# Patient Record
Sex: FEMALE | Race: OTHER | HISPANIC OR LATINO | URBAN - METROPOLITAN AREA
[De-identification: names, ages, dates, MRNs, and addresses within clinical notes are randomized per-mention and may not be internally consistent; named-entity substitution may affect disease eponyms.]

---

## 2021-05-27 ENCOUNTER — INPATIENT (INPATIENT)
Facility: HOSPITAL | Age: 46
LOS: 2 days | Discharge: HOME | DRG: 744 | End: 2021-05-30
Attending: OBSTETRICS & GYNECOLOGY | Admitting: OBSTETRICS & GYNECOLOGY
Payer: MEDICAID

## 2021-05-27 VITALS
TEMPERATURE: 100 F | WEIGHT: 194.01 LBS | HEART RATE: 136 BPM | HEIGHT: 64 IN | RESPIRATION RATE: 16 BRPM | SYSTOLIC BLOOD PRESSURE: 140 MMHG | OXYGEN SATURATION: 97 % | DIASTOLIC BLOOD PRESSURE: 85 MMHG

## 2021-05-27 LAB
ALBUMIN SERPL ELPH-MCNC: 3.8 G/DL — SIGNIFICANT CHANGE UP (ref 3.3–5)
ALP SERPL-CCNC: 122 U/L — HIGH (ref 40–120)
ALT FLD-CCNC: 13 U/L — SIGNIFICANT CHANGE UP (ref 10–45)
ANION GAP SERPL CALC-SCNC: 15 MMOL/L — SIGNIFICANT CHANGE UP (ref 5–17)
AST SERPL-CCNC: 11 U/L — SIGNIFICANT CHANGE UP (ref 10–40)
BASE EXCESS BLDV CALC-SCNC: 3.9 MMOL/L — HIGH (ref -2–2)
BASOPHILS # BLD AUTO: 0.03 K/UL — SIGNIFICANT CHANGE UP (ref 0–0.2)
BASOPHILS NFR BLD AUTO: 0.2 % — SIGNIFICANT CHANGE UP (ref 0–2)
BILIRUB SERPL-MCNC: 0.4 MG/DL — SIGNIFICANT CHANGE UP (ref 0.2–1.2)
BUN SERPL-MCNC: 10 MG/DL — SIGNIFICANT CHANGE UP (ref 7–23)
CA-I SERPL-SCNC: 1.17 MMOL/L — SIGNIFICANT CHANGE UP (ref 1.12–1.3)
CALCIUM SERPL-MCNC: 9.8 MG/DL — SIGNIFICANT CHANGE UP (ref 8.4–10.5)
CHLORIDE BLDV-SCNC: 101 MMOL/L — SIGNIFICANT CHANGE UP (ref 96–108)
CHLORIDE SERPL-SCNC: 98 MMOL/L — SIGNIFICANT CHANGE UP (ref 96–108)
CO2 BLDV-SCNC: 30 MMOL/L — SIGNIFICANT CHANGE UP (ref 22–30)
CO2 SERPL-SCNC: 24 MMOL/L — SIGNIFICANT CHANGE UP (ref 22–31)
CREAT SERPL-MCNC: 0.88 MG/DL — SIGNIFICANT CHANGE UP (ref 0.5–1.3)
EOSINOPHIL # BLD AUTO: 0.02 K/UL — SIGNIFICANT CHANGE UP (ref 0–0.5)
EOSINOPHIL NFR BLD AUTO: 0.1 % — SIGNIFICANT CHANGE UP (ref 0–6)
GAS PNL BLDV: 137 MMOL/L — SIGNIFICANT CHANGE UP (ref 135–145)
GAS PNL BLDV: SIGNIFICANT CHANGE UP
GLUCOSE BLDV-MCNC: 111 MG/DL — HIGH (ref 70–99)
GLUCOSE SERPL-MCNC: 112 MG/DL — HIGH (ref 70–99)
HCG SERPL-ACNC: <2 MIU/ML — SIGNIFICANT CHANGE UP
HCO3 BLDV-SCNC: 29 MMOL/L — SIGNIFICANT CHANGE UP (ref 21–29)
HCT VFR BLD CALC: 37.9 % — SIGNIFICANT CHANGE UP (ref 34.5–45)
HCT VFR BLDA CALC: 42 % — SIGNIFICANT CHANGE UP (ref 39–50)
HGB BLD CALC-MCNC: 13.6 G/DL — SIGNIFICANT CHANGE UP (ref 11.5–15.5)
HGB BLD-MCNC: 12.5 G/DL — SIGNIFICANT CHANGE UP (ref 11.5–15.5)
IMM GRANULOCYTES NFR BLD AUTO: 0.4 % — SIGNIFICANT CHANGE UP (ref 0–1.5)
LACTATE BLDV-MCNC: 1.7 MMOL/L — SIGNIFICANT CHANGE UP (ref 0.7–2)
LYMPHOCYTES # BLD AUTO: 1.47 K/UL — SIGNIFICANT CHANGE UP (ref 1–3.3)
LYMPHOCYTES # BLD AUTO: 9.7 % — LOW (ref 13–44)
MCHC RBC-ENTMCNC: 28.5 PG — SIGNIFICANT CHANGE UP (ref 27–34)
MCHC RBC-ENTMCNC: 33 GM/DL — SIGNIFICANT CHANGE UP (ref 32–36)
MCV RBC AUTO: 86.3 FL — SIGNIFICANT CHANGE UP (ref 80–100)
MONOCYTES # BLD AUTO: 0.84 K/UL — SIGNIFICANT CHANGE UP (ref 0–0.9)
MONOCYTES NFR BLD AUTO: 5.5 % — SIGNIFICANT CHANGE UP (ref 2–14)
NEUTROPHILS # BLD AUTO: 12.76 K/UL — HIGH (ref 1.8–7.4)
NEUTROPHILS NFR BLD AUTO: 84.1 % — HIGH (ref 43–77)
NRBC # BLD: 0 /100 WBCS — SIGNIFICANT CHANGE UP (ref 0–0)
PCO2 BLDV: 47 MMHG — SIGNIFICANT CHANGE UP (ref 35–50)
PH BLDV: 7.41 — SIGNIFICANT CHANGE UP (ref 7.35–7.45)
PLATELET # BLD AUTO: 315 K/UL — SIGNIFICANT CHANGE UP (ref 150–400)
PO2 BLDV: <20 MMHG — LOW (ref 25–45)
POTASSIUM BLDV-SCNC: 3.4 MMOL/L — LOW (ref 3.5–5.3)
POTASSIUM SERPL-MCNC: 3.8 MMOL/L — SIGNIFICANT CHANGE UP (ref 3.5–5.3)
POTASSIUM SERPL-SCNC: 3.8 MMOL/L — SIGNIFICANT CHANGE UP (ref 3.5–5.3)
PROT SERPL-MCNC: 7.8 G/DL — SIGNIFICANT CHANGE UP (ref 6–8.3)
RBC # BLD: 4.39 M/UL — SIGNIFICANT CHANGE UP (ref 3.8–5.2)
RBC # FLD: 13.4 % — SIGNIFICANT CHANGE UP (ref 10.3–14.5)
SAO2 % BLDV: 30 % — LOW (ref 67–88)
SODIUM SERPL-SCNC: 137 MMOL/L — SIGNIFICANT CHANGE UP (ref 135–145)
WBC # BLD: 15.18 K/UL — HIGH (ref 3.8–10.5)
WBC # FLD AUTO: 15.18 K/UL — HIGH (ref 3.8–10.5)

## 2021-05-27 PROCEDURE — 93010 ELECTROCARDIOGRAM REPORT: CPT

## 2021-05-27 PROCEDURE — 99285 EMERGENCY DEPT VISIT HI MDM: CPT

## 2021-05-27 RX ORDER — ACETAMINOPHEN 500 MG
975 TABLET ORAL ONCE
Refills: 0 | Status: COMPLETED | OUTPATIENT
Start: 2021-05-27 | End: 2021-05-27

## 2021-05-27 RX ORDER — SODIUM CHLORIDE 9 MG/ML
1000 INJECTION, SOLUTION INTRAVENOUS ONCE
Refills: 0 | Status: COMPLETED | OUTPATIENT
Start: 2021-05-27 | End: 2021-05-27

## 2021-05-27 RX ORDER — SODIUM CHLORIDE 9 MG/ML
1000 INJECTION, SOLUTION INTRAVENOUS
Refills: 0 | Status: DISCONTINUED | OUTPATIENT
Start: 2021-05-27 | End: 2021-05-29

## 2021-05-27 RX ORDER — VANCOMYCIN HCL 1 G
1750 VIAL (EA) INTRAVENOUS ONCE
Refills: 0 | Status: COMPLETED | OUTPATIENT
Start: 2021-05-27 | End: 2021-05-27

## 2021-05-27 RX ORDER — KETOROLAC TROMETHAMINE 30 MG/ML
15 SYRINGE (ML) INJECTION ONCE
Refills: 0 | Status: DISCONTINUED | OUTPATIENT
Start: 2021-05-27 | End: 2021-05-27

## 2021-05-27 RX ADMIN — Medication 975 MILLIGRAM(S): at 21:37

## 2021-05-27 RX ADMIN — SODIUM CHLORIDE 4000 MILLILITER(S): 9 INJECTION, SOLUTION INTRAVENOUS at 22:42

## 2021-05-27 RX ADMIN — Medication 100 MILLIGRAM(S): at 22:43

## 2021-05-27 RX ADMIN — Medication 15 MILLIGRAM(S): at 22:42

## 2021-05-27 NOTE — ED PROVIDER NOTE - NS ED ROS FT
Constitutional: no fevers, chills  HEENT: +cough +sore throat  Cardiac: no chest pain, palpitations  Respiratory: no SOB  GI: +abdominal pain lower  : no dysuria, frequency, or hematuria +vaginal discharge  MSK: no joint pain  Skin: no rashes  Neuro: no headache, change in vision, weakness  Psych: negative

## 2021-05-27 NOTE — ED ADULT NURSE NOTE - CHIEF COMPLAINT QUOTE
Tampon stuck in vagina x 5 days. Pain/fever/cramps/vaginal bleeding x 1 day.    *recent travel to [place] (Monday return), +sore throat.

## 2021-05-27 NOTE — ED ADULT TRIAGE NOTE - CHIEF COMPLAINT QUOTE
Tampon stuck in vagina x 5 days. Pain/fever/cramps/vaginal bleeding x 1 day. Tampon stuck in vagina x 5 days. Pain/fever/cramps/vaginal bleeding x 1 day.    *recent travel to [place] (Monday return), +sore throat.

## 2021-05-27 NOTE — ED PROVIDER NOTE - CLINICAL SUMMARY MEDICAL DECISION MAKING FREE TEXT BOX
Pt w/ retained tampon now w/ fever and foul smelling vaginal discharge. Febrile, tachy, normotensive. Concern for Toxic shock. Labs, bcx, clinda+vanc, fluids, UA, UCX, EKG, GYN consult. Reassess.

## 2021-05-27 NOTE — ED PROVIDER NOTE - OBJECTIVE STATEMENT
46yo F hx of HTN, DM comes to ED for fever and retained tampon. Pt placed a tampon on Saturday (6 days ago), has been unable to get it out, went to OP office today and they could not retrieve the entirety of the tampon either. Pt today reports fever, tmax 100.6F, took Tylenol in triage. Having vaginal discharge, foul smelling. Also has had 1 day of sore throat, slight cough, and R neck pain. Denies n/v, cp, sob, upper abdominal pain, urinary burning, or bowel change, and rashes.

## 2021-05-27 NOTE — ED PROVIDER NOTE - ATTENDING CONTRIBUTION TO CARE
------------ATTENDING NOTE------------  pt c/o 24 hrs of fevers, diffuse aches, in setting of one week of gradually worsening pelvic pain and increasing malodorous vaginal discharge, complicated as tampon unable to be removed for past 6 days, empiric antibiotics, awaiting imaging and GYN consult and close reassessments -->  - Remigio Rivers MD   --------------------------------------------- ------------ATTENDING NOTE------------  pt c/o 24 hrs of fevers, diffuse aches, in setting of one week of gradually worsening pelvic pain and increasing malodorous vaginal discharge, complicated as tampon unable to be removed for past 6 days, empiric antibiotics, awaiting imaging and GYN consult and close reassessments --> improving w/ IVF/fever reduction, eval by GYN for admission, tx as PID.  - Remigio Rivers MD   ---------------------------------------------

## 2021-05-27 NOTE — ED PROVIDER NOTE - PHYSICAL EXAMINATION
General: non-toxic, NAD  HEENT: NCAT, PERRL, +bilateral tonsilar exudates +tender anterior cervical lymph node of R neck.  Cardiac: RRR, no murmurs, 2+ peripheral pulses  Chest: CTA  Abdomen: soft, non-distended, +ttp of lower abdomen mainly suprapubically   Pelvic: deferred in setting of GYN for tampon retrieval.   Extremities: no peripheral edema, calf tenderness, or leg size discrepancies  Skin: no rashes  Neuro: AAOx4, 5+motor, sensory grossly intact  Psych: mood and affect appropriate

## 2021-05-27 NOTE — ED PROVIDER NOTE - RAPID ASSESSMENT
45y F presents to the ED c/o tampon stuck in the vagina x 5 days. Pt went to OBGYN to remove the tampon however they were unable to remove it completely and stated that "pieces are stuck in the cervix." Pt endorses f/c, pelvic pain, lower abd pain, and a general sense of feeling unwell today.  Denies n/v/d, cough, cp, prior episodes,  rash or itchiness.     IMelody (Scribhawa), have documented this rapid assessment note under the dictation of  Alfred Chen (PA), which has been reviewed and affirmed to be accurate.  Patient was seen as a QPA patient. The patient will be seen and further worked up in the main emergency department and their care will be completed by the main emergency department team along with a thorough physical exam. Receiving team will follow up on labs, analgesia, any clinical imaging, reassess and disposition as clinically indicated, all decisions regarding the progression of care will be made at their discretion. 45y F presents to the ED c/o tampon stuck in the vagina x 5 days. Pt went to OBGYN to remove the tampon however they were unable to remove it completely and stated that "pieces are stuck in the cervix." Pt endorses f/c, pelvic pain, lower abd pain, and a general sense of feeling unwell today.  Denies n/v/d, cough, cp, prior episodes,  rash or pruritus.    IMelody (Karolina), have documented this rapid assessment note under the dictation of  Alfred Chen (PA), which has been reviewed and affirmed to be accurate.  Patient was seen as a QPA patient. The patient will be seen and further worked up in the main emergency department and their care will be completed by the main emergency department team along with a thorough physical exam. Receiving team will follow up on labs, analgesia, any clinical imaging, reassess and disposition as clinically indicated, all decisions regarding the progression of care will be made at their discretion.    IAlfred PA-C, personally performed the service described in the documentation recorded by the scribhawa in my presence, and it accurately and completely records my words and actions.

## 2021-05-27 NOTE — ED ADULT NURSE NOTE - OBJECTIVE STATEMENT
Pt is a 45y F PMH HTN, HLD, DM p/w  tampon stuck in vagina x 6 days. Pt was seen at OB/GYN and they were unable to remove the entire tampon. Reports pelvic pain, fevers, chills, and "feeling unwell." Pt reports Tmax 100.6, tachycardic in triage to 130s. Pt placed on cardiac monitor, improved in ED to 100s. Endorses foul smelling vaginal discharge and one day of cough, sore throat, and neck pain. Denies SOB, chest pain, numbness, tingling. A&Ox4, LEE, lungs clear, distal pulses intact, abdomen soft, skin intact. Side rails up for safety, call bell and personal items within reach, instructed to call for assistance, verbalizes understanding. Will continue to monitor.

## 2021-05-28 ENCOUNTER — RESULT REVIEW (OUTPATIENT)
Age: 46
End: 2021-05-28

## 2021-05-28 ENCOUNTER — TRANSCRIPTION ENCOUNTER (OUTPATIENT)
Age: 46
End: 2021-05-28

## 2021-05-28 DIAGNOSIS — N73.0 ACUTE PARAMETRITIS AND PELVIC CELLULITIS: ICD-10-CM

## 2021-05-28 LAB
A1C WITH ESTIMATED AVERAGE GLUCOSE RESULT: 5.8 % — HIGH (ref 4–5.6)
APPEARANCE UR: CLEAR — SIGNIFICANT CHANGE UP
APTT BLD: 33.2 SEC — SIGNIFICANT CHANGE UP (ref 27.5–35.5)
BACTERIA # UR AUTO: ABNORMAL
BASOPHILS # BLD AUTO: 0.02 K/UL — SIGNIFICANT CHANGE UP (ref 0–0.2)
BASOPHILS NFR BLD AUTO: 0.1 % — SIGNIFICANT CHANGE UP (ref 0–2)
BILIRUB UR-MCNC: NEGATIVE — SIGNIFICANT CHANGE UP
COLOR SPEC: SIGNIFICANT CHANGE UP
DIFF PNL FLD: ABNORMAL
EOSINOPHIL # BLD AUTO: 0.05 K/UL — SIGNIFICANT CHANGE UP (ref 0–0.5)
EOSINOPHIL NFR BLD AUTO: 0.3 % — SIGNIFICANT CHANGE UP (ref 0–6)
EPI CELLS # UR: 1 /HPF — SIGNIFICANT CHANGE UP
ESTIMATED AVERAGE GLUCOSE: 120 MG/DL — HIGH (ref 68–114)
GLUCOSE BLDC GLUCOMTR-MCNC: 111 MG/DL — HIGH (ref 70–99)
GLUCOSE BLDC GLUCOMTR-MCNC: 206 MG/DL — HIGH (ref 70–99)
GLUCOSE BLDC GLUCOMTR-MCNC: 218 MG/DL — HIGH (ref 70–99)
GLUCOSE UR QL: NEGATIVE — SIGNIFICANT CHANGE UP
HCT VFR BLD CALC: 34.8 % — SIGNIFICANT CHANGE UP (ref 34.5–45)
HGB BLD-MCNC: 11.6 G/DL — SIGNIFICANT CHANGE UP (ref 11.5–15.5)
HYALINE CASTS # UR AUTO: 1 /LPF — SIGNIFICANT CHANGE UP (ref 0–2)
IMM GRANULOCYTES NFR BLD AUTO: 0.5 % — SIGNIFICANT CHANGE UP (ref 0–1.5)
INR BLD: 1.12 RATIO — SIGNIFICANT CHANGE UP (ref 0.88–1.16)
KETONES UR-MCNC: NEGATIVE — SIGNIFICANT CHANGE UP
LEUKOCYTE ESTERASE UR-ACNC: ABNORMAL
LYMPHOCYTES # BLD AUTO: 1.15 K/UL — SIGNIFICANT CHANGE UP (ref 1–3.3)
LYMPHOCYTES # BLD AUTO: 7.6 % — LOW (ref 13–44)
MCHC RBC-ENTMCNC: 28.6 PG — SIGNIFICANT CHANGE UP (ref 27–34)
MCHC RBC-ENTMCNC: 33.3 GM/DL — SIGNIFICANT CHANGE UP (ref 32–36)
MCV RBC AUTO: 85.9 FL — SIGNIFICANT CHANGE UP (ref 80–100)
MONOCYTES # BLD AUTO: 0.8 K/UL — SIGNIFICANT CHANGE UP (ref 0–0.9)
MONOCYTES NFR BLD AUTO: 5.3 % — SIGNIFICANT CHANGE UP (ref 2–14)
NEUTROPHILS # BLD AUTO: 13 K/UL — HIGH (ref 1.8–7.4)
NEUTROPHILS NFR BLD AUTO: 86.2 % — HIGH (ref 43–77)
NITRITE UR-MCNC: NEGATIVE — SIGNIFICANT CHANGE UP
NRBC # BLD: 0 /100 WBCS — SIGNIFICANT CHANGE UP (ref 0–0)
PH UR: 6.5 — SIGNIFICANT CHANGE UP (ref 5–8)
PLATELET # BLD AUTO: 279 K/UL — SIGNIFICANT CHANGE UP (ref 150–400)
PROT UR-MCNC: ABNORMAL
PROTHROM AB SERPL-ACNC: 13.4 SEC — SIGNIFICANT CHANGE UP (ref 10.6–13.6)
RBC # BLD: 4.05 M/UL — SIGNIFICANT CHANGE UP (ref 3.8–5.2)
RBC # FLD: 13.6 % — SIGNIFICANT CHANGE UP (ref 10.3–14.5)
RBC CASTS # UR COMP ASSIST: 9 /HPF — HIGH (ref 0–4)
SARS-COV-2 RNA SPEC QL NAA+PROBE: SIGNIFICANT CHANGE UP
SP GR SPEC: 1.01 — LOW (ref 1.01–1.02)
UROBILINOGEN FLD QL: NEGATIVE — SIGNIFICANT CHANGE UP
WBC # BLD: 15.09 K/UL — HIGH (ref 3.8–10.5)
WBC # FLD AUTO: 15.09 K/UL — HIGH (ref 3.8–10.5)
WBC UR QL: 37 /HPF — HIGH (ref 0–5)

## 2021-05-28 PROCEDURE — 88305 TISSUE EXAM BY PATHOLOGIST: CPT | Mod: 26

## 2021-05-28 PROCEDURE — 88342 IMHCHEM/IMCYTCHM 1ST ANTB: CPT | Mod: 26,59

## 2021-05-28 PROCEDURE — 76856 US EXAM PELVIC COMPLETE: CPT | Mod: 26

## 2021-05-28 PROCEDURE — 88360 TUMOR IMMUNOHISTOCHEM/MANUAL: CPT | Mod: 26

## 2021-05-28 PROCEDURE — 99233 SBSQ HOSP IP/OBS HIGH 50: CPT | Mod: GC,25

## 2021-05-28 PROCEDURE — 57454 BX/CURETT OF CERVIX W/SCOPE: CPT | Mod: GC

## 2021-05-28 RX ORDER — DEXTROSE 50 % IN WATER 50 %
25 SYRINGE (ML) INTRAVENOUS ONCE
Refills: 0 | Status: DISCONTINUED | OUTPATIENT
Start: 2021-05-28 | End: 2021-05-29

## 2021-05-28 RX ORDER — ACETAMINOPHEN 500 MG
650 TABLET ORAL EVERY 6 HOURS
Refills: 0 | Status: DISCONTINUED | OUTPATIENT
Start: 2021-05-28 | End: 2021-05-30

## 2021-05-28 RX ORDER — SODIUM CHLORIDE 9 MG/ML
1000 INJECTION, SOLUTION INTRAVENOUS
Refills: 0 | Status: DISCONTINUED | OUTPATIENT
Start: 2021-05-28 | End: 2021-05-29

## 2021-05-28 RX ORDER — DEXTROSE 50 % IN WATER 50 %
15 SYRINGE (ML) INTRAVENOUS ONCE
Refills: 0 | Status: DISCONTINUED | OUTPATIENT
Start: 2021-05-28 | End: 2021-05-29

## 2021-05-28 RX ORDER — INSULIN LISPRO 100/ML
VIAL (ML) SUBCUTANEOUS AT BEDTIME
Refills: 0 | Status: DISCONTINUED | OUTPATIENT
Start: 2021-05-28 | End: 2021-05-29

## 2021-05-28 RX ORDER — IBUPROFEN 200 MG
600 TABLET ORAL EVERY 6 HOURS
Refills: 0 | Status: DISCONTINUED | OUTPATIENT
Start: 2021-05-28 | End: 2021-05-30

## 2021-05-28 RX ORDER — INSULIN LISPRO 100/ML
VIAL (ML) SUBCUTANEOUS
Refills: 0 | Status: DISCONTINUED | OUTPATIENT
Start: 2021-05-28 | End: 2021-05-29

## 2021-05-28 RX ORDER — BENZOCAINE AND MENTHOL 5; 1 G/100ML; G/100ML
1 LIQUID ORAL EVERY 6 HOURS
Refills: 0 | Status: DISCONTINUED | OUTPATIENT
Start: 2021-05-28 | End: 2021-05-30

## 2021-05-28 RX ORDER — LOSARTAN POTASSIUM 100 MG/1
100 TABLET, FILM COATED ORAL DAILY
Refills: 0 | Status: DISCONTINUED | OUTPATIENT
Start: 2021-05-28 | End: 2021-05-30

## 2021-05-28 RX ORDER — CEFOTETAN DISODIUM 1 G
2 VIAL (EA) INJECTION ONCE
Refills: 0 | Status: COMPLETED | OUTPATIENT
Start: 2021-05-28 | End: 2021-05-28

## 2021-05-28 RX ORDER — AMLODIPINE BESYLATE 2.5 MG/1
10 TABLET ORAL DAILY
Refills: 0 | Status: DISCONTINUED | OUTPATIENT
Start: 2021-05-28 | End: 2021-05-30

## 2021-05-28 RX ORDER — ALPRAZOLAM 0.25 MG
0.5 TABLET ORAL ONCE
Refills: 0 | Status: DISCONTINUED | OUTPATIENT
Start: 2021-05-28 | End: 2021-05-28

## 2021-05-28 RX ORDER — DEXTROSE 50 % IN WATER 50 %
25 SYRINGE (ML) INTRAVENOUS ONCE
Refills: 0 | Status: DISCONTINUED | OUTPATIENT
Start: 2021-05-28 | End: 2021-05-30

## 2021-05-28 RX ORDER — SODIUM CHLORIDE 0.65 %
1 AEROSOL, SPRAY (ML) NASAL THREE TIMES A DAY
Refills: 0 | Status: DISCONTINUED | OUTPATIENT
Start: 2021-05-28 | End: 2021-05-30

## 2021-05-28 RX ORDER — GLUCAGON INJECTION, SOLUTION 0.5 MG/.1ML
1 INJECTION, SOLUTION SUBCUTANEOUS ONCE
Refills: 0 | Status: DISCONTINUED | OUTPATIENT
Start: 2021-05-28 | End: 2021-05-30

## 2021-05-28 RX ORDER — CEFOTETAN DISODIUM 1 G
2 VIAL (EA) INJECTION EVERY 12 HOURS
Refills: 0 | Status: DISCONTINUED | OUTPATIENT
Start: 2021-05-28 | End: 2021-05-29

## 2021-05-28 RX ORDER — CEFOTETAN DISODIUM 1 G
VIAL (EA) INJECTION
Refills: 0 | Status: DISCONTINUED | OUTPATIENT
Start: 2021-05-28 | End: 2021-05-29

## 2021-05-28 RX ORDER — SIMETHICONE 80 MG/1
80 TABLET, CHEWABLE ORAL EVERY 6 HOURS
Refills: 0 | Status: DISCONTINUED | OUTPATIENT
Start: 2021-05-28 | End: 2021-05-30

## 2021-05-28 RX ORDER — SENNA PLUS 8.6 MG/1
1 TABLET ORAL AT BEDTIME
Refills: 0 | Status: DISCONTINUED | OUTPATIENT
Start: 2021-05-28 | End: 2021-05-30

## 2021-05-28 RX ORDER — DEXTROSE 50 % IN WATER 50 %
12.5 SYRINGE (ML) INTRAVENOUS ONCE
Refills: 0 | Status: DISCONTINUED | OUTPATIENT
Start: 2021-05-28 | End: 2021-05-30

## 2021-05-28 RX ORDER — HEPARIN SODIUM 5000 [USP'U]/ML
5000 INJECTION INTRAVENOUS; SUBCUTANEOUS EVERY 12 HOURS
Refills: 0 | Status: DISCONTINUED | OUTPATIENT
Start: 2021-05-28 | End: 2021-05-30

## 2021-05-28 RX ADMIN — Medication 0.5 MILLIGRAM(S): at 14:14

## 2021-05-28 RX ADMIN — Medication 2: at 14:25

## 2021-05-28 RX ADMIN — SODIUM CHLORIDE 1000 MILLILITER(S): 9 INJECTION, SOLUTION INTRAVENOUS at 00:45

## 2021-05-28 RX ADMIN — HEPARIN SODIUM 5000 UNIT(S): 5000 INJECTION INTRAVENOUS; SUBCUTANEOUS at 05:37

## 2021-05-28 RX ADMIN — BENZOCAINE AND MENTHOL 1 LOZENGE: 5; 1 LIQUID ORAL at 17:47

## 2021-05-28 RX ADMIN — Medication 110 MILLIGRAM(S): at 17:30

## 2021-05-28 RX ADMIN — Medication 650 MILLIGRAM(S): at 18:01

## 2021-05-28 RX ADMIN — Medication 110 MILLIGRAM(S): at 06:54

## 2021-05-28 RX ADMIN — Medication 650 MILLIGRAM(S): at 17:31

## 2021-05-28 RX ADMIN — Medication 975 MILLIGRAM(S): at 01:04

## 2021-05-28 RX ADMIN — Medication 15 MILLIGRAM(S): at 01:04

## 2021-05-28 RX ADMIN — HEPARIN SODIUM 5000 UNIT(S): 5000 INJECTION INTRAVENOUS; SUBCUTANEOUS at 17:30

## 2021-05-28 RX ADMIN — AMLODIPINE BESYLATE 10 MILLIGRAM(S): 2.5 TABLET ORAL at 05:37

## 2021-05-28 RX ADMIN — Medication 100 GRAM(S): at 17:30

## 2021-05-28 RX ADMIN — Medication 500 MILLIGRAM(S): at 01:15

## 2021-05-28 RX ADMIN — Medication 100 GRAM(S): at 06:28

## 2021-05-28 RX ADMIN — LOSARTAN POTASSIUM 100 MILLIGRAM(S): 100 TABLET, FILM COATED ORAL at 05:37

## 2021-05-28 NOTE — H&P ADULT - ASSESSMENT
TEN HERNANDEZ is a 46y  who presented to ED with new onset fever, pelvic pain, and 5d of foul smelling vaginal discharge following retention of tampon x 2-5d. She denies any chills, dysuria, n/v, diarrhea, SOB, or CP. In the ED pt febrile to 100.7, tachycardic to 136, WBC elevated to 15, and pt with pain to palpation to bilateral lower quadrants and suprapubic region. She denies any chills, dysuria, n/v, diarrhea, SOB, or CP. Pt to be admitted for treatment of PID       Plan:  #Neuro: ibuprofen + tylenol prn pain  #CV: Hemodynamically stable,tachycardia resolved, BP wnl    #Pulm: ROS and PE unremarkable  #ID: last fever 100.7  @ 2120, Covid neg on (). S/p vancomycin + clindamycin in ED will proceed with IV Cefotetan and doxycycline. WBC 15 on admission, f/u CT pelvis, BCx, UCx, pelvic fluid Cx, urine GC/CT   #GI: Reg diet  #: pelvic rest, no insertion of any objects/antibiotics  #FEN: LR @ 125. replete electrolytes prn   #Heme: HSQ, SCDs while in bed, and early ambulation encouraged for DVT prophylaxis      Pt seen and assessed with Dr. Flood.     Swapna Hart MD  OBGYN PGY2  TEN HERNANDEZ is a 46y  who presented to ED with new onset fever, pelvic pain, and 5d of foul smelling vaginal discharge following retention of tampon x 2-5d. She denies any chills, dysuria, n/v, diarrhea, SOB, or CP. In the ED pt febrile to 100.7, tachycardic to 136, WBC elevated to 15, and pt with pain to palpation to bilateral lower quadrants and suprapubic region. She denies any chills, dysuria, n/v, diarrhea, SOB, or CP. Pt to be admitted for treatment of PID and CT pelvis to be performed to confirm removal of foreign body.       Plan:  #Neuro: ibuprofen + tylenol prn pain  #CV: Hemodynamically stable,tachycardia resolved, BP wnl    #Pulm: ROS and PE unremarkable  #ID: last fever 100.7  @ 2120, Covid neg on (). S/p vancomycin + clindamycin in ED will proceed with IV Cefotetan and doxycycline. WBC 15 on admission, f/u CT pelvis, BCx, UCx, pelvic fluid Cx, urine GC/CT   #GI: Reg diet  #: pelvic rest, no insertion of any objects/antibiotics  #FEN: LR @ 125. replete electrolytes prn   #Heme: HSQ, SCDs while in bed, and early ambulation encouraged for DVT prophylaxis      Pt seen and assessed with Dr. Flood.     Swapna Hart MD  OBGYN PGY2

## 2021-05-28 NOTE — DISCHARGE NOTE PROVIDER - CARE PROVIDER_API CALL
[facility]  [facility],   [address] [place] [address] [id number]   [place] [address]  Phone: [phone]  Fax: (   )    -  Follow Up Time:

## 2021-05-28 NOTE — DISCHARGE NOTE PROVIDER - PROVIDER TOKENS
FREE:[LAST:[[facility]],PHONE:[phone]],FAX:[(   [phone]    -],ADDRESS:[[address] [place] [address] [address] [place] [address]]]

## 2021-05-28 NOTE — DISCHARGE NOTE PROVIDER - NSDCMRMEDTOKEN_GEN_ALL_CORE_FT
amLODIPine:  orally   atorvastatin:  orally   cephalexin 500 mg oral tablet: 1 tab(s) orally 3 times a day for 10 days  lisinopril:  orally    amLODIPine:  orally   atorvastatin:  orally   doxycycline hyclate 100 mg oral capsule: 1 cap(s) orally 2 times a day MDD:2 tab/day  Flagyl 500 mg oral tablet: 1 tab(s) orally 2 times a day MDD:2 tab/day  lisinopril:  orally   metFORMIN 1000 mg oral tablet: 1 tab(s) orally 2 times a day

## 2021-05-28 NOTE — DISCHARGE NOTE PROVIDER - HOSPITAL COURSE
46 y.o. with PMHx of T2DM, HLD, HTN initially presented to the ED with pelvic pain and recent hx of retained tampon, 5 days of fowl smelling discharge, admitted with fever and tachycardia. She was started on treatment for presumed PID (Cefotetan/Doxycyline 5/27-). Labs as noted below. On HD#1, it was noted that cervix appeared irregular, dark, and distorted. Cervical biopsies were performed as well as endometrial biopsy and endocervical curettings. Patient's primary OB GYN was contacted (Dr. Michael James) and her records were obtained. It was confirmed that she was seen 5/26 and where he removed the retained tampon. It was noted that she had copious yellow discharge and she was given metrogel with instruction to follow up. Pelvic ultrasound was completed which showed no collection or fluid in the endomtrail canal. Patient wss dicsharged in stable condition.                11.6   15.09 )-----------( 279      ( 05-28 @ 10:20 )             34.8                12.5   15.18 )-----------( 315      ( 05-27 @ 22:20 )             37.9      46 y.o. with PMHx of T2DM, HLD, HTN initially presented to the ED with pelvic pain and recent hx of retained tampon, 5 days of fowl smelling discharge, admitted with fever and tachycardia. She was started on treatment for presumed PID (Cefotetan/Doxycyline 5/27- 5/30). Labs as noted below. On HD#1, it was noted that cervix appeared irregular, dark, and distorted. Cervical biopsies were performed as well as endometrial biopsy and endocervical curettings. Patient's primary OB GYN was contacted (Dr. Michael James) and her records were obtained. It was confirmed that she was seen 5/26 and where he removed the retained tampon. It was noted that she had copious yellow discharge and she was given metrogel with instruction to follow up. Pelvic ultrasound was completed which showed no collection or fluid in the endomtrail canal.     On HDR#3 CTAP was performed, with read that could not rule out retained tampon. Pt remained afebrile, without leukocytosis.     However, given findings patient was taken to OR on HD#4 for EUA and possible diagnostic hysteroscopy. EUA revealed barrel cervix, with friable abnormal tissue seen. ECC and cervical biopsy were obtained. Pt med post operative milestones - voiding, ambulating, tolerating po, pain control.     Pt was discharged home on HD#4 with plan for close follow up. Pt was discharged home with antibiotics for complete 2 week course of Doxy/Flagyl. Pt to have close follow up in GYN clinic, and pathology to be followed up. Patient was dicsharged in stable condition.

## 2021-05-28 NOTE — CHART NOTE - NSCHARTNOTEFT_GEN_A_CORE
R4 Chart Note:     Spoke with patient's OBGYN Dr. Michael James. Per Dr. James patient was seen in his office on Wed 5/26 for complaint of foul smelling vaginal discharge in setting of a retained tampon. On exam Dr. James noted copious yellow discharge and yellow foreign body, presumably a tampon, protruding from the external cervical os which he removed in pieces. A TVUS was completed at that time and there appeared to be remaining heterogenous material within the cervical canal however it was unable to be removed. The patient was then sent home with prescription for metrogel and the plan was for the patient to return to the office after treatment for further management. At the time of the patient's visit she had no pelvic tenderness on exam.     Per Dr. James the patient has been seen by him previously for an annual exam at which time she had a negative pap/HPV neg (1/2021). Additionally, the patient has a h/o of small ovarian cysts noted on US last year however the patient failed to follow up for repeat imaging.       Will await full records from Dr. James's office which are to be faxed over.     d/w Dr. Nicholas Vasquez PGY4

## 2021-05-28 NOTE — CHART NOTE - NSCHARTNOTEFT_GEN_A_CORE
R/B/A of procedure obtained, all questions answered, consent obtained. Pt placed in lithotomy position, speculum placed, cervix visualized, betadine used to clean the cervix,   Punch biopsies taken of cervix at 12, 3, 6, and 9 o'clock. Each placed in individual specimen cup for pathology.   ECC performed and placed in specimen cup for pathology  Endometrial biopsy pipelle placed through cervix with difficulty, 1 passes made with the pipelle.  Pt tolerated the procedure well, no active bleeding. Monsels placed over cerivix. Pt with no pain post procedure.   Specimen taken to pathology directly.    Performed with Dr. Peterson, SVC Attending and Niru PGY4  Asaf PGY2 R/B/A of procedure obtained, all questions answered, consent obtained. Pt placed in lithotomy position, speculum placed, cervix visualized, betadine used to clean the cervix,   Punch biopsies taken of cervix at 12, 3, 6, and 9 o'clock. Each placed in individual specimen cup for pathology.   ECC performed and placed in specimen cup for pathology  Endometrial biopsy pipelle placed through cervix with difficulty, 1 pass made with the pipelle however unable to clearly pass through internal os   Pt tolerated the procedure well, no active bleeding. Monsels placed over cervix. Pt with no pain post procedure.   Specimen taken to pathology directly.    Performed with Dr. Peterson, C Attending and Dr. Marrufo PGY4  Asaf PGY2      GYN ATTENDING PROCEDURE NOTE  R/B/A for endometrial biopsy, ECC, cervical biopsy discussed with patient at length and consent obtained in the presence of a witness  Procedure as above  Will continue in patient monitor and treament for presumed PID  F/u TVUS and CT A/P\    CWChan

## 2021-05-28 NOTE — PROGRESS NOTE ADULT - ATTENDING COMMENTS
GYN ATTENDING NOTE **incomplete**  Ms. Wilson is a 45yo  LMP 5/15 ([facility] neg) now HD#2 a/w presumed pelvic inflammatory disease in the setting of retained tampon.     Timeline of events:   (5/15) CD#1 of menses  () Tampon #1 placed as pt in [facility] and going to [facility], removed same day  () Tampon #2 placed, pt does not remember removing tampon- had vaginal intercourse with . Notes that  did not report retained tampon.   () Noted foul smelling copious discharge per vagina  () Presented to primary GYN's office where pt reports foreign body removed and started on vaginal antibiotic x5 days  () present to ED with Tmax 100.7F s/p 1 dose of Vancomycin/Clindamycin  () admitted to GYN and started on Cefotetan/Doxycycline for presumed PID     Today, pt is afebrile with overall reassuring VS, interval improvement in tachycardia. Exam       Plan  #PID  - fever curve, last T100.8 ( at 21:20)  - f/u AM CBC, trend leukocytosis  - f/u UCx ()   - f/u TVUS  - obtain CT A/P if persistent leukocytosis or interval worsening of symptoms, unresponsive to current antibiotics  - Continue Cefotetan/Doxycycline, consider broadening with addition of Metronidazole if clinical picture worsens  - obtain records from Primary GYN re: cervical cancer screening       CWChan GYN ATTENDING NOTE   Pt evaluated at 8AM and then again at 2PM  Ms. Wilson is a 45yo  LMP 5/15 ([facility] neg) now HD#2 a/w presumed pelvic inflammatory disease in the setting of retained tampon.     Timeline of events:   (5/15) CD#1 of menses  () Tampon #1 placed as pt in [facility] and going to [facility], removed same day  () Tampon #2 placed, pt does not remember removing tampon- had vaginal intercourse with . Notes that  did not report retained tampon.   () Noted foul smelling copious discharge per vagina  () Presented to primary GYN's office where pt reports foreign body removed and started on vaginal antibiotic x5 days  () present to ED with Tmax 100.7F s/p 1 dose of Vancomycin/Clindamycin  () admitted to GYN and started on Cefotetan/Doxycycline for presumed PID     Today, pt reports minimal vaginal bleeding however she reports a hx of HMB with occasional intermenstrual bleeding. She endorses a hx of small ovarian cysts. Records obtained from pt's primary GYN with (2021) cervical cancer cotesting HAKEEM, HPV negative. Prior to this, pt did not have GYN care after her last pregnancy. Currently, she is afebrile with overall reassuring VS, interval improvement in tachycardia. Pelvic examination performed and notable for normal appearing external female genitalia, vaginal mucosa, anterior lip and posterior lip of cervix visualized entirely, no active bleeding however small amount of blood tinged fluid in the vault. At the level of the external os, there is a 1.5x1.5cm white, mildly friable appearing lesion that is flush to external os. On bimanual, the cervix is firm to palpation, unable to delineate size of uterus due to pt's body habitus. Rectovaginal exam without nodularity or firmness of parametria bilaterally. Labs notable for stable leukocytosis 15.09. H/H overall reassuring.     Clinical concern of cervical abnormality discussed with patient. Recommend cervical biopsy, ECC. Also recommend EMB at this time for HMB, >36yo, and RF of obesity. Patient verbalized understanding and is in agreement with plan.     Plan  #PID  - fever curve, last T100.8 ( at 21:20)  - f/u AM CBC, trend leukocytosis  - f/u UCx ()   - f/u TVUS  - obtain CT A/P if persistent leukocytosis or interval worsening of symptoms, unresponsive to current antibiotics  - Continue Cefotetan/Doxycycline, consider broadening with addition of Metronidazole if clinical picture worsens  - obtain records from Primary GYN re: cervical cancer screening     #Cervical mass  -f/u cervical biopsy, ECC    #AUB  -f/u TVUS  -f/u EMB    Jordi BLACK Fellow

## 2021-05-28 NOTE — H&P ADULT - ATTENDING COMMENTS
Patient presents with complaints of vaginal discharge, foul smelling; abdominal pain and fevers. Patient with history of removal of retained tampon and given vaginal antibiotic by primary GYN. Reports taking 2 doses of vaginal medication.   Reports having intercourse with penis and toys per vagina with tampon in place.   Tolerating diet, ambulating and voiding spontaneously.   Past medical and surgical history reviewed. Allergies confirmed.   Obstetrical and GYN history reviewed.   Vitals reviewed, notable for T 100.7, mild tachycardia and tachypnea, normotensive   Gen: no acute distress, appears mildly diaphoretic   Abd: bilateral lower quadrant abdominal pain, no rebound or guarding noted   Speculum: cervix visualized, dark appearance at os with eschar appearance, grayish discharge from os, no notable mass seen   Pelvix exam: + bilateral adenexal discomfort, no masses palpated bilaterally   Cervix palpated without mass noted and mild tenderness with manipulation   Ext: no calf tenderness   Labs reviewed WBC 15   A/P: Suspected PID due to prolonged retained tampon   -IV antibiotics   -f/u cultures   -continue to monitor vitals   -CT pelvis to evaluate for retained material   -pain control PRN   -regular diet   -AM labs   -GYN team to be informed     Patient expressed understanding of the plan     DULCE Flood

## 2021-05-28 NOTE — DISCHARGE NOTE PROVIDER - NSDCFUADDINST_GEN_ALL_CORE_FT
Please make an appointment to see your doctor as ordered.     Nothing per vagina - no douching, no tampons, no sex.     Please call your doctor if you develop: fevers, nausea or vomiting, or have pain not relieved by motrin and/or tylenol.

## 2021-05-28 NOTE — DISCHARGE NOTE PROVIDER - NSDCCPCAREPLAN_GEN_ALL_CORE_FT
PRINCIPAL DISCHARGE DIAGNOSIS  Diagnosis: PID (acute pelvic inflammatory disease)  Assessment and Plan of Treatment:

## 2021-05-28 NOTE — PROGRESS NOTE ADULT - ASSESSMENT
46y p/w recent history of retained tampon s/p removal from outside OB a/w pelvic pain, fever and tachycardia. Overnight, patient had episode of chills and was unable to get CT done due to panic attack, otherwise feeling well.    46y p/w recent history of retained tampon s/p removal with outside OB  admitted with pelvic pain, fever and tachycardia for treatment of PID. Overnight, patient had episode of chills and was unable to get CT done due to panic attack. Patient afebrile overnight without acute complaints.     -Plan to complete TVUS and CTAP.  -Will contact patient's private OBGYN Dr. Michael zuniga and obtain medical -records  -Will obtain vaginal cultures  -c/w treatment of PID with IV abx (Cefotetan, Doxy) and trend leukocytosis

## 2021-05-28 NOTE — PROGRESS NOTE ADULT - SUBJECTIVE AND OBJECTIVE BOX
R2 GYN Progress Note   HD#1     Patient seen and examined at bedside.  Reports having some mild chills last night, otherwise feeling well following beginning antibiotics. Reports going to CT scan but had panic attack and was unable to complete. No acute complaints.  Pain well controlled. Patient is ambulating, passing flatus. Patient is voiding spontaneously.   Denies CP, SOB, N/V.     Vital Signs Last 24 Hours  T(C): 36.7 (05-28-21 @ 05:20), Max: 38.2 (05-27-21 @ 21:20)  HR: 102 (05-28-21 @ 05:20) (89 - 136)  BP: 126/85 (05-28-21 @ 05:20) (115/73 - 140/85)  RR: 18 (05-28-21 @ 05:20) (16 - 20)  SpO2: 100% (05-28-21 @ 05:20) (96% - 100%)    I&O's Summary    27 May 2021 07:01  -  28 May 2021 07:00  --------------------------------------------------------  IN: 470 mL / OUT: 0 mL / NET: 470 mL        Physical Exam:  General: NAD  CV: RR  Lungs: CTA b/l  Abdomen: Soft, nontender, normoactive bowel sounds  : no bleeding on pad  Ext: No pain or swelling     Labs:                        12.5   15.18 )-----------( 315      ( 27 May 2021 22:20 )             37.9   baso 0.2    eos 0.1    imm gran 0.4    lymph 9.7    mono 5.5    poly 84.1       MEDICATIONS  (STANDING):  amLODIPine   Tablet 10 milliGRAM(s) Oral daily  cefoTEtan  IVPB      cefoTEtan  IVPB 2 Gram(s) IV Intermittent every 12 hours  dextrose 40% Gel 15 Gram(s) Oral once  dextrose 5%. 1000 milliLiter(s) (50 mL/Hr) IV Continuous <Continuous>  dextrose 5%. 1000 milliLiter(s) (100 mL/Hr) IV Continuous <Continuous>  dextrose 50% Injectable 25 Gram(s) IV Push once  dextrose 50% Injectable 12.5 Gram(s) IV Push once  dextrose 50% Injectable 25 Gram(s) IV Push once  doxycycline IVPB      doxycycline IVPB 100 milliGRAM(s) IV Intermittent every 12 hours  glucagon  Injectable 1 milliGRAM(s) IntraMuscular once  heparin   Injectable 5000 Unit(s) SubCutaneous every 12 hours  insulin lispro (ADMELOG) corrective regimen sliding scale   SubCutaneous three times a day before meals  insulin lispro (ADMELOG) corrective regimen sliding scale   SubCutaneous at bedtime  lactated ringers. 1000 milliLiter(s) (150 mL/Hr) IV Continuous <Continuous>  losartan 100 milliGRAM(s) Oral daily    MEDICATIONS  (PRN):  acetaminophen   Tablet .. 650 milliGRAM(s) Oral every 6 hours PRN Mild Pain (1 - 3)  ibuprofen  Tablet. 600 milliGRAM(s) Oral every 6 hours PRN Moderate Pain (4 - 6)           GYN Progress Note   HD#1     Patient seen and examined at bedside.  Reports having some mild chills last night, otherwise feeling well following beginning antibiotics. Patient reports having had  Reports going to CT scan but had panic attack and was unable to complete. Patient currently without acute complaints.  Pain well controlled. Patient is ambulating, passing flatus. Patient is voiding spontaneously.   Denies CP, SOB, N/V.         Vital Signs Last 24 Hours  T(C): 36.7 (05-28-21 @ 05:20), Max: 38.2 (05-27-21 @ 21:20)  HR: 102 (05-28-21 @ 05:20) (89 - 136)  BP: 126/85 (05-28-21 @ 05:20) (115/73 - 140/85)  RR: 18 (05-28-21 @ 05:20) (16 - 20)  SpO2: 100% (05-28-21 @ 05:20) (96% - 100%)    I&O's Summary    27 May 2021 07:01  -  28 May 2021 07:00  --------------------------------------------------------  IN: 470 mL / OUT: 0 mL / NET: 470 mL        Physical Exam:  General: NAD  CV: RR  Lungs: CTA b/l  Abdomen: Soft, nontender, normoactive bowel sounds  : no bleeding on pad  Ext: No pain or swelling     Labs:                        12.5   15.18 )-----------( 315      ( 27 May 2021 22:20 )             37.9   baso 0.2    eos 0.1    imm gran 0.4    lymph 9.7    mono 5.5    poly 84.1       MEDICATIONS  (STANDING):  amLODIPine   Tablet 10 milliGRAM(s) Oral daily  cefoTEtan  IVPB      cefoTEtan  IVPB 2 Gram(s) IV Intermittent every 12 hours  dextrose 40% Gel 15 Gram(s) Oral once  dextrose 5%. 1000 milliLiter(s) (50 mL/Hr) IV Continuous <Continuous>  dextrose 5%. 1000 milliLiter(s) (100 mL/Hr) IV Continuous <Continuous>  dextrose 50% Injectable 25 Gram(s) IV Push once  dextrose 50% Injectable 12.5 Gram(s) IV Push once  dextrose 50% Injectable 25 Gram(s) IV Push once  doxycycline IVPB      doxycycline IVPB 100 milliGRAM(s) IV Intermittent every 12 hours  glucagon  Injectable 1 milliGRAM(s) IntraMuscular once  heparin   Injectable 5000 Unit(s) SubCutaneous every 12 hours  insulin lispro (ADMELOG) corrective regimen sliding scale   SubCutaneous three times a day before meals  insulin lispro (ADMELOG) corrective regimen sliding scale   SubCutaneous at bedtime  lactated ringers. 1000 milliLiter(s) (150 mL/Hr) IV Continuous <Continuous>  losartan 100 milliGRAM(s) Oral daily    MEDICATIONS  (PRN):  acetaminophen   Tablet .. 650 milliGRAM(s) Oral every 6 hours PRN Mild Pain (1 - 3)  ibuprofen  Tablet. 600 milliGRAM(s) Oral every 6 hours PRN Moderate Pain (4 - 6)

## 2021-05-28 NOTE — PATIENT PROFILE ADULT - ARE SIGNIFICANT INDICATORS COMPLETE.
Assessment/Plan:          Diagnoses and all orders for this visit:    Adult general medical exam  Comments:  recommend flu shot  Orders:  -     Lipid Panel with Direct LDL reflex; Future  -     Comprehensive metabolic panel; Future  -     CBC and differential; Future    Need for hepatitis C screening test  -     Hepatitis C antibody; Future    Bradycardia  Comments:   asymptomatic  Advised to recheck Holter monitor to rule out less than 40 beat heart rate  Patient declined    Elevated PSA  Comments:   referred to Urology  Discussed with patient rule out prostate cancer  Patient declined  Orders:  -     PSA, total and free; Future  -     Urinalysis with reflex to microscopic    Blood pressure elevated without history of HTN  Comments:   recheck blood pressure with next office visit    Visual disturbance of one eye  Comments:  recommend finishing work up ,holter , echo/ Doppler  Patient declined  advised to follow with Ophthalmology    Screening for colon cancer  -     POCT hemoccult screening    Right inguinal hernia  Comments:   recommend referral to surgery  Patient declined  patient to call if he developed pain or hernia get enlarged    Enlarged prostate    Marijuana use  Comments:   consult patient about stopping vaping marijuana            Subjective:     Patient ID: Petey Irwin is a 56 y o  male      Pt is here to follow with his medical problems     Elevated PSA, pt didn't see urology   Denied problems with urination   Denied wt loss   bradycadia ,  pain patient stated he had a Holter monitor 2 years ago  Was benign  Denied dizziness, shortness of breath or fatigue  Patient stated per last year he 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  He was seen by ophthalmologist and neurologist   Patient stated he start very being marijuana and vision went back to normal in few months  Patient denied any recurrence of vision loss  Patient denied weakness, numbness denied the slurred speech    Patient stated her his Yes the visual loss was related to his retina problem not been following with Ophthalmology  New complaint  Bulging at the right inguinal area  Started 2 months ago  Stable  Soft  The he sees with standing, it goes away when he lays down  Moderate size  And persistent pt denied pain  Labs done 4-2018 discussed results with ot        Review of Systems   Constitutional: Negative for chills, fatigue, fever and unexpected weight change  HENT: Negative for sore throat and trouble swallowing  Eyes: Negative for visual disturbance  Respiratory: Negative for cough and shortness of breath  Cardiovascular: Negative for chest pain, palpitations and leg swelling  Gastrointestinal: Negative for abdominal pain, blood in stool, constipation, diarrhea, nausea and vomiting  Endocrine: Negative for polydipsia and polyphagia  Genitourinary: Negative for dysuria, flank pain, frequency, hematuria and urgency  Musculoskeletal: Negative for arthralgias, back pain, gait problem, joint swelling and myalgias  Neurological: Negative for dizziness, numbness and headaches  Hematological: Negative for adenopathy  Psychiatric/Behavioral: The patient is not nervous/anxious  Objective:     Physical Exam   Constitutional: He is oriented to person, place, and time  He appears well-developed and well-nourished  No distress  HENT:   Head: Normocephalic  Mouth/Throat: Oropharynx is clear and moist  No oropharyngeal exudate  Eyes: Pupils are equal, round, and reactive to light  EOM are normal  No scleral icterus  Neck: Normal range of motion  Neck supple  No JVD present  No tracheal deviation present  Cardiovascular: Normal rate, regular rhythm and normal heart sounds  Exam reveals no gallop and no friction rub  No murmur heard  Pulses:       Carotid pulses are 3+ on the right side, and 3+ on the left side    Legs , no edema    Pulmonary/Chest: Effort normal and breath sounds normal    Abdominal: Soft  Bowel sounds are normal  He exhibits no mass  There is no tenderness  A hernia is present    smal size right inguinal hernia , + pulging with standing , rducible,   Musculoskeletal: Normal range of motion  He exhibits no edema or tenderness  Lymphadenopathy:     He has no cervical adenopathy  Neurological: He is alert and oriented to person, place, and time  No cranial nerve deficit  He exhibits normal muscle tone  Coordination normal    Normal gait   Skin: No rash noted  Psychiatric: He has a normal mood and affect   His behavior is normal  Judgment and thought content normal

## 2021-05-28 NOTE — DISCHARGE NOTE PROVIDER - NSDCFUADDAPPT_GEN_ALL_CORE_FT
**Schedule a postoperative appointment with the [facility] in 2 weeks**

## 2021-05-28 NOTE — H&P ADULT - HISTORY OF PRESENT ILLNESS
TEN HERNANDEZ is a 46y  who presented to ED following 5d of foul smelling vaginal discharge, pelvic pain, and fever of 100.6  @ 0900. She recalls inserting a tampon due to menses , forgot about it and the proceed to have sexual intercourse with her  for the next two consecutive days. She became concerned during sexual intercourse on  because fo the foul smell she and her  noticed and she remembered she had never removed her tampon. She was seen in the office by her obgyn (who she cannot recall the name of)  where a TVUS was performed, pt reports he "took out the tampon in pieces", and that she felt lots of "scraping and had heavy bleeding" following the procedure. She was prescribed a topical antibiotic that she cannot recall the name of and has been inserting it vaginally twice a day for the past 2 days. She is unsure what the TVUS performed in the office showed as her doctor had to emergently leave her visit and she was never counseled regarding her results. She woke up the morning of  in a pool of sweat and took her temp which was 100.6. She took and Advil but was concerned about her well being so she came to the ED to be evaluated. She denies any chills, dysuria, n/v, diarrhea, SOB, or CP. In the ED pt febrile to 100.7, tachycardic to 136, and WBC elevated to 15.      OB/GYN HISTORY:     LMP: 5/15/21   ( ,  AB,  )  (+) known ovarian cyst  Pt denies any hx of fibroids, endometriosis, STIs or abnormal pap smears                                            REVIEW OF SYSTEMS:  CONSTITUTIONAL: No weakness, (+) fevers, no chills  RESPIRATORY: No shortness of breath  CARDIOVASCULAR: No chest pain or palpitations  GASTROINTESTINAL: No abdominal or epigastric pain. No nausea, vomiting; No diarrhea  GENITOURINARY: No dysuria, frequency or hematuria  NEUROLOGICAL: No headache, LOC  All other review of systems is negative unless indicated above      Allergies:  No Known Allergies/Intolerances        PAST MEDICAL & SURGICAL HISTORY:  Hypertension    Hypercholesteremia    Pre-diabetes    No significant past surgical history            FAMILY HISTORY:  No pertinent family history in first degree relatives      SOCIAL HISTORY:  Lives with daughter,  lives in [place]  Denies any alcohol/tobacco/illicit drug use     Vital Signs Last 24 Hrs  T(C): 37 (27 May 2021 23:11), Max: 38.2 (27 May 2021 21:20)  T(F): 98.6 (27 May 2021 23:11), Max: 100.7 (27 May 2021 21:20)  HR: 99 (27 May 2021 23:11) (99 - 136)  BP: 128/89 (27 May 2021 23:11) (128/89 - 140/85)  BP(mean): --  RR: 20 (27 May 2021 23:11) (16 - 20)  SpO2: 96% (27 May 2021 23:11) (96% - 97%)    PHYSICAL EXAM:  Constitutional: NAD, awake and alert, well-developed  Respiratory: Breath sounds are clear bilaterally, No wheezing, rales or rhonchi  Cardiovascular: S1 and S2, regular rate and rhythm, no Murmurs, gallops or rubs  Gastrointestinal: soft, obese, nondistended, no guarding, no rebound  Genitourinary: brown/pink singed vaginal discharge appreciated (culture sent), no cervical/vaginal bleeding, cervical os closed with abnormal contour and white/brown/black discoloration ?scar tissue vs monsels, no foreign objects appreciated, no CMT, tender on bimanual exam with palpation of bilateral lower quadrants and suprapubic region    Neurological: A/O x 3, no focal deficits  Skin: No rashes    LABS:                        12.5   15.18 )-----------( 315      ( 27 May 2021 22:20 )             37.9         137  |  98  |  10  ----------------------------<  112<H>  3.8   |  24  |  0.88    Ca    9.8      27 May 2021 22:20    TPro  7.8  /  Alb  3.8  /  TBili  0.4  /  DBili  x   /  AST  11  /  ALT  13  /  AlkPhos  122<H>      I&O's Detail      Urinalysis Basic - ( 27 May 2021 23:49 )    Color: Light Yellow / Appearance: Clear / S.008 / pH: x  Gluc: x / Ketone: Negative  / Bili: Negative / Urobili: Negative   Blood: x / Protein: 30 mg/dL / Nitrite: Negative   Leuk Esterase: Large / RBC: 9 /hpf / WBC 37 /HPF   Sq Epi: x / Non Sq Epi: 1 /hpf / Bacteria: Many        RADIOLOGY & ADDITIONAL STUDIES:  **CT Pelvis pending** TEN HERNANDEZ is a 46y  who presented to ED following 5d of foul smelling vaginal discharge, pelvic pain, and fever of 100.6  @ 0900. She recalls inserting a tampon due to menses , forgot about it and the proceed to have sexual intercourse with her  for the next two consecutive days. She became concerned during sexual intercourse on  because fo the foul smell she and her  noticed and she remembered she had never removed her tampon. She was seen in the office by her obgyn (who she cannot recall the name of)  where a TVUS was performed, pt reports he "took out the tampon in pieces", and that she felt lots of "scraping and had heavy bleeding" following the procedure. She was prescribed a topical antibiotic that she cannot recall the name of and has been inserting it vaginally twice a day for the past 2 days. She is unsure what the TVUS performed in the office showed as her doctor had to emergently leave her visit and she was never counseled regarding her results. She woke up the morning of  in a pool of sweat and took her temp which was 100.6. She took and Advil but was concerned about her well being so she came to the ED to be evaluated. She denies any chills, dysuria, n/v, diarrhea, SOB, or CP. In the ED pt febrile to 100.7, tachycardic to 136, and WBC elevated to 15.      OB/GYN HISTORY:     LMP: 5/15/21   ( ,  AB,  )  (+) known ovarian cyst  Pt denies any hx of fibroids, endometriosis, STIs or abnormal pap smears                                            REVIEW OF SYSTEMS:  CONSTITUTIONAL: No weakness, (+) fevers, no chills  RESPIRATORY: No shortness of breath  CARDIOVASCULAR: No chest pain or palpitations  GASTROINTESTINAL: No abdominal or epigastric pain. No nausea, vomiting; No diarrhea  GENITOURINARY: No dysuria, frequency or hematuria  NEUROLOGICAL: No headache, LOC  All other review of systems is negative unless indicated above      Allergies:  No Known Allergies/Intolerances        PAST MEDICAL & SURGICAL HISTORY:  Hypertension    Hypercholesteremia    Diabetes Mellitus, type 2    No significant past surgical history            FAMILY HISTORY:  No pertinent family history in first degree relatives      SOCIAL HISTORY:  Lives with daughter,  lives in [place]  Denies any alcohol/tobacco/illicit drug use     Vital Signs Last 24 Hrs  T(C): 37 (27 May 2021 23:11), Max: 38.2 (27 May 2021 21:20)  T(F): 98.6 (27 May 2021 23:11), Max: 100.7 (27 May 2021 21:20)  HR: 99 (27 May 2021 23:11) (99 - 136)  BP: 128/89 (27 May 2021 23:11) (128/89 - 140/85)  BP(mean): --  RR: 20 (27 May 2021 23:11) (16 - 20)  SpO2: 96% (27 May 2021 23:11) (96% - 97%)    PHYSICAL EXAM:  Constitutional: NAD, awake and alert, well-developed  Respiratory: Breath sounds are clear bilaterally, No wheezing, rales or rhonchi  Cardiovascular: S1 and S2, regular rate and rhythm, no Murmurs, gallops or rubs  Gastrointestinal: soft, obese, nondistended, no guarding, no rebound  Genitourinary: brown/pink singed vaginal discharge appreciated (culture sent), no cervical/vaginal bleeding, cervical os closed with abnormal contour and white/brown/black discoloration ?scar tissue vs monsels, no foreign objects appreciated, no CMT, tender on bimanual exam with palpation of bilateral lower quadrants and suprapubic region    Neurological: A/O x 3, no focal deficits  Skin: No rashes    LABS:                        12.5   15.18 )-----------( 315      ( 27 May 2021 22:20 )             37.9         137  |  98  |  10  ----------------------------<  112<H>  3.8   |  24  |  0.88    Ca    9.8      27 May 2021 22:20    TPro  7.8  /  Alb  3.8  /  TBili  0.4  /  DBili  x   /  AST  11  /  ALT  13  /  AlkPhos  122<H>      I&O's Detail      Urinalysis Basic - ( 27 May 2021 23:49 )    Color: Light Yellow / Appearance: Clear / S.008 / pH: x  Gluc: x / Ketone: Negative  / Bili: Negative / Urobili: Negative   Blood: x / Protein: 30 mg/dL / Nitrite: Negative   Leuk Esterase: Large / RBC: 9 /hpf / WBC 37 /HPF   Sq Epi: x / Non Sq Epi: 1 /hpf / Bacteria: Many        RADIOLOGY & ADDITIONAL STUDIES:  **CT Pelvis pending**

## 2021-05-29 ENCOUNTER — TRANSCRIPTION ENCOUNTER (OUTPATIENT)
Age: 46
End: 2021-05-29

## 2021-05-29 LAB
COVID-19 SPIKE DOMAIN AB INTERP: NEGATIVE — SIGNIFICANT CHANGE UP
COVID-19 SPIKE DOMAIN ANTIBODY RESULT: 0.4 U/ML — SIGNIFICANT CHANGE UP
HCT VFR BLD CALC: 35.8 % — SIGNIFICANT CHANGE UP (ref 34.5–45)
HGB BLD-MCNC: 11.8 G/DL — SIGNIFICANT CHANGE UP (ref 11.5–15.5)
MCHC RBC-ENTMCNC: 29.4 PG — SIGNIFICANT CHANGE UP (ref 27–34)
MCHC RBC-ENTMCNC: 33 GM/DL — SIGNIFICANT CHANGE UP (ref 32–36)
MCV RBC AUTO: 89.3 FL — SIGNIFICANT CHANGE UP (ref 80–100)
N GONORRHOEA RRNA SPEC QL NAA+PROBE: SIGNIFICANT CHANGE UP
NRBC # BLD: 0 /100 WBCS — SIGNIFICANT CHANGE UP (ref 0–0)
PLATELET # BLD AUTO: 154 K/UL — SIGNIFICANT CHANGE UP (ref 150–400)
RBC # BLD: 4.01 M/UL — SIGNIFICANT CHANGE UP (ref 3.8–5.2)
RBC # FLD: 13.8 % — SIGNIFICANT CHANGE UP (ref 10.3–14.5)
SARS-COV-2 IGG+IGM SERPL QL IA: 0.4 U/ML — SIGNIFICANT CHANGE UP
SARS-COV-2 IGG+IGM SERPL QL IA: NEGATIVE — SIGNIFICANT CHANGE UP
SPECIMEN SOURCE: SIGNIFICANT CHANGE UP
WBC # BLD: 5.89 K/UL — SIGNIFICANT CHANGE UP (ref 3.8–10.5)
WBC # FLD AUTO: 5.89 K/UL — SIGNIFICANT CHANGE UP (ref 3.8–10.5)

## 2021-05-29 PROCEDURE — 74177 CT ABD & PELVIS W/CONTRAST: CPT | Mod: 26

## 2021-05-29 PROCEDURE — 99231 SBSQ HOSP IP/OBS SF/LOW 25: CPT

## 2021-05-29 RX ORDER — CEFOTETAN DISODIUM 1 G
2 VIAL (EA) INJECTION EVERY 12 HOURS
Refills: 0 | Status: DISCONTINUED | OUTPATIENT
Start: 2021-05-29 | End: 2021-05-30

## 2021-05-29 RX ORDER — SODIUM CHLORIDE 9 MG/ML
1000 INJECTION, SOLUTION INTRAVENOUS
Refills: 0 | Status: DISCONTINUED | OUTPATIENT
Start: 2021-05-29 | End: 2021-05-30

## 2021-05-29 RX ORDER — METRONIDAZOLE 500 MG
500 TABLET ORAL EVERY 8 HOURS
Refills: 0 | Status: DISCONTINUED | OUTPATIENT
Start: 2021-05-29 | End: 2021-05-29

## 2021-05-29 RX ORDER — METRONIDAZOLE 500 MG
500 TABLET ORAL EVERY 8 HOURS
Refills: 0 | Status: DISCONTINUED | OUTPATIENT
Start: 2021-05-29 | End: 2021-05-30

## 2021-05-29 RX ORDER — ALPRAZOLAM 0.25 MG
1 TABLET ORAL ONCE
Refills: 0 | Status: DISCONTINUED | OUTPATIENT
Start: 2021-05-29 | End: 2021-05-29

## 2021-05-29 RX ADMIN — BENZOCAINE AND MENTHOL 1 LOZENGE: 5; 1 LIQUID ORAL at 05:08

## 2021-05-29 RX ADMIN — BENZOCAINE AND MENTHOL 1 LOZENGE: 5; 1 LIQUID ORAL at 22:51

## 2021-05-29 RX ADMIN — Medication 100 GRAM(S): at 17:06

## 2021-05-29 RX ADMIN — Medication 110 MILLIGRAM(S): at 05:35

## 2021-05-29 RX ADMIN — HEPARIN SODIUM 5000 UNIT(S): 5000 INJECTION INTRAVENOUS; SUBCUTANEOUS at 17:06

## 2021-05-29 RX ADMIN — Medication 1 MILLIGRAM(S): at 08:19

## 2021-05-29 RX ADMIN — SENNA PLUS 1 TABLET(S): 8.6 TABLET ORAL at 22:41

## 2021-05-29 RX ADMIN — AMLODIPINE BESYLATE 10 MILLIGRAM(S): 2.5 TABLET ORAL at 05:35

## 2021-05-29 RX ADMIN — Medication 100 GRAM(S): at 05:35

## 2021-05-29 RX ADMIN — SODIUM CHLORIDE 125 MILLILITER(S): 9 INJECTION, SOLUTION INTRAVENOUS at 11:33

## 2021-05-29 RX ADMIN — Medication 100 MILLIGRAM(S): at 13:24

## 2021-05-29 RX ADMIN — Medication 110 MILLIGRAM(S): at 17:06

## 2021-05-29 RX ADMIN — Medication 100 MILLIGRAM(S): at 22:43

## 2021-05-29 RX ADMIN — LOSARTAN POTASSIUM 100 MILLIGRAM(S): 100 TABLET, FILM COATED ORAL at 05:35

## 2021-05-29 RX ADMIN — HEPARIN SODIUM 5000 UNIT(S): 5000 INJECTION INTRAVENOUS; SUBCUTANEOUS at 05:35

## 2021-05-29 NOTE — PROGRESS NOTE ADULT - ATTENDING COMMENTS
GYN ATTENDING NOTE   Ms. Wilson is a 45yo  [place] [place]#3 a/w presumed pelvic inflammatory disease in the setting of retained tampon (which was removed outpatient by primary GYN) found to have cervical lesion c/f malignancy on this admission. She is s/p cervical biopsies, ECC, EMB on () for cervical mass and hx of AUB. TVUS notable for thin EE, no acute intrauterine lesions, and simple appearing R ovarian cyst likely physiologic. No e/o of retained tampon, suspect endocervical canal lesion. Today, she reports interval resolution in vaginal bleeding. Afebrile with interval resolution in leukocytosis. Exam entirely nonfocal and benign. Patient recovering well. Will transition to oral abx and obtain CT A/P with contrast to evaluate cervical mass.       Plan  #PID  - fever curve, last T100.8 ( at 21:20)  - f/u UCx (), genital culture (), GC/CT ()  - Transition to Doxycycline 100mg BID and Metronidazole 500mg q8h PO x7 days for outpatient management     #Cervical mass/ AUB  -f/u cervical biopsy, ECC, EMB ()  -f/u CT A/P with contrast, premedicate with Alprazolam for procedure associated anxiety      Anticipate dc home today  C/f cervical malignancy dw patient in depth. All questions answered.    Jordi BLACK Fellow.

## 2021-05-29 NOTE — PROGRESS NOTE ADULT - PROBLEM SELECTOR PLAN 1
Neuro: pain controlled, Motrin/Tylenol PRN  CV: Hemodynamically stable.   Pulm: Saturating well on room air, encourage oob/amb  GI:  Continue regular diet  : UOP, Voiding spontaneously  Heme: c/w HSQ and SCDs for DVT ppx  FEN: SLIV.    ID: Afebrile, on Cefotetan + Doxycycline (5/27-) f/u AM CBC. Trend leukocytosis. f/u Bcx, Ucx (5/27); f/y GC/CT (5/28). F/u EMB/ECC/Cervical Bx (5/28)  Endo: ISS   Dispo: will reattempt CT today; premedicate with Xanax    ANAMranheri PGY2

## 2021-05-29 NOTE — CHART NOTE - NSCHARTNOTEFT_GEN_A_CORE
R2 Note    Spoke with patient at bedside regarding the CT findings of retained tampon in the endocervical canal. Explained that, given her hx of fevers and leukocytosis, we would recommend an EUA and diagnostic hysteroscopy during this admission to remove any retained objects. She was instructed to hold eating in anticipation of possible procedure later this evening. Will start IVF. The patient is amenable to this plan. Patient added on to OR.       D/w Dr. David RFrankel PGY2

## 2021-05-29 NOTE — PROGRESS NOTE ADULT - SUBJECTIVE AND OBJECTIVE BOX
R2  GYN  Progress Note     HD#2    Patient seen and examined at bedside. No acute events overnight. No acute complaints. Minimal amount of pelvic pain, well controlled with medication. No vaginal bleeding. Ambulating and tolerating regular diet. She was unable to go to CT yesterday due to medication wearing off, but is amenable to trying again today. Patient is voiding spontaneously.  Denies CP, SOB, N/V, fevers, and chills.    Vital Signs Last 24 Hours  T(C): 36.7 (05-29-21 @ 05:06), Max: 37.8 (05-28-21 @ 08:41)  HR: 88 (05-29-21 @ 05:06) (86 - 107)  BP: 122/83 (05-29-21 @ 05:06) (111/75 - 134/88)  RR: 18 (05-29-21 @ 05:06) (18 - 18)  SpO2: 99% (05-29-21 @ 05:06) (96% - 99%)    I&O's Summary    28 May 2021 07:01  -  29 May 2021 07:00  --------------------------------------------------------  IN: 4270 mL / OUT: 1200 mL / NET: 3070 mL        Physical Exam:  General: NAD  CV: RR  Lungs: CTA b/l  Abdomen: Soft, nontender, normoactive bowel sounds  : no bleeding on pad  Ext: No pain or swelling     Labs:                        11.8   5.89  )-----------( 154      ( 29 May 2021 06:36 )             35.8   baso x      eos x      imm gran x      lymph x      mono x      poly x                            11.6   15.09 )-----------( 279      ( 28 May 2021 10:20 )             34.8   baso 0.1    eos 0.3    imm gran 0.5    lymph 7.6    mono 5.3    poly 86.2                         12.5   15.18 )-----------( 315      ( 27 May 2021 22:20 )             37.9   baso 0.2    eos 0.1    imm gran 0.4    lymph 9.7    mono 5.5    poly 84.1       MEDICATIONS  (STANDING):  amLODIPine   Tablet 10 milliGRAM(s) Oral daily  benzocaine 15 mG/menthol 3.6 mG (Sugar-Free) Lozenge 1 Lozenge Oral every 6 hours  cefoTEtan  IVPB 2 Gram(s) IV Intermittent every 12 hours  cefoTEtan  IVPB      dextrose 50% Injectable 25 Gram(s) IV Push once  dextrose 50% Injectable 12.5 Gram(s) IV Push once  doxycycline IVPB      doxycycline IVPB 100 milliGRAM(s) IV Intermittent every 12 hours  glucagon  Injectable 1 milliGRAM(s) IntraMuscular once  heparin   Injectable 5000 Unit(s) SubCutaneous every 12 hours  losartan 100 milliGRAM(s) Oral daily  senna 1 Tablet(s) Oral at bedtime    MEDICATIONS  (PRN):  acetaminophen   Tablet .. 650 milliGRAM(s) Oral every 6 hours PRN Mild Pain (1 - 3)  ibuprofen  Tablet. 600 milliGRAM(s) Oral every 6 hours PRN Moderate Pain (4 - 6)  simethicone 80 milliGRAM(s) Chew every 6 hours PRN Gas  sodium chloride 0.65% Nasal 1 Spray(s) Both Nostrils three times a day PRN Nasal Congestion

## 2021-05-29 NOTE — PROGRESS NOTE ADULT - ASSESSMENT
46y p/w recent history of retained tampon s/p removal with outside OB, p/w with pelvic pain, fever and tachycardia and admitted for treatment of PID. She has been afebrile overnight. Has been unable to complete CT exam due to anxiety; will attempt again today.

## 2021-05-30 ENCOUNTER — TRANSCRIPTION ENCOUNTER (OUTPATIENT)
Age: 46
End: 2021-05-30

## 2021-05-30 ENCOUNTER — RESULT REVIEW (OUTPATIENT)
Age: 46
End: 2021-05-30

## 2021-05-30 VITALS — TEMPERATURE: 98 F

## 2021-05-30 LAB
-  AMIKACIN: SIGNIFICANT CHANGE UP
-  AMOXICILLIN/CLAVULANIC ACID: SIGNIFICANT CHANGE UP
-  AMPICILLIN/SULBACTAM: SIGNIFICANT CHANGE UP
-  AMPICILLIN: SIGNIFICANT CHANGE UP
-  AZTREONAM: SIGNIFICANT CHANGE UP
-  CEFAZOLIN: SIGNIFICANT CHANGE UP
-  CEFEPIME: SIGNIFICANT CHANGE UP
-  CEFOXITIN: SIGNIFICANT CHANGE UP
-  CEFTRIAXONE: SIGNIFICANT CHANGE UP
-  CIPROFLOXACIN: SIGNIFICANT CHANGE UP
-  ERTAPENEM: SIGNIFICANT CHANGE UP
-  GENTAMICIN: SIGNIFICANT CHANGE UP
-  IMIPENEM: SIGNIFICANT CHANGE UP
-  LEVOFLOXACIN: SIGNIFICANT CHANGE UP
-  MEROPENEM: SIGNIFICANT CHANGE UP
-  NITROFURANTOIN: SIGNIFICANT CHANGE UP
-  PIPERACILLIN/TAZOBACTAM: SIGNIFICANT CHANGE UP
-  TIGECYCLINE: SIGNIFICANT CHANGE UP
-  TOBRAMYCIN: SIGNIFICANT CHANGE UP
-  TRIMETHOPRIM/SULFAMETHOXAZOLE: SIGNIFICANT CHANGE UP
BASOPHILS # BLD AUTO: 0.02 K/UL — SIGNIFICANT CHANGE UP (ref 0–0.2)
BASOPHILS NFR BLD AUTO: 0.3 % — SIGNIFICANT CHANGE UP (ref 0–2)
BLD GP AB SCN SERPL QL: NEGATIVE — SIGNIFICANT CHANGE UP
CULTURE RESULTS: SIGNIFICANT CHANGE UP
EOSINOPHIL # BLD AUTO: 0.22 K/UL — SIGNIFICANT CHANGE UP (ref 0–0.5)
EOSINOPHIL NFR BLD AUTO: 3.8 % — SIGNIFICANT CHANGE UP (ref 0–6)
HCG SERPL-ACNC: <2 MIU/ML — SIGNIFICANT CHANGE UP
HCT VFR BLD CALC: 34 % — LOW (ref 34.5–45)
HCT VFR BLD CALC: 34.5 % — SIGNIFICANT CHANGE UP (ref 34.5–45)
HGB BLD-MCNC: 11.5 G/DL — SIGNIFICANT CHANGE UP (ref 11.5–15.5)
HGB BLD-MCNC: 11.7 G/DL — SIGNIFICANT CHANGE UP (ref 11.5–15.5)
IMM GRANULOCYTES NFR BLD AUTO: 0.5 % — SIGNIFICANT CHANGE UP (ref 0–1.5)
LYMPHOCYTES # BLD AUTO: 1.39 K/UL — SIGNIFICANT CHANGE UP (ref 1–3.3)
LYMPHOCYTES # BLD AUTO: 23.7 % — SIGNIFICANT CHANGE UP (ref 13–44)
MCHC RBC-ENTMCNC: 29 PG — SIGNIFICANT CHANGE UP (ref 27–34)
MCHC RBC-ENTMCNC: 29.3 PG — SIGNIFICANT CHANGE UP (ref 27–34)
MCHC RBC-ENTMCNC: 33.8 GM/DL — SIGNIFICANT CHANGE UP (ref 32–36)
MCHC RBC-ENTMCNC: 33.9 GM/DL — SIGNIFICANT CHANGE UP (ref 32–36)
MCV RBC AUTO: 85.6 FL — SIGNIFICANT CHANGE UP (ref 80–100)
MCV RBC AUTO: 86.5 FL — SIGNIFICANT CHANGE UP (ref 80–100)
METHOD TYPE: SIGNIFICANT CHANGE UP
MONOCYTES # BLD AUTO: 0.38 K/UL — SIGNIFICANT CHANGE UP (ref 0–0.9)
MONOCYTES NFR BLD AUTO: 6.5 % — SIGNIFICANT CHANGE UP (ref 2–14)
NEUTROPHILS # BLD AUTO: 3.82 K/UL — SIGNIFICANT CHANGE UP (ref 1.8–7.4)
NEUTROPHILS NFR BLD AUTO: 65.2 % — SIGNIFICANT CHANGE UP (ref 43–77)
NRBC # BLD: 0 /100 WBCS — SIGNIFICANT CHANGE UP (ref 0–0)
NRBC # BLD: 0 /100 WBCS — SIGNIFICANT CHANGE UP (ref 0–0)
ORGANISM # SPEC MICROSCOPIC CNT: SIGNIFICANT CHANGE UP
ORGANISM # SPEC MICROSCOPIC CNT: SIGNIFICANT CHANGE UP
PLATELET # BLD AUTO: 312 K/UL — SIGNIFICANT CHANGE UP (ref 150–400)
PLATELET # BLD AUTO: 314 K/UL — SIGNIFICANT CHANGE UP (ref 150–400)
RBC # BLD: 3.93 M/UL — SIGNIFICANT CHANGE UP (ref 3.8–5.2)
RBC # BLD: 4.03 M/UL — SIGNIFICANT CHANGE UP (ref 3.8–5.2)
RBC # FLD: 13.2 % — SIGNIFICANT CHANGE UP (ref 10.3–14.5)
RBC # FLD: 13.2 % — SIGNIFICANT CHANGE UP (ref 10.3–14.5)
RH IG SCN BLD-IMP: NEGATIVE — SIGNIFICANT CHANGE UP
SARS-COV-2 RNA SPEC QL NAA+PROBE: SIGNIFICANT CHANGE UP
SPECIMEN SOURCE: SIGNIFICANT CHANGE UP
WBC # BLD: 5.86 K/UL — SIGNIFICANT CHANGE UP (ref 3.8–10.5)
WBC # BLD: 6.06 K/UL — SIGNIFICANT CHANGE UP (ref 3.8–10.5)
WBC # FLD AUTO: 5.86 K/UL — SIGNIFICANT CHANGE UP (ref 3.8–10.5)
WBC # FLD AUTO: 6.06 K/UL — SIGNIFICANT CHANGE UP (ref 3.8–10.5)

## 2021-05-30 PROCEDURE — 87086 URINE CULTURE/COLONY COUNT: CPT

## 2021-05-30 PROCEDURE — 96374 THER/PROPH/DIAG INJ IV PUSH: CPT

## 2021-05-30 PROCEDURE — 87591 N.GONORRHOEAE DNA AMP PROB: CPT

## 2021-05-30 PROCEDURE — 88305 TISSUE EXAM BY PATHOLOGIST: CPT

## 2021-05-30 PROCEDURE — 87635 SARS-COV-2 COVID-19 AMP PRB: CPT

## 2021-05-30 PROCEDURE — 82435 ASSAY OF BLOOD CHLORIDE: CPT

## 2021-05-30 PROCEDURE — 82330 ASSAY OF CALCIUM: CPT

## 2021-05-30 PROCEDURE — 88341 IMHCHEM/IMCYTCHM EA ADD ANTB: CPT

## 2021-05-30 PROCEDURE — 86901 BLOOD TYPING SEROLOGIC RH(D): CPT

## 2021-05-30 PROCEDURE — 88360 TUMOR IMMUNOHISTOCHEM/MANUAL: CPT

## 2021-05-30 PROCEDURE — 85730 THROMBOPLASTIN TIME PARTIAL: CPT

## 2021-05-30 PROCEDURE — U0003: CPT

## 2021-05-30 PROCEDURE — 84145 PROCALCITONIN (PCT): CPT

## 2021-05-30 PROCEDURE — 86769 SARS-COV-2 COVID-19 ANTIBODY: CPT

## 2021-05-30 PROCEDURE — 84132 ASSAY OF SERUM POTASSIUM: CPT

## 2021-05-30 PROCEDURE — 88305 TISSUE EXAM BY PATHOLOGIST: CPT | Mod: 26,59

## 2021-05-30 PROCEDURE — 84295 ASSAY OF SERUM SODIUM: CPT

## 2021-05-30 PROCEDURE — 82962 GLUCOSE BLOOD TEST: CPT

## 2021-05-30 PROCEDURE — 83036 HEMOGLOBIN GLYCOSYLATED A1C: CPT

## 2021-05-30 PROCEDURE — 85014 HEMATOCRIT: CPT

## 2021-05-30 PROCEDURE — 85610 PROTHROMBIN TIME: CPT

## 2021-05-30 PROCEDURE — 74177 CT ABD & PELVIS W/CONTRAST: CPT

## 2021-05-30 PROCEDURE — 82803 BLOOD GASES ANY COMBINATION: CPT

## 2021-05-30 PROCEDURE — 85025 COMPLETE CBC W/AUTO DIFF WBC: CPT

## 2021-05-30 PROCEDURE — 86850 RBC ANTIBODY SCREEN: CPT

## 2021-05-30 PROCEDURE — 82947 ASSAY GLUCOSE BLOOD QUANT: CPT

## 2021-05-30 PROCEDURE — 85018 HEMOGLOBIN: CPT

## 2021-05-30 PROCEDURE — 83605 ASSAY OF LACTIC ACID: CPT

## 2021-05-30 PROCEDURE — 84702 CHORIONIC GONADOTROPIN TEST: CPT

## 2021-05-30 PROCEDURE — 87040 BLOOD CULTURE FOR BACTERIA: CPT

## 2021-05-30 PROCEDURE — 80053 COMPREHEN METABOLIC PANEL: CPT

## 2021-05-30 PROCEDURE — 96361 HYDRATE IV INFUSION ADD-ON: CPT

## 2021-05-30 PROCEDURE — 81001 URINALYSIS AUTO W/SCOPE: CPT

## 2021-05-30 PROCEDURE — 87186 SC STD MICRODIL/AGAR DIL: CPT

## 2021-05-30 PROCEDURE — 86900 BLOOD TYPING SEROLOGIC ABO: CPT

## 2021-05-30 PROCEDURE — 99285 EMERGENCY DEPT VISIT HI MDM: CPT | Mod: 25

## 2021-05-30 PROCEDURE — 88305 TISSUE EXAM BY PATHOLOGIST: CPT | Mod: 26

## 2021-05-30 PROCEDURE — 93005 ELECTROCARDIOGRAM TRACING: CPT

## 2021-05-30 PROCEDURE — U0005: CPT

## 2021-05-30 PROCEDURE — 76856 US EXAM PELVIC COMPLETE: CPT

## 2021-05-30 RX ORDER — METRONIDAZOLE 500 MG
1 TABLET ORAL
Qty: 20 | Refills: 0
Start: 2021-05-30 | End: 2021-06-08

## 2021-05-30 RX ORDER — ONDANSETRON 8 MG/1
4 TABLET, FILM COATED ORAL ONCE
Refills: 0 | Status: DISCONTINUED | OUTPATIENT
Start: 2021-05-30 | End: 2021-05-30

## 2021-05-30 RX ORDER — SODIUM CHLORIDE 9 MG/ML
1000 INJECTION, SOLUTION INTRAVENOUS
Refills: 0 | Status: DISCONTINUED | OUTPATIENT
Start: 2021-05-30 | End: 2021-05-30

## 2021-05-30 RX ORDER — FENTANYL CITRATE 50 UG/ML
50 INJECTION INTRAVENOUS
Refills: 0 | Status: DISCONTINUED | OUTPATIENT
Start: 2021-05-30 | End: 2021-05-30

## 2021-05-30 RX ORDER — FENTANYL CITRATE 50 UG/ML
25 INJECTION INTRAVENOUS
Refills: 0 | Status: DISCONTINUED | OUTPATIENT
Start: 2021-05-30 | End: 2021-05-30

## 2021-05-30 RX ADMIN — HEPARIN SODIUM 5000 UNIT(S): 5000 INJECTION INTRAVENOUS; SUBCUTANEOUS at 06:47

## 2021-05-30 RX ADMIN — LOSARTAN POTASSIUM 100 MILLIGRAM(S): 100 TABLET, FILM COATED ORAL at 06:49

## 2021-05-30 RX ADMIN — Medication 650 MILLIGRAM(S): at 00:31

## 2021-05-30 RX ADMIN — AMLODIPINE BESYLATE 10 MILLIGRAM(S): 2.5 TABLET ORAL at 06:49

## 2021-05-30 RX ADMIN — Medication 100 GRAM(S): at 07:01

## 2021-05-30 RX ADMIN — Medication 100 MILLIGRAM(S): at 06:47

## 2021-05-30 RX ADMIN — SODIUM CHLORIDE 125 MILLILITER(S): 9 INJECTION, SOLUTION INTRAVENOUS at 06:47

## 2021-05-30 RX ADMIN — Medication 100 MILLIGRAM(S): at 13:38

## 2021-05-30 RX ADMIN — Medication 110 MILLIGRAM(S): at 06:48

## 2021-05-30 RX ADMIN — BENZOCAINE AND MENTHOL 1 LOZENGE: 5; 1 LIQUID ORAL at 06:48

## 2021-05-30 RX ADMIN — SODIUM CHLORIDE 125 MILLILITER(S): 9 INJECTION, SOLUTION INTRAVENOUS at 17:44

## 2021-05-30 NOTE — PRE-ANESTHESIA EVALUATION ADULT - NSANTHPMHFT_GEN_ALL_CORE
46y  who presented to ED following 5d of foul smelling vaginal discharge, pelvic pain, and fever of 100.6  @ 0900. She recalls inserting a tampon due to menses , forgot about it and the proceed to have sexual intercourse with her  for the next two consecutive days. She became concerned during sexual intercourse on  because fo the foul smell she and her  noticed and she remembered she had never removed her tampon. She was seen in the office by her obgyn (who she cannot recall the name of)  where a TVUS was performed, pt reports he "took out the tampon in pieces", and that she felt lots of "scraping and had heavy bleeding" following the procedure. She was prescribed a topical antibiotic that she cannot recall the name of and has been inserting it vaginally twice a day for the past 2 days. She is unsure what the TVUS performed in the office showed as her doctor had to emergently leave her visit and she was never counseled regarding her results. She woke up the morning of  in a pool of sweat and took her temp which was 100.6. She took and Advil but was concerned about her well being so she came to the ED to be evaluated. She denies any chills, dysuria, n/v, diarrhea, SOB, or CP. In the ED pt febrile to 100.7, tachycardic to 136, and WBC elevated to 15.

## 2021-05-30 NOTE — PROGRESS NOTE ADULT - ATTENDING COMMENTS
Covering GYN Service Attending.  Chart reviewed, labs notes reviewed in detail.  Disc reasons for proceeding with examination under anesthesia, Poss D and C hysteroscopy  Presently stable.  Pelvic pain and vaginal dc decreased.  R / B / A of Procedure disc.  All questions answered.  Pt is on OR schedule for today.    PERCY

## 2021-05-30 NOTE — PROGRESS NOTE ADULT - ASSESSMENT
46y p/w recent history of retained tampon s/p removal with outside OB, p/w with pelvic pain, fever and tachycardia and admitted for treatment of PID. She has been afebrile since initial fever on admission. However, CTAP read yesterday revealed evidence of retained tampon in the endocervical canal. In the setting of hx of fevers, leukocytosis, hx and radioographic findigns - pt added on for EUA and diagnostic hysteroscopy to OR today.     Neuro: pain controlled, Motrin/Tylenol PRN  CV: Hemodynamically stable.   Pulm: Saturating well on room air, encourage oob/amb  GI:  NPO for OR  : UOP, Voiding spontaneously  Heme: c/w HSQ and SCDs for DVT ppx  FEN: LR@125 while NPO  ID: Afebrile, on Cefotetan + Doxycycline (5/27-) f/u AM CBC. Trend leukocytosis. f/u Bcx, Ucx (5/27); f/y GC/CT (5/28). F/u EMB/ECC/Cervical Bx (5/28)  Endo: ISS   Dispo: For OR today    Asaf PGY2

## 2021-05-30 NOTE — PROGRESS NOTE ADULT - SUBJECTIVE AND OBJECTIVE BOX
R2 STERLING Progress Note HD#4    Patient seen and examined at bedside.  No acute events overnight. No acute complaints.  Pain well controlled.  Patient is ambulating. NPO at midnight for OR today.    Denies CP, SOB, N/V, fevers, and chills.    Vital Signs Last 24 Hours  T(C): 36.5 (05-30-21 @ 01:02), Max: 36.8 (05-29-21 @ 14:08)  HR: 89 (05-30-21 @ 01:02) (89 - 96)  BP: 127/85 (05-30-21 @ 01:02) (117/81 - 127/85)  RR: 18 (05-30-21 @ 01:02) (18 - 18)  SpO2: 97% (05-30-21 @ 01:02) (97% - 98%)    I&O's Summary    29 May 2021 07:01  -  30 May 2021 07:00  --------------------------------------------------------  IN: 3675 mL / OUT: 0 mL / NET: 3675 mL        Physical Exam:  General: NAD  CV: RR, S1, S2  Lungs: CTA b/l, good air flow b/l   Abdomen: Soft, mildly-tender to palpation diffusely, non distended  : no bleeding/discharge on pad  Ext: No pain or swelling     Labs:                        11.8   5.89  )-----------( 154      ( 29 May 2021 06:36 )             35.8   baso x      eos x      imm gran x      lymph x      mono x      poly x                            11.6   15.09 )-----------( 279      ( 28 May 2021 10:20 )             34.8   baso 0.1    eos 0.3    imm gran 0.5    lymph 7.6    mono 5.3    poly 86.2                         12.5   15.18 )-----------( 315      ( 27 May 2021 22:20 )             37.9   baso 0.2    eos 0.1    imm gran 0.4    lymph 9.7    mono 5.5    poly 84.1       MEDICATIONS  (STANDING):  amLODIPine   Tablet 10 milliGRAM(s) Oral daily  benzocaine 15 mG/menthol 3.6 mG (Sugar-Free) Lozenge 1 Lozenge Oral every 6 hours  cefoTEtan  IVPB 2 Gram(s) IV Intermittent every 12 hours  dextrose 50% Injectable 25 Gram(s) IV Push once  dextrose 50% Injectable 12.5 Gram(s) IV Push once  doxycycline IVPB 100 milliGRAM(s) IV Intermittent every 12 hours  glucagon  Injectable 1 milliGRAM(s) IntraMuscular once  heparin   Injectable 5000 Unit(s) SubCutaneous every 12 hours  lactated ringers. 1000 milliLiter(s) (125 mL/Hr) IV Continuous <Continuous>  losartan 100 milliGRAM(s) Oral daily  metroNIDAZOLE  IVPB 500 milliGRAM(s) IV Intermittent every 8 hours  senna 1 Tablet(s) Oral at bedtime    MEDICATIONS  (PRN):  acetaminophen   Tablet .. 650 milliGRAM(s) Oral every 6 hours PRN Mild Pain (1 - 3)  ibuprofen  Tablet. 600 milliGRAM(s) Oral every 6 hours PRN Moderate Pain (4 - 6)  simethicone 80 milliGRAM(s) Chew every 6 hours PRN Gas  sodium chloride 0.65% Nasal 1 Spray(s) Both Nostrils three times a day PRN Nasal Congestion

## 2021-05-30 NOTE — DISCHARGE NOTE NURSING/CASE MANAGEMENT/SOCIAL WORK - NSDCFUADDAPPT_GEN_ALL_CORE_FT
**Schedule a postoperative appointment with the [facility] in 2 weeks**

## 2021-05-30 NOTE — PRE-ANESTHESIA EVALUATION ADULT - NSANTHAIRWAYFT_ENT_ALL_CORE
good mouth opening, MP 1, neck ROM wnl, thick neck, some missing teeth (lower left), denies loose teeth

## 2021-05-30 NOTE — DISCHARGE NOTE NURSING/CASE MANAGEMENT/SOCIAL WORK - PATIENT PORTAL LINK FT
You can access the FollowMyHealth Patient Portal offered by [facility] by registering at the following website: [email or web address]. By joining [facility]’s FollowMyHealth portal, you will also be able to view your health information using other applications (apps) compatible with our system.

## 2021-05-30 NOTE — BRIEF OPERATIVE NOTE - OPERATION/FINDINGS
Barrel shaped cervix on bimanual exam. Visual appearing dark in color, friable with multiple small pieces of tissues easily removed. ECC obtained. Multiple pieces of tissue collected from cervical bed in piece meal. Rectovaginal revealed distinct mass in posterior fornix, no additional nodularity palpated.

## 2021-05-30 NOTE — BRIEF OPERATIVE NOTE - NSICDXBRIEFPROCEDURE_GEN_ALL_CORE_FT
PROCEDURES:  Exam under anesthesia, vagina 30-May-2021 16:45:17  Kristine Ron  Cervical biopsy 30-May-2021 16:45:27  Kristine Ron

## 2021-05-30 NOTE — CHART NOTE - NSCHARTNOTEFT_GEN_A_CORE
X0HTHGI POST-OP CHECK    S: Patient seen and evaluated at bedside.  Pt awake and alert resting comfortaby in bed, eating soup.   Patient reports pain controlled with analgesia. Pt denies N/V, SOB, CP, palpitations, fever/chills. +OOB. Voiding.     O:   T(C): 36.7 (05-30-21 @ 18:00), Max: 36.8 (05-30-21 @ 14:47)  HR: 93 (05-30-21 @ 17:45) (82 - 93)  BP: 116/81 (05-30-21 @ 17:45) (111/72 - 124/77)  RR: 12 (05-30-21 @ 17:45) (12 - 18)  SpO2: 98% (05-30-21 @ 17:45) (91% - 98%)  Wt(kg): --  I&O's Summary    29 May 2021 07:01  -  30 May 2021 07:00  --------------------------------------------------------  IN: 3675 mL / OUT: 0 mL / NET: 3675 mL    30 May 2021 07:01  -  30 May 2021 18:34  --------------------------------------------------------  IN: 615 mL / OUT: 300 mL / NET: 315 mL        Gen: Resting comfortably in bed, NAD  Abd: soft, appropriately tender, no rebound or guarding  Ext: non-tender b/l, no edema      A/P: 46y Female s/p EUA, cervical biopsy - recovering well in immediate postoperative period. No indication for patient to remain inpatient at this time. Pt has been afebrile>24 hours, leukocytosis resolved, and patient clinically feeling well.   - Will discharge patient home with complete course of PID abx: Doxy/Flagyl   - Patient to follow up in [address] [place] [address] GYN Clinic in 2 weeks for postoperative visit  - Explained and counseled patient and her  at bedside on findings of EUA. Explained importance of pathology follow up and need for close gyn follow up. Pt states she wishes to continue care to [facility] and will come to clinic.   - GYN team to follow up on pathology.     dw Dr. Rio Ron PGY2

## 2021-06-02 LAB
CULTURE RESULTS: SIGNIFICANT CHANGE UP
SPECIMEN SOURCE: SIGNIFICANT CHANGE UP

## 2021-06-03 NOTE — CHART NOTE - NSCHARTNOTEFT_GEN_A_CORE
R1 Chart Note     Patient called to check in. She reports some mild abdominal pain and vaginal spotting but it otherwise feeling well. Discussed with patient that she needs to be followed up in clinic-provided her with the phone number. Patient states she will call to make a follow up appointment. Patient expressed understanding, answered all questions.     Marzena Moraes PGY-1

## 2021-06-04 LAB — SURGICAL PATHOLOGY STUDY: SIGNIFICANT CHANGE UP

## 2021-06-04 NOTE — CHART NOTE - NSCHARTNOTESELECT_GEN_ALL_CORE
GYN Telephone
R2 Gyn Chart Note
R4 Chart Note/Event Note
R2 GYN Note
R2 Gyn Chart Note
R2 Gyn Procedure Note

## 2021-06-04 NOTE — CHART NOTE - NSCHARTNOTEFT_GEN_A_CORE
Surgical pathology reviewed as below -     Final Diagnosis    1. Cervix, biopsy:  - Adenocarcinoma underlying squamous epithelium, in the  background of necrosis and acute inflammation, see comment.    2. Endocervix, scraping:  - Adenocarcinoma in the background of acute inflammation and  necrosis, see comment.      Pt called regarding results. Discussed need for referral to Gyn Onc for further work up and management.   Pt clearly tearful and sad over the phone, but expressed clear understanding and plans to follow up closely. Pt states her  went back to [place], but she has close family and friends nearby for support as well.     Pt information sent to Dr. Kitchen and Dr. Goldberg of Gyn Oncology for referral and establishment of care in Gyn Onc Clinic.   NS GYN team to continue to follow to ensure follow up.     naresh Ron PGY2

## 2021-06-09 LAB — SURGICAL PATHOLOGY STUDY: SIGNIFICANT CHANGE UP

## 2021-06-10 ENCOUNTER — NON-APPOINTMENT (OUTPATIENT)
Age: 46
End: 2021-06-10

## 2021-06-10 PROBLEM — Z86.39 HISTORY OF TYPE 2 DIABETES MELLITUS: Status: RESOLVED | Noted: 2021-06-10 | Resolved: 2021-06-10

## 2021-06-10 PROBLEM — D25.9 UTERINE FIBROID: Status: ACTIVE | Noted: 2021-06-10

## 2021-06-10 PROBLEM — N83.201 BILATERAL OVARIAN CYSTS: Status: ACTIVE | Noted: 2021-06-10

## 2021-06-10 PROBLEM — N73.9 PID (PELVIC INFLAMMATORY DISEASE): Status: ACTIVE | Noted: 2021-06-10

## 2021-06-10 PROBLEM — Z86.39 HISTORY OF HYPERLIPIDEMIA: Status: RESOLVED | Noted: 2021-06-10 | Resolved: 2021-06-10

## 2021-06-11 ENCOUNTER — APPOINTMENT (OUTPATIENT)
Dept: GYNECOLOGIC ONCOLOGY | Facility: CLINIC | Age: 46
End: 2021-06-11
Payer: MEDICAID

## 2021-06-11 ENCOUNTER — NON-APPOINTMENT (OUTPATIENT)
Age: 46
End: 2021-06-11

## 2021-06-11 VITALS
WEIGHT: 182 LBS | HEIGHT: 64 IN | BODY MASS INDEX: 31.07 KG/M2 | HEART RATE: 68 BPM | DIASTOLIC BLOOD PRESSURE: 98 MMHG | SYSTOLIC BLOOD PRESSURE: 138 MMHG

## 2021-06-11 DIAGNOSIS — D25.9 LEIOMYOMA OF UTERUS, UNSPECIFIED: ICD-10-CM

## 2021-06-11 DIAGNOSIS — N83.201 UNSPECIFIED OVARIAN CYST, RIGHT SIDE: ICD-10-CM

## 2021-06-11 DIAGNOSIS — Z86.39 PERSONAL HISTORY OF OTHER ENDOCRINE, NUTRITIONAL AND METABOLIC DISEASE: ICD-10-CM

## 2021-06-11 DIAGNOSIS — N83.202 UNSPECIFIED OVARIAN CYST, RIGHT SIDE: ICD-10-CM

## 2021-06-11 DIAGNOSIS — Z80.3 FAMILY HISTORY OF MALIGNANT NEOPLASM OF BREAST: ICD-10-CM

## 2021-06-11 DIAGNOSIS — Z80.0 FAMILY HISTORY OF MALIGNANT NEOPLASM OF DIGESTIVE ORGANS: ICD-10-CM

## 2021-06-11 DIAGNOSIS — N73.9 FEMALE PELVIC INFLAMMATORY DISEASE, UNSPECIFIED: ICD-10-CM

## 2021-06-11 PROCEDURE — 99205 OFFICE O/P NEW HI 60 MIN: CPT

## 2021-06-12 NOTE — REVIEW OF SYSTEMS
[Negative] : Musculoskeletal [Diabetes] : diabetes mellitus [de-identified] : HTN, HLD [FreeTextEntry4] : Recent PID

## 2021-06-12 NOTE — HISTORY OF PRESENT ILLNESS
[FreeTextEntry1] : Referring GYN - Dr. Rick James\par PCP - Dr. Rueda\par \par Ms. Wilson is a 47 yo  premenopausal female (LMP 5/15/21) who presents for initial consultation at the request of Dr. James for the management of a GYN malignancy (primary vs. secondary). Patient presented to [facility] on 5/28/21 c/o pelvic pain and recent hx of retained tampon with 5 days of malodorous discharge admitted with fever and tachycardia. Presumed PID and started of Cefotetan/Doxy. On exam it was noted the cervix appeared irregular, dark and distorted. It was confirmed that patient was seen by Dr. James 5/26/21 where retained tampon was removed, cervical biopsy obtained, prescribed Metrogel and TVUS done with no collection or fluid. CT A/P performed which could not R/O retained tampon. On HD # 4 she was taken to the OR for EUA, possible D&C hysteroscopy. ECC/cervical biopsy were obtained revealing adenocarcinoma (primary vs. secondary). She was discharged home on HD 4 on PO Doxy/Flagyl, completed yesterday. She was referred here for further management. \par \par Reports occasional pelvic cramping. Tolerating PO without N/V. Denies a change in appetite or weight. Reports normal bowel and urinary function. No other associated signed or symptoms. She is planning to move to [place] next month. \par \par PATHOLOGY:\par 1) EMBx, 5/26/21, Dr. James\par     a) atypical endometrial glands exhibiting complex, cribiform architexture are present as a few scattered glands, further workup is necessary to R/O CAH or adenocarcinoma of the endometrium\par 2) EMBx, 5/28/21\par     a) minute polypoid fragment of endometrial tissue with partial infarction and focal glandular crowding without atypia \par 3) EUA, D&C, ECC, cervical biopsy, 5/30/21, [facility]\par     a) cervical/endocervix - adenocarcinoma in a background of necrosis and acute inflammation (primary vs. secondary)\par \par IMAGING:\par CT A/P on 5/29/21: lobulated bilateral renal cortex with cortical scarring bilaterally. Multiple nonobstructing stones on the left. no hydro.\par The endometrial cavity is slightly distended with fluid. An air containing structure is identified within the upper vaginal cavity (likely retained tampon). Mild fatty stranding in the pelvis. Likely a physiological cyst in the right ovary and a small corpus luteal or hemorrhagic cyst in the left ovary. Small free fluid. Appendix normal. No ascites. No LAD. \par \par TVUS on 5/28/21: uterus 9.0 x 4.9 x 5.2cm. Retained tampon in vagina. No collection or fluid in the endometrial canal. EML 6.3mm. \par RTO- a simple cyst 3.7 x 3.4 x 3.4cm\par LTO - Small follicles are seen. \par No FF. \par \par In office TVUS on 5/26/21: hyperechoic area seen within the cervical cavity. right ovarian cyst seen 2.7 x 3.2 x 2.8cm. \par \par In office TVUS on 11/15/20: EML 10mm. Fluid seen in the endo cavity.. Posterior uterine fibroid 13.7 x 13.0 x 14.7cm. normal ovaries.\par \par CT A/P on 5/18/16: heterogenous 4cm myometrial lesion in the lower uterus - probably a degenerating fibroid. Small complex left adnexal hemorrhagic cyst. \par \par LABS on 11/30/20:\par CA-125 was 11 (ref<36)\par HCG was <5\par \par PMHx: T2DM, HTN, HLD\par PSHx: N/A\par Fam Hx: maternal uncle (breast cancer), paternal GM (stomach cancer\par \par HCM:\par PAP on 11/30/20: NEGATIVE, HrHPV negative\par Mammogram/sono: 1/8/21: BIRADS 3- benign asymmetries (Dr. James) - 6 month follow up 6/14/21\par CBE: 5/28/21\par SBE: performs\par Colonoscopy: never\par COVID-19 vaccine series not completed

## 2021-06-12 NOTE — PHYSICAL EXAM
[Absent] : CVAT: absent [Normal] : Anus and perineum: Normal sphincter tone, no masses, no prolapse. [Fully active, able to carry on all pre-disease performance without restriction] : Status 0 - Fully active, able to carry on all pre-disease performance without restriction [Abnormal] : Recto-Vaginal Exam: Abnormal [de-identified] : trace edema [de-identified] : Much of the cervix was replaced by a necrotic crater (~5cm) with nodularity in the CRS/post fornix [de-identified] : the uterus was nonpalpable [de-identified] : the adnexa are nonpalpable [de-identified] : See Cx exam [de-identified] : confirms

## 2021-06-12 NOTE — DISCUSSION/SUMMARY
[Reviewed Clinical Lab Test(s)] : Results of clinical tests were reviewed. [Reviewed Radiology Report(s)] : Radiology reports were reviewed. [Reviewed Radiology Film/Image(s)] : Images from radiology studies were reviewed and examined. [FreeTextEntry1] : - I met with the pt and her daughter.\par -We discussed the clinical findings and the differential, given the IHC from the Bx's. \par -Management options were reviewed, including my advise for further EoD eval. I explained that I do not feel that surgery is not likely advised. \par -Periop and recuperative issues were discussed.\par -A diagram was drawn and a copy provided. .\par -I advise PET/CT to evaluate disease extent\par -I advise GI evaluation; [facility] was contacted to facilitate a consultation. \par -I advised a Med Onc consultation; the [facility] was contacted to facilitate the consultation. \par -CA-125, , CEA, CA27-29 were ordered and drawn today. \par -Her instructions were reviewed.\par -All Q/A.\par \par \par

## 2021-06-14 ENCOUNTER — APPOINTMENT (OUTPATIENT)
Dept: GASTROENTEROLOGY | Facility: CLINIC | Age: 46
End: 2021-06-14
Payer: MEDICAID

## 2021-06-14 VITALS
HEIGHT: 64 IN | BODY MASS INDEX: 31.58 KG/M2 | DIASTOLIC BLOOD PRESSURE: 88 MMHG | WEIGHT: 185 LBS | SYSTOLIC BLOOD PRESSURE: 160 MMHG | TEMPERATURE: 99.1 F

## 2021-06-14 DIAGNOSIS — Z12.11 ENCOUNTER FOR SCREENING FOR MALIGNANT NEOPLASM OF COLON: ICD-10-CM

## 2021-06-14 DIAGNOSIS — K21.9 GASTRO-ESOPHAGEAL REFLUX DISEASE W/OUT ESOPHAGITIS: ICD-10-CM

## 2021-06-14 LAB
CANCER AG125 SERPL-ACNC: 12 U/ML
CANCER AG19-9 SERPL-ACNC: 13 U/ML
CANCER AG27-29 SERPL-ACNC: 24.1 U/ML
CEA SERPL-MCNC: 1 NG/ML

## 2021-06-14 PROCEDURE — 99214 OFFICE O/P EST MOD 30 MIN: CPT

## 2021-06-14 NOTE — REVIEW OF SYSTEMS
[Fever] : no fever [Chills] : no chills [Eyesight Problems] : no eyesight problems [Discharge From Eyes] : no purulent discharge from the eyes [Nosebleeds] : no nosebleeds [Nasal Discharge] : no nasal discharge [Chest Pain] : no chest pain [Palpitations] : no palpitations [Cough] : no cough [SOB on Exertion] : no shortness of breath during exertion [Constipation] : no constipation [Diarrhea] : no diarrhea [Pelvic Pain] : no pelvic pain [Joint Swelling] : no joint swelling [Itching] : no itching [Change In A Mole] : no change in a mole [Dizziness] : no dizziness [Fainting] : no fainting [Anxiety] : no anxiety [Depression] : no depression [Muscle Weakness] : no muscle weakness [Deepening Of The Voice] : no deepening of the voice [Swollen Glands] : no swollen glands

## 2021-06-14 NOTE — ASSESSMENT
[FreeTextEntry1] : 1. adenoca of cervix, r/o secondary  lesion, average risk for colon cancer recommend colonoscopy for screening, ct scan unrevealing.\par \par Discussed risks including but not limited to bleeding,infection,drug reaction, perforation,missed lesion,benefits and alternatives of colonoscopy/egd with patient  including no\par treatment and patient consents to procedure.\par \par \par plan colonoscopy to be scheduled\par \par 2. new onset gerd, consdier egd if colonoscopy negative.\par \par 3. gas/bloating\par \par plan low fodmap diet

## 2021-06-14 NOTE — PHYSICAL EXAM
[General Appearance - Alert] : alert [General Appearance - In No Acute Distress] : in no acute distress [General Appearance - Well Nourished] : well nourished [General Appearance - Well Developed] : well developed [Bowel Sounds] : normal bowel sounds [Abdomen Soft] : soft [Abdomen Tenderness] : non-tender [Abnormal Walk] : normal gait [Hearing Threshold Finger Rub Not [place]] : hearing was normal [Neck Cervical Mass (___cm)] : no neck mass was observed [Respiration, Rhythm And Depth] : normal respiratory rhythm and effort [Auscultation Breath Sounds / Voice Sounds] : lungs were clear to auscultation bilaterally [Heart Sounds] : normal S1 and S2 [No CVA Tenderness] : no ~M costovertebral angle tenderness [Nail Clubbing] : no clubbing  or cyanosis of the fingernails [] : no rash [Sensation] : the sensory exam was normal to light touch and pinprick [Skin Lesions] : no skin lesions [Motor Exam] : the motor exam was normal [No Focal Deficits] : no focal deficits [Oriented To Time, Place, And Person] : oriented to person, place, and time

## 2021-06-14 NOTE — REASON FOR VISIT
[Initial Evaluation] : an initial evaluation [FreeTextEntry1] : adenocarcinoma of ECC/cervical biopsy

## 2021-06-14 NOTE — HISTORY OF PRESENT ILLNESS
[FreeTextEntry1] : 45 yo female with h/o recent admission to hospital last month for retianed tampon and vaginal discahrge, s/p CT scan unrevealing , had EUA and bx of ECC/cervux revealed adenocarcinoma.  \par \par pt referred for evaluatio of colon primary. pt reports normal bms,no brbpr, no melena. , brown, soft stool, has bm at middle of night. intentional weight loss.  no n/v.  GERD on ppi for one month\par \par no family h/o colon or gastric cancer

## 2021-06-15 ENCOUNTER — APPOINTMENT (OUTPATIENT)
Dept: DISASTER EMERGENCY | Facility: CLINIC | Age: 46
End: 2021-06-15

## 2021-06-16 LAB — SARS-COV-2 N GENE NPH QL NAA+PROBE: NOT DETECTED

## 2021-06-18 ENCOUNTER — NON-APPOINTMENT (OUTPATIENT)
Age: 46
End: 2021-06-18

## 2021-06-18 ENCOUNTER — APPOINTMENT (OUTPATIENT)
Dept: GASTROENTEROLOGY | Facility: CLINIC | Age: 46
End: 2021-06-18
Payer: MEDICAID

## 2021-06-18 PROCEDURE — 45378 DIAGNOSTIC COLONOSCOPY: CPT

## 2021-06-22 ENCOUNTER — APPOINTMENT (OUTPATIENT)
Dept: DISASTER EMERGENCY | Facility: CLINIC | Age: 46
End: 2021-06-22

## 2021-06-22 ENCOUNTER — OUTPATIENT (OUTPATIENT)
Dept: OUTPATIENT SERVICES | Facility: HOSPITAL | Age: 46
LOS: 1 days | End: 2021-06-22
Payer: MEDICAID

## 2021-06-22 ENCOUNTER — RESULT REVIEW (OUTPATIENT)
Age: 46
End: 2021-06-22

## 2021-06-22 DIAGNOSIS — C80.1 MALIGNANT (PRIMARY) NEOPLASM, UNSPECIFIED: ICD-10-CM

## 2021-06-22 PROCEDURE — 88321 CONSLTJ&REPRT SLD PREP ELSWR: CPT

## 2021-06-23 LAB — SARS-COV-2 N GENE NPH QL NAA+PROBE: NOT DETECTED

## 2021-06-25 ENCOUNTER — APPOINTMENT (OUTPATIENT)
Dept: GASTROENTEROLOGY | Facility: CLINIC | Age: 46
End: 2021-06-25
Payer: MEDICAID

## 2021-06-25 ENCOUNTER — LABORATORY RESULT (OUTPATIENT)
Age: 46
End: 2021-06-25

## 2021-06-25 PROCEDURE — 43239 EGD BIOPSY SINGLE/MULTIPLE: CPT

## 2021-07-01 LAB — SURGICAL PATHOLOGY STUDY: SIGNIFICANT CHANGE UP

## 2021-07-04 ENCOUNTER — TRANSCRIPTION ENCOUNTER (OUTPATIENT)
Age: 46
End: 2021-07-04

## 2021-07-05 ENCOUNTER — APPOINTMENT (OUTPATIENT)
Dept: NUCLEAR MEDICINE | Facility: IMAGING CENTER | Age: 46
End: 2021-07-05
Payer: MEDICAID

## 2021-07-05 ENCOUNTER — OUTPATIENT (OUTPATIENT)
Dept: OUTPATIENT SERVICES | Facility: HOSPITAL | Age: 46
LOS: 1 days | End: 2021-07-05
Payer: MEDICAID

## 2021-07-05 DIAGNOSIS — C80.1 MALIGNANT (PRIMARY) NEOPLASM, UNSPECIFIED: ICD-10-CM

## 2021-07-05 PROCEDURE — 78815 PET IMAGE W/CT SKULL-THIGH: CPT | Mod: 26,PI

## 2021-07-05 PROCEDURE — A9552: CPT

## 2021-07-05 PROCEDURE — 78815 PET IMAGE W/CT SKULL-THIGH: CPT

## 2021-07-14 ENCOUNTER — NON-APPOINTMENT (OUTPATIENT)
Age: 46
End: 2021-07-14

## 2021-07-16 ENCOUNTER — APPOINTMENT (OUTPATIENT)
Dept: GYNECOLOGIC ONCOLOGY | Facility: CLINIC | Age: 46
End: 2021-07-16
Payer: MEDICAID

## 2021-07-16 PROCEDURE — 99213 OFFICE O/P EST LOW 20 MIN: CPT

## 2021-07-20 ENCOUNTER — OUTPATIENT (OUTPATIENT)
Dept: OUTPATIENT SERVICES | Facility: HOSPITAL | Age: 46
LOS: 1 days | Discharge: HOME | End: 2021-07-20
Payer: MEDICAID

## 2021-07-26 ENCOUNTER — OUTPATIENT (OUTPATIENT)
Dept: OUTPATIENT SERVICES | Facility: HOSPITAL | Age: 46
LOS: 1 days | Discharge: HOME | End: 2021-07-26

## 2021-07-26 DIAGNOSIS — D64.9 ANEMIA, UNSPECIFIED: ICD-10-CM

## 2021-07-28 ENCOUNTER — RESULT REVIEW (OUTPATIENT)
Age: 46
End: 2021-07-28

## 2021-07-28 ENCOUNTER — APPOINTMENT (OUTPATIENT)
Dept: HEMATOLOGY ONCOLOGY | Facility: CLINIC | Age: 46
End: 2021-07-28
Payer: MEDICAID

## 2021-07-28 VITALS
OXYGEN SATURATION: 99 % | DIASTOLIC BLOOD PRESSURE: 88 MMHG | BODY MASS INDEX: 31.54 KG/M2 | TEMPERATURE: 97.3 F | HEIGHT: 64 IN | SYSTOLIC BLOOD PRESSURE: 135 MMHG | HEART RATE: 84 BPM | WEIGHT: 184.73 LBS | RESPIRATION RATE: 16 BRPM

## 2021-07-28 LAB
BASOPHILS # BLD AUTO: 0.02 K/UL — SIGNIFICANT CHANGE UP (ref 0–0.2)
BASOPHILS NFR BLD AUTO: 0.4 % — SIGNIFICANT CHANGE UP (ref 0–2)
EOSINOPHIL # BLD AUTO: 0.08 K/UL — SIGNIFICANT CHANGE UP (ref 0–0.5)
EOSINOPHIL NFR BLD AUTO: 1.7 % — SIGNIFICANT CHANGE UP (ref 0–6)
HCT VFR BLD CALC: 37.6 % — SIGNIFICANT CHANGE UP (ref 34.5–45)
HGB BLD-MCNC: 12.7 G/DL — SIGNIFICANT CHANGE UP (ref 11.5–15.5)
IMM GRANULOCYTES NFR BLD AUTO: 0.4 % — SIGNIFICANT CHANGE UP (ref 0–1.5)
LYMPHOCYTES # BLD AUTO: 1.37 K/UL — SIGNIFICANT CHANGE UP (ref 1–3.3)
LYMPHOCYTES # BLD AUTO: 28.7 % — SIGNIFICANT CHANGE UP (ref 13–44)
MCHC RBC-ENTMCNC: 28.9 PG — SIGNIFICANT CHANGE UP (ref 27–34)
MCHC RBC-ENTMCNC: 33.8 G/DL — SIGNIFICANT CHANGE UP (ref 32–36)
MCV RBC AUTO: 85.5 FL — SIGNIFICANT CHANGE UP (ref 80–100)
MONOCYTES # BLD AUTO: 0.36 K/UL — SIGNIFICANT CHANGE UP (ref 0–0.9)
MONOCYTES NFR BLD AUTO: 7.5 % — SIGNIFICANT CHANGE UP (ref 2–14)
NEUTROPHILS # BLD AUTO: 2.93 K/UL — SIGNIFICANT CHANGE UP (ref 1.8–7.4)
NEUTROPHILS NFR BLD AUTO: 61.3 % — SIGNIFICANT CHANGE UP (ref 43–77)
NRBC # BLD: 0 /100 WBCS — SIGNIFICANT CHANGE UP (ref 0–0)
PLATELET # BLD AUTO: 295 K/UL — SIGNIFICANT CHANGE UP (ref 150–400)
RBC # BLD: 4.4 M/UL — SIGNIFICANT CHANGE UP (ref 3.8–5.2)
RBC # FLD: 13.6 % — SIGNIFICANT CHANGE UP (ref 10.3–14.5)
WBC # BLD: 4.78 K/UL — SIGNIFICANT CHANGE UP (ref 3.8–10.5)
WBC # FLD AUTO: 4.78 K/UL — SIGNIFICANT CHANGE UP (ref 3.8–10.5)

## 2021-07-28 PROCEDURE — 99205 OFFICE O/P NEW HI 60 MIN: CPT

## 2021-07-29 ENCOUNTER — APPOINTMENT (OUTPATIENT)
Dept: RADIATION ONCOLOGY | Facility: CLINIC | Age: 46
End: 2021-07-29
Payer: MEDICAID

## 2021-07-29 VITALS
RESPIRATION RATE: 17 BRPM | WEIGHT: 183.73 LBS | SYSTOLIC BLOOD PRESSURE: 115 MMHG | TEMPERATURE: 91.2 F | DIASTOLIC BLOOD PRESSURE: 79 MMHG | HEART RATE: 90 BPM | BODY MASS INDEX: 31.54 KG/M2 | OXYGEN SATURATION: 99 %

## 2021-07-29 LAB
ALBUMIN SERPL ELPH-MCNC: 4.5 G/DL
ALP BLD-CCNC: 110 U/L
ALT SERPL-CCNC: 32 U/L
ANION GAP SERPL CALC-SCNC: 12 MMOL/L
APTT BLD: 32.4 SEC
AST SERPL-CCNC: 21 U/L
BILIRUB SERPL-MCNC: 0.3 MG/DL
BUN SERPL-MCNC: 14 MG/DL
CALCIUM SERPL-MCNC: 10.2 MG/DL
CHLORIDE SERPL-SCNC: 103 MMOL/L
CO2 SERPL-SCNC: 23 MMOL/L
CREAT SERPL-MCNC: 0.69 MG/DL
GLUCOSE SERPL-MCNC: 95 MG/DL
HAV IGM SER QL: NONREACTIVE
HBV CORE IGM SER QL: NONREACTIVE
HBV SURFACE AG SER QL: NONREACTIVE
HCV AB SER QL: NONREACTIVE
HCV S/CO RATIO: 0.12 S/CO
INR PPP: 1.02 RATIO
MAGNESIUM SERPL-MCNC: 2.2 MG/DL
POTASSIUM SERPL-SCNC: 4.2 MMOL/L
PROT SERPL-MCNC: 7.6 G/DL
PT BLD: 12 SEC
SODIUM SERPL-SCNC: 138 MMOL/L

## 2021-07-29 PROCEDURE — 99204 OFFICE O/P NEW MOD 45 MIN: CPT | Mod: GC,25

## 2021-07-29 PROCEDURE — 77263 THER RADIOLOGY TX PLNG CPLX: CPT

## 2021-08-02 NOTE — CONSULT LETTER
[Dear  ___] : Dear  [unfilled], [Consult Letter:] : I had the pleasure of evaluating your patient, [unfilled]. [Consult Closing:] : Thank you very much for allowing me to participate in the care of this patient.  If you have any questions, please do not hesitate to contact me. [Sincerely,] : Sincerely, [DrMarlene  ___] : Dr. KELLEY [DrMarlene ___] : Dr. KELLEY

## 2021-08-04 PROCEDURE — 77332 RADIATION TREATMENT AID(S): CPT | Mod: 26

## 2021-08-05 NOTE — HISTORY OF PRESENT ILLNESS
[FreeTextEntry1] : 46 year old premenopausal female with cervical adenocarcinoma, with disease involved in the endometrium presenting for consideration of radiation therapy. Initially seen by Dr. Bill on 7/16/21 given concern for primary vs secondary gynecologic malignancy. \par \par Presented to [facility] on 5/28/21 given pelvic pain and retained tampon with a 5 day history of malodorous discharge with fever and tachycardia. Initially assumed to have PID and started on antibiotics, however cervix appeared irregular, dark and distorted. Subsequently taken to the OR for EUA, and cervical biospy done, showing adenocarcinoma, unclear at time if primary or secondary. \par Pathology is as below: \par       PATHOLOGY:\par 1) EMBx, 5/26/21, Dr. James\par  a) atypical endometrial glands exhibiting complex, cribiform architexture are present as a few scattered glands, further workup is necessary to R/O CAH or adenocarcinoma of the endometrium\par 2) EMBx, 5/28/21\par  a) minute polypoid fragment of endometrial tissue with partial infarction and focal glandular crowding without atypia \par 3) EUA, D&C, ECC, cervical biopsy, 5/30/21, [facility]\par  a) cervical/endocervix - adenocarcinoma in a background of necrosis and acute inflammation (primary vs. secondary)\par Because of concern for gi primary, she was seen for consultation by Dr. Knox. She underwent colonoscopy and upper endoscopy with no cancerous lesion found.\par \par PET/CT done on 7/5/21 is as below: \par Atrophic left left kidney. Lobulated bilateral renal cortex with cortical scarring bilaterally. Multiple non obstruction stones on the left, largest 7mm in the lower pole, stable since 5/29/21. Difficult to delineate increased FDG activity in the uterine cervix corresponding to known malignancy SUV 7.3. There is a separate focus of increased FDG activity in the endometrium which may be physiologic SUV 8.8. No enlarged FDG avid nodes. Pan colonic rectal hypermetabolism which may be related to Metformin. \par \par Thus, the impression of a cervical primary was confirmed and she was sent forward for medical oncology and radiation medicine evaluation.\par \par Other imaging: \par \par TVUS on 5/28/21: uterus 9.0 x 4.9 x 5.2cm. Retained tampon in vagina. No collection or fluid in the endometrial canal. EML 6.3mm. \par RTO- a simple cyst 3.7 x 3.4 x 3.4cm\par LTO - Small follicles are seen. \par No FF. \par \par \par CT A/P on 5/29/21: lobulated bilateral renal cortex with cortical scarring bilaterally. Multiple nonobstructing stones on the left. no hydro.\par The endometrial cavity is slightly distended with fluid. An air containing structure is identified within the upper vaginal cavity (likely retained tampon). Mild fatty stranding in the pelvis. Likely a physiological cyst in the right ovary and a small corpus luteal or hemorrhagic cyst in the left ovary. Small free fluid. Appendix normal. No ascites. No LAD. \par \par Today she is feeling well. Denies any pain. No CP/SOB/N/V/abdominal pain. No urinary or bowel complaints. No vaginal bleeding or discharge. Consulted with medical oncology yesterday. Of note, she is in [place] from 8/10 - 8/20/21. She notes a prior history of irregular periods, but had regular periods for 3 years prior to last year. At that time began experiencing increased bleeding; presented to Gyn, was prescribed iron and then initially felt better, before the events discussed above leading to presentation at [facility]

## 2021-08-05 NOTE — PHYSICAL EXAM
[General Appearance - In No Acute Distress] : in no acute distress [] : no respiratory distress [Abdomen Soft] : soft [No Rectal Mass] : no rectal mass [Normal External Genitalia] : normal external genitalia  [de-identified] : ecm cervical massright parametrial shortening

## 2021-08-06 ENCOUNTER — NON-APPOINTMENT (OUTPATIENT)
Age: 46
End: 2021-08-06

## 2021-08-09 PROCEDURE — 77470 SPECIAL RADIATION TREATMENT: CPT | Mod: 26

## 2021-08-24 NOTE — REASON FOR VISIT
[Initial Consultation] : an initial consultation [Other: _____] : [unfilled] [FreeTextEntry2] : cervical cancer.

## 2021-08-24 NOTE — HISTORY OF PRESENT ILLNESS
[Disease: _____________________] : Disease: [unfilled] [AJCC Stage: ____] : AJCC Stage: [unfilled] [de-identified] : 46 y.o female with adenocarcinoma most likely  of cervical origin.\par \par 46 year old premenopausal female with cervical adenocarcinoma, with disease involved in the endometrium presenting for consideration of chemotherapy. Initially seen by Dr. Bill on 7/16/21 given concern for primary vs secondary gynecologic malignancy. \par \par Presented to [facility] on 5/28/21 given pelvic pain and retained tampon with a 5 day history of malodorous discharge with fever and tachycardia. Initially assumed to have PID and started on antibiotics, however cervix appeared irregular, dark and distorted. Subsequently taken to the OR for EUA, and cervical biospy done, showing adenocarcinoma, unclear at time if primary or secondary. \par Pathology is as below: \par  PATHOLOGY:\par 1) EMBx, 5/26/21, Dr. James\par  a) atypical endometrial glands exhibiting complex, cribiform architexture are present as a few scattered glands, further workup is necessary to R/O CAH or adenocarcinoma of the endometrium\par 2) EMBx, 5/28/21\par  a) minute polypoid fragment of endometrial tissue with partial infarction and focal glandular crowding without atypia \par 3) EUA, D&C, ECC, cervical biopsy, 5/30/21, [facility]\par  a) cervical/endocervix - adenocarcinoma in a background of necrosis and acute inflammation (primary vs. secondary)\par \par PET/CT done on 7/5/21 is as below: \par trophic left left kidney. Lobulated bilateral renal cortex with cortical scarring bilaterally. Multiple non obstruction stones on the left, largest 7mm in the lower pole, stable since 5/29/21. Difficult to delineate increased FDG activity in the uterine cervix corresponding to known malignancy SUV 7.3. There is a separate focus of increased FDG activity in the endometrium which may be physiologic SUV 8.8. No enlarged FDG avid nodes. Pan colonic rectal hypermetabolism which may be related to Metformin. \par \par Other imaging: \par \par TVUS on 5/28/21: uterus 9.0 x 4.9 x 5.2cm. Retained tampon in vagina. No collection or fluid in the endometrial canal. EML 6.3mm. \par RTO- a simple cyst 3.7 x 3.4 x 3.4cm\par LTO - Small follicles are seen. \par No FF. \par \par \par CT A/P on 5/29/21: lobulated bilateral renal cortex with cortical scarring bilaterally. Multiple nonobstructing stones on the left. no hydro.\par The endometrial cavity is slightly distended with fluid. An air containing structure is identified within the upper vaginal cavity (likely retained tampon). Mild fatty stranding in the pelvis. Likely a physiological cyst in the right ovary and a small corpus luteal or hemorrhagic cyst in the left ovary. Small free fluid. Appendix normal. No ascites. No LAD. \par \par Initially it was thought to be a GI primary and patient underwent GI work up with EGD and colonoscopy, all WNL.\par Patient still has some vaginal discharge, malodorous.\par \par  [de-identified] : Patient has no more bleeding , she c/o lower pelvic pain, not taking any pain meds.\par She is single, has two children, denies smoking or drinking history.\par She works for [employer]. She is not vaccinated.\par Menarche: 8\par LMP: 5/16/21\par First pregnancy: 20

## 2021-08-25 ENCOUNTER — APPOINTMENT (OUTPATIENT)
Dept: HEMATOLOGY ONCOLOGY | Facility: CLINIC | Age: 46
End: 2021-08-25

## 2021-08-25 PROCEDURE — 77301 RADIOTHERAPY DOSE PLAN IMRT: CPT | Mod: 26

## 2021-08-25 PROCEDURE — 77338 DESIGN MLC DEVICE FOR IMRT: CPT | Mod: 26

## 2021-08-25 PROCEDURE — 77300 RADIATION THERAPY DOSE PLAN: CPT | Mod: 26

## 2021-08-26 ENCOUNTER — OUTPATIENT (OUTPATIENT)
Dept: OUTPATIENT SERVICES | Facility: HOSPITAL | Age: 46
LOS: 1 days | Discharge: HOME | End: 2021-08-26

## 2021-08-26 ENCOUNTER — LABORATORY RESULT (OUTPATIENT)
Age: 46
End: 2021-08-26

## 2021-08-26 DIAGNOSIS — D64.9 ANEMIA, UNSPECIFIED: ICD-10-CM

## 2021-08-27 ENCOUNTER — APPOINTMENT (OUTPATIENT)
Dept: HEMATOLOGY ONCOLOGY | Facility: CLINIC | Age: 46
End: 2021-08-27

## 2021-08-30 PROCEDURE — G6002: CPT | Mod: 26

## 2021-08-31 ENCOUNTER — RESULT REVIEW (OUTPATIENT)
Age: 46
End: 2021-08-31

## 2021-08-31 ENCOUNTER — NON-APPOINTMENT (OUTPATIENT)
Age: 46
End: 2021-08-31

## 2021-08-31 ENCOUNTER — APPOINTMENT (OUTPATIENT)
Dept: INFUSION THERAPY | Facility: HOSPITAL | Age: 46
End: 2021-08-31

## 2021-08-31 ENCOUNTER — LABORATORY RESULT (OUTPATIENT)
Age: 46
End: 2021-08-31

## 2021-08-31 LAB
BASOPHILS # BLD AUTO: 0.01 K/UL — SIGNIFICANT CHANGE UP (ref 0–0.2)
BASOPHILS NFR BLD AUTO: 0.1 % — SIGNIFICANT CHANGE UP (ref 0–2)
EOSINOPHIL # BLD AUTO: 0.06 K/UL — SIGNIFICANT CHANGE UP (ref 0–0.5)
EOSINOPHIL NFR BLD AUTO: 0.8 % — SIGNIFICANT CHANGE UP (ref 0–6)
HCT VFR BLD CALC: 37.8 % — SIGNIFICANT CHANGE UP (ref 34.5–45)
HGB BLD-MCNC: 13.3 G/DL — SIGNIFICANT CHANGE UP (ref 11.5–15.5)
IMM GRANULOCYTES NFR BLD AUTO: 0.4 % — SIGNIFICANT CHANGE UP (ref 0–1.5)
LYMPHOCYTES # BLD AUTO: 1.28 K/UL — SIGNIFICANT CHANGE UP (ref 1–3.3)
LYMPHOCYTES # BLD AUTO: 16.9 % — SIGNIFICANT CHANGE UP (ref 13–44)
MCHC RBC-ENTMCNC: 29.6 PG — SIGNIFICANT CHANGE UP (ref 27–34)
MCHC RBC-ENTMCNC: 35.2 G/DL — SIGNIFICANT CHANGE UP (ref 32–36)
MCV RBC AUTO: 84.2 FL — SIGNIFICANT CHANGE UP (ref 80–100)
MONOCYTES # BLD AUTO: 0.44 K/UL — SIGNIFICANT CHANGE UP (ref 0–0.9)
MONOCYTES NFR BLD AUTO: 5.8 % — SIGNIFICANT CHANGE UP (ref 2–14)
NEUTROPHILS # BLD AUTO: 5.75 K/UL — SIGNIFICANT CHANGE UP (ref 1.8–7.4)
NEUTROPHILS NFR BLD AUTO: 76 % — SIGNIFICANT CHANGE UP (ref 43–77)
NRBC # BLD: 0 /100 WBCS — SIGNIFICANT CHANGE UP (ref 0–0)
PLATELET # BLD AUTO: 276 K/UL — SIGNIFICANT CHANGE UP (ref 150–400)
RBC # BLD: 4.49 M/UL — SIGNIFICANT CHANGE UP (ref 3.8–5.2)
RBC # FLD: 13.4 % — SIGNIFICANT CHANGE UP (ref 10.3–14.5)
WBC # BLD: 7.57 K/UL — SIGNIFICANT CHANGE UP (ref 3.8–10.5)
WBC # FLD AUTO: 7.57 K/UL — SIGNIFICANT CHANGE UP (ref 3.8–10.5)

## 2021-08-31 PROCEDURE — G6002: CPT | Mod: 26

## 2021-09-01 ENCOUNTER — NON-APPOINTMENT (OUTPATIENT)
Age: 46
End: 2021-09-01

## 2021-09-01 VITALS
TEMPERATURE: 96.26 F | RESPIRATION RATE: 17 BRPM | WEIGHT: 183 LBS | DIASTOLIC BLOOD PRESSURE: 72 MMHG | BODY MASS INDEX: 31.41 KG/M2 | OXYGEN SATURATION: 99 % | SYSTOLIC BLOOD PRESSURE: 108 MMHG | HEART RATE: 95 BPM

## 2021-09-01 DIAGNOSIS — C53.9 MALIGNANT NEOPLASM OF CERVIX UTERI, UNSPECIFIED: ICD-10-CM

## 2021-09-01 DIAGNOSIS — Z51.11 ENCOUNTER FOR ANTINEOPLASTIC CHEMOTHERAPY: ICD-10-CM

## 2021-09-01 DIAGNOSIS — R11.2 NAUSEA WITH VOMITING, UNSPECIFIED: ICD-10-CM

## 2021-09-01 PROCEDURE — G6002: CPT | Mod: 26

## 2021-09-01 NOTE — HISTORY OF PRESENT ILLNESS
[FreeTextEntry1] : 46 year old premenopausal female with locally advanced cervical adenocarcinoma.\par Receiving radiation therapy.\par \par 9/01/21 3/28 fx Tolerating treatment well. Notes that she has had some difficulty sleeping in settting of anxiety. Advised seeing a therapist and/or psychiatrist. We will link her to a behavioralist and will prescribe alprazolam in interim.

## 2021-09-01 NOTE — DISEASE MANAGEMENT
[TTNM] : 2b [NTNM] : 0 [MTNM] : 0 [de-identified] : 531 [de-identified] : 5040 cGy [de-identified] : Pelvis/ Cervix

## 2021-09-02 PROCEDURE — G6002: CPT | Mod: 26

## 2021-09-03 PROCEDURE — 77014: CPT | Mod: 26

## 2021-09-03 PROCEDURE — 77427 RADIATION TX MANAGEMENT X5: CPT

## 2021-09-07 ENCOUNTER — NON-APPOINTMENT (OUTPATIENT)
Age: 46
End: 2021-09-07

## 2021-09-07 PROCEDURE — G6002: CPT | Mod: 26

## 2021-09-07 NOTE — DISEASE MANAGEMENT
[Clinical] : TNM Stage: c [TTNM] : 2b [NTNM] : 0 [MTNM] : 0 [IIB] : IIB [de-identified] : 364 [de-identified] : 5040 cGy [de-identified] : Pelvis/ Cervix

## 2021-09-07 NOTE — VITALS
[Maximal Pain Intensity: 4/10] : 4/10 [ECOG Performance Status: 1 - Restricted in physically strenuous activity but ambulatory and able to carry out work of a light or sedentary nature] : Performance Status: 1 - Restricted in physically strenuous activity but ambulatory and able to carry out work of a light or sedentary nature, e.g., light house work, office work

## 2021-09-07 NOTE — HISTORY OF PRESENT ILLNESS
[FreeTextEntry1] : 46 year old premenopausal female with locally advanced cervical adenocarcinoma.\par Receiving radiation therapy.\par \par 9/7/21: 6/28fx pelvic pain radiating down both legs, worse with opening bowels and urination. Taking Tylenol\par \par 9/01/21 3/28 fx Tolerating treatment well. Notes that she has had some difficulty sleeping in settting of anxiety. Advised seeing a therapist and/or psychiatrist. We will link her to a behavioralist and will prescribe alprazolam in interim.

## 2021-09-08 ENCOUNTER — RESULT REVIEW (OUTPATIENT)
Age: 46
End: 2021-09-08

## 2021-09-08 ENCOUNTER — APPOINTMENT (OUTPATIENT)
Dept: HEMATOLOGY ONCOLOGY | Facility: CLINIC | Age: 46
End: 2021-09-08
Payer: MEDICAID

## 2021-09-08 ENCOUNTER — LABORATORY RESULT (OUTPATIENT)
Age: 46
End: 2021-09-08

## 2021-09-08 ENCOUNTER — APPOINTMENT (OUTPATIENT)
Dept: INFUSION THERAPY | Facility: HOSPITAL | Age: 46
End: 2021-09-08

## 2021-09-08 VITALS
SYSTOLIC BLOOD PRESSURE: 105 MMHG | DIASTOLIC BLOOD PRESSURE: 74 MMHG | TEMPERATURE: 97.8 F | RESPIRATION RATE: 16 BRPM | WEIGHT: 183.62 LBS | HEART RATE: 105 BPM | OXYGEN SATURATION: 98 % | BODY MASS INDEX: 31.52 KG/M2

## 2021-09-08 LAB
BASOPHILS # BLD AUTO: 0.01 K/UL — SIGNIFICANT CHANGE UP (ref 0–0.2)
BASOPHILS NFR BLD AUTO: 0.2 % — SIGNIFICANT CHANGE UP (ref 0–2)
EOSINOPHIL # BLD AUTO: 0.13 K/UL — SIGNIFICANT CHANGE UP (ref 0–0.5)
EOSINOPHIL NFR BLD AUTO: 2.6 % — SIGNIFICANT CHANGE UP (ref 0–6)
HCT VFR BLD CALC: 34.7 % — SIGNIFICANT CHANGE UP (ref 34.5–45)
HGB BLD-MCNC: 12.2 G/DL — SIGNIFICANT CHANGE UP (ref 11.5–15.5)
IMM GRANULOCYTES NFR BLD AUTO: 1.2 % — SIGNIFICANT CHANGE UP (ref 0–1.5)
LYMPHOCYTES # BLD AUTO: 0.7 K/UL — LOW (ref 1–3.3)
LYMPHOCYTES # BLD AUTO: 14 % — SIGNIFICANT CHANGE UP (ref 13–44)
MCHC RBC-ENTMCNC: 29.5 PG — SIGNIFICANT CHANGE UP (ref 27–34)
MCHC RBC-ENTMCNC: 35.2 G/DL — SIGNIFICANT CHANGE UP (ref 32–36)
MCV RBC AUTO: 84 FL — SIGNIFICANT CHANGE UP (ref 80–100)
MONOCYTES # BLD AUTO: 0.5 K/UL — SIGNIFICANT CHANGE UP (ref 0–0.9)
MONOCYTES NFR BLD AUTO: 10 % — SIGNIFICANT CHANGE UP (ref 2–14)
NEUTROPHILS # BLD AUTO: 3.61 K/UL — SIGNIFICANT CHANGE UP (ref 1.8–7.4)
NEUTROPHILS NFR BLD AUTO: 72 % — SIGNIFICANT CHANGE UP (ref 43–77)
NRBC # BLD: 0 /100 WBCS — SIGNIFICANT CHANGE UP (ref 0–0)
PLATELET # BLD AUTO: 282 K/UL — SIGNIFICANT CHANGE UP (ref 150–400)
RBC # BLD: 4.13 M/UL — SIGNIFICANT CHANGE UP (ref 3.8–5.2)
RBC # FLD: 13.2 % — SIGNIFICANT CHANGE UP (ref 10.3–14.5)
WBC # BLD: 5.01 K/UL — SIGNIFICANT CHANGE UP (ref 3.8–10.5)
WBC # FLD AUTO: 5.01 K/UL — SIGNIFICANT CHANGE UP (ref 3.8–10.5)

## 2021-09-08 PROCEDURE — G6002: CPT | Mod: 26

## 2021-09-08 PROCEDURE — 99214 OFFICE O/P EST MOD 30 MIN: CPT

## 2021-09-09 PROCEDURE — G6002: CPT | Mod: 26

## 2021-09-09 NOTE — HISTORY OF PRESENT ILLNESS
[Disease: _____________________] : Disease: [unfilled] [AJCC Stage: ____] : AJCC Stage: [unfilled] [Treatment Protocol] : Treatment Protocol [de-identified] : 46 y.o female with adenocarcinoma most likely  of cervical origin.\par \par 46 year old premenopausal female with cervical adenocarcinoma, with disease involved in the endometrium presenting for consideration of chemotherapy. Initially seen by Dr. Bill on 7/16/21 given concern for primary vs secondary gynecologic malignancy. \par \par Presented to [facility] on 5/28/21 given pelvic pain and retained tampon with a 5 day history of malodorous discharge with fever and tachycardia. Initially assumed to have PID and started on antibiotics, however cervix appeared irregular, dark and distorted. Subsequently taken to the OR for EUA, and cervical biospy done, showing adenocarcinoma, unclear at time if primary or secondary. \par Pathology is as below: \par  PATHOLOGY:\par 1) EMBx, 5/26/21, Dr. James\par  a) atypical endometrial glands exhibiting complex, cribiform architexture are present as a few scattered glands, further workup is necessary to R/O CAH or adenocarcinoma of the endometrium\par 2) EMBx, 5/28/21\par  a) minute polypoid fragment of endometrial tissue with partial infarction and focal glandular crowding without atypia \par 3) EUA, D&C, ECC, cervical biopsy, 5/30/21, [facility]\par  a) cervical/endocervix - adenocarcinoma in a background of necrosis and acute inflammation (primary vs. secondary)\par \par PET/CT done on 7/5/21 is as below: \par trophic left left kidney. Lobulated bilateral renal cortex with cortical scarring bilaterally. Multiple non obstruction stones on the left, largest 7mm in the lower pole, stable since 5/29/21. Difficult to delineate increased FDG activity in the uterine cervix corresponding to known malignancy SUV 7.3. There is a separate focus of increased FDG activity in the endometrium which may be physiologic SUV 8.8. No enlarged FDG avid nodes. Pan colonic rectal hypermetabolism which may be related to Metformin. \par \par Other imaging: \par \par TVUS on 5/28/21: uterus 9.0 x 4.9 x 5.2cm. Retained tampon in vagina. No collection or fluid in the endometrial canal. EML 6.3mm. \par RTO- a simple cyst 3.7 x 3.4 x 3.4cm\par LTO - Small follicles are seen. \par No FF. \par \par \par CT A/P on 5/29/21: lobulated bilateral renal cortex with cortical scarring bilaterally. Multiple nonobstructing stones on the left. no hydro.\par The endometrial cavity is slightly distended with fluid. An air containing structure is identified within the upper vaginal cavity (likely retained tampon). Mild fatty stranding in the pelvis. Likely a physiological cyst in the right ovary and a small corpus luteal or hemorrhagic cyst in the left ovary. Small free fluid. Appendix normal. No ascites. No LAD. \par \par Initially it was thought to be a GI primary and patient underwent GI work up with EGD and colonoscopy, all WNL.\par Patient still has some vaginal discharge, malodorous.\par \par  [FreeTextEntry1] : Weekly Cisplatin and EBRT\par C1- 8/31/21\par C2- 9/8/21 [de-identified] : Patient feels well, tolerating treatment well.  She c/o lower abdominal and groin pain.\par Started since starting treatment. Denies any dysuria, UA done yesterday was negative for infection.\par Denies any diarrhea or vomiting. She has some nausea.

## 2021-09-10 ENCOUNTER — NON-APPOINTMENT (OUTPATIENT)
Age: 46
End: 2021-09-10

## 2021-09-10 VITALS
HEART RATE: 85 BPM | RESPIRATION RATE: 16 BRPM | SYSTOLIC BLOOD PRESSURE: 117 MMHG | DIASTOLIC BLOOD PRESSURE: 77 MMHG | WEIGHT: 184.39 LBS | OXYGEN SATURATION: 99 % | BODY MASS INDEX: 31.65 KG/M2 | TEMPERATURE: 97.16 F

## 2021-09-10 LAB
APPEARANCE: CLEAR
BILIRUBIN URINE: NEGATIVE
BLOOD URINE: NEGATIVE
COLOR: COLORLESS
GLUCOSE QUALITATIVE U: NEGATIVE
KETONES URINE: NEGATIVE
LEUKOCYTE ESTERASE URINE: NEGATIVE
NITRITE URINE: NEGATIVE
PH URINE: 6.5
PROTEIN URINE: NEGATIVE
SPECIFIC GRAVITY URINE: 1
UROBILINOGEN URINE: NORMAL

## 2021-09-10 PROCEDURE — 77014: CPT | Mod: 26

## 2021-09-10 NOTE — REVIEW OF SYSTEMS
[Diarrhea: Grade 0] : Diarrhea: Grade 0 [Fatigue: Grade 1 - Fatigue relieved by rest] : Fatigue: Grade 1 - Fatigue relieved by rest [Skin Hyperpigmentation: Grade 0] : Skin Hyperpigmentation: Grade 0

## 2021-09-10 NOTE — VITALS
[ECOG Performance Status: 1 - Restricted in physically strenuous activity but ambulatory and able to carry out work of a light or sedentary nature] : Performance Status: 1 - Restricted in physically strenuous activity but ambulatory and able to carry out work of a light or sedentary nature, e.g., light house work, office work [Maximal Pain Intensity: 0/10] : 0/10

## 2021-09-10 NOTE — PHYSICAL EXAM
[Abdomen Soft] : soft [Normal] : normal skin color and pigmentation and no rash [Oriented To Time, Place, And Person] : oriented to person, place, and time

## 2021-09-13 PROCEDURE — G6002: CPT | Mod: 26

## 2021-09-13 PROCEDURE — 77427 RADIATION TX MANAGEMENT X5: CPT

## 2021-09-13 NOTE — DISEASE MANAGEMENT
[Clinical] : TNM Stage: c [IIB] : IIB [TTNM] : 2b [NTNM] : 0 [MTNM] : 0 [de-identified] : 2619 [de-identified] : 5040 cGy [de-identified] : Pelvis/ Cervix

## 2021-09-13 NOTE — HISTORY OF PRESENT ILLNESS
[FreeTextEntry1] : 46 year old premenopausal female with locally advanced cervical adenocarcinoma.\par Receiving radiation therapy.\par \par 9/10/2021 -9/28 fxs. On Tuesday complained of urinary irritation, and incomplete emptying. Endorsed history of chronic UTIs. We obtained UA on Tuesday, that resulted as negative. Bilateral leg pain endorsed at that time is improving on Tylenol.Today reports that her urinary irritation is resolving. Denies any diarrhea, vaginal bleeding or discharge. \par \par 9/7/21: 6/28 fxs pelvic pain radiating down both legs, worse with opening bowels and urination. Taking Tylenol\par \par 9/01/21 3/28 fxs Tolerating treatment well. Notes that she has had some difficulty sleeping in setting of anxiety. Advised seeing a therapist and/or psychiatrist. We will link her to a behaviorist and will prescribe alprazolam in interim.

## 2021-09-14 ENCOUNTER — RESULT REVIEW (OUTPATIENT)
Age: 46
End: 2021-09-14

## 2021-09-14 ENCOUNTER — APPOINTMENT (OUTPATIENT)
Dept: INFUSION THERAPY | Facility: HOSPITAL | Age: 46
End: 2021-09-14

## 2021-09-14 ENCOUNTER — LABORATORY RESULT (OUTPATIENT)
Age: 46
End: 2021-09-14

## 2021-09-14 ENCOUNTER — NON-APPOINTMENT (OUTPATIENT)
Age: 46
End: 2021-09-14

## 2021-09-14 VITALS
BODY MASS INDEX: 31.11 KG/M2 | OXYGEN SATURATION: 100 % | RESPIRATION RATE: 16 BRPM | SYSTOLIC BLOOD PRESSURE: 118 MMHG | DIASTOLIC BLOOD PRESSURE: 84 MMHG | HEART RATE: 98 BPM | WEIGHT: 181.22 LBS | TEMPERATURE: 95.9 F

## 2021-09-14 LAB
BASOPHILS # BLD AUTO: 0.01 K/UL — SIGNIFICANT CHANGE UP (ref 0–0.2)
BASOPHILS NFR BLD AUTO: 0.3 % — SIGNIFICANT CHANGE UP (ref 0–2)
EOSINOPHIL # BLD AUTO: 0.13 K/UL — SIGNIFICANT CHANGE UP (ref 0–0.5)
EOSINOPHIL NFR BLD AUTO: 3.3 % — SIGNIFICANT CHANGE UP (ref 0–6)
HCT VFR BLD CALC: 35.4 % — SIGNIFICANT CHANGE UP (ref 34.5–45)
HGB BLD-MCNC: 12.6 G/DL — SIGNIFICANT CHANGE UP (ref 11.5–15.5)
IMM GRANULOCYTES NFR BLD AUTO: 0.8 % — SIGNIFICANT CHANGE UP (ref 0–1.5)
LYMPHOCYTES # BLD AUTO: 0.43 K/UL — LOW (ref 1–3.3)
LYMPHOCYTES # BLD AUTO: 10.8 % — LOW (ref 13–44)
MCHC RBC-ENTMCNC: 29.2 PG — SIGNIFICANT CHANGE UP (ref 27–34)
MCHC RBC-ENTMCNC: 35.6 G/DL — SIGNIFICANT CHANGE UP (ref 32–36)
MCV RBC AUTO: 82.1 FL — SIGNIFICANT CHANGE UP (ref 80–100)
MONOCYTES # BLD AUTO: 0.31 K/UL — SIGNIFICANT CHANGE UP (ref 0–0.9)
MONOCYTES NFR BLD AUTO: 7.8 % — SIGNIFICANT CHANGE UP (ref 2–14)
NEUTROPHILS # BLD AUTO: 3.09 K/UL — SIGNIFICANT CHANGE UP (ref 1.8–7.4)
NEUTROPHILS NFR BLD AUTO: 77 % — SIGNIFICANT CHANGE UP (ref 43–77)
NRBC # BLD: 0 /100 WBCS — SIGNIFICANT CHANGE UP (ref 0–0)
PLATELET # BLD AUTO: 211 K/UL — SIGNIFICANT CHANGE UP (ref 150–400)
RBC # BLD: 4.31 M/UL — SIGNIFICANT CHANGE UP (ref 3.8–5.2)
RBC # FLD: 13 % — SIGNIFICANT CHANGE UP (ref 10.3–14.5)
WBC # BLD: 4 K/UL — SIGNIFICANT CHANGE UP (ref 3.8–10.5)
WBC # FLD AUTO: 4 K/UL — SIGNIFICANT CHANGE UP (ref 3.8–10.5)

## 2021-09-14 PROCEDURE — G6002: CPT | Mod: 26

## 2021-09-14 NOTE — REVIEW OF SYSTEMS
[Constipation: Grade 1 - Occasional or intermittent symptoms; occasional use of stool softeners, laxatives, dietary modification, or enema] : Constipation: Grade 1 - Occasional or intermittent symptoms; occasional use of stool softeners, laxatives, dietary modification, or enema [Diarrhea: Grade 0] : Diarrhea: Grade 0 [Fatigue: Grade 1 - Fatigue relieved by rest] : Fatigue: Grade 1 - Fatigue relieved by rest [Skin Hyperpigmentation: Grade 0] : Skin Hyperpigmentation: Grade 0

## 2021-09-15 PROCEDURE — G6002: CPT | Mod: 26

## 2021-09-16 PROCEDURE — G6002: CPT | Mod: 26

## 2021-09-17 PROCEDURE — 77014: CPT | Mod: 26

## 2021-09-20 VITALS
SYSTOLIC BLOOD PRESSURE: 115 MMHG | BODY MASS INDEX: 31.07 KG/M2 | HEART RATE: 104 BPM | WEIGHT: 181 LBS | OXYGEN SATURATION: 100 % | RESPIRATION RATE: 17 BRPM | DIASTOLIC BLOOD PRESSURE: 79 MMHG

## 2021-09-20 PROCEDURE — 77427 RADIATION TX MANAGEMENT X5: CPT

## 2021-09-20 PROCEDURE — G6002: CPT | Mod: 26

## 2021-09-20 NOTE — HISTORY OF PRESENT ILLNESS
[FreeTextEntry1] : 46 year old premenopausal female with locally advanced cervical adenocarcinoma.\par Receiving radiation therapy.\par \par 9/14/2021 - 10/25 fx. Has some intermittent constipation as per her. Has many small bowel movements through the day, that are hard. Reports that chicken, and bread has been associated with hard bowel movements. \par \par 9/10/2021 -9/28 fxs. On Tuesday complained of urinary irritation, and incomplete emptying. Endorsed history of chronic UTIs. We obtained UA on Tuesday, that resulted as negative. Bilateral leg pain endorsed at that time is improving on Tylenol. Today reports that her urinary irritation is resolving. Denies any diarrhea, vaginal bleeding or discharge. \par \par 9/7/21: 6/28 fxs pelvic pain radiating down both legs, worse with opening bowels and urination. Taking Tylenol\par \par 9/01/21 3/28 fxs Tolerating treatment well. Notes that she has had some difficulty sleeping in setting of anxiety. Advised seeing a therapist and/or psychiatrist. We will link her to a behaviorist and will prescribe alprazolam in interim.

## 2021-09-20 NOTE — REVIEW OF SYSTEMS
[Diarrhea: Grade 2 - Increase of 4 - 6 stools per day over baseline; moderate increase in ostomy output compared to baseline] : Diarrhea: Grade 2 - Increase of 4 - 6 stools per day over baseline; moderate increase in ostomy output compared to baseline [Edema Limbs: Grade 0] : Edema Limbs: Grade 0  [Fatigue: Grade 1 - Fatigue relieved by rest] : Fatigue: Grade 1 - Fatigue relieved by rest [Alopecia: Grade 0] : Alopecia: Grade 0 [Pruritus: Grade 0] : Pruritus: Grade 0 [Skin Atrophy: Grade 0] : Skin Atrophy: Grade 0 [Skin Hyperpigmentation: Grade 0] : Skin Hyperpigmentation: Grade 0 [Skin Induration: Grade 0] : Skin Induration: Grade 0 [Dermatitis Radiation: Grade 0] : Dermatitis Radiation: Grade 0

## 2021-09-20 NOTE — PHYSICAL EXAM
[Outer Ear] : the ears and nose were normal in appearance [Hearing Threshold Finger Rub Not [place]] : hearing was normal [Exaggerated Use Of Accessory Muscles For Inspiration] : no accessory muscle use [Arterial Pulses Normal] : the arterial pulses were normal [Edema] : no peripheral edema present [Nail Clubbing] : no clubbing  or cyanosis of the fingernails [] : no rash [Normal] : oriented to person, place and time, the affect was normal, the mood was normal and not anxious

## 2021-09-20 NOTE — DISEASE MANAGEMENT
[Clinical] : TNM Stage: c [IIB] : IIB [TTNM] : 2b [NTNM] : 0 [MTNM] : 0 [de-identified] : 9690 [de-identified] : 5040 cGy [de-identified] : Pelvis/ Cervix

## 2021-09-20 NOTE — DISEASE MANAGEMENT
[Clinical] : TNM Stage: c [IIB] : IIB [TTNM] : 2b [NTNM] : 0 [MTNM] : 0 [de-identified] : 9318 [de-identified] : 5040 cGy [de-identified] : Pelvis/ Cervix

## 2021-09-21 ENCOUNTER — NON-APPOINTMENT (OUTPATIENT)
Age: 46
End: 2021-09-21

## 2021-09-21 ENCOUNTER — APPOINTMENT (OUTPATIENT)
Dept: HEMATOLOGY ONCOLOGY | Facility: CLINIC | Age: 46
End: 2021-09-21
Payer: MEDICAID

## 2021-09-21 ENCOUNTER — RESULT REVIEW (OUTPATIENT)
Age: 46
End: 2021-09-21

## 2021-09-21 ENCOUNTER — LABORATORY RESULT (OUTPATIENT)
Age: 46
End: 2021-09-21

## 2021-09-21 ENCOUNTER — APPOINTMENT (OUTPATIENT)
Dept: INFUSION THERAPY | Facility: HOSPITAL | Age: 46
End: 2021-09-21

## 2021-09-21 LAB
BASOPHILS # BLD AUTO: 0.03 K/UL — SIGNIFICANT CHANGE UP (ref 0–0.2)
BASOPHILS NFR BLD AUTO: 0.8 % — SIGNIFICANT CHANGE UP (ref 0–2)
BUN SERPL-MCNC: 12 MG/DL — SIGNIFICANT CHANGE UP (ref 7–23)
CA-I BLDA-SCNC: 1.29 MMOL/L — SIGNIFICANT CHANGE UP (ref 1.12–1.3)
CHLORIDE SERPL-SCNC: 101 MMOL/L — SIGNIFICANT CHANGE UP (ref 96–108)
CO2 SERPL-SCNC: 26 MMOL/L — SIGNIFICANT CHANGE UP (ref 22–31)
CREAT SERPL-MCNC: 0.6 MG/DL — SIGNIFICANT CHANGE UP (ref 0.5–1.3)
EOSINOPHIL # BLD AUTO: 0.24 K/UL — SIGNIFICANT CHANGE UP (ref 0–0.5)
EOSINOPHIL NFR BLD AUTO: 6 % — SIGNIFICANT CHANGE UP (ref 0–6)
GLUCOSE SERPL-MCNC: 122 MG/DL — HIGH (ref 70–99)
HCT VFR BLD CALC: 31 % — LOW (ref 34.5–45)
HGB BLD-MCNC: 11.4 G/DL — LOW (ref 11.5–15.5)
IMM GRANULOCYTES NFR BLD AUTO: 3.8 % — HIGH (ref 0–1.5)
LYMPHOCYTES # BLD AUTO: 0.33 K/UL — LOW (ref 1–3.3)
LYMPHOCYTES # BLD AUTO: 8.3 % — LOW (ref 13–44)
MCHC RBC-ENTMCNC: 30.4 PG — SIGNIFICANT CHANGE UP (ref 27–34)
MCHC RBC-ENTMCNC: 36.8 G/DL — HIGH (ref 32–36)
MCV RBC AUTO: 82.7 FL — SIGNIFICANT CHANGE UP (ref 80–100)
MONOCYTES # BLD AUTO: 0.38 K/UL — SIGNIFICANT CHANGE UP (ref 0–0.9)
MONOCYTES NFR BLD AUTO: 9.5 % — SIGNIFICANT CHANGE UP (ref 2–14)
NEUTROPHILS # BLD AUTO: 2.85 K/UL — SIGNIFICANT CHANGE UP (ref 1.8–7.4)
NEUTROPHILS NFR BLD AUTO: 71.6 % — SIGNIFICANT CHANGE UP (ref 43–77)
NRBC # BLD: 0 /100 WBCS — SIGNIFICANT CHANGE UP (ref 0–0)
PLATELET # BLD AUTO: 195 K/UL — SIGNIFICANT CHANGE UP (ref 150–400)
POTASSIUM SERPL-MCNC: 4 MMOL/L — SIGNIFICANT CHANGE UP (ref 3.5–5.3)
POTASSIUM SERPL-SCNC: 4 MMOL/L — SIGNIFICANT CHANGE UP (ref 3.5–5.3)
RBC # BLD: 3.75 M/UL — LOW (ref 3.8–5.2)
RBC # FLD: 13.2 % — SIGNIFICANT CHANGE UP (ref 10.3–14.5)
SODIUM SERPL-SCNC: 138 MMOL/L — SIGNIFICANT CHANGE UP (ref 135–145)
WBC # BLD: 3.98 K/UL — SIGNIFICANT CHANGE UP (ref 3.8–10.5)
WBC # FLD AUTO: 3.98 K/UL — SIGNIFICANT CHANGE UP (ref 3.8–10.5)

## 2021-09-21 PROCEDURE — G6002: CPT | Mod: 26

## 2021-09-21 PROCEDURE — 99213 OFFICE O/P EST LOW 20 MIN: CPT

## 2021-09-22 ENCOUNTER — NON-APPOINTMENT (OUTPATIENT)
Age: 46
End: 2021-09-22

## 2021-09-22 PROCEDURE — G6002: CPT | Mod: 26

## 2021-09-22 NOTE — HISTORY OF PRESENT ILLNESS
[Disease: _____________________] : Disease: [unfilled] [AJCC Stage: ____] : AJCC Stage: [unfilled] [Treatment Protocol] : Treatment Protocol [de-identified] : 46 y.o female with adenocarcinoma most likely  of cervical origin.\par \par 46 year old premenopausal female with cervical adenocarcinoma, with disease involved in the endometrium presenting for consideration of chemotherapy. Initially seen by Dr. Bill on 7/16/21 given concern for primary vs secondary gynecologic malignancy. \par \par Presented to [facility] on 5/28/21 given pelvic pain and retained tampon with a 5 day history of malodorous discharge with fever and tachycardia. Initially assumed to have PID and started on antibiotics, however cervix appeared irregular, dark and distorted. Subsequently taken to the OR for EUA, and cervical biospy done, showing adenocarcinoma, unclear at time if primary or secondary. \par Pathology is as below: \par  PATHOLOGY:\par 1) EMBx, 5/26/21, Dr. James\par  a) atypical endometrial glands exhibiting complex, cribiform architexture are present as a few scattered glands, further workup is necessary to R/O CAH or adenocarcinoma of the endometrium\par 2) EMBx, 5/28/21\par  a) minute polypoid fragment of endometrial tissue with partial infarction and focal glandular crowding without atypia \par 3) EUA, D&C, ECC, cervical biopsy, 5/30/21, [facility]\par  a) cervical/endocervix - adenocarcinoma in a background of necrosis and acute inflammation (primary vs. secondary)\par \par PET/CT done on 7/5/21 is as below: \par trophic left left kidney. Lobulated bilateral renal cortex with cortical scarring bilaterally. Multiple non obstruction stones on the left, largest 7mm in the lower pole, stable since 5/29/21. Difficult to delineate increased FDG activity in the uterine cervix corresponding to known malignancy SUV 7.3. There is a separate focus of increased FDG activity in the endometrium which may be physiologic SUV 8.8. No enlarged FDG avid nodes. Pan colonic rectal hypermetabolism which may be related to Metformin. \par \par Other imaging: \par \par TVUS on 5/28/21: uterus 9.0 x 4.9 x 5.2cm. Retained tampon in vagina. No collection or fluid in the endometrial canal. EML 6.3mm. \par RTO- a simple cyst 3.7 x 3.4 x 3.4cm\par LTO - Small follicles are seen. \par No FF. \par \par \par CT A/P on 5/29/21: lobulated bilateral renal cortex with cortical scarring bilaterally. Multiple nonobstructing stones on the left. no hydro.\par The endometrial cavity is slightly distended with fluid. An air containing structure is identified within the upper vaginal cavity (likely retained tampon). Mild fatty stranding in the pelvis. Likely a physiological cyst in the right ovary and a small corpus luteal or hemorrhagic cyst in the left ovary. Small free fluid. Appendix normal. No ascites. No LAD. \par \par Initially it was thought to be a GI primary and patient underwent GI work up with EGD and colonoscopy, all WNL.\par Patient still has some vaginal discharge, malodorous.\par \par  [FreeTextEntry1] : Weekly Cisplatin and EBRT\par C1- 8/31/21\par C2- 9/8/21\par C3 - 9/14/21\par C4 - 9/21/21 [de-identified] : Patient is here for follow up and treatment.  Nausea is well controlled with Compazine.  She has more fatigue this week. She has had some intermittent diarrhea, especially when eating.  She denies fever, chills, chest pain, SOB, abdominal pain, vomiting, bleeding, tinnitus, neuropathy.

## 2021-09-22 NOTE — REVIEW OF SYSTEMS
[Negative] : Allergic/Immunologic [Fatigue] : fatigue [Diarrhea] : diarrhea [FreeTextEntry7] : nausea

## 2021-09-22 NOTE — ASSESSMENT
[Curative] : Goals of care discussed with patient: Curative [Palliative Care Plan] : not applicable at this time [FreeTextEntry1] : 46 year old woman with cervical cancer on chemo RT.\par Overall tolerating treatment well.\par Nausea - continue Compazine PRN.\par Diarrhea - continue Immodium PRN.\par Check labs to day - BMP ok as requested by Dr. Germain.\par RTC 2 weeks.

## 2021-09-23 ENCOUNTER — OUTPATIENT (OUTPATIENT)
Dept: OUTPATIENT SERVICES | Facility: HOSPITAL | Age: 46
LOS: 1 days | Discharge: HOME | End: 2021-09-23

## 2021-09-23 DIAGNOSIS — C53.9 MALIGNANT NEOPLASM OF CERVIX UTERI, UNSPECIFIED: ICD-10-CM

## 2021-09-23 PROCEDURE — G6002: CPT | Mod: 26

## 2021-09-23 NOTE — VITALS
[80: Normal activity with effort; some signs or symptoms of disease.] : 80: Normal activity with effort; some signs or symptoms of disease.  [Least Pain Intensity: 0/10] : 0/10

## 2021-09-24 ENCOUNTER — APPOINTMENT (OUTPATIENT)
Dept: GYNECOLOGIC ONCOLOGY | Facility: CLINIC | Age: 46
End: 2021-09-24
Payer: MEDICAID

## 2021-09-24 ENCOUNTER — APPOINTMENT (OUTPATIENT)
Dept: RADIATION ONCOLOGY | Facility: CLINIC | Age: 46
End: 2021-09-24
Payer: MEDICAID

## 2021-09-24 VITALS
WEIGHT: 182 LBS | DIASTOLIC BLOOD PRESSURE: 87 MMHG | BODY MASS INDEX: 31.07 KG/M2 | HEART RATE: 103 BPM | HEIGHT: 64 IN | SYSTOLIC BLOOD PRESSURE: 131 MMHG

## 2021-09-24 PROCEDURE — 77014: CPT | Mod: 26

## 2021-09-24 PROCEDURE — 99024 POSTOP FOLLOW-UP VISIT: CPT

## 2021-09-24 PROCEDURE — 99215 OFFICE O/P EST HI 40 MIN: CPT

## 2021-09-27 PROCEDURE — 77427 RADIATION TX MANAGEMENT X5: CPT

## 2021-09-27 PROCEDURE — G6002: CPT | Mod: 26

## 2021-09-27 NOTE — HISTORY OF PRESENT ILLNESS
[FreeTextEntry1] : 46 year old premenopausal female with locally advanced cervical adenocarcinoma.\par Receiving radiation therapy.\par \par 9[place]24/21 [place] [facility] [place], only needing immodium 1-2x daily. Notes she has changed her diet, less fruits and more starches, but is still not drinking as much water as she believes she should. No vaginal bleeding, cough, but notes fatigue and lightheadedness after radiotherapy treatments. She has <1wk left of RT. \par \par 9/21/21 - 15/28 fx. Complains of diarrhea more than 8  times per day since friday, not relieved by pepto bismol, has not tried anything else. No dizziness on standing, no mucoid stool, no hematochezia or melena. Notes diet jessica in beans, lentils, fruits. Fatigue continues. also notes proximal muscle weakness in bilateral LE with "shakiness" while climbing stairs. \par \par 9/14/2021 - 10/28 fx. Has some intermittent constipation as per her. Has many small bowel movements through the day, that are hard. Reports that chicken, and bread has been associated with hard bowel movements. \par \par 9/10/2021 -9/28 fxs. On Tuesday complained of urinary irritation, and incomplete emptying. Endorsed history of chronic UTIs. We obtained UA on Tuesday, that resulted as negative. Bilateral leg pain endorsed at that time is improving on Tylenol. Today reports that her urinary irritation is resolving. Denies any diarrhea, vaginal bleeding or discharge. \par \par 9/7/21: 6/28 fxs pelvic pain radiating down both legs, worse with opening bowels and urination. Taking Tylenol\par \par 9/01/21 3/28 fxs Tolerating treatment well. Notes that she has had some difficulty sleeping in setting of anxiety. Advised seeing a therapist and/or psychiatrist. We will link her to a behaviorist and will prescribe alprazolam in interim.

## 2021-09-27 NOTE — DISEASE MANAGEMENT
[Clinical] : TNM Stage: c [IIB] : IIB [TTNM] : 2b [NTNM] : 0 [MTNM] : 0 [de-identified] : 7840 [de-identified] : 5040 cGy [de-identified] : Pelvis/ Cervix

## 2021-09-27 NOTE — ASSESSMENT
[FreeTextEntry1] : responding somewhat to radiotherapy with decreased size of globular cervix on exam

## 2021-09-27 NOTE — PHYSICAL EXAM
[Outer Ear] : the ears and nose were normal in appearance [Hearing Threshold Finger Rub Not [place]] : hearing was normal [Exaggerated Use Of Accessory Muscles For Inspiration] : no accessory muscle use [Arterial Pulses Normal] : the arterial pulses were normal [Edema] : no peripheral edema present [Nail Clubbing] : no clubbing  or cyanosis of the fingernails [Normal] : oriented to person, place and time, the affect was normal, the mood was normal and not anxious [] : no rash [Obese] : obese [Normal Sphincter Tone] : normal sphincter tone [No Rectal Mass] : no rectal mass [Normal External Genitalia] : normal external genitalia  [Internal Hemorrhoid] : no internal hemorrhoids [External Hemorrhoid] : no external hemorrhoids [Blood on examination glove] : no blood on examination glove [de-identified] : globular lesion inferolateral vaginal cuff, small amount of blood on exam.

## 2021-09-28 ENCOUNTER — RESULT REVIEW (OUTPATIENT)
Age: 46
End: 2021-09-28

## 2021-09-28 ENCOUNTER — LABORATORY RESULT (OUTPATIENT)
Age: 46
End: 2021-09-28

## 2021-09-28 ENCOUNTER — APPOINTMENT (OUTPATIENT)
Dept: INFUSION THERAPY | Facility: HOSPITAL | Age: 46
End: 2021-09-28

## 2021-09-28 LAB
BASOPHILS # BLD AUTO: 0.01 K/UL — SIGNIFICANT CHANGE UP (ref 0–0.2)
BASOPHILS NFR BLD AUTO: 0.3 % — SIGNIFICANT CHANGE UP (ref 0–2)
EOSINOPHIL # BLD AUTO: 0.15 K/UL — SIGNIFICANT CHANGE UP (ref 0–0.5)
EOSINOPHIL NFR BLD AUTO: 4.1 % — SIGNIFICANT CHANGE UP (ref 0–6)
HCT VFR BLD CALC: 31.9 % — LOW (ref 34.5–45)
HGB BLD-MCNC: 11.5 G/DL — SIGNIFICANT CHANGE UP (ref 11.5–15.5)
IMM GRANULOCYTES NFR BLD AUTO: 0.6 % — SIGNIFICANT CHANGE UP (ref 0–1.5)
LYMPHOCYTES # BLD AUTO: 0.21 K/UL — LOW (ref 1–3.3)
LYMPHOCYTES # BLD AUTO: 5.8 % — LOW (ref 13–44)
MCHC RBC-ENTMCNC: 30 PG — SIGNIFICANT CHANGE UP (ref 27–34)
MCHC RBC-ENTMCNC: 36.1 G/DL — HIGH (ref 32–36)
MCV RBC AUTO: 83.3 FL — SIGNIFICANT CHANGE UP (ref 80–100)
MONOCYTES # BLD AUTO: 0.4 K/UL — SIGNIFICANT CHANGE UP (ref 0–0.9)
MONOCYTES NFR BLD AUTO: 11 % — SIGNIFICANT CHANGE UP (ref 2–14)
NEUTROPHILS # BLD AUTO: 2.84 K/UL — SIGNIFICANT CHANGE UP (ref 1.8–7.4)
NEUTROPHILS NFR BLD AUTO: 78.2 % — HIGH (ref 43–77)
NRBC # BLD: 0 /100 WBCS — SIGNIFICANT CHANGE UP (ref 0–0)
PLATELET # BLD AUTO: 203 K/UL — SIGNIFICANT CHANGE UP (ref 150–400)
RBC # BLD: 3.83 M/UL — SIGNIFICANT CHANGE UP (ref 3.8–5.2)
RBC # FLD: 14.2 % — SIGNIFICANT CHANGE UP (ref 10.3–14.5)
WBC # BLD: 3.63 K/UL — LOW (ref 3.8–10.5)
WBC # FLD AUTO: 3.63 K/UL — LOW (ref 3.8–10.5)

## 2021-09-28 PROCEDURE — G6002: CPT | Mod: 26

## 2021-09-29 DIAGNOSIS — Z51.11 ENCOUNTER FOR ANTINEOPLASTIC CHEMOTHERAPY: ICD-10-CM

## 2021-09-29 DIAGNOSIS — R11.2 NAUSEA WITH VOMITING, UNSPECIFIED: ICD-10-CM

## 2021-09-29 DIAGNOSIS — E86.0 DEHYDRATION: ICD-10-CM

## 2021-09-29 PROCEDURE — G6002: CPT | Mod: 26

## 2021-09-30 ENCOUNTER — NON-APPOINTMENT (OUTPATIENT)
Age: 46
End: 2021-09-30

## 2021-09-30 VITALS — BODY MASS INDEX: 31.16 KG/M2 | WEIGHT: 181.55 LBS | TEMPERATURE: 97.9 F

## 2021-09-30 VITALS
SYSTOLIC BLOOD PRESSURE: 118 MMHG | RESPIRATION RATE: 18 BRPM | DIASTOLIC BLOOD PRESSURE: 80 MMHG | OXYGEN SATURATION: 100 % | HEART RATE: 100 BPM

## 2021-09-30 DIAGNOSIS — R19.7 DIARRHEA, UNSPECIFIED: ICD-10-CM

## 2021-09-30 PROCEDURE — G6002: CPT | Mod: 26

## 2021-09-30 RX ORDER — LOPERAMIDE HYDROCHLORIDE 2 MG/1
2 CAPSULE ORAL
Qty: 0 | Refills: 0 | Status: COMPLETED | OUTPATIENT
Start: 2021-09-30

## 2021-09-30 NOTE — REVIEW OF SYSTEMS
[Diarrhea: Grade 2 - Increase of 4 - 6 stools per day over baseline; moderate increase in ostomy output compared to baseline] : Diarrhea: Grade 2 - Increase of 4 - 6 stools per day over baseline; moderate increase in ostomy output compared to baseline [Edema Limbs: Grade 0] : Edema Limbs: Grade 0  [Fatigue: Grade 1 - Fatigue relieved by rest] : Fatigue: Grade 1 - Fatigue relieved by rest [Alopecia: Grade 0] : Alopecia: Grade 0 [Pruritus: Grade 0] : Pruritus: Grade 0 [Skin Atrophy: Grade 0] : Skin Atrophy: Grade 0 [Skin Hyperpigmentation: Grade 1 - Hyperpigmentation covering <10% BSA; no psychosocial impact] : Skin Hyperpigmentation: Grade 1 - Hyperpigmentation covering <10% BSA; no psychosocial impact [Skin Induration: Grade 0] : Skin Induration: Grade 0 [Dermatitis Radiation: Grade 0] : Dermatitis Radiation: Grade 0

## 2021-10-01 PROCEDURE — 77014: CPT | Mod: 26

## 2021-10-04 PROCEDURE — 77427 RADIATION TX MANAGEMENT X5: CPT

## 2021-10-04 PROCEDURE — G6002: CPT | Mod: 26

## 2021-10-04 NOTE — HISTORY OF PRESENT ILLNESS
[FreeTextEntry1] : 46 year old premenopausal female with locally advanced cervical adenocarcinoma.\par Receiving radiation therapy.\par \par 9/30/2021- 23/28 fractions. remains with diarrhea, relieve by immodium. has some irritation to her vulva, has been using cream which helps. has a hemorrhoid which is now exterior, is uncomfortable. \par \par 9/21/21 - 15/28 fx. Complains of diarrhea more than 8  times per day since friday, not relieved by pepto bismol, has not tried anything else. No dizziness on standing, no mucoid stool, no hematochezia or melena. Notes diet jessica in beans, lentils, fruits. Fatigue continues. also notes proximal muscle weakness in bilateral LE with "shakiness" while climbing stairs. \par \par 9/14/2021 - 10/28 fx. Has some intermittent constipation as per her. Has many small bowel movements through the day, that are hard. Reports that chicken, and bread has been associated with hard bowel movements. \par \par 9/10/2021 -9/28 fxs. On Tuesday complained of urinary irritation, and incomplete emptying. Endorsed history of chronic UTIs. We obtained UA on Tuesday, that resulted as negative. Bilateral leg pain endorsed at that time is improving on Tylenol. Today reports that her urinary irritation is resolving. Denies any diarrhea, vaginal bleeding or discharge. \par \par 9/7/21: 6/28 fxs pelvic pain radiating down both legs, worse with opening bowels and urination. Taking Tylenol\par \par 9/01/21 3/28 fxs Tolerating treatment well. Notes that she has had some difficulty sleeping in setting of anxiety. Advised seeing a therapist and/or psychiatrist. We will link her to a behaviorist and will prescribe alprazolam in interim.

## 2021-10-04 NOTE — DISEASE MANAGEMENT
[Clinical] : TNM Stage: c [IIB] : IIB [TTNM] : 2b [NTNM] : 0 [MTNM] : 0 [de-identified] : 3580 cgy [de-identified] : 5040 cGy [de-identified] : Pelvis/ Cervix

## 2021-10-04 NOTE — PHYSICAL EXAM
[Outer Ear] : the ears and nose were normal in appearance [Hearing Threshold Finger Rub Not Stone] : hearing was normal [Exaggerated Use Of Accessory Muscles For Inspiration] : no accessory muscle use [Arterial Pulses Normal] : the arterial pulses were normal [Edema] : no peripheral edema present [Nail Clubbing] : no clubbing  or cyanosis of the fingernails [] : no rash [Normal] : oriented to person, place and time, the affect was normal, the mood was normal and not anxious [de-identified] : slight irritation to vulva

## 2021-10-05 ENCOUNTER — RESULT REVIEW (OUTPATIENT)
Age: 46
End: 2021-10-05

## 2021-10-05 ENCOUNTER — APPOINTMENT (OUTPATIENT)
Dept: INFUSION THERAPY | Facility: HOSPITAL | Age: 46
End: 2021-10-05

## 2021-10-05 ENCOUNTER — NON-APPOINTMENT (OUTPATIENT)
Age: 46
End: 2021-10-05

## 2021-10-05 ENCOUNTER — LABORATORY RESULT (OUTPATIENT)
Age: 46
End: 2021-10-05

## 2021-10-05 ENCOUNTER — APPOINTMENT (OUTPATIENT)
Dept: HEMATOLOGY ONCOLOGY | Facility: CLINIC | Age: 46
End: 2021-10-05
Payer: MEDICAID

## 2021-10-05 VITALS
WEIGHT: 179.76 LBS | TEMPERATURE: 98.06 F | RESPIRATION RATE: 17 BRPM | DIASTOLIC BLOOD PRESSURE: 80 MMHG | BODY MASS INDEX: 30.86 KG/M2 | OXYGEN SATURATION: 98 % | HEART RATE: 105 BPM | SYSTOLIC BLOOD PRESSURE: 116 MMHG

## 2021-10-05 DIAGNOSIS — L58.9 RADIODERMATITIS, UNSPECIFIED: ICD-10-CM

## 2021-10-05 DIAGNOSIS — R11.2 NAUSEA WITH VOMITING, UNSPECIFIED: ICD-10-CM

## 2021-10-05 LAB
BASOPHILS # BLD AUTO: 0.01 K/UL — SIGNIFICANT CHANGE UP (ref 0–0.2)
BASOPHILS NFR BLD AUTO: 0.3 % — SIGNIFICANT CHANGE UP (ref 0–2)
EOSINOPHIL # BLD AUTO: 0.15 K/UL — SIGNIFICANT CHANGE UP (ref 0–0.5)
EOSINOPHIL NFR BLD AUTO: 4.7 % — SIGNIFICANT CHANGE UP (ref 0–6)
HCT VFR BLD CALC: 31.2 % — LOW (ref 34.5–45)
HGB BLD-MCNC: 11.1 G/DL — LOW (ref 11.5–15.5)
IMM GRANULOCYTES NFR BLD AUTO: 0.6 % — SIGNIFICANT CHANGE UP (ref 0–1.5)
LYMPHOCYTES # BLD AUTO: 0.17 K/UL — LOW (ref 1–3.3)
LYMPHOCYTES # BLD AUTO: 5.4 % — LOW (ref 13–44)
MCHC RBC-ENTMCNC: 29.7 PG — SIGNIFICANT CHANGE UP (ref 27–34)
MCHC RBC-ENTMCNC: 35.6 G/DL — SIGNIFICANT CHANGE UP (ref 32–36)
MCV RBC AUTO: 83.4 FL — SIGNIFICANT CHANGE UP (ref 80–100)
MONOCYTES # BLD AUTO: 0.39 K/UL — SIGNIFICANT CHANGE UP (ref 0–0.9)
MONOCYTES NFR BLD AUTO: 12.3 % — SIGNIFICANT CHANGE UP (ref 2–14)
NEUTROPHILS # BLD AUTO: 2.43 K/UL — SIGNIFICANT CHANGE UP (ref 1.8–7.4)
NEUTROPHILS NFR BLD AUTO: 76.7 % — SIGNIFICANT CHANGE UP (ref 43–77)
NRBC # BLD: 0 /100 WBCS — SIGNIFICANT CHANGE UP (ref 0–0)
PLATELET # BLD AUTO: 164 K/UL — SIGNIFICANT CHANGE UP (ref 150–400)
RBC # BLD: 3.74 M/UL — LOW (ref 3.8–5.2)
RBC # FLD: 14.6 % — HIGH (ref 10.3–14.5)
WBC # BLD: 3.17 K/UL — LOW (ref 3.8–10.5)
WBC # FLD AUTO: 3.17 K/UL — LOW (ref 3.8–10.5)

## 2021-10-05 PROCEDURE — 99214 OFFICE O/P EST MOD 30 MIN: CPT

## 2021-10-05 PROCEDURE — G6002: CPT | Mod: 26

## 2021-10-05 NOTE — DISEASE MANAGEMENT
[Clinical] : TNM Stage: c [IIB] : IIB [TTNM] : 2b [NTNM] : 0 [MTNM] : 0 [de-identified] : [id number] [de-identified] : 5040 cGy [de-identified] : Pelvis/ Cervix

## 2021-10-05 NOTE — REASON FOR VISIT
[Emergent On-Treatment] : an emergent on-treatment visit for [Endometrial Cancer] : endometrial cancer

## 2021-10-05 NOTE — HISTORY OF PRESENT ILLNESS
[FreeTextEntry1] : 46 year old premenopausal female with locally advanced cervical adenocarcinoma.\par Receiving radiation therapy.\par \par 10/5/21 26/28 fx diarrhea resolved with dietary changes and Imodium, no longer using Imodium. Still notes leg weakness and shakiness, notes paresthesias in distal extremities. Notes LE weakness which she attributes to chemo. Also notes reflux, nausea. Takins prn compazine without much response. WBC 3.6 9/28/21. Would like to try new anti nausea med. MRI scheduled tomorrow with sedation. Lastly, notes pain in the bilateral groin skin\par \par 9/30/2021- 23/28 fractions. remains with diarrhea, relieve by immodium. has some irritation to her vulva, has been using cream which helps. has a hemorrhoid which is now exterior, is uncomfortable. \par \par 9/21/21 - 15/28 fx. Complains of diarrhea more than 8  times per day since friday, not relieved by pepto bismol, has not tried anything else. No dizziness on standing, no mucoid stool, no hematochezia or melena. Notes diet jessica in beans, lentils, fruits. Fatigue continues. also notes proximal muscle weakness in bilateral LE with "shakiness" while climbing stairs. \par \par 9/14/2021 - 10/28 fx. Has some intermittent constipation as per her. Has many small bowel movements through the day, that are hard. Reports that chicken, and bread has been associated with hard bowel movements. \par \par 9/10/2021 -9/28 fxs. On Tuesday complained of urinary irritation, and incomplete emptying. Endorsed history of chronic UTIs. We obtained UA on Tuesday, that resulted as negative. Bilateral leg pain endorsed at that time is improving on Tylenol. Today reports that her urinary irritation is resolving. Denies any diarrhea, vaginal bleeding or discharge. \par \par 9/7/21: 6/28 fxs pelvic pain radiating down both legs, worse with opening bowels and urination. Taking Tylenol\par \par 9/01/21 3/28 fxs Tolerating treatment well. Notes that she has had some difficulty sleeping in setting of anxiety. Advised seeing a therapist and/or psychiatrist. We will link her to a behaviorist and will prescribe alprazolam in interim.

## 2021-10-06 ENCOUNTER — APPOINTMENT (OUTPATIENT)
Dept: MRI IMAGING | Facility: CLINIC | Age: 46
End: 2021-10-06
Payer: MEDICAID

## 2021-10-06 ENCOUNTER — OUTPATIENT (OUTPATIENT)
Dept: OUTPATIENT SERVICES | Facility: HOSPITAL | Age: 46
LOS: 1 days | End: 2021-10-06
Payer: MEDICAID

## 2021-10-06 DIAGNOSIS — C53.9 MALIGNANT NEOPLASM OF CERVIX UTERI, UNSPECIFIED: ICD-10-CM

## 2021-10-06 PROCEDURE — G6002: CPT | Mod: 26

## 2021-10-06 PROCEDURE — 72197 MRI PELVIS W/O & W/DYE: CPT | Mod: 26

## 2021-10-06 PROCEDURE — 72197 MRI PELVIS W/O & W/DYE: CPT

## 2021-10-06 PROCEDURE — A9585: CPT

## 2021-10-06 NOTE — HISTORY OF PRESENT ILLNESS
[Disease: _____________________] : Disease: [unfilled] [AJCC Stage: ____] : AJCC Stage: [unfilled] [de-identified] : 46 y.o female with adenocarcinoma most likely  of cervical origin.\par \par 46 year old premenopausal female with cervical adenocarcinoma, with disease involved in the endometrium presenting for consideration of chemotherapy. Initially seen by Dr. Bill on 7/16/21 given concern for primary vs secondary gynecologic malignancy. \par \par Presented to [facility] on 5/28/21 given pelvic pain and retained tampon with a 5 day history of malodorous discharge with fever and tachycardia. Initially assumed to have PID and started on antibiotics, however cervix appeared irregular, dark and distorted. Subsequently taken to the OR for EUA, and cervical biospy done, showing adenocarcinoma, unclear at time if primary or secondary. \par Pathology is as below: \par  PATHOLOGY:\par 1) EMBx, 5/26/21, Dr. James\par  a) atypical endometrial glands exhibiting complex, cribiform architexture are present as a few scattered glands, further workup is necessary to R/O CAH or adenocarcinoma of the endometrium\par 2) EMBx, 5/28/21\par  a) minute polypoid fragment of endometrial tissue with partial infarction and focal glandular crowding without atypia \par 3) EUA, D&C, ECC, cervical biopsy, 5/30/21, [facility]\par  a) cervical/endocervix - adenocarcinoma in a background of necrosis and acute inflammation (primary vs. secondary)\par \par PET/CT done on 7/5/21 is as below: \par trophic left left kidney. Lobulated bilateral renal cortex with cortical scarring bilaterally. Multiple non obstruction stones on the left, largest 7mm in the lower pole, stable since 5/29/21. Difficult to delineate increased FDG activity in the uterine cervix corresponding to known malignancy SUV 7.3. There is a separate focus of increased FDG activity in the endometrium which may be physiologic SUV 8.8. No enlarged FDG avid nodes. Pan colonic rectal hypermetabolism which may be related to Metformin. \par \par Other imaging: \par \par TVUS on 5/28/21: uterus 9.0 x 4.9 x 5.2cm. Retained tampon in vagina. No collection or fluid in the endometrial canal. EML 6.3mm. \par RTO- a simple cyst 3.7 x 3.4 x 3.4cm\par LTO - Small follicles are seen. \par No FF. \par \par \par CT A/P on 5/29/21: lobulated bilateral renal cortex with cortical scarring bilaterally. Multiple nonobstructing stones on the left. no hydro.\par The endometrial cavity is slightly distended with fluid. An air containing structure is identified within the upper vaginal cavity (likely retained tampon). Mild fatty stranding in the pelvis. Likely a physiological cyst in the right ovary and a small corpus luteal or hemorrhagic cyst in the left ovary. Small free fluid. Appendix normal. No ascites. No LAD. \par \par Initially it was thought to be a GI primary and patient underwent GI work up with EGD and colonoscopy, all WNL.\par Patient still has some vaginal discharge, malodorous.\par \par  [Treatment Protocol] : Treatment Protocol [FreeTextEntry1] : Weekly Cisplatin and EBRT. [de-identified] : Patient tolerating treatment well.\par She has grade I intermittent neuropathy of the feet and fingers.\par She takes anti emetics for nausea with good response.\par

## 2021-10-07 PROCEDURE — 77427 RADIATION TX MANAGEMENT X5: CPT

## 2021-10-07 PROCEDURE — G6002: CPT | Mod: 26

## 2021-10-08 ENCOUNTER — LABORATORY RESULT (OUTPATIENT)
Age: 46
End: 2021-10-08

## 2021-10-08 ENCOUNTER — OUTPATIENT (OUTPATIENT)
Dept: OUTPATIENT SERVICES | Facility: HOSPITAL | Age: 46
LOS: 1 days | End: 2021-10-08
Payer: MEDICAID

## 2021-10-08 ENCOUNTER — APPOINTMENT (OUTPATIENT)
Dept: DISASTER EMERGENCY | Facility: CLINIC | Age: 46
End: 2021-10-08

## 2021-10-08 VITALS
WEIGHT: 177.91 LBS | HEIGHT: 64 IN | OXYGEN SATURATION: 98 % | SYSTOLIC BLOOD PRESSURE: 118 MMHG | DIASTOLIC BLOOD PRESSURE: 80 MMHG | RESPIRATION RATE: 16 BRPM | TEMPERATURE: 98 F | HEART RATE: 97 BPM

## 2021-10-08 DIAGNOSIS — C53.9 MALIGNANT NEOPLASM OF CERVIX UTERI, UNSPECIFIED: ICD-10-CM

## 2021-10-08 DIAGNOSIS — I10 ESSENTIAL (PRIMARY) HYPERTENSION: ICD-10-CM

## 2021-10-08 DIAGNOSIS — E11.9 TYPE 2 DIABETES MELLITUS WITHOUT COMPLICATIONS: ICD-10-CM

## 2021-10-08 LAB
A1C WITH ESTIMATED AVERAGE GLUCOSE RESULT: 6.6 % — HIGH (ref 4–5.6)
ANION GAP SERPL CALC-SCNC: 16 MMOL/L — HIGH (ref 7–14)
BUN SERPL-MCNC: 16 MG/DL — SIGNIFICANT CHANGE UP (ref 7–23)
CALCIUM SERPL-MCNC: 9.1 MG/DL — SIGNIFICANT CHANGE UP (ref 8.4–10.5)
CHLORIDE SERPL-SCNC: 102 MMOL/L — SIGNIFICANT CHANGE UP (ref 98–107)
CO2 SERPL-SCNC: 18 MMOL/L — LOW (ref 22–31)
CREAT SERPL-MCNC: 0.74 MG/DL — SIGNIFICANT CHANGE UP (ref 0.5–1.3)
ESTIMATED AVERAGE GLUCOSE: 143 — SIGNIFICANT CHANGE UP
GLUCOSE SERPL-MCNC: 161 MG/DL — HIGH (ref 70–99)
HCG SERPL-ACNC: <5 MIU/ML — SIGNIFICANT CHANGE UP
HCT VFR BLD CALC: 29.5 % — LOW (ref 34.5–45)
HGB BLD-MCNC: 10.6 G/DL — LOW (ref 11.5–15.5)
MCHC RBC-ENTMCNC: 30.1 PG — SIGNIFICANT CHANGE UP (ref 27–34)
MCHC RBC-ENTMCNC: 35.9 GM/DL — SIGNIFICANT CHANGE UP (ref 32–36)
MCV RBC AUTO: 83.8 FL — SIGNIFICANT CHANGE UP (ref 80–100)
NRBC # BLD: 0 /100 WBCS — SIGNIFICANT CHANGE UP
NRBC # FLD: 0 K/UL — SIGNIFICANT CHANGE UP
PLATELET # BLD AUTO: 166 K/UL — SIGNIFICANT CHANGE UP (ref 150–400)
POTASSIUM SERPL-MCNC: 3.7 MMOL/L — SIGNIFICANT CHANGE UP (ref 3.5–5.3)
POTASSIUM SERPL-SCNC: 3.7 MMOL/L — SIGNIFICANT CHANGE UP (ref 3.5–5.3)
RBC # BLD: 3.52 M/UL — LOW (ref 3.8–5.2)
RBC # FLD: 15.3 % — HIGH (ref 10.3–14.5)
SODIUM SERPL-SCNC: 136 MMOL/L — SIGNIFICANT CHANGE UP (ref 135–145)
WBC # BLD: 3.14 K/UL — LOW (ref 3.8–10.5)
WBC # FLD AUTO: 3.14 K/UL — LOW (ref 3.8–10.5)

## 2021-10-08 PROCEDURE — 93010 ELECTROCARDIOGRAM REPORT: CPT

## 2021-10-08 RX ORDER — METFORMIN HYDROCHLORIDE 850 MG/1
1 TABLET ORAL
Qty: 0 | Refills: 0 | DISCHARGE

## 2021-10-08 NOTE — H&P PST ADULT - NSICDXPASTMEDICALHX_GEN_ALL_CORE_FT
PAST MEDICAL HISTORY:  Hypercholesteremia     Hypertension     Pre-diabetes      PAST MEDICAL HISTORY:  Cervical ca     Hypercholesteremia     Hypertension     Obesity     Pre-diabetes      PAST MEDICAL HISTORY:  Acid reflux     Cervical ca     Hypercholesteremia     Hypertension     Obesity     Pre-diabetes

## 2021-10-08 NOTE — H&P PST ADULT - ACTIVITY
walks daily, housechores, 1 flight stairs w/o SOB walks daily, house chores, 1 flight stairs w/o SOB

## 2021-10-08 NOTE — H&P PST ADULT - NSANTHOSAYNRD_GEN_A_CORE
No. LEESA screening performed.  STOP BANG Legend: 0-2 = LOW Risk; 3-4 = INTERMEDIATE Risk; 5-8 = HIGH Risk

## 2021-10-08 NOTE — H&P PST ADULT - GENITOURINARY COMMENTS
Pt Pt hx vaginal discharge and fever 5/21 - pt found to have cervical ca - pt has had Chemo and RT and now for Tandem procedure - pt denies pain or discharge at this time

## 2021-10-08 NOTE — H&P PST ADULT - HISTORY OF PRESENT ILLNESS
TEN HERNANDEZ is a 46y  who presented to ED following 5d of foul smelling vaginal discharge, pelvic pain, and fever of 100.6  @ 0900. She recalls inserting a tampon due to menses , forgot about it and the proceed to have sexual intercourse with her  for the next two consecutive days. She became concerned during sexual intercourse on  because fo the foul smell she and her  noticed and she remembered she had never removed her tampon. She was seen in the office by her obgyn (who she cannot recall the name of)  where a TVUS was performed, pt reports he "took out the tampon in pieces", and that she felt lots of "scraping and had heavy bleeding" following the procedure. She was prescribed a topical antibiotic that she cannot recall the name of and has been inserting it vaginally twice a day for the past 2 days. She is unsure what the TVUS performed in the office showed as her doctor had to emergently leave her visit and she was never counseled regarding her results. She woke up the morning of  in a pool of sweat and took her temp which was 100.6. She took and Advil but was concerned about her well being so she came to the ED to be evaluated. She denies any chills, dysuria, n/v, diarrhea, SOB, or CP. In the ED pt febrile to 100.7, tachycardic to 136, and WBC elevated to 15.      OB/GYN HISTORY:     LMP: 5/15/21   ( ,  AB,  )  (+) known ovarian cyst  Pt denies any hx of fibroids, endometriosis, STIs or abnormal pap smears                                            REVIEW OF SYSTEMS:  CONSTITUTIONAL: No weakness, (+) fevers, no chills  RESPIRATORY: No shortness of breath  CARDIOVASCULAR: No chest pain or palpitations  GASTROINTESTINAL: No abdominal or epigastric pain. No nausea, vomiting; No diarrhea  GENITOURINARY: No dysuria, frequency or hematuria  NEUROLOGICAL: No headache, LOC  All other review of systems is negative unless indicated above      Allergies:  No Known Allergies/Intolerances        PAST MEDICAL & SURGICAL HISTORY:  Hypertension    Hypercholesteremia    Diabetes Mellitus, type 2    No significant past surgical history            FAMILY HISTORY:  No pertinent family history in first degree relatives      SOCIAL HISTORY:  Lives with daughter,  lives in [place]  Denies any alcohol/tobacco/illicit drug use     Vital Signs Last 24 Hrs  T(C): 37 (27 May 2021 23:11), Max: 38.2 (27 May 2021 21:20)  T(F): 98.6 (27 May 2021 23:11), Max: 100.7 (27 May 2021 21:20)  HR: 99 (27 May 2021 23:11) (99 - 136)  BP: 128/89 (27 May 2021 23:11) (128/89 - 140/85)  BP(mean): --  RR: 20 (27 May 2021 23:11) (16 - 20)  SpO2: 96% (27 May 2021 23:11) (96% - 97%)    PHYSICAL EXAM:  Constitutional: NAD, awake and alert, well-developed  Respiratory: Breath sounds are clear bilaterally, No wheezing, rales or rhonchi  Cardiovascular: S1 and S2, regular rate and rhythm, no Murmurs, gallops or rubs  Gastrointestinal: soft, obese, nondistended, no guarding, no rebound  Genitourinary: brown/pink singed vaginal discharge appreciated (culture sent), no cervical/vaginal bleeding, cervical os closed with abnormal contour and white/brown/black discoloration ?scar tissue vs monsels, no foreign objects appreciated, no CMT, tender on bimanual exam with palpation of bilateral lower quadrants and suprapubic region    Neurological: A/O x 3, no focal deficits  Skin: No rashes    LABS:                        12.5   15.18 )-----------( 315      ( 27 May 2021 22:20 )             37.9         137  |  98  |  10  ----------------------------<  112<H>  3.8   |  24  |  0.88    Ca    9.8      27 May 2021 22:20    TPro  7.8  /  Alb  3.8  /  TBili  0.4  /  DBili  x   /  AST  11  /  ALT  13  /  AlkPhos  122<H>      I&O's Detail      Urinalysis Basic - ( 27 May 2021 23:49 )    Color: Light Yellow / Appearance: Clear / S.008 / pH: x  Gluc: x / Ketone: Negative  / Bili: Negative / Urobili: Negative   Blood: x / Protein: 30 mg/dL / Nitrite: Negative   Leuk Esterase: Large / RBC: 9 /hpf / WBC 37 /HPF   Sq Epi: x / Non Sq Epi: 1 /hpf / Bacteria: Many        RADIOLOGY & ADDITIONAL STUDIES:  **CT Pelvis pending** Pt is a 46 yr old female scheduled for Insertion of Tandem and Ring Ovoid Stage with Dr Germain tentatively 10/11/21 - pt Dx with cervical ca after being seen in ER for fever and vaginal discharge 5/21 - pt now has had Chemo and RT - pt denies discharge at this time - pt denies pain. Hx HTN, HLD, DM   Pt denies COVID   Pt has not had vaccine   Patient instructed to contact surgeon's office concerning COVID test prior to surgery      Pt is a 46 yr old female scheduled for Insertion of Tandem and Ring Ovoid Stage with Dr Germain tentatively 10/11/21 - pt Dx with cervical ca after being seen in ER for fever and vaginal discharge 5/21 - pt now has had Chemo and RT - pt denies vaginal discharge at this time - pt denies pain. Hx HTN, HLD, DM   Pt denies COVID   Pt has not had vaccine   Patient instructed to contact surgeon's office concerning COVID test prior to surgery

## 2021-10-08 NOTE — H&P PST ADULT - PROBLEM SELECTOR PLAN 1
Pt scheduled for surgery for Insertion of Tandem and Ring Stage 1 tentatively 10/11/21 and preop instructions including instructions for taking Famotidine on the day of surgery, given verbally and with use of  written materials, and patient confirming understanding of such instructions using  teach back method.

## 2021-10-08 NOTE — H&P PST ADULT - ATTENDING COMMENTS
For EUA, Sleeve and applicator insertion 10/19/21 For EUA, Sleeve and applicator insertion 10/19/21    AWM: Seen in ASU. Planned procedure disc. All Q/A. Consent form reviewed. For EUA, Sleeve and applicator insertion 10/19/21    AWM: Seen in ASU. No changes to her gynecologic condition. Planned procedure disc. All Q/A. Consent form reviewed. For EUA, Sleeve and applicator insertion 10/19/21    AWM: Seen in ASU. No reported changes to her gynecologic condition. Planned procedure disc. All Q/A. Consent form reviewed. For EUA, Sleeve and applicator insertion 10/19/21    AWM: Seen in ASU. No reported changes to her gynecologic condition. Planned procedure disc. All Q/A. Consent form reviewed.    10/21/21: Here for second procedure. Procedure reviewed> No significant change to H and p BB For EUA, Sleeve and applicator insertion 10/19/21    AWM: Seen in ASU. No reported changes to her gynecologic condition. Planned procedure disc. All Q/A. Consent form reviewed.    10/21/21: Here for second procedure. Pro  cedure reviewed> No significant change to H and p BB    !0/29/21: Here for third procedure. Has developed vaginal dischsrge, otherwise no change For EUA, Sleeve and applicator insertion 10/19/21    AWM: Seen in ASU. No reported changes to her gynecologic condition. Planned procedure disc. All Q/A. Consent form reviewed.    10/21/21: Here for second procedure. Pro  cedure reviewed> No significant change to H and p BB    !0/29/21: Here for third procedure. Has developed vaginal dischsrge, otherwise no change    11/01/21: Seen in Preop holding. ad some bleeding over weekend post procedure.Marco proceed with insertion 4.

## 2021-10-12 ENCOUNTER — LABORATORY RESULT (OUTPATIENT)
Age: 46
End: 2021-10-12

## 2021-10-12 ENCOUNTER — APPOINTMENT (OUTPATIENT)
Dept: DISASTER EMERGENCY | Facility: CLINIC | Age: 46
End: 2021-10-12

## 2021-10-16 ENCOUNTER — APPOINTMENT (OUTPATIENT)
Dept: DISASTER EMERGENCY | Facility: CLINIC | Age: 46
End: 2021-10-16

## 2021-10-17 LAB — SARS-COV-2 N GENE NPH QL NAA+PROBE: NOT DETECTED

## 2021-10-18 ENCOUNTER — TRANSCRIPTION ENCOUNTER (OUTPATIENT)
Age: 46
End: 2021-10-18

## 2021-10-18 VITALS
TEMPERATURE: 99 F | OXYGEN SATURATION: 100 % | RESPIRATION RATE: 20 BRPM | DIASTOLIC BLOOD PRESSURE: 79 MMHG | SYSTOLIC BLOOD PRESSURE: 130 MMHG | WEIGHT: 177.91 LBS | HEART RATE: 87 BPM | HEIGHT: 63 IN

## 2021-10-18 NOTE — ASU PREOPERATIVE ASSESSMENT, ADULT ([facility] ONLY) - TEACHING/LEARNING EDUCATIONAL LEVEL
[facility] Silver Nitrate Text: The wound bed was treated with silver nitrate after the biopsy was performed.

## 2021-10-19 ENCOUNTER — OUTPATIENT (OUTPATIENT)
Dept: OUTPATIENT SERVICES | Facility: HOSPITAL | Age: 46
LOS: 1 days | Discharge: HOME | End: 2021-10-19
Payer: MEDICAID

## 2021-10-19 ENCOUNTER — APPOINTMENT (OUTPATIENT)
Dept: GYNECOLOGIC ONCOLOGY | Facility: HOSPITAL | Age: 46
End: 2021-10-19

## 2021-10-19 VITALS
HEART RATE: 100 BPM | TEMPERATURE: 98 F | RESPIRATION RATE: 18 BRPM | OXYGEN SATURATION: 96 % | DIASTOLIC BLOOD PRESSURE: 84 MMHG | SYSTOLIC BLOOD PRESSURE: 120 MMHG

## 2021-10-19 DIAGNOSIS — C53.9 MALIGNANT NEOPLASM OF CERVIX UTERI, UNSPECIFIED: ICD-10-CM

## 2021-10-19 LAB
GLUCOSE BLDC GLUCOMTR-MCNC: 231 MG/DL — HIGH (ref 70–99)
GLUCOSE BLDC GLUCOMTR-MCNC: 238 MG/DL — HIGH (ref 70–99)

## 2021-10-19 PROCEDURE — 57156 INS VAG BRACHYTX DEVICE: CPT

## 2021-10-19 PROCEDURE — 77290 THER RAD SIMULAJ FIELD CPLX: CPT | Mod: 26

## 2021-10-19 PROCEDURE — 77295 3-D RADIOTHERAPY PLAN: CPT | Mod: 26

## 2021-10-19 PROCEDURE — 77771 HDR RDNCL NTRSTL/ICAV BRCHTX: CPT | Mod: 26

## 2021-10-19 PROCEDURE — 77334 RADIATION TREATMENT AID(S): CPT | Mod: 26

## 2021-10-19 PROCEDURE — 57155 INSERT UTERI TANDEM/OVOIDS: CPT

## 2021-10-19 RX ORDER — SODIUM CHLORIDE 9 MG/ML
1000 INJECTION, SOLUTION INTRAVENOUS
Refills: 0 | Status: DISCONTINUED | OUTPATIENT
Start: 2021-10-19 | End: 2021-11-02

## 2021-10-19 NOTE — BRIEF OPERATIVE NOTE - BRIEF OP NOTE DRAINS
tandem and ovoid, I roll vaginal packing, and Rhodes to drainage left in place at conclusion of procedure

## 2021-10-19 NOTE — BRIEF OPERATIVE NOTE - COMMENTS
EUA performed.  Intrayterune sleeve placed by Dr. Bill and procedure dictated by him immediately befor placement of tandem and ovoid

## 2021-10-19 NOTE — BRIEF OPERATIVE NOTE - OPERATION/FINDINGS
EUA: enlarged cervix, approximately 4 cm. The posterior lip of the cervix was flush with the vaginal mucosa. The cervical os was completely filled with tumor. Dilation performed under ultrasound guidance. Stitches placed at 2:00, 7:00, 10:00. EUA: enlarged cervix, approximately 4 cm. The posterior lip of the cervix was flush with the vaginal mucosa. The cervical os was completely filled with tumor. Dilation performed under ultrasound guidance. Stitches placed at 2:00, 7:00, 10:00. Urine clear, pre- and post-op. CC.

## 2021-10-19 NOTE — BRIEF OPERATIVE NOTE - NSICDXBRIEFPROCEDURE_GEN_ALL_CORE_FT
PROCEDURES:  Vaginal examination under anesthesia 19-Oct-2021 11:06:31  Gage Nguyen  Cervical canal dilation 19-Oct-2021 11:06:53  Gage Nguyen  Insertion, Akil sleeve 19-Oct-2021 11:07:05  Gage Nguyen

## 2021-10-20 ENCOUNTER — NON-APPOINTMENT (OUTPATIENT)
Age: 46
End: 2021-10-20

## 2021-10-21 ENCOUNTER — OUTPATIENT (OUTPATIENT)
Dept: OUTPATIENT SERVICES | Facility: HOSPITAL | Age: 46
LOS: 1 days | Discharge: HOME | End: 2021-10-21

## 2021-10-21 ENCOUNTER — NON-APPOINTMENT (OUTPATIENT)
Age: 46
End: 2021-10-21

## 2021-10-21 VITALS
SYSTOLIC BLOOD PRESSURE: 137 MMHG | RESPIRATION RATE: 20 BRPM | HEART RATE: 103 BPM | OXYGEN SATURATION: 98 % | HEIGHT: 64 IN | TEMPERATURE: 98 F | DIASTOLIC BLOOD PRESSURE: 85 MMHG | WEIGHT: 177.91 LBS

## 2021-10-21 VITALS — HEART RATE: 96 BPM | SYSTOLIC BLOOD PRESSURE: 99 MMHG | DIASTOLIC BLOOD PRESSURE: 65 MMHG

## 2021-10-21 DIAGNOSIS — C53.9 MALIGNANT NEOPLASM OF CERVIX UTERI, UNSPECIFIED: ICD-10-CM

## 2021-10-21 LAB — GLUCOSE BLDC GLUCOMTR-MCNC: 181 MG/DL — HIGH (ref 70–99)

## 2021-10-21 PROCEDURE — 57155 INSERT UTERI TANDEM/OVOIDS: CPT

## 2021-10-21 PROCEDURE — 77290 THER RAD SIMULAJ FIELD CPLX: CPT | Mod: 26

## 2021-10-21 PROCEDURE — 77334 RADIATION TREATMENT AID(S): CPT | Mod: 26

## 2021-10-21 PROCEDURE — 77771 HDR RDNCL NTRSTL/ICAV BRCHTX: CPT | Mod: 26

## 2021-10-21 PROCEDURE — 77295 3-D RADIOTHERAPY PLAN: CPT | Mod: 26

## 2021-10-21 RX ORDER — SODIUM CHLORIDE 9 MG/ML
1000 INJECTION, SOLUTION INTRAVENOUS
Refills: 0 | Status: DISCONTINUED | OUTPATIENT
Start: 2021-10-21 | End: 2021-11-04

## 2021-10-21 NOTE — BRIEF OPERATIVE NOTE - COMMENTS
INtrauterine tandem, vaginal ovoids,2 interstitial needles, I roll vaginal packing, and Rhodes Catheter to rainage remain in place at conclusion of procedure

## 2021-10-22 ENCOUNTER — NON-APPOINTMENT (OUTPATIENT)
Age: 46
End: 2021-10-22

## 2021-10-24 ENCOUNTER — APPOINTMENT (OUTPATIENT)
Dept: DISASTER EMERGENCY | Facility: CLINIC | Age: 46
End: 2021-10-24

## 2021-10-25 ENCOUNTER — APPOINTMENT (OUTPATIENT)
Dept: DISASTER EMERGENCY | Facility: CLINIC | Age: 46
End: 2021-10-25

## 2021-10-25 ENCOUNTER — NON-APPOINTMENT (OUTPATIENT)
Age: 46
End: 2021-10-25

## 2021-10-25 LAB — SARS-COV-2 N GENE NPH QL NAA+PROBE: NOT DETECTED

## 2021-10-28 ENCOUNTER — TRANSCRIPTION ENCOUNTER (OUTPATIENT)
Age: 46
End: 2021-10-28

## 2021-10-28 ENCOUNTER — APPOINTMENT (OUTPATIENT)
Dept: DISASTER EMERGENCY | Facility: CLINIC | Age: 46
End: 2021-10-28

## 2021-10-28 VITALS
WEIGHT: 177.91 LBS | HEIGHT: 64 IN | HEART RATE: 109 BPM | RESPIRATION RATE: 20 BRPM | SYSTOLIC BLOOD PRESSURE: 110 MMHG | TEMPERATURE: 98 F | OXYGEN SATURATION: 100 % | DIASTOLIC BLOOD PRESSURE: 76 MMHG

## 2021-10-29 ENCOUNTER — OUTPATIENT (OUTPATIENT)
Dept: OUTPATIENT SERVICES | Facility: HOSPITAL | Age: 46
LOS: 1 days | Discharge: HOME | End: 2021-10-29

## 2021-10-29 VITALS
HEART RATE: 101 BPM | SYSTOLIC BLOOD PRESSURE: 103 MMHG | RESPIRATION RATE: 13 BRPM | OXYGEN SATURATION: 99 % | DIASTOLIC BLOOD PRESSURE: 65 MMHG

## 2021-10-29 DIAGNOSIS — C53.9 MALIGNANT NEOPLASM OF CERVIX UTERI, UNSPECIFIED: ICD-10-CM

## 2021-10-29 LAB
GLUCOSE BLDC GLUCOMTR-MCNC: 243 MG/DL — HIGH (ref 70–99)
SARS-COV-2 N GENE NPH QL NAA+PROBE: NOT DETECTED

## 2021-10-29 PROCEDURE — 77334 RADIATION TREATMENT AID(S): CPT | Mod: 26

## 2021-10-29 PROCEDURE — 77771 HDR RDNCL NTRSTL/ICAV BRCHTX: CPT | Mod: 26

## 2021-10-29 PROCEDURE — 57155 INSERT UTERI TANDEM/OVOIDS: CPT

## 2021-10-29 PROCEDURE — 77295 3-D RADIOTHERAPY PLAN: CPT | Mod: 26

## 2021-10-29 PROCEDURE — 77290 THER RAD SIMULAJ FIELD CPLX: CPT | Mod: 26

## 2021-10-29 RX ORDER — SODIUM CHLORIDE 9 MG/ML
1000 INJECTION, SOLUTION INTRAVENOUS
Refills: 0 | Status: DISCONTINUED | OUTPATIENT
Start: 2021-10-29 | End: 2021-11-12

## 2021-10-29 NOTE — BRIEF OPERATIVE NOTE - ESTIMATED BLOOD LOSS
The following changes were made to your medications today:   stop flecainide  Start diltiazem 120mg daily    The following lifestyle modifications are encouraged:  Diet   Exercise    The following tests have been ordered:   Echocardiogram    3 months nurse visit to check HR, BP  Return to Clinic in 6 months or sooner if needed.    Additional Educational Resources:  For additional resources regarding your symptoms, diagnosis, or further health information, please visit the Health Resources section on [email or web address] or the Online Health Resources section in [facility].    
5

## 2021-10-30 ENCOUNTER — APPOINTMENT (OUTPATIENT)
Dept: DISASTER EMERGENCY | Facility: CLINIC | Age: 46
End: 2021-10-30

## 2021-10-30 DIAGNOSIS — Z01.818 ENCOUNTER FOR OTHER PREPROCEDURAL EXAMINATION: ICD-10-CM

## 2021-10-31 ENCOUNTER — TRANSCRIPTION ENCOUNTER (OUTPATIENT)
Age: 46
End: 2021-10-31

## 2021-11-01 ENCOUNTER — OUTPATIENT (OUTPATIENT)
Dept: OUTPATIENT SERVICES | Facility: HOSPITAL | Age: 46
LOS: 1 days | Discharge: HOME | End: 2021-11-01

## 2021-11-01 ENCOUNTER — LABORATORY RESULT (OUTPATIENT)
Age: 46
End: 2021-11-01

## 2021-11-01 VITALS
TEMPERATURE: 98 F | WEIGHT: 177.91 LBS | HEART RATE: 104 BPM | RESPIRATION RATE: 16 BRPM | HEIGHT: 64 IN | OXYGEN SATURATION: 100 % | DIASTOLIC BLOOD PRESSURE: 80 MMHG | SYSTOLIC BLOOD PRESSURE: 142 MMHG

## 2021-11-01 VITALS
HEART RATE: 92 BPM | TEMPERATURE: 98 F | SYSTOLIC BLOOD PRESSURE: 110 MMHG | RESPIRATION RATE: 17 BRPM | OXYGEN SATURATION: 99 % | DIASTOLIC BLOOD PRESSURE: 68 MMHG

## 2021-11-01 DIAGNOSIS — Z51.0 ENCOUNTER FOR ANTINEOPLASTIC RADIATION THERAPY: ICD-10-CM

## 2021-11-01 DIAGNOSIS — C53.9 MALIGNANT NEOPLASM OF CERVIX UTERI, UNSPECIFIED: ICD-10-CM

## 2021-11-01 LAB — GLUCOSE BLDC GLUCOMTR-MCNC: 210 MG/DL — HIGH (ref 70–99)

## 2021-11-01 PROCEDURE — 77295 3-D RADIOTHERAPY PLAN: CPT | Mod: 26

## 2021-11-01 PROCEDURE — 77290 THER RAD SIMULAJ FIELD CPLX: CPT | Mod: 26

## 2021-11-01 PROCEDURE — 77771 HDR RDNCL NTRSTL/ICAV BRCHTX: CPT | Mod: 26

## 2021-11-01 PROCEDURE — 57155 INSERT UTERI TANDEM/OVOIDS: CPT

## 2021-11-01 PROCEDURE — 77334 RADIATION TREATMENT AID(S): CPT | Mod: 26

## 2021-11-01 RX ORDER — LORAZEPAM 0.5 MG/1
0.5 TABLET ORAL 3 TIMES DAILY
Qty: 0 | Refills: 0 | Status: COMPLETED | OUTPATIENT
Start: 2021-11-01

## 2021-11-01 RX ORDER — SODIUM CHLORIDE 9 MG/ML
1000 INJECTION, SOLUTION INTRAVENOUS
Refills: 0 | Status: DISCONTINUED | OUTPATIENT
Start: 2021-11-01 | End: 2021-11-15

## 2021-11-01 NOTE — BRIEF OPERATIVE NOTE - OPERATION/FINDINGS
Sleeve in place, vaginal mucosa pink, moist, without evidence of bleeding or visualized residual gross tumor on speculum exam. Uterus sounded to 5cm.

## 2021-11-01 NOTE — ASU DISCHARGE PLAN (ADULT/PEDIATRIC) - CALL YOUR DOCTOR IF YOU HAVE ANY OF THE FOLLOWING:
Bleeding that does not stop/Pain not relieved by Medications/Fever greater than (need to indicate Fahrenheit or Celsius)/Numbness, tingling, color or temperature change to extremity/Nausea and vomiting that does not stop/Unable to urinate/Excessive diarrhea/Inability to tolerate liquids or foods/Increased irritability or sluggishness Bleeding that does not stop/Swelling that gets worse/Pain not relieved by Medications/Fever greater than (need to indicate Fahrenheit or Celsius)/Wound/Surgical Site with redness, or foul smelling discharge or pus/Numbness, tingling, color or temperature change to extremity/Nausea and vomiting that does not stop/Unable to urinate/Excessive diarrhea/Inability to tolerate liquids or foods/Increased irritability or sluggishness

## 2021-11-02 ENCOUNTER — NON-APPOINTMENT (OUTPATIENT)
Age: 46
End: 2021-11-02

## 2021-11-02 RX ORDER — LORAZEPAM 0.5 MG/1
0.5 TABLET ORAL
Qty: 0 | Refills: 0 | Status: COMPLETED | OUTPATIENT
Start: 2021-11-01

## 2021-11-02 RX ORDER — FENTANYL CITRATE 200 UG/1
200 LOZENGE TRANSMUCOSAL
Qty: 0 | Refills: 0 | Status: COMPLETED | OUTPATIENT
Start: 2021-11-01

## 2021-11-05 LAB
BASOPHILS # BLD AUTO: 0.01 K/UL
BASOPHILS NFR BLD AUTO: 0.2 %
EOSINOPHIL # BLD AUTO: 0 K/UL
EOSINOPHIL NFR BLD AUTO: 0 %
HCT VFR BLD CALC: 25.7 %
HGB BLD-MCNC: 8.3 G/DL
IMM GRANULOCYTES NFR BLD AUTO: 1.4 %
LYMPHOCYTES # BLD AUTO: 0.21 K/UL
LYMPHOCYTES NFR BLD AUTO: 3.3 %
MAN DIFF?: NORMAL
MCHC RBC-ENTMCNC: 32.3 GM/DL
MCHC RBC-ENTMCNC: 32.5 PG
MCV RBC AUTO: 100.8 FL
MONOCYTES # BLD AUTO: 0.26 K/UL
MONOCYTES NFR BLD AUTO: 4 %
NEUTROPHILS # BLD AUTO: 5.86 K/UL
NEUTROPHILS NFR BLD AUTO: 91.1 %
PLATELET # BLD AUTO: 284 K/UL
RBC # BLD: 2.55 M/UL
RBC # FLD: 20.6 %
WBC # FLD AUTO: 6.43 K/UL

## 2021-11-15 ENCOUNTER — LABORATORY RESULT (OUTPATIENT)
Age: 46
End: 2021-11-15

## 2021-11-15 ENCOUNTER — APPOINTMENT (OUTPATIENT)
Dept: RADIATION ONCOLOGY | Facility: CLINIC | Age: 46
End: 2021-11-15
Payer: MEDICAID

## 2021-11-15 VITALS
HEART RATE: 102 BPM | HEIGHT: 64 IN | WEIGHT: 177.47 LBS | DIASTOLIC BLOOD PRESSURE: 74 MMHG | RESPIRATION RATE: 16 BRPM | TEMPERATURE: 98 F | BODY MASS INDEX: 30.3 KG/M2 | OXYGEN SATURATION: 100 % | SYSTOLIC BLOOD PRESSURE: 114 MMHG

## 2021-11-15 PROCEDURE — 99024 POSTOP FOLLOW-UP VISIT: CPT | Mod: GC

## 2021-11-15 NOTE — PHYSICAL EXAM
[Normal] : well developed, well nourished, in no acute distress [Abdomen Soft] : soft [Nondistended] : nondistended [Normal External Genitalia] : normal external genitalia  [de-identified] : no vaginal mass gray  tissue at cervical area [FreeTextEntry1] : serisanguinous fluid on blood during digital exam.\par \par erythematous changes secondary to previous procedures. Where the os would have been: necrotic changes \par (likely resolving tumor)

## 2021-11-15 NOTE — VITALS
[Maximal Pain Intensity: 0/10] : 0/10 [ECOG Performance Status: 1 - Restricted in physically strenuous activity but ambulatory and able to carry out work of a light or sedentary nature] : Performance Status: 1 - Restricted in physically strenuous activity but ambulatory and able to carry out work of a light or sedentary nature, e.g., light house work, office work [80: Normal activity with effort; some signs or symptoms of disease.] : 80: Normal activity with effort; some signs or symptoms of disease.

## 2021-11-15 NOTE — DISEASE MANAGEMENT
[Clinical] : TNM Stage: c [IIB] : IIB [TTNM] : 2b [MTNM] : 0 [NTNM] : 0 [de-identified] :  cGy [de-identified] : Pelvis/ Cervix

## 2021-11-15 NOTE — HISTORY OF PRESENT ILLNESS
[Disease: _____________________] : Disease: [unfilled] [AJCC Stage: ____] : AJCC Stage: [unfilled] [Treatment Protocol] : Treatment Protocol [de-identified] : Patient tolerating treatment well.\par She has grade I intermittent neuropathy of the feet and fingers.\par She takes anti emetics for nausea with good response.\par  [FreeTextEntry1] : Weekly Cisplatin and EBRT. [de-identified] : \par 46 year old premenopausal female with locally advanced cervical adenocarcinoma, Stage IIB, completed chemo/rt Nov1. Rt consisted of pelvic rt,4500cgy?25 fx plus parametrial boost 540cgy?3 fx and 4 brachytherapy procedures(hybrid therapy) [id number].\par She returns today for post treatment check.  She continues to have a discharge which did not respond to cleocin. She admits to chon.

## 2021-11-16 LAB
BASOPHILS # BLD AUTO: 0.01 K/UL
BASOPHILS NFR BLD AUTO: 0.3 %
EOSINOPHIL # BLD AUTO: 0.05 K/UL
EOSINOPHIL NFR BLD AUTO: 1.4 %
HCT VFR BLD CALC: 25.8 %
HGB BLD-MCNC: 8 G/DL
IMM GRANULOCYTES NFR BLD AUTO: 0.6 %
LYMPHOCYTES # BLD AUTO: 0.38 K/UL
LYMPHOCYTES NFR BLD AUTO: 10.8 %
MAN DIFF?: NORMAL
MCHC RBC-ENTMCNC: 31 GM/DL
MCHC RBC-ENTMCNC: 32.4 PG
MCV RBC AUTO: 104.5 FL
MONOCYTES # BLD AUTO: 0.52 K/UL
MONOCYTES NFR BLD AUTO: 14.8 %
NEUTROPHILS # BLD AUTO: 2.53 K/UL
NEUTROPHILS NFR BLD AUTO: 72.1 %
PLATELET # BLD AUTO: 253 K/UL
RBC # BLD: 2.47 M/UL
RBC # FLD: 22 %
WBC # FLD AUTO: 3.51 K/UL

## 2021-12-09 ENCOUNTER — NON-APPOINTMENT (OUTPATIENT)
Age: 46
End: 2021-12-09

## 2021-12-12 ENCOUNTER — OUTPATIENT (OUTPATIENT)
Dept: OUTPATIENT SERVICES | Facility: HOSPITAL | Age: 46
LOS: 1 days | Discharge: HOME | End: 2021-12-12

## 2021-12-12 DIAGNOSIS — C53.9 MALIGNANT NEOPLASM OF CERVIX UTERI, UNSPECIFIED: ICD-10-CM

## 2021-12-14 NOTE — ASU DISCHARGE PLAN (ADULT/PEDIATRIC) - HISTORY OF COVID-19 VACCINATION
Vaccine status unknown
You can access the FollowMyHealth Patient Portal offered by Health system by registering at the following website: [email or web address]. By joining [facility]’s FollowMyHealth portal, you will also be able to view your health information using other applications (apps) compatible with our system.

## 2021-12-17 ENCOUNTER — APPOINTMENT (OUTPATIENT)
Dept: HEMATOLOGY ONCOLOGY | Facility: CLINIC | Age: 46
End: 2021-12-17
Payer: MEDICAID

## 2021-12-17 ENCOUNTER — RESULT REVIEW (OUTPATIENT)
Age: 46
End: 2021-12-17

## 2021-12-17 ENCOUNTER — APPOINTMENT (OUTPATIENT)
Dept: GYNECOLOGIC ONCOLOGY | Facility: CLINIC | Age: 46
End: 2021-12-17
Payer: MEDICAID

## 2021-12-17 ENCOUNTER — APPOINTMENT (OUTPATIENT)
Dept: GYNECOLOGIC ONCOLOGY | Facility: CLINIC | Age: 46
End: 2021-12-17

## 2021-12-17 ENCOUNTER — APPOINTMENT (OUTPATIENT)
Dept: RADIATION ONCOLOGY | Facility: CLINIC | Age: 46
End: 2021-12-17
Payer: MEDICAID

## 2021-12-17 VITALS
SYSTOLIC BLOOD PRESSURE: 110 MMHG | BODY MASS INDEX: 30.67 KG/M2 | HEIGHT: 63.98 IN | WEIGHT: 179.65 LBS | TEMPERATURE: 97.6 F | RESPIRATION RATE: 18 BRPM | HEART RATE: 110 BPM | OXYGEN SATURATION: 100 % | DIASTOLIC BLOOD PRESSURE: 78 MMHG

## 2021-12-17 DIAGNOSIS — N89.8 OTHER SPECIFIED NONINFLAMMATORY DISORDERS OF VAGINA: ICD-10-CM

## 2021-12-17 LAB
ALBUMIN SERPL ELPH-MCNC: 4.1 G/DL
ALP BLD-CCNC: 126 U/L
ALT SERPL-CCNC: 20 U/L
ANION GAP SERPL CALC-SCNC: 17 MMOL/L
AST SERPL-CCNC: 18 U/L
BASOPHILS # BLD AUTO: 0.01 K/UL — SIGNIFICANT CHANGE UP (ref 0–0.2)
BASOPHILS NFR BLD AUTO: 0.2 % — SIGNIFICANT CHANGE UP (ref 0–2)
BILIRUB SERPL-MCNC: 0.2 MG/DL
BUN SERPL-MCNC: 14 MG/DL
CALCIUM SERPL-MCNC: 9.7 MG/DL
CHLORIDE SERPL-SCNC: 103 MMOL/L
CO2 SERPL-SCNC: 19 MMOL/L
CREAT SERPL-MCNC: 0.79 MG/DL
EOSINOPHIL # BLD AUTO: 0.08 K/UL — SIGNIFICANT CHANGE UP (ref 0–0.5)
EOSINOPHIL NFR BLD AUTO: 1.3 % — SIGNIFICANT CHANGE UP (ref 0–6)
GLUCOSE SERPL-MCNC: 61 MG/DL
HCT VFR BLD CALC: 28.7 % — LOW (ref 34.5–45)
HGB BLD-MCNC: 9.2 G/DL — LOW (ref 11.5–15.5)
IMM GRANULOCYTES NFR BLD AUTO: 0.3 % — SIGNIFICANT CHANGE UP (ref 0–1.5)
LYMPHOCYTES # BLD AUTO: 0.54 K/UL — LOW (ref 1–3.3)
LYMPHOCYTES # BLD AUTO: 8.9 % — LOW (ref 13–44)
MCHC RBC-ENTMCNC: 31.5 PG — SIGNIFICANT CHANGE UP (ref 27–34)
MCHC RBC-ENTMCNC: 32.1 G/DL — SIGNIFICANT CHANGE UP (ref 32–36)
MCV RBC AUTO: 97.9 FL — SIGNIFICANT CHANGE UP (ref 80–100)
MONOCYTES # BLD AUTO: 0.51 K/UL — SIGNIFICANT CHANGE UP (ref 0–0.9)
MONOCYTES NFR BLD AUTO: 8.4 % — SIGNIFICANT CHANGE UP (ref 2–14)
NEUTROPHILS # BLD AUTO: 4.94 K/UL — SIGNIFICANT CHANGE UP (ref 1.8–7.4)
NEUTROPHILS NFR BLD AUTO: 80.9 % — HIGH (ref 43–77)
NRBC # BLD: 0 /100 WBCS — SIGNIFICANT CHANGE UP (ref 0–0)
PLATELET # BLD AUTO: 248 K/UL — SIGNIFICANT CHANGE UP (ref 150–400)
POTASSIUM SERPL-SCNC: 3.9 MMOL/L
PROT SERPL-MCNC: 7.2 G/DL
RBC # BLD: 2.92 M/UL — LOW (ref 3.8–5.2)
RBC # FLD: 13.9 % — SIGNIFICANT CHANGE UP (ref 10.3–14.5)
SODIUM SERPL-SCNC: 140 MMOL/L
WBC # BLD: 6.1 K/UL — SIGNIFICANT CHANGE UP (ref 3.8–10.5)
WBC # FLD AUTO: 6.1 K/UL — SIGNIFICANT CHANGE UP (ref 3.8–10.5)

## 2021-12-17 PROCEDURE — 99024 POSTOP FOLLOW-UP VISIT: CPT | Mod: 59

## 2021-12-17 PROCEDURE — 99214 OFFICE O/P EST MOD 30 MIN: CPT

## 2021-12-17 RX ORDER — CLINDAMYCIN HYDROCHLORIDE 300 MG/1
300 CAPSULE ORAL TWICE DAILY
Qty: 14 | Refills: 0 | Status: DISCONTINUED | COMMUNITY
Start: 2021-11-05 | End: 2021-12-17

## 2021-12-17 NOTE — HISTORY OF PRESENT ILLNESS
[Disease: _____________________] : Disease: [unfilled] [AJCC Stage: ____] : AJCC Stage: [unfilled] [Treatment Protocol] : Treatment Protocol [de-identified] : 46 y.o female with adenocarcinoma most likely  of cervical origin.\par \par 46 year old premenopausal female with cervical adenocarcinoma, with disease involved in the endometrium presenting for consideration of chemotherapy. Initially seen by Dr. Bill on 7/16/21 given concern for primary vs secondary gynecologic malignancy. \par \par Presented to [facility] on 5/28/21 given pelvic pain and retained tampon with a 5 day history of malodorous discharge with fever and tachycardia. Initially assumed to have PID and started on antibiotics, however cervix appeared irregular, dark and distorted. Subsequently taken to the OR for EUA, and cervical biospy done, showing adenocarcinoma, unclear at time if primary or secondary. \par Pathology is as below: \par  PATHOLOGY:\par 1) EMBx, 5/26/21, Dr. James\par  a) atypical endometrial glands exhibiting complex, cribiform architexture are present as a few scattered glands, further workup is necessary to R/O CAH or adenocarcinoma of the endometrium\par 2) EMBx, 5/28/21\par  a) minute polypoid fragment of endometrial tissue with partial infarction and focal glandular crowding without atypia \par 3) EUA, D&C, ECC, cervical biopsy, 5/30/21, [facility]\par  a) cervical/endocervix - adenocarcinoma in a background of necrosis and acute inflammation (primary vs. secondary)\par \par PET/CT done on 7/5/21 is as below: \par trophic left left kidney. Lobulated bilateral renal cortex with cortical scarring bilaterally. Multiple non obstruction stones on the left, largest 7mm in the lower pole, stable since 5/29/21. Difficult to delineate increased FDG activity in the uterine cervix corresponding to known malignancy SUV 7.3. There is a separate focus of increased FDG activity in the endometrium which may be physiologic SUV 8.8. No enlarged FDG avid nodes. Pan colonic rectal hypermetabolism which may be related to Metformin. \par \par Other imaging: \par \par TVUS on 5/28/21: uterus 9.0 x 4.9 x 5.2cm. Retained tampon in vagina. No collection or fluid in the endometrial canal. EML 6.3mm. \par RTO- a simple cyst 3.7 x 3.4 x 3.4cm\par LTO - Small follicles are seen. \par No FF. \par \par \par CT A/P on 5/29/21: lobulated bilateral renal cortex with cortical scarring bilaterally. Multiple nonobstructing stones on the left. no hydro.\par The endometrial cavity is slightly distended with fluid. An air containing structure is identified within the upper vaginal cavity (likely retained tampon). Mild fatty stranding in the pelvis. Likely a physiological cyst in the right ovary and a small corpus luteal or hemorrhagic cyst in the left ovary. Small free fluid. Appendix normal. No ascites. No LAD. \par \par Initially it was thought to be a GI primary and patient underwent GI work up with EGD and colonoscopy, all WNL.\par Patient still has some vaginal discharge, malodorous.\par \par  [FreeTextEntry1] : Weekly Cisplatin and EBRT. [de-identified] : Patient is here for follow up, completed her RT about one month ago.  She has some constipation and decreased appetite, not losing weight. She also has persistent weakness and fatigue.  She continues to have intermittent b/l hip/pelvic pain (mostly left side) and b/l leg numbness. She denies abdominal pain, nausea, vomiting, diarrhea.

## 2021-12-17 NOTE — VITALS
[80: Normal activity with effort; some signs or symptoms of disease.] : 80: Normal activity with effort; some signs or symptoms of disease.  [ECOG Performance Status: 1 - Restricted in physically strenuous activity but ambulatory and able to carry out work of a light or sedentary nature] : Performance Status: 1 - Restricted in physically strenuous activity but ambulatory and able to carry out work of a light or sedentary nature, e.g., light house work, office work [Maximal Pain Intensity: 3/10] : 3/10

## 2021-12-27 NOTE — PHYSICAL EXAM
[General Appearance - In No Acute Distress] : in no acute distress [] : no respiratory distress [de-identified] : necrortic appearing tssue where os was and into vanal

## 2021-12-27 NOTE — REVIEW OF SYSTEMS
[Constipation: Grade 0] : Constipation: Grade 0 [Diarrhea: Grade 0] : Diarrhea: Grade 0 [Hematuria: Grade 0] : Hematuria: Grade 0 [Vaginal Infection: Grade 2 - Localized; local intervention indicated (e.g., topical antibiotic, antifungal, or antiviral)] : Vaginal Infection: Grade 2 - Localized; local intervention indicated (e.g., topical antibiotic, antifungal, or antiviral) [Urinary Retention: Grade 1 - Urinary, suprapubic or intermittent catheter placement not indicated; able to void with some residual] : Urinary Retention: Grade 1 - Urinary, suprapubic or intermittent catheter placement not indicated; able to void with some residual [FreeTextEntry1] : vaginal discharge

## 2021-12-27 NOTE — HISTORY OF PRESENT ILLNESS
[Treatment Protocol] : Treatment Protocol [FreeTextEntry1] : Weekly Cisplatin and EBRT. [de-identified] : \par 46 year old premenopausal female with locally advanced cervical adenocarcinoma, Stage IIB, completed chemo/rt Nov1. Rt consisted of pelvic rt,4500cgy/25 fx plus parametrial boost 540cgy/3 fx and 4 brachytherapy procedures(hybrid therapy) 293felx3.\par She returns today for post treatment check.  She continues to have a discharge which did not respond to cleocin. She admits to fatigue.\par \par 12/17/21- Patient presents today for followup. Continued fatigue and discharge though discharge somewhat less

## 2021-12-27 NOTE — DISEASE MANAGEMENT
[Clinical] : TNM Stage: c [IIB] : IIB [FreeTextEntry4] : cervix [TTNM] : 2b [NTNM] : 0 [MTNM] : 0 [de-identified] :  cGy [de-identified] : Pelvis/ Cervix

## 2021-12-27 NOTE — REASON FOR VISIT
[Follow-Up Visit] : a follow-up [Other: _____] : [unfilled] [Post-Treatment Evaluation] : post-treatment evaluation for [Other: ___] : [unfilled] [FreeTextEntry2] : cervical cancer.

## 2022-01-10 ENCOUNTER — NON-APPOINTMENT (OUTPATIENT)
Age: 47
End: 2022-01-10

## 2022-01-11 ENCOUNTER — NON-APPOINTMENT (OUTPATIENT)
Age: 47
End: 2022-01-11

## 2022-01-20 NOTE — ASU DISCHARGE PLAN (ADULT/PEDIATRIC) - CARE PROVIDER_API CALL
Maria Luisa Germain)  Radiation Oncology  [address], [facility] - [place]iation [place] [address]  Phone: [phone]  Fax: [phone]  Follow Up Time:    yes

## 2022-01-26 ENCOUNTER — INPATIENT (INPATIENT)
Facility: HOSPITAL | Age: 47
LOS: 5 days | Discharge: HOME HEALTH | End: 2022-02-01
Attending: HOSPITALIST | Admitting: HOSPITALIST
Payer: MEDICAID

## 2022-01-26 ENCOUNTER — APPOINTMENT (OUTPATIENT)
Dept: RADIATION ONCOLOGY | Facility: CLINIC | Age: 47
End: 2022-01-26
Payer: MEDICAID

## 2022-01-26 VITALS
DIASTOLIC BLOOD PRESSURE: 94 MMHG | WEIGHT: 170.97 LBS | RESPIRATION RATE: 17 BRPM | OXYGEN SATURATION: 96 % | SYSTOLIC BLOOD PRESSURE: 157 MMHG | BODY MASS INDEX: 29.37 KG/M2 | HEART RATE: 112 BPM

## 2022-01-26 VITALS
OXYGEN SATURATION: 100 % | HEIGHT: 64 IN | RESPIRATION RATE: 16 BRPM | SYSTOLIC BLOOD PRESSURE: 146 MMHG | DIASTOLIC BLOOD PRESSURE: 82 MMHG | HEART RATE: 103 BPM | TEMPERATURE: 99 F

## 2022-01-26 DIAGNOSIS — R26.2 DIFFICULTY IN WALKING, NOT ELSEWHERE CLASSIFIED: ICD-10-CM

## 2022-01-26 LAB
ALBUMIN SERPL ELPH-MCNC: 4.2 G/DL — SIGNIFICANT CHANGE UP (ref 3.3–5)
ALP SERPL-CCNC: 131 U/L — HIGH (ref 40–120)
ALT FLD-CCNC: 16 U/L — SIGNIFICANT CHANGE UP (ref 4–33)
ANION GAP SERPL CALC-SCNC: 12 MMOL/L — SIGNIFICANT CHANGE UP (ref 7–14)
AST SERPL-CCNC: 10 U/L — SIGNIFICANT CHANGE UP (ref 4–32)
BASOPHILS # BLD AUTO: 0.01 K/UL — SIGNIFICANT CHANGE UP (ref 0–0.2)
BASOPHILS NFR BLD AUTO: 0.2 % — SIGNIFICANT CHANGE UP (ref 0–2)
BILIRUB SERPL-MCNC: 0.3 MG/DL — SIGNIFICANT CHANGE UP (ref 0.2–1.2)
BUN SERPL-MCNC: 12 MG/DL — SIGNIFICANT CHANGE UP (ref 7–23)
CALCIUM SERPL-MCNC: 9.8 MG/DL — SIGNIFICANT CHANGE UP (ref 8.4–10.5)
CHLORIDE SERPL-SCNC: 92 MMOL/L — LOW (ref 98–107)
CO2 SERPL-SCNC: 25 MMOL/L — SIGNIFICANT CHANGE UP (ref 22–31)
CREAT SERPL-MCNC: 0.67 MG/DL — SIGNIFICANT CHANGE UP (ref 0.5–1.3)
EOSINOPHIL # BLD AUTO: 0.1 K/UL — SIGNIFICANT CHANGE UP (ref 0–0.5)
EOSINOPHIL NFR BLD AUTO: 2.2 % — SIGNIFICANT CHANGE UP (ref 0–6)
FLUAV AG NPH QL: SIGNIFICANT CHANGE UP
FLUBV AG NPH QL: SIGNIFICANT CHANGE UP
GLUCOSE SERPL-MCNC: 224 MG/DL — HIGH (ref 70–99)
HCG SERPL-ACNC: <5 MIU/ML — SIGNIFICANT CHANGE UP
HCT VFR BLD CALC: 30.4 % — LOW (ref 34.5–45)
HGB BLD-MCNC: 10 G/DL — LOW (ref 11.5–15.5)
IANC: 3.52 K/UL — SIGNIFICANT CHANGE UP (ref 1.5–8.5)
IMM GRANULOCYTES NFR BLD AUTO: 0.4 % — SIGNIFICANT CHANGE UP (ref 0–1.5)
LYMPHOCYTES # BLD AUTO: 0.47 K/UL — LOW (ref 1–3.3)
LYMPHOCYTES # BLD AUTO: 10.2 % — LOW (ref 13–44)
MCHC RBC-ENTMCNC: 28.7 PG — SIGNIFICANT CHANGE UP (ref 27–34)
MCHC RBC-ENTMCNC: 32.9 GM/DL — SIGNIFICANT CHANGE UP (ref 32–36)
MCV RBC AUTO: 87.1 FL — SIGNIFICANT CHANGE UP (ref 80–100)
MONOCYTES # BLD AUTO: 0.47 K/UL — SIGNIFICANT CHANGE UP (ref 0–0.9)
MONOCYTES NFR BLD AUTO: 10.2 % — SIGNIFICANT CHANGE UP (ref 2–14)
NEUTROPHILS # BLD AUTO: 3.52 K/UL — SIGNIFICANT CHANGE UP (ref 1.8–7.4)
NEUTROPHILS NFR BLD AUTO: 76.8 % — SIGNIFICANT CHANGE UP (ref 43–77)
NRBC # BLD: 0 /100 WBCS — SIGNIFICANT CHANGE UP
NRBC # FLD: 0 K/UL — SIGNIFICANT CHANGE UP
PLATELET # BLD AUTO: 257 K/UL — SIGNIFICANT CHANGE UP (ref 150–400)
POTASSIUM SERPL-MCNC: 4.6 MMOL/L — SIGNIFICANT CHANGE UP (ref 3.5–5.3)
POTASSIUM SERPL-SCNC: 4.6 MMOL/L — SIGNIFICANT CHANGE UP (ref 3.5–5.3)
PROT SERPL-MCNC: 7.7 G/DL — SIGNIFICANT CHANGE UP (ref 6–8.3)
RBC # BLD: 3.49 M/UL — LOW (ref 3.8–5.2)
RBC # FLD: 13.5 % — SIGNIFICANT CHANGE UP (ref 10.3–14.5)
RSV RNA NPH QL NAA+NON-PROBE: SIGNIFICANT CHANGE UP
SARS-COV-2 RNA SPEC QL NAA+PROBE: SIGNIFICANT CHANGE UP
SODIUM SERPL-SCNC: 129 MMOL/L — LOW (ref 135–145)
WBC # BLD: 4.59 K/UL — SIGNIFICANT CHANGE UP (ref 3.8–10.5)
WBC # FLD AUTO: 4.59 K/UL — SIGNIFICANT CHANGE UP (ref 3.8–10.5)

## 2022-01-26 PROCEDURE — 99214 OFFICE O/P EST MOD 30 MIN: CPT

## 2022-01-26 PROCEDURE — 99285 EMERGENCY DEPT VISIT HI MDM: CPT

## 2022-01-26 PROCEDURE — 72082 X-RAY EXAM ENTIRE SPI 2/3 VW: CPT | Mod: 26

## 2022-01-26 RX ORDER — OXYCODONE HYDROCHLORIDE 5 MG/1
5 TABLET ORAL ONCE
Refills: 0 | Status: DISCONTINUED | OUTPATIENT
Start: 2022-01-26 | End: 2022-01-26

## 2022-01-26 RX ORDER — DEXTROSE 50 % IN WATER 50 %
25 SYRINGE (ML) INTRAVENOUS ONCE
Refills: 0 | Status: DISCONTINUED | OUTPATIENT
Start: 2022-01-26 | End: 2022-02-01

## 2022-01-26 RX ORDER — SODIUM CHLORIDE 9 MG/ML
1000 INJECTION INTRAMUSCULAR; INTRAVENOUS; SUBCUTANEOUS ONCE
Refills: 0 | Status: COMPLETED | OUTPATIENT
Start: 2022-01-26 | End: 2022-01-26

## 2022-01-26 RX ORDER — ACETAMINOPHEN 500 MG
650 TABLET ORAL EVERY 6 HOURS
Refills: 0 | Status: DISCONTINUED | OUTPATIENT
Start: 2022-01-26 | End: 2022-02-01

## 2022-01-26 RX ORDER — INSULIN LISPRO 100/ML
VIAL (ML) SUBCUTANEOUS AT BEDTIME
Refills: 0 | Status: DISCONTINUED | OUTPATIENT
Start: 2022-01-26 | End: 2022-01-29

## 2022-01-26 RX ORDER — DEXTROSE 50 % IN WATER 50 %
12.5 SYRINGE (ML) INTRAVENOUS ONCE
Refills: 0 | Status: DISCONTINUED | OUTPATIENT
Start: 2022-01-26 | End: 2022-02-01

## 2022-01-26 RX ORDER — DEXTROSE 50 % IN WATER 50 %
15 SYRINGE (ML) INTRAVENOUS ONCE
Refills: 0 | Status: DISCONTINUED | OUTPATIENT
Start: 2022-01-26 | End: 2022-02-01

## 2022-01-26 RX ORDER — INSULIN LISPRO 100/ML
VIAL (ML) SUBCUTANEOUS
Refills: 0 | Status: DISCONTINUED | OUTPATIENT
Start: 2022-01-26 | End: 2022-01-29

## 2022-01-26 RX ORDER — LIDOCAINE 4 G/100G
1 CREAM TOPICAL ONCE
Refills: 0 | Status: COMPLETED | OUTPATIENT
Start: 2022-01-26 | End: 2022-01-26

## 2022-01-26 RX ORDER — GABAPENTIN 400 MG/1
300 CAPSULE ORAL ONCE
Refills: 0 | Status: COMPLETED | OUTPATIENT
Start: 2022-01-26 | End: 2022-01-26

## 2022-01-26 RX ORDER — SODIUM CHLORIDE 9 MG/ML
1000 INJECTION, SOLUTION INTRAVENOUS
Refills: 0 | Status: DISCONTINUED | OUTPATIENT
Start: 2022-01-26 | End: 2022-02-01

## 2022-01-26 RX ORDER — GLUCAGON INJECTION, SOLUTION 0.5 MG/.1ML
1 INJECTION, SOLUTION SUBCUTANEOUS ONCE
Refills: 0 | Status: DISCONTINUED | OUTPATIENT
Start: 2022-01-26 | End: 2022-02-01

## 2022-01-26 RX ORDER — ONDANSETRON 8 MG/1
4 TABLET, FILM COATED ORAL EVERY 8 HOURS
Refills: 0 | Status: DISCONTINUED | OUTPATIENT
Start: 2022-01-26 | End: 2022-02-01

## 2022-01-26 RX ADMIN — LIDOCAINE 1 PATCH: 4 CREAM TOPICAL at 19:18

## 2022-01-26 RX ADMIN — OXYCODONE HYDROCHLORIDE 5 MILLIGRAM(S): 5 TABLET ORAL at 19:18

## 2022-01-26 RX ADMIN — SODIUM CHLORIDE 1000 MILLILITER(S): 9 INJECTION INTRAMUSCULAR; INTRAVENOUS; SUBCUTANEOUS at 22:33

## 2022-01-26 RX ADMIN — GABAPENTIN 300 MILLIGRAM(S): 400 CAPSULE ORAL at 19:18

## 2022-01-26 NOTE — ED ADULT TRIAGE NOTE - CCCP TRG CHIEF CMPLNT
Patient ID by name and . Allergies verified. Influenza High Dose vaccine given IM in right deltoid using aseptic technique. Aspirated with no blood noted. Patient tolerated well. Given per physicians order. No adverse reaction noted.    back pain general

## 2022-01-26 NOTE — ED PROVIDER NOTE - CLINICAL SUMMARY MEDICAL DECISION MAKING FREE TEXT BOX
45 yo F with hx of cervical cancer s/p chemo ([facility]) p/w lower back pain x 1 month. Recent CT scan negative for mets/fx. Uncomfortable on exam with paraspinal lumbar ttp. Motor and sensation intact. +normal rectal tone. Low likelihood for cord compression. Possible occult pathologic fx vs new mets. Plan for labs, pain control and likely admit for MRI.

## 2022-01-26 NOTE — VITALS
[Maximal Pain Intensity: 10/10] : 10/10 [Least Pain Intensity: 7/10] : 7/10 [90: Able to carry normal activity; minor signs or symptoms of disease.] : 90: Able to carry normal activity; minor signs or symptoms of disease.

## 2022-01-26 NOTE — ED PROVIDER NOTE - PHYSICAL EXAMINATION
Gen: NAD, AAOx3, uncomfortable, non-toxic appearing.  HEENT: NCAT, normal conjunctiva, oral mucosa moist.  Lung: speaking in full sentences, good aeration bilaterally, lungs CTA b/l.  CV: regular rate and rhythm. cap refill <2x. peripheral pulses 2+bilaterally.   Abd: soft, ND, NT.  MSK: tenderness over R paraspinal lumbar region.   Neuro: 5/5 strength of bilateral lower extremities. sensation of bilateral lower extremities intact. +rectal tone.   Skin: Intact.  Psych: normal affect.

## 2022-01-26 NOTE — ED ADULT NURSE NOTE - NSIMPLEMENTINTERV_GEN_ALL_ED
Implemented All Universal Safety Interventions:  [place] to call system. Call bell, personal items and telephone within reach. Instruct patient to call for assistance. Room bathroom lighting operational. Non-slip footwear when patient is off stretcher. Physically safe environment: no spills, clutter or unnecessary equipment. Stretcher in lowest position, wheels locked, appropriate side rails in place.

## 2022-01-26 NOTE — ED ADULT TRIAGE NOTE - CHIEF COMPLAINT QUOTE
c/o lower back pain for 2x weeks radiating down legs, denies fall/trauma. Hx of endometrial CA (last radiation november)

## 2022-01-26 NOTE — ED PROVIDER NOTE - OBJECTIVE STATEMENT
45 yo F with hx of cervical cancer s/p chemo ([facility]) p/w lower back pain x 1 month. Reports constant sharp bilateral lumbar back pain radiating into the R leg. States that the pain has been progressively worsening. Worsens with movement. Presented to OSH in [place] and  had CT imaging which was negative for mets/fx. Sent in to the ER by heme/onc for possible MRI. No bowel or bladder dysfunction, no sensory or motor loss in legs, no recent back surgeries or injections, no history of intravenous drug abuse, no fevers, chills or night sweats. No saddle numbness with wiping or self exam.

## 2022-01-26 NOTE — ED PROVIDER NOTE - NS ED ROS FT
GENERAL: No fever or chills, //             EYES: no change in vision, //             HEENT: no trouble swallowing or speaking, //             CARDIAC: no chest pain, //              PULMONARY: no cough or SOB, //             GI: no abdominal pain, no nausea or no vomiting, no diarrhea or constipation, //             : No changes in urination,  //            SKIN: no rashes,  //            NEURO: no headache,  //             MSK: bp  ~Quirino Castellano, DO

## 2022-01-26 NOTE — ED PROVIDER NOTE - NS ED MD EM SELECTION
SUBJECTIVE:  This patient is a 46-year-old white female who returns for discussion of her pain management.    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  There was some detection of oxycodone/oxymorphone which I had not been prescribing for her    She states that her  does get a prescription for it and she believes that she inadvertently took one or 2 of his tablets one time when she was carrying his pill bottle in her purse for him.    This last month she has been very methodical to avoid any such type of medication error again        OBJECTIVE:  General:  The patient is overweight patient    Vitals: Blood pressure 120/88, weight 113.4 kg.  She is alert and in no distress    ASSESSMENT/PLAN:  We did collect another urine sample today for toxicology screening    And drug compliance    I also told her that it's quite possible that in the near future we will be referring patients to see pain management rather than prescribing narcotics I will let her know   [id number] Comprehensive

## 2022-01-26 NOTE — ED PROVIDER NOTE - ATTENDING CONTRIBUTION TO CARE
Quirino Castellano DO:  patient seen and evaluated with the resident.  I was present for key portions of the History & Physical, and I agree with the Impression & Plan. 47 yo f pmh cervical ca, localized, s/p chemo, pw bp. reports one month of sx, worsening. was in [place] after chemo for mental health, while there began having BP. lower lumbar w/ radiation and paresthesias to b/l heels. worse w/ movement, had imaging in ED there, comes w/ reports, no fx or mets identified on CT t/l/s spine 1/11/22. comes to ed tonight due to persistent sx and possible MR, from pts hem/o recs. denies new neuro sx. denies hx trauma, iv drug use, bowel/bladder incontinence, saddle anesthesia. denies n/v, cp sob, cough, congestion, gu sx, f/c. arrives hds, well appearing. sacral ttp ml and b/l buttock ttp. will check xr at this time, treat sx. given recent CT findings do not think repeat CT would be of utility. if pain not controlled, pt unable to ambulate, would consider inpatient mr. very low suspicion cauda equina or cord compression upon initial eval.

## 2022-01-26 NOTE — ED ADULT NURSE NOTE - OBJECTIVE STATEMENT
Yeny RN: Received pt to bed 19, A+Ox4, ambulatory. C/O lower back pain radiating to BLE, pt endorses numbness in lower extremities x 3 weeks. Respirations even and unlabored, normal work of breathing, no accessory muscle use, speaking in full clear uninterrupted sentences. ABD is soft, non tender, non distended. Pt denies any chest pain, SOB, headache, dizziness, N/V/D, fever, chills. . 20G to Right wrist, Labs sent, Medicated as per MD, report endorses to primary RN

## 2022-01-26 NOTE — ED PROVIDER NOTE - NSICDXPASTMEDICALHX_GEN_ALL_CORE_FT
PAST MEDICAL HISTORY:  Acid reflux     Cervical ca     Hypercholesteremia     Hypertension     Obesity     Pre-diabetes

## 2022-01-27 DIAGNOSIS — M54.50 LOW BACK PAIN, UNSPECIFIED: ICD-10-CM

## 2022-01-27 DIAGNOSIS — D64.9 ANEMIA, UNSPECIFIED: ICD-10-CM

## 2022-01-27 DIAGNOSIS — E11.9 TYPE 2 DIABETES MELLITUS WITHOUT COMPLICATIONS: ICD-10-CM

## 2022-01-27 DIAGNOSIS — E78.5 HYPERLIPIDEMIA, UNSPECIFIED: ICD-10-CM

## 2022-01-27 DIAGNOSIS — Z29.9 ENCOUNTER FOR PROPHYLACTIC MEASURES, UNSPECIFIED: ICD-10-CM

## 2022-01-27 DIAGNOSIS — M25.551 PAIN IN RIGHT HIP: ICD-10-CM

## 2022-01-27 DIAGNOSIS — G62.9 POLYNEUROPATHY, UNSPECIFIED: ICD-10-CM

## 2022-01-27 DIAGNOSIS — I10 ESSENTIAL (PRIMARY) HYPERTENSION: ICD-10-CM

## 2022-01-27 DIAGNOSIS — E87.1 HYPO-OSMOLALITY AND HYPONATREMIA: ICD-10-CM

## 2022-01-27 DIAGNOSIS — C53.9 MALIGNANT NEOPLASM OF CERVIX UTERI, UNSPECIFIED: ICD-10-CM

## 2022-01-27 LAB
A1C WITH ESTIMATED AVERAGE GLUCOSE RESULT: 7 % — HIGH (ref 4–5.6)
ALBUMIN SERPL ELPH-MCNC: 3.6 G/DL — SIGNIFICANT CHANGE UP (ref 3.3–5)
ALP SERPL-CCNC: 117 U/L — SIGNIFICANT CHANGE UP (ref 40–120)
ALT FLD-CCNC: 15 U/L — SIGNIFICANT CHANGE UP (ref 4–33)
ANION GAP SERPL CALC-SCNC: 9 MMOL/L — SIGNIFICANT CHANGE UP (ref 7–14)
APPEARANCE UR: ABNORMAL
AST SERPL-CCNC: 12 U/L — SIGNIFICANT CHANGE UP (ref 4–32)
BACTERIA # UR AUTO: ABNORMAL
BASOPHILS # BLD AUTO: 0 K/UL — SIGNIFICANT CHANGE UP (ref 0–0.2)
BASOPHILS NFR BLD AUTO: 0 % — SIGNIFICANT CHANGE UP (ref 0–2)
BILIRUB SERPL-MCNC: 0.2 MG/DL — SIGNIFICANT CHANGE UP (ref 0.2–1.2)
BILIRUB UR-MCNC: NEGATIVE — SIGNIFICANT CHANGE UP
BUN SERPL-MCNC: 12 MG/DL — SIGNIFICANT CHANGE UP (ref 7–23)
CALCIUM SERPL-MCNC: 9.1 MG/DL — SIGNIFICANT CHANGE UP (ref 8.4–10.5)
CHLORIDE SERPL-SCNC: 100 MMOL/L — SIGNIFICANT CHANGE UP (ref 98–107)
CHOLEST SERPL-MCNC: 180 MG/DL — SIGNIFICANT CHANGE UP
CO2 SERPL-SCNC: 25 MMOL/L — SIGNIFICANT CHANGE UP (ref 22–31)
COLOR SPEC: COLORLESS — SIGNIFICANT CHANGE UP
CREAT SERPL-MCNC: 0.72 MG/DL — SIGNIFICANT CHANGE UP (ref 0.5–1.3)
DIFF PNL FLD: NEGATIVE — SIGNIFICANT CHANGE UP
EOSINOPHIL # BLD AUTO: 0.08 K/UL — SIGNIFICANT CHANGE UP (ref 0–0.5)
EOSINOPHIL NFR BLD AUTO: 2 % — SIGNIFICANT CHANGE UP (ref 0–6)
EPI CELLS # UR: 0 /HPF — SIGNIFICANT CHANGE UP (ref 0–5)
ESTIMATED AVERAGE GLUCOSE: 154 — SIGNIFICANT CHANGE UP
FOLATE SERPL-MCNC: 16.6 NG/ML — SIGNIFICANT CHANGE UP (ref 3.1–17.5)
GLUCOSE BLDC GLUCOMTR-MCNC: 178 MG/DL — HIGH (ref 70–99)
GLUCOSE BLDC GLUCOMTR-MCNC: 201 MG/DL — HIGH (ref 70–99)
GLUCOSE BLDC GLUCOMTR-MCNC: 209 MG/DL — HIGH (ref 70–99)
GLUCOSE BLDC GLUCOMTR-MCNC: 223 MG/DL — HIGH (ref 70–99)
GLUCOSE BLDC GLUCOMTR-MCNC: 229 MG/DL — HIGH (ref 70–99)
GLUCOSE SERPL-MCNC: 211 MG/DL — HIGH (ref 70–99)
GLUCOSE UR QL: NEGATIVE — SIGNIFICANT CHANGE UP
HCT VFR BLD CALC: 29.9 % — LOW (ref 34.5–45)
HDLC SERPL-MCNC: 45 MG/DL — LOW
HGB BLD-MCNC: 10 G/DL — LOW (ref 11.5–15.5)
HYALINE CASTS # UR AUTO: 1 /LPF — SIGNIFICANT CHANGE UP (ref 0–7)
IANC: 2.98 K/UL — SIGNIFICANT CHANGE UP (ref 1.5–8.5)
IMM GRANULOCYTES NFR BLD AUTO: 0.5 % — SIGNIFICANT CHANGE UP (ref 0–1.5)
IRON SATN MFR SERPL: 14 % — SIGNIFICANT CHANGE UP (ref 14–50)
IRON SATN MFR SERPL: 36 UG/DL — SIGNIFICANT CHANGE UP (ref 30–160)
KETONES UR-MCNC: NEGATIVE — SIGNIFICANT CHANGE UP
LEUKOCYTE ESTERASE UR-ACNC: ABNORMAL
LIPID PNL WITH DIRECT LDL SERPL: 113 MG/DL — HIGH
LYMPHOCYTES # BLD AUTO: 0.34 K/UL — LOW (ref 1–3.3)
LYMPHOCYTES # BLD AUTO: 8.7 % — LOW (ref 13–44)
MAGNESIUM SERPL-MCNC: 1.9 MG/DL — SIGNIFICANT CHANGE UP (ref 1.6–2.6)
MCHC RBC-ENTMCNC: 29 PG — SIGNIFICANT CHANGE UP (ref 27–34)
MCHC RBC-ENTMCNC: 33.4 GM/DL — SIGNIFICANT CHANGE UP (ref 32–36)
MCV RBC AUTO: 86.7 FL — SIGNIFICANT CHANGE UP (ref 80–100)
MONOCYTES # BLD AUTO: 0.5 K/UL — SIGNIFICANT CHANGE UP (ref 0–0.9)
MONOCYTES NFR BLD AUTO: 12.8 % — SIGNIFICANT CHANGE UP (ref 2–14)
NEUTROPHILS # BLD AUTO: 2.98 K/UL — SIGNIFICANT CHANGE UP (ref 1.8–7.4)
NEUTROPHILS NFR BLD AUTO: 76 % — SIGNIFICANT CHANGE UP (ref 43–77)
NITRITE UR-MCNC: NEGATIVE — SIGNIFICANT CHANGE UP
NON HDL CHOLESTEROL: 135 MG/DL — HIGH
NRBC # BLD: 0 /100 WBCS — SIGNIFICANT CHANGE UP
NRBC # FLD: 0 K/UL — SIGNIFICANT CHANGE UP
PH UR: 6 — SIGNIFICANT CHANGE UP (ref 5–8)
PHOSPHATE SERPL-MCNC: 4.2 MG/DL — SIGNIFICANT CHANGE UP (ref 2.5–4.5)
PLATELET # BLD AUTO: 241 K/UL — SIGNIFICANT CHANGE UP (ref 150–400)
POTASSIUM SERPL-MCNC: 3.8 MMOL/L — SIGNIFICANT CHANGE UP (ref 3.5–5.3)
POTASSIUM SERPL-SCNC: 3.8 MMOL/L — SIGNIFICANT CHANGE UP (ref 3.5–5.3)
PROT SERPL-MCNC: 7.1 G/DL — SIGNIFICANT CHANGE UP (ref 6–8.3)
PROT UR-MCNC: NEGATIVE — SIGNIFICANT CHANGE UP
RBC # BLD: 3.45 M/UL — LOW (ref 3.8–5.2)
RBC # FLD: 13.6 % — SIGNIFICANT CHANGE UP (ref 10.3–14.5)
RBC CASTS # UR COMP ASSIST: 0 /HPF — SIGNIFICANT CHANGE UP (ref 0–4)
SODIUM SERPL-SCNC: 134 MMOL/L — LOW (ref 135–145)
SP GR SPEC: 1 — SIGNIFICANT CHANGE UP (ref 1–1.05)
TIBC SERPL-MCNC: 262 UG/DL — SIGNIFICANT CHANGE UP (ref 220–430)
TRIGL SERPL-MCNC: 110 MG/DL — SIGNIFICANT CHANGE UP
UIBC SERPL-MCNC: 226 UG/DL — SIGNIFICANT CHANGE UP (ref 110–370)
UROBILINOGEN FLD QL: SIGNIFICANT CHANGE UP
VIT B12 SERPL-MCNC: 226 PG/ML — SIGNIFICANT CHANGE UP (ref 200–900)
WBC # BLD: 3.92 K/UL — SIGNIFICANT CHANGE UP (ref 3.8–10.5)
WBC # FLD AUTO: 3.92 K/UL — SIGNIFICANT CHANGE UP (ref 3.8–10.5)
WBC UR QL: 62 /HPF — HIGH (ref 0–5)

## 2022-01-27 PROCEDURE — 99223 1ST HOSP IP/OBS HIGH 75: CPT

## 2022-01-27 PROCEDURE — 12345: CPT | Mod: NC,GC

## 2022-01-27 RX ORDER — LOSARTAN POTASSIUM 100 MG/1
100 TABLET, FILM COATED ORAL DAILY
Refills: 0 | Status: DISCONTINUED | OUTPATIENT
Start: 2022-01-27 | End: 2022-02-01

## 2022-01-27 RX ORDER — ENOXAPARIN SODIUM 100 MG/ML
40 INJECTION SUBCUTANEOUS DAILY
Refills: 0 | Status: DISCONTINUED | OUTPATIENT
Start: 2022-01-27 | End: 2022-01-28

## 2022-01-27 RX ORDER — MORPHINE SULFATE 50 MG/1
2 CAPSULE, EXTENDED RELEASE ORAL ONCE
Refills: 0 | Status: DISCONTINUED | OUTPATIENT
Start: 2022-01-27 | End: 2022-01-27

## 2022-01-27 RX ORDER — ATORVASTATIN CALCIUM 80 MG/1
20 TABLET, FILM COATED ORAL AT BEDTIME
Refills: 0 | Status: DISCONTINUED | OUTPATIENT
Start: 2022-01-27 | End: 2022-02-01

## 2022-01-27 RX ORDER — INFLUENZA VIRUS VACCINE 15; 15; 15; 15 UG/.5ML; UG/.5ML; UG/.5ML; UG/.5ML
0.5 SUSPENSION INTRAMUSCULAR ONCE
Refills: 0 | Status: DISCONTINUED | OUTPATIENT
Start: 2022-01-27 | End: 2022-02-01

## 2022-01-27 RX ORDER — ACETAMINOPHEN 500 MG
1000 TABLET ORAL ONCE
Refills: 0 | Status: COMPLETED | OUTPATIENT
Start: 2022-01-27 | End: 2022-01-27

## 2022-01-27 RX ORDER — POLYETHYLENE GLYCOL 3350 17 G/17G
17 POWDER, FOR SOLUTION ORAL DAILY
Refills: 0 | Status: DISCONTINUED | OUTPATIENT
Start: 2022-01-27 | End: 2022-01-27

## 2022-01-27 RX ORDER — AMLODIPINE BESYLATE 2.5 MG/1
10 TABLET ORAL DAILY
Refills: 0 | Status: DISCONTINUED | OUTPATIENT
Start: 2022-01-27 | End: 2022-02-01

## 2022-01-27 RX ORDER — POLYETHYLENE GLYCOL 3350 17 G/17G
17 POWDER, FOR SOLUTION ORAL
Refills: 0 | Status: DISCONTINUED | OUTPATIENT
Start: 2022-01-27 | End: 2022-02-01

## 2022-01-27 RX ORDER — SENNA PLUS 8.6 MG/1
2 TABLET ORAL AT BEDTIME
Refills: 0 | Status: DISCONTINUED | OUTPATIENT
Start: 2022-01-27 | End: 2022-02-01

## 2022-01-27 RX ORDER — HYDROMORPHONE HYDROCHLORIDE 2 MG/ML
0.5 INJECTION INTRAMUSCULAR; INTRAVENOUS; SUBCUTANEOUS EVERY 4 HOURS
Refills: 0 | Status: DISCONTINUED | OUTPATIENT
Start: 2022-01-27 | End: 2022-01-28

## 2022-01-27 RX ORDER — OMEPRAZOLE 10 MG/1
1 CAPSULE, DELAYED RELEASE ORAL
Qty: 0 | Refills: 0 | DISCHARGE

## 2022-01-27 RX ORDER — OXYCODONE HYDROCHLORIDE 5 MG/1
5 TABLET ORAL EVERY 6 HOURS
Refills: 0 | Status: DISCONTINUED | OUTPATIENT
Start: 2022-01-27 | End: 2022-01-27

## 2022-01-27 RX ORDER — GABAPENTIN 400 MG/1
300 CAPSULE ORAL
Refills: 0 | Status: DISCONTINUED | OUTPATIENT
Start: 2022-01-27 | End: 2022-01-28

## 2022-01-27 RX ORDER — HYDROMORPHONE HYDROCHLORIDE 2 MG/ML
0.25 INJECTION INTRAMUSCULAR; INTRAVENOUS; SUBCUTANEOUS EVERY 4 HOURS
Refills: 0 | Status: DISCONTINUED | OUTPATIENT
Start: 2022-01-27 | End: 2022-01-27

## 2022-01-27 RX ORDER — FAMOTIDINE 10 MG/ML
0 INJECTION INTRAVENOUS
Qty: 0 | Refills: 0 | DISCHARGE

## 2022-01-27 RX ADMIN — Medication 2: at 09:28

## 2022-01-27 RX ADMIN — HYDROMORPHONE HYDROCHLORIDE 0.5 MILLIGRAM(S): 2 INJECTION INTRAMUSCULAR; INTRAVENOUS; SUBCUTANEOUS at 22:04

## 2022-01-27 RX ADMIN — GABAPENTIN 300 MILLIGRAM(S): 400 CAPSULE ORAL at 16:47

## 2022-01-27 RX ADMIN — OXYCODONE HYDROCHLORIDE 5 MILLIGRAM(S): 5 TABLET ORAL at 02:12

## 2022-01-27 RX ADMIN — MORPHINE SULFATE 2 MILLIGRAM(S): 50 CAPSULE, EXTENDED RELEASE ORAL at 04:14

## 2022-01-27 RX ADMIN — GABAPENTIN 300 MILLIGRAM(S): 400 CAPSULE ORAL at 06:21

## 2022-01-27 RX ADMIN — POLYETHYLENE GLYCOL 3350 17 GRAM(S): 17 POWDER, FOR SOLUTION ORAL at 16:46

## 2022-01-27 RX ADMIN — HYDROMORPHONE HYDROCHLORIDE 0.25 MILLIGRAM(S): 2 INJECTION INTRAMUSCULAR; INTRAVENOUS; SUBCUTANEOUS at 13:46

## 2022-01-27 RX ADMIN — AMLODIPINE BESYLATE 10 MILLIGRAM(S): 2.5 TABLET ORAL at 06:20

## 2022-01-27 RX ADMIN — Medication 400 MILLIGRAM(S): at 00:40

## 2022-01-27 RX ADMIN — HYDROMORPHONE HYDROCHLORIDE 0.5 MILLIGRAM(S): 2 INJECTION INTRAMUSCULAR; INTRAVENOUS; SUBCUTANEOUS at 22:34

## 2022-01-27 RX ADMIN — HYDROMORPHONE HYDROCHLORIDE 0.25 MILLIGRAM(S): 2 INJECTION INTRAMUSCULAR; INTRAVENOUS; SUBCUTANEOUS at 14:30

## 2022-01-27 RX ADMIN — HYDROMORPHONE HYDROCHLORIDE 0.5 MILLIGRAM(S): 2 INJECTION INTRAMUSCULAR; INTRAVENOUS; SUBCUTANEOUS at 16:47

## 2022-01-27 RX ADMIN — ATORVASTATIN CALCIUM 20 MILLIGRAM(S): 80 TABLET, FILM COATED ORAL at 22:04

## 2022-01-27 RX ADMIN — HYDROMORPHONE HYDROCHLORIDE 0.5 MILLIGRAM(S): 2 INJECTION INTRAMUSCULAR; INTRAVENOUS; SUBCUTANEOUS at 17:10

## 2022-01-27 RX ADMIN — ENOXAPARIN SODIUM 40 MILLIGRAM(S): 100 INJECTION SUBCUTANEOUS at 08:46

## 2022-01-27 RX ADMIN — Medication 650 MILLIGRAM(S): at 08:12

## 2022-01-27 RX ADMIN — Medication 650 MILLIGRAM(S): at 08:40

## 2022-01-27 RX ADMIN — Medication 1 MILLIGRAM(S): at 09:44

## 2022-01-27 RX ADMIN — OXYCODONE HYDROCHLORIDE 5 MILLIGRAM(S): 5 TABLET ORAL at 09:45

## 2022-01-27 RX ADMIN — OXYCODONE HYDROCHLORIDE 5 MILLIGRAM(S): 5 TABLET ORAL at 08:44

## 2022-01-27 RX ADMIN — MORPHINE SULFATE 2 MILLIGRAM(S): 50 CAPSULE, EXTENDED RELEASE ORAL at 04:09

## 2022-01-27 RX ADMIN — LOSARTAN POTASSIUM 100 MILLIGRAM(S): 100 TABLET, FILM COATED ORAL at 06:24

## 2022-01-27 RX ADMIN — Medication 2: at 12:46

## 2022-01-27 RX ADMIN — Medication 2: at 17:33

## 2022-01-27 RX ADMIN — OXYCODONE HYDROCHLORIDE 5 MILLIGRAM(S): 5 TABLET ORAL at 02:42

## 2022-01-27 RX ADMIN — SENNA PLUS 2 TABLET(S): 8.6 TABLET ORAL at 22:03

## 2022-01-27 NOTE — CONSULT NOTE ADULT - SUBJECTIVE AND OBJECTIVE BOX
HPI:  46F PMH cervical cancer (Stage 2B, s/p cisplatin and RT), HTN, HLD, T2DM p/w 3 weeks of worsening b/l hip/lower back pain and feet numbness. Pt states that b/l hip/lower back pain started about three weeks ago, sharp in nature, radiating down the lateral thighs, with mild numbness on dorsal sides of feet. Symptoms initially improved with Aleve, however noted blood tinged vaginal discharge and blood clots with urination not related to menstraul periods so stopped taking Aleve. Hip pain and feet numbness progressively worsened, to the point that she cannot walk or lie supine. Pain is mildly alleviated when lying prone, and better with activity. Pt presented to ER in [place] 2 weeks ago due to these symptoms, CT spine non-con showed multiple non-obstructing nephrolithiasis in L kidney but no fractures, deformities, subluxation were noted. She was discharged with gabapentin 300BID, Methocarbomol 500 TID, and Meloxicam 15mg q6, and states that the medications temporarily relieve symptoms.    Pt states that numbness is worse on L side, from dorsal foot to lateral mid-calf. Feels like it is "freezing" and must keep socks on. Also notes cramping intermittently in the toes. Pt also noticed that not all of her urine will come out, and must put pressure to completely relieve herself. Pt denies fever, chills, saddle anesthesia, loss of sensation on extremities, weight loss, urinary incontinence.    In the ED, /82, , RR 16, 100%RA, 98.7F. Given NS1L, gabapentin, oxycodone, lidocaine patch. (2022 00:32)    PAST MEDICAL & SURGICAL HISTORY:  Hypertension    Hypercholesteremia    Cervical ca    Obesity    Acid reflux    T2DM (type 2 diabetes mellitus)    No significant past surgical history      Allergies    No Known Allergies    Intolerances      acetaminophen     Tablet .. 650 milliGRAM(s) Oral every 6 hours PRN  amLODIPine   Tablet 10 milliGRAM(s) Oral daily  atorvastatin 20 milliGRAM(s) Oral at bedtime  dextrose 40% Gel 15 Gram(s) Oral once  dextrose 5%. 1000 milliLiter(s) IV Continuous <Continuous>  dextrose 5%. 1000 milliLiter(s) IV Continuous <Continuous>  dextrose 50% Injectable 25 Gram(s) IV Push once  dextrose 50% Injectable 12.5 Gram(s) IV Push once  dextrose 50% Injectable 25 Gram(s) IV Push once  enoxaparin Injectable 40 milliGRAM(s) SubCutaneous daily  gabapentin 300 milliGRAM(s) Oral two times a day  glucagon  Injectable 1 milliGRAM(s) IntraMuscular once  insulin lispro (ADMELOG) corrective regimen sliding scale   SubCutaneous three times a day before meals  insulin lispro (ADMELOG) corrective regimen sliding scale   SubCutaneous at bedtime  LORazepam     Tablet 0.5 milliGRAM(s) Oral every 12 hours PRN  losartan 100 milliGRAM(s) Oral daily  ondansetron Injectable 4 milliGRAM(s) IV Push every 8 hours PRN  oxyCODONE    IR 5 milliGRAM(s) Oral every 6 hours PRN  polyethylene glycol 3350 17 Gram(s) Oral daily    SOCIAL HISTORY:  FAMILY HISTORY:  FH: breast cancer      Vital Signs Last 24 Hrs  T(C): 36.8 (2022 08:32), Max: 37.1 (2022 17:18)  T(F): 98.2 (2022 08:32), Max: 98.7 (2022 17:18)  HR: 100 (2022 08:32) (90 - 103)  BP: 143/95 (2022 08:32) (143/95 - 148/92)  BP(mean): --  RR: 16 (2022 08:32) (16 - 17)  SpO2: 98% (2022 08:32) (98% - 100%)    PHYSICAL EXAM:      LABS:                          10.0   3.92  )-----------( 241      ( 2022 06:35 )             29.9         134<L>  |  100  |  12  ----------------------------<  211<H>  3.8   |  25  |  0.72    Ca    9.1      2022 06:35  Phos  4.2       Mg     1.90         TPro  7.1  /  Alb  3.6  /  TBili  0.2  /  DBili  x   /  AST  12  /  ALT  15  /  AlkPhos  117        Urinalysis Basic - ( 2022 04:12 )    Color: Colorless / Appearance: Slightly Turbid / S.004 / pH: x  Gluc: x / Ketone: Negative  / Bili: Negative / Urobili: <2 mg/dL   Blood: x / Protein: Negative / Nitrite: Negative   Leuk Esterase: Large / RBC: 0 /HPF / WBC 62 /HPF   Sq Epi: x / Non Sq Epi: 0 /HPF / Bacteria: Many        RADIOLOGY & ADDITIONAL STUDIES:         HPI:  46F PMH cervical cancer (Stage 2B, s/p cisplatin and RT), HTN, HLD, T2DM p/w 3 weeks of worsening b/l hip/lower back pain and feet numbness. Pt states that b/l hip/lower back pain started about three weeks ago, sharp in nature, radiating down the lateral thighs, with mild numbness on dorsal sides of feet. Symptoms initially improved with Aleve, however noted blood tinged vaginal discharge and blood clots with urination not related to menstraul periods so stopped taking Aleve. Hip pain and feet numbness progressively worsened, to the point that she cannot walk or lie supine. Pain is mildly alleviated when lying prone, and better with activity. Pt presented to ER in [place] 2 weeks ago due to these symptoms, CT spine non-con showed multiple non-obstructing nephrolithiasis in L kidney but no fractures, deformities, subluxation were noted. She was discharged with gabapentin 300BID, Methocarbomol 500 TID, and Meloxicam 15mg q6, and states that the medications temporarily relieve symptoms.    Pt states that numbness is worse on L side, from dorsal foot to lateral mid-calf. Feels like it is "freezing" and must keep socks on. Also notes cramping intermittently in the toes. Pt also noticed that not all of her urine will come out, and must put pressure to completely relieve herself. Pt denies fever, chills, saddle anesthesia, loss of sensation on extremities, weight loss, urinary incontinence.    In the ED, /82, , RR 16, 100%RA, 98.7F. Given NS1L, gabapentin, oxycodone, lidocaine patch. (2022 00:32)    PAST MEDICAL & SURGICAL HISTORY:  Hypertension    Hypercholesteremia    Cervical ca    Obesity    Acid reflux    T2DM (type 2 diabetes mellitus)    No significant past surgical history      Allergies    No Known Allergies    Intolerances      acetaminophen     Tablet .. 650 milliGRAM(s) Oral every 6 hours PRN  amLODIPine   Tablet 10 milliGRAM(s) Oral daily  atorvastatin 20 milliGRAM(s) Oral at bedtime  dextrose 40% Gel 15 Gram(s) Oral once  dextrose 5%. 1000 milliLiter(s) IV Continuous <Continuous>  dextrose 5%. 1000 milliLiter(s) IV Continuous <Continuous>  dextrose 50% Injectable 25 Gram(s) IV Push once  dextrose 50% Injectable 12.5 Gram(s) IV Push once  dextrose 50% Injectable 25 Gram(s) IV Push once  enoxaparin Injectable 40 milliGRAM(s) SubCutaneous daily  gabapentin 300 milliGRAM(s) Oral two times a day  glucagon  Injectable 1 milliGRAM(s) IntraMuscular once  insulin lispro (ADMELOG) corrective regimen sliding scale   SubCutaneous three times a day before meals  insulin lispro (ADMELOG) corrective regimen sliding scale   SubCutaneous at bedtime  LORazepam     Tablet 0.5 milliGRAM(s) Oral every 12 hours PRN  losartan 100 milliGRAM(s) Oral daily  ondansetron Injectable 4 milliGRAM(s) IV Push every 8 hours PRN  oxyCODONE    IR 5 milliGRAM(s) Oral every 6 hours PRN  polyethylene glycol 3350 17 Gram(s) Oral daily    SOCIAL HISTORY:  FAMILY HISTORY:  FH: breast cancer      Vital Signs Last 24 Hrs  T(C): 36.8 (2022 08:32), Max: 37.1 (2022 17:18)  T(F): 98.2 (2022 08:32), Max: 98.7 (2022 17:18)  HR: 100 (2022 08:32) (90 - 103)  BP: 143/95 (2022 08:32) (143/95 - 148/92)  BP(mean): --  RR: 16 (2022 08:32) (16 - 17)  SpO2: 98% (2022 08:32) (98% - 100%)    PHYSICAL EXAM:  AA&O x 3  PERRL, EOMI  [X] Upper extremity                      Delt       Bicep    Tricep                                                  R     5/5        5/5        5/5       5/5                                               L      5/5        5/5        5/5       5/5  [X] Lower extremity                       HF          KE          KF        DF         PF                                               R    4/5        3/5        3/5       4/5       4/5                                               L    4/5        3/5       3/5       4/5        4/5  Decreased sensation to BL Feet      LABS:                          10.0   3.92  )-----------( 241      ( 2022 06:35 )             29.9     01-27    134<L>  |  100  |  12  ----------------------------<  211<H>  3.8   |  25  |  0.72    Ca    9.1      2022 06:35  Phos  4.2       Mg     1.90         TPro  7.1  /  Alb  3.6  /  TBili  0.2  /  DBili  x   /  AST  12  /  ALT  15  /  AlkPhos  117        Urinalysis Basic - ( 2022 04:12 )    Color: Colorless / Appearance: Slightly Turbid / S.004 / pH: x  Gluc: x / Ketone: Negative  / Bili: Negative / Urobili: <2 mg/dL   Blood: x / Protein: Negative / Nitrite: Negative   Leuk Esterase: Large / RBC: 0 /HPF / WBC 62 /HPF   Sq Epi: x / Non Sq Epi: 0 /HPF / Bacteria: Many        RADIOLOGY & ADDITIONAL STUDIES:

## 2022-01-27 NOTE — PHYSICAL THERAPY INITIAL EVALUATION ADULT - IMPAIRMENTS CONTRIBUTING TO GAIT DEVIATIONS, PT EVAL
impaired balance/impaired coordination/impaired motor control/pain/impaired postural control/decreased sensation/decreased strength

## 2022-01-27 NOTE — H&P ADULT - PROBLEM SELECTOR PLAN 5
C/w home losartan 100mg daily and amlodipine 10mg daily Hg 10 at admission, baseline 9-11. Most likely anemia of chronic disease  -F/u TIBC, iron, vitamin b12 and folate

## 2022-01-27 NOTE — PHYSICAL THERAPY INITIAL EVALUATION ADULT - LEVEL OF INDEPENDENCE: GAIT, REHAB EVAL
20ft with bilateral hand held assist requiring minimal assistance; 10ft with contact guard assistance without assistive device or support/contact guard/minimum assist (75% patients effort)

## 2022-01-27 NOTE — H&P ADULT - ASSESSMENT
46F PMH cervical cancer (Stage 2B, s/p cisplatin and RT), HTN, HLD, T2DM p/w 3 weeks of worsening b/l hip pain and feet numbness. 46F PMH cervical cancer (Stage 2B, s/p cisplatin and RT), HTN, HLD, T2DM p/w 3 weeks of worsening b/l hip/lower back pain and neuropathy.

## 2022-01-27 NOTE — H&P ADULT - NSHPREVIEWOFSYSTEMS_GEN_ALL_CORE
REVIEW OF SYSTEMS:    CONSTITUTIONAL: +weakness, no fevers or chills  EYES/ENT: No visual changes;  No vertigo or throat pain   NECK: No pain or stiffness  RESPIRATORY: No cough, wheezing, hemoptysis; No shortness of breath  CARDIOVASCULAR: No chest pain or palpitations  GASTROINTESTINAL: No abdominal or epigastric pain. +nausea, no vomiting, or hematemesis; No diarrhea or constipation. No melena or hematochezia.  GENITOURINARY: No dysuria, frequency or hematuria, +vaginal discharge  NEUROLOGICAL: +numbness in b/l feet  SKIN: No itching, rashes REVIEW OF SYSTEMS:    CONSTITUTIONAL: +weakness, no fevers or chills  EYES/ENT: No visual changes;  No vertigo or throat pain   NECK: No pain or stiffness  RESPIRATORY: No cough, wheezing, hemoptysis; No shortness of breath  CARDIOVASCULAR: No chest pain or palpitations  GASTROINTESTINAL: No abdominal or epigastric pain. +nausea, no vomiting, or hematemesis; No diarrhea or constipation. No melena or hematochezia.  GENITOURINARY: No dysuria, frequency or hematuria, +vaginal discharge, +urinary retention  NEUROLOGICAL: +numbness in b/l feet  SKIN: No itching, rashes

## 2022-01-27 NOTE — PHYSICAL THERAPY INITIAL EVALUATION ADULT - IMPAIRED TRANSFERS: SIT/STAND, REHAB EVAL
impaired balance/impaired coordination/impaired motor control/impaired postural control/decreased sensation/decreased strength

## 2022-01-27 NOTE — PHYSICAL THERAPY INITIAL EVALUATION ADULT - GAIT DEVIATIONS NOTED, PT EVAL
Poor neuromuscular control of quadriceps/decreased francis/decreased velocity of limb motion/decreased step length/decreased stride length/decreased weight-shifting ability

## 2022-01-27 NOTE — PROGRESS NOTE ADULT - PROBLEM SELECTOR PLAN 5
Hg 10 at admission, baseline 9-11. Most likely anemia of chronic disease  -F/u TIBC, iron, vitamin b12 and folate Hg 10 at admission, baseline 9-11. Iron studies suggest iron deficiency anemia.  - Monitor for bleeding given cervical CA and recent vaginal bleed.  - Can start iron supplementation if no constipation with opioids

## 2022-01-27 NOTE — PHYSICAL THERAPY INITIAL EVALUATION ADULT - ADDITIONAL COMMENTS
Pt lives in a single story home with her family; there are ~4 steps to enter. Pt reports she was independent with mobility, self-care, and ADLs; no assistive devices utilized.

## 2022-01-27 NOTE — PROGRESS NOTE ADULT - PROBLEM SELECTOR PLAN 3
Diagnosed 06/2021, cervical adenocarcinoma Stage 2B, s/p cisplatin and RT 11/2021. Follows at [facility], Rad Onc Dr. Maria Luisa Germain, Med Onc Dr. CHU Dinh  -Pt noted bloody vaginal discharge and some clots 2 weeks ago, currently does not have bloody discharge  -Possibly from cancer vs other abnormal uterine bleed (MR pelvis 10/2021 shows L hydrosalpinx and hemorrhagic cysts w/ small endometriosis, R cyst resolved)  -Outpatient follow up for cancer  -F/u UA, gonorrhea, chlamydia Diagnosed 06/2021, cervical adenocarcinoma Stage 2B, s/p cisplatin and RT 11/2021. Follows at [facility], Rad Onc Dr. Maria Luisa Germain, Med Onc Dr. CHU Dinh  -Pt noted bloody vaginal discharge and some clots 2 weeks ago, currently does not have bloody discharge  -Possibly from cancer vs other abnormal uterine bleed (MR pelvis 10/2021 shows L hydrosalpinx and hemorrhagic cysts w/ small endometriosis, R cyst resolved)  -Outpatient follow up for cancer  -F/u UA

## 2022-01-27 NOTE — H&P ADULT - PROBLEM SELECTOR PLAN 4
On metformin 500mg BID at home.  -Low ISS, monitor FSG  -F/u A1c Na 129 at admission, most likely hypovolemic hyponatremia as patient stated she was nauseous with poor po intake. S/p 1LNS in ED  -F/u AM BMP  -IVF if downtrending, and urine studies

## 2022-01-27 NOTE — PROGRESS NOTE ADULT - ATTENDING COMMENTS
Patient seen and examined with team  Agree with above A/P  46F PMH cervical cancer (Stage 2B, s/p cisplatin and RT), HTN, HLD, T2DM p/w 3 weeks of worsening b/l hip/lower back pain and neuropathy concerning for possible cord compression, in need of MRI under sedation due to severe claustrophobia.   patient notes low back pain 9/10 and uncomfortable to lay or sit- she stands but has poor gait- Rn noted almost fell, bed rest, not able to do SLR w/o pain  PE  Vital Signs Last 24 Hrs T(F): 98, HR: 99, BP: 142/92, RR: 16, SpO2: 98% RA  lungs ct, cor rrr, abd soft n/t, ext neg e/c/c. pain with SLR  A/P  #r/o cord compression - patient needs anesthesia with MRI- attempted overnight but patient too claustrophobic- Anesthesia wants neurosurg input and neurosurgery rec Ct of L spine first. NPO after MN for MRI with Anest pos 1/28. CT of L spine today  #pain control tylenols prn mild and Dilaudid 0.25 mg q4 prn mod and severe pain, with senna and Miralax bid for bowel regimen  # DVT proph Lovenox sq  Plan d/w patient and team and rn

## 2022-01-27 NOTE — H&P ADULT - PROBLEM SELECTOR PLAN 3
Diagnosed 06/2021, cervical adenocarcinoma Stage 2B, s/p cisplatin and RT 11/2021. Follows at [facility], Rad Onc Dr. Maria Luisa Germain, Med Onc Dr. CHU Dinh  -Pt noted bloody vaginal discharge and some clots 2 weeks ago, currently does not have bloody discharge  -Possibly from cancer vs other abnormal uterine bleed  -Outpatient follow up for cancer  -F/u UA, gonorrhea, chlamydia Diagnosed 06/2021, cervical adenocarcinoma Stage 2B, s/p cisplatin and RT 11/2021. Follows at [facility], Rad Onc Dr. Maria Luisa Germain, Med Onc Dr. CHU Dinh  -Pt noted bloody vaginal discharge and some clots 2 weeks ago, currently does not have bloody discharge  -Possibly from cancer vs other abnormal uterine bleed (MR pelvis 10/2021 shows L hydrosalpinx and hemorrhagic cysts w/ small endometriosis, R cyst resolved)  -Outpatient follow up for cancer  -F/u UA, gonorrhea, chlamydia

## 2022-01-27 NOTE — H&P ADULT - NSHPLABSRESULTS_GEN_ALL_CORE
10.0   4.59  )-----------( 257      ( 26 Jan 2022 19:30 )             30.4     01-26    129<L>  |  92<L>  |  12  ----------------------------<  224<H>  4.6   |  25  |  0.67    Ca    9.8      26 Jan 2022 19:30    TPro  7.7  /  Alb  4.2  /  TBili  0.3  /  DBili  x   /  AST  10  /  ALT  16  /  AlkPhos  131<H>  01-26    < from: Xray Lumbosacral Spine + Obliques (01.26.22 @ 21:38) >    FINDINGS:  The vertebral body heights are maintained. No acute fracture or   subluxation is identified. Slight straightening of lumbar lordosis. There   are no acute malalignments of the vertebral bodies. No radiographic   evidence of aggressive osseous lesion. The visualized soft tissues are   unremarkable.    IMPRESSION:    No evidence of acute compression deformity or traumatic malalignment of   the thoracic and lumbar spine.    No radiographic evidence of aggressive osseous lesion.    < end of copied text >

## 2022-01-27 NOTE — PHYSICAL THERAPY INITIAL EVALUATION ADULT - PATIENT PROFILE REVIEW, REHAB EVAL
PT initial evaluation received and chart review completed. Pt agreeable to participate in PT evaluation/yes

## 2022-01-27 NOTE — PROGRESS NOTE ADULT - PROBLEM SELECTOR PLAN 6
On metformin 500mg BID at home.  -Low ISS, monitor FSG  -F/u A1c On metformin 500mg BID at home. A1c 7.0  -Low ISS, monitor FSG

## 2022-01-27 NOTE — PROGRESS NOTE ADULT - PROBLEM SELECTOR PLAN 1
P/w 3 weeks of worsening b/l hip/lower back pain radiating down lateral thighs. CT non con from OSH negative for fractures, dislocation, subluxation, mets. X ray of lumbosacral spine also neg. DDx herniated disc vs spinal stenosis vs metastasis vs infection. Given presentation, cord compression not as likely as patient w/ intact sensation, no incontinence, able to walk with pos rectal tone  -MRI lumbar with and without contrast to r/o cord compression, further evaluate for herniated disk, spinal stenosis, metastasis  -Tylenol for mild pain, oxycodone 5 q6 moderate pain  -F/u bladder scan  -c/w lidocaine patch P/w 3 weeks of worsening b/l hip/lower back pain radiating down lateral thighs. CT non con from OSH negative for fractures, dislocation, subluxation, mets. X ray of lumbosacral spine also neg. DDx herniated disc vs spinal stenosis vs metastasis vs infection. Cord compression must be ruled out given weakness, urine retention, and difficulty ambulating.  -MRI lumbar with and without contrast to r/o cord compression, further evaluate for herniated disk, spinal stenosis, metastasis  -Tylenol for mild pain, IV Dilaudid 0.25mg for moderate or severe pain, increase dose as needed  -F/u bladder scan  -c/w lidocaine patch  - Bowel regimen

## 2022-01-27 NOTE — PROGRESS NOTE ADULT - PROBLEM SELECTOR PLAN 4
Na 129 at admission, most likely hypovolemic hyponatremia as patient stated she was nauseous with poor po intake. S/p 1LNS in ED  -F/u AM BMP  -IVF if downtrending, and urine studies Na 129 at admission, most likely hypovolemic hyponatremia as patient stated she was nauseous with poor po intake. S/p 1LNS in ED. Now resolved.  -F/u BMPs

## 2022-01-27 NOTE — PHYSICAL THERAPY INITIAL EVALUATION ADULT - DISCHARGE DISPOSITION, PT EVAL
Anticipate discharge home with outpatient PT services to address impairments in strength and balance. Recommend utilization of rolling walker.

## 2022-01-27 NOTE — H&P ADULT - ATTENDING COMMENTS
Agree with resident's a/p  #Low back pain reproducible w/ palpation w/ radiation to b/l posterior thighs  Possible herniated disc, rectal tone wnl, and patient has some urinary retention but still able to go  Bladder scan, less concern for cord compression currently, but will obtain MR lumbar w/ w/o iv contrast to r/o herniation vs malignant lesion vs stenosis   Xray spine without signs of acute fracture  PT evaluation and pain regimen w/ tylenol for mild pain and oxycodone 5 mg IR for moderate pain

## 2022-01-27 NOTE — PHYSICAL THERAPY INITIAL EVALUATION ADULT - PERTINENT HX OF CURRENT PROBLEM, REHAB EVAL
Bilateral hip/lower back pain started about 3weeks ago, sharp in nature, radiating down the lateral thighs, with mild numbness on dorsal sides of feet

## 2022-01-27 NOTE — PROGRESS NOTE ADULT - ASSESSMENT
46F PMH cervical cancer (Stage 2B, s/p cisplatin and RT), HTN, HLD, T2DM p/w 3 weeks of worsening b/l hip/lower back pain and neuropathy. 46F PMH cervical cancer (Stage 2B, s/p cisplatin and RT), HTN, HLD, T2DM p/w 3 weeks of worsening b/l hip/lower back pain and neuropathy concerning for possible cord compression, in need of MRI under sedation due to severe claustrophobia.

## 2022-01-27 NOTE — PHYSICAL THERAPY INITIAL EVALUATION ADULT - BALANCE DISTURBANCE, IDENTIFIED IMPAIRMENT CONTRIBUTE, REHAB EVAL
impaired coordination/impaired motor control/pain/impaired postural control/decreased sensation/decreased strength

## 2022-01-27 NOTE — CONSULT NOTE ADULT - PROBLEM SELECTOR RECOMMENDATION 9
1. No acute neurosurgical intervention at this time.  2. MRI T/L spine with/without contrast  Case to be d/w attending

## 2022-01-27 NOTE — PHYSICAL THERAPY INITIAL EVALUATION ADULT - PLANNED THERAPY INTERVENTIONS, PT EVAL
stair training/balance training/gait training/lumbar stabilization/motor coordination training/neuromuscular re-education/postural re-education/strengthening/transfer training

## 2022-01-27 NOTE — H&P ADULT - PROBLEM SELECTOR PLAN 7
DVT prophylaxis: Lovenox   Diet: DASH/carb consistent  Dispo: Pending PT and clinical outcome  PCP: Dr. Remigio Rueda C/w home losartan 100mg daily and amlodipine 10mg daily

## 2022-01-27 NOTE — H&P ADULT - PROBLEM SELECTOR PLAN 9
DVT prophylaxis: Lovenox   Diet: DASH/carb consistent  Bowel regimen: miralax  Dispo: Pending PT and clinical outcome  PCP: Dr. Remigio Rueda

## 2022-01-27 NOTE — PHYSICAL THERAPY INITIAL EVALUATION ADULT - GROSSLY INTACT, SENSORY
Bilateral UE intact; Light touch impaired in bilateral dorsal and plantar aspects of the feet.; intact sensation per patient reports proximal to bilateral ankle joints

## 2022-01-27 NOTE — H&P ADULT - HISTORY OF PRESENT ILLNESS
46F PMH cervical cancer 46F PMH cervical cancer (Stage 2B, s/p cisplatin and RT), HTN, HLD, T2DM p/w 3 weeks of worsening b/l hip pain and feet numbness. Pt states that b/l hip pains started about three weeks ago, sharp in nature, radiating down the lateral thighs, with mild numbness on dorsal sides of feet. Symptoms initially improved with Aleve, however noted blood tinged vaginal discharge and blood clots with urination not related to menstraul periods so stopped taking Aleve. Hip pain and feet numbness progressively worsened, to the point that she cannot walk or lie supine. Pain is mildly alleviated when lying prone, and better with activity. Pt presented to ER in [place] 2 weeks ago due to these symptoms, CT spine showed multiple non-obstructing nephrolithiasis in L kidney but no fractures, deformities, subluxation were noted. She was discharged with gabapentin 300BID, Methocarbomol 500 TID, and Meloxicam 15mg q6, and states that the medications temporarily relieve symptoms.    Pt states that numbness is worse on L side, from dorsal foot to lateral mid-calf. Feels like it is "freezing" and must keep socks on. Also notes cramping intermittently in the toes. Pt also noticed that not all of her urine will come out, and must put pressure to completely relieve herself. Pt denies fever, chills, saddle anesthesia, loss of sensation on extremities, weight loss, urinary incontinence.    In the ED, /82, , RR 16, 100%RA, 98.7F. Given NS1L, gabapentin, oxycodone, lidocaine patch. 46F PMH cervical cancer (Stage 2B, s/p cisplatin and RT), HTN, HLD, T2DM p/w 3 weeks of worsening b/l hip/lower back pain and feet numbness. Pt states that b/l hip/lower back pain started about three weeks ago, sharp in nature, radiating down the lateral thighs, with mild numbness on dorsal sides of feet. Symptoms initially improved with Aleve, however noted blood tinged vaginal discharge and blood clots with urination not related to menstraul periods so stopped taking Aleve. Hip pain and feet numbness progressively worsened, to the point that she cannot walk or lie supine. Pain is mildly alleviated when lying prone, and better with activity. Pt presented to ER in [place] 2 weeks ago due to these symptoms, CT spine non-con showed multiple non-obstructing nephrolithiasis in L kidney but no fractures, deformities, subluxation were noted. She was discharged with gabapentin 300BID, Methocarbomol 500 TID, and Meloxicam 15mg q6, and states that the medications temporarily relieve symptoms.    Pt states that numbness is worse on L side, from dorsal foot to lateral mid-calf. Feels like it is "freezing" and must keep socks on. Also notes cramping intermittently in the toes. Pt also noticed that not all of her urine will come out, and must put pressure to completely relieve herself. Pt denies fever, chills, saddle anesthesia, loss of sensation on extremities, weight loss, urinary incontinence.    In the ED, /82, , RR 16, 100%RA, 98.7F. Given NS1L, gabapentin, oxycodone, lidocaine patch.

## 2022-01-27 NOTE — H&P ADULT - NSHPPHYSICALEXAM_GEN_ALL_CORE
PHYSICAL EXAM:  GENERAL: In distress, well-groomed, well-developed  HEAD:  Atraumatic, Normocephalic  EYES: EOMI, PERRLA, conjunctiva and sclera clear  ENMT: No tonsillar erythema, exudates, or enlargement; Moist mucous membranes  NECK: Supple, No JVD, Normal thyroid  HEART: Regular rate and rhythm; No murmurs, rubs, or gallops  RESPIRATORY: CTA B/L, No W/R/R  ABDOMEN: Soft, Nontender, Nondistended; Bowel sounds present  NEUROLOGY: A&Ox3, nonfocal, moving all extremities  EXTREMITIES:  2+ Peripheral Pulses, No clubbing, cyanosis, or edema. Not TTP along spine, TTP on b/l hips, neg straight leg test, feet cool to sensation, allodynia on feet  SKIN: warm, dry, normal color, no rash or abnormal lesions

## 2022-01-27 NOTE — H&P ADULT - PROBLEM SELECTOR PLAN 2
P/w 3 weeks of worsening b/l feet numbness on dorsal side, with L worse than R, and reaching up to L mid calf. 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 DDx diabetes neuropathy  -Gabapentin 300mg BID, consider increasing to TID  -Consider duloxetine  -PT eval P/w 3 weeks of worsening b/l feet numbness on dorsal side, with L worse than R, and reaching up to L mid calf. DDx radicular pain vs chemotherapy induced peripheral neuropathy given recent completion of cisplatin therapy vs diabetes neuropathy  -Gabapentin 300mg BID, consider increasing to TID  -F/u B12 and folate levels  -Consider duloxetine  -PT eval

## 2022-01-27 NOTE — PATIENT PROFILE ADULT - FALL HARM RISK - HARM RISK INTERVENTIONS
Assistance with ambulation/Assistance OOB with selected safe patient handling equipment/Communicate Risk of Fall with Harm to all staff/Discuss with provider need for PT consult/Monitor gait and stability/Reinforce activity limits and safety measures with patient and family/Tailored Fall Risk Interventions/Visual Cue: Yellow wristband and red socks/Bed in lowest position, wheels locked, appropriate side rails in place/Call bell, personal items and telephone in reach/Instruct patient to call for assistance before getting out of bed or chair/Non-slip footwear when patient is out of bed/[place] to call system/Physically safe environment - no spills, clutter or unnecessary equipment/Purposeful Proactive Rounding/Room/bathroom lighting operational, light cord in reach

## 2022-01-27 NOTE — PROGRESS NOTE ADULT - PROBLEM SELECTOR PLAN 2
P/w 3 weeks of worsening b/l feet numbness on dorsal side, with L worse than R, and reaching up to L mid calf. DDx radicular pain vs chemotherapy induced peripheral neuropathy given recent completion of cisplatin therapy vs diabetes neuropathy  -Gabapentin 300mg BID, consider increasing to TID  -F/u B12 and folate levels  -Consider duloxetine  -PT eval P/w 3 weeks of worsening b/l feet numbness on dorsal side, with L worse than R, and reaching up to L mid calf. DDx radicular pain vs chemotherapy induced peripheral neuropathy given recent completion of cisplatin therapy vs diabetes neuropathy  -Gabapentin 300mg BID, can increase to TID if needed  -F/u B12 and folate levels  -PT eval

## 2022-01-27 NOTE — PROGRESS NOTE ADULT - PROBLEM SELECTOR PLAN 9
DVT prophylaxis: Lovenox   Diet: DASH/carb consistent  Bowel regimen: miralax  Dispo: Pending PT and clinical outcome  PCP: Dr. Remigio uReda DVT prophylaxis: Lovenox   Diet: DASH/carb consistent  Bowel regimen: miralax BID, Senna  Dispo: Pending PT and clinical outcome  PCP: Dr. Remigio Rueda

## 2022-01-27 NOTE — H&P ADULT - NSICDXPASTMEDICALHX_GEN_ALL_CORE_FT
PAST MEDICAL HISTORY:  Acid reflux     Cervical ca     Hypercholesteremia     Hypertension     Obesity     T2DM (type 2 diabetes mellitus)

## 2022-01-27 NOTE — H&P ADULT - PROBLEM SELECTOR PLAN 1
P/w 3 weeks of worsening b/l hip pain radiating down lateral thighs, unsteady gait, no urinary incontinence but unable to completely relieve without putting external pressure, no saddle anesthesia, no f/c/leukocytosis. CT from OSH negative for fractures, dislocation, subluxation, mets. DDx herniated disc vs spinal stenosis vs metastasis vs infection  -MRI r/o cord compression, further evaluate for herniated rish, spinal stenosis, metastasis  -Tylenol for mild pain, oxycodone 5 q6 moderate pain  -c/w lidocaine patch  -Flexeril PRN? P/w 3 weeks of worsening b/l hip pain radiating down lateral thighs, unsteady gait, no urinary incontinence but has urinary retention, no saddle anesthesia, no f/c/leukocytosis. CT non con from OSH negative for fractures, dislocation, subluxation, mets. DDx cord compression vs herniated disc vs spinal stenosis vs metastasis vs infection  -MRI r/o cord compression, further evaluate for herniated rish, spinal stenosis, metastasis  -Neurosurg/ortho consult for cord compression eval  -Tylenol for mild pain, oxycodone 5 q6 moderate pain  -c/w lidocaine patch P/w 3 weeks of worsening b/l hip/lower back pain radiating down lateral thighs. CT non con from OSH negative for fractures, dislocation, subluxation, mets. DDx herniated disc vs spinal stenosis vs metastasis vs infection. Given presentation, cord compression not as likely as patient w/ intact sensation, no incontinence, able to walk with pos rectal tone  -MRI lumbar with and without contrast to r/o cord compression, further evaluate for herniated disk, spinal stenosis, metastasis  -Tylenol for mild pain, oxycodone 5 q6 moderate pain  -c/w lidocaine patch P/w 3 weeks of worsening b/l hip/lower back pain radiating down lateral thighs. CT non con from OSH negative for fractures, dislocation, subluxation, mets. X ray of lumbosacral spine also neg. DDx herniated disc vs spinal stenosis vs metastasis vs infection. Given presentation, cord compression not as likely as patient w/ intact sensation, no incontinence, able to walk with pos rectal tone  -MRI lumbar with and without contrast to r/o cord compression, further evaluate for herniated disk, spinal stenosis, metastasis  -Tylenol for mild pain, oxycodone 5 q6 moderate pain  -c/w lidocaine patch P/w 3 weeks of worsening b/l hip/lower back pain radiating down lateral thighs. CT non con from OSH negative for fractures, dislocation, subluxation, mets. X ray of lumbosacral spine also neg. DDx herniated disc vs spinal stenosis vs metastasis vs infection. Given presentation, cord compression not as likely as patient w/ intact sensation, no incontinence, able to walk with pos rectal tone  -MRI lumbar with and without contrast to r/o cord compression, further evaluate for herniated disk, spinal stenosis, metastasis  -Tylenol for mild pain, oxycodone 5 q6 moderate pain  -F/u bladder scan  -c/w lidocaine patch

## 2022-01-28 LAB
ANION GAP SERPL CALC-SCNC: 11 MMOL/L — SIGNIFICANT CHANGE UP (ref 7–14)
BUN SERPL-MCNC: 12 MG/DL — SIGNIFICANT CHANGE UP (ref 7–23)
C TRACH RRNA SPEC QL NAA+PROBE: SIGNIFICANT CHANGE UP
CALCIUM SERPL-MCNC: 9.6 MG/DL — SIGNIFICANT CHANGE UP (ref 8.4–10.5)
CHLORIDE SERPL-SCNC: 96 MMOL/L — LOW (ref 98–107)
CO2 SERPL-SCNC: 27 MMOL/L — SIGNIFICANT CHANGE UP (ref 22–31)
CREAT SERPL-MCNC: 0.68 MG/DL — SIGNIFICANT CHANGE UP (ref 0.5–1.3)
GLUCOSE BLDC GLUCOMTR-MCNC: 147 MG/DL — HIGH (ref 70–99)
GLUCOSE BLDC GLUCOMTR-MCNC: 173 MG/DL — HIGH (ref 70–99)
GLUCOSE BLDC GLUCOMTR-MCNC: 205 MG/DL — HIGH (ref 70–99)
GLUCOSE BLDC GLUCOMTR-MCNC: 208 MG/DL — HIGH (ref 70–99)
GLUCOSE BLDC GLUCOMTR-MCNC: 280 MG/DL — HIGH (ref 70–99)
GLUCOSE SERPL-MCNC: 210 MG/DL — HIGH (ref 70–99)
HCT VFR BLD CALC: 30.5 % — LOW (ref 34.5–45)
HGB BLD-MCNC: 10 G/DL — LOW (ref 11.5–15.5)
MAGNESIUM SERPL-MCNC: 1.9 MG/DL — SIGNIFICANT CHANGE UP (ref 1.6–2.6)
MCHC RBC-ENTMCNC: 28.1 PG — SIGNIFICANT CHANGE UP (ref 27–34)
MCHC RBC-ENTMCNC: 32.8 GM/DL — SIGNIFICANT CHANGE UP (ref 32–36)
MCV RBC AUTO: 85.7 FL — SIGNIFICANT CHANGE UP (ref 80–100)
N GONORRHOEA RRNA SPEC QL NAA+PROBE: SIGNIFICANT CHANGE UP
NRBC # BLD: 0 /100 WBCS — SIGNIFICANT CHANGE UP
NRBC # FLD: 0 K/UL — SIGNIFICANT CHANGE UP
PHOSPHATE SERPL-MCNC: 4.1 MG/DL — SIGNIFICANT CHANGE UP (ref 2.5–4.5)
PLATELET # BLD AUTO: 249 K/UL — SIGNIFICANT CHANGE UP (ref 150–400)
POTASSIUM SERPL-MCNC: 3.9 MMOL/L — SIGNIFICANT CHANGE UP (ref 3.5–5.3)
POTASSIUM SERPL-SCNC: 3.9 MMOL/L — SIGNIFICANT CHANGE UP (ref 3.5–5.3)
RBC # BLD: 3.56 M/UL — LOW (ref 3.8–5.2)
RBC # FLD: 13.3 % — SIGNIFICANT CHANGE UP (ref 10.3–14.5)
SODIUM SERPL-SCNC: 134 MMOL/L — LOW (ref 135–145)
VIT B12 SERPL-MCNC: 234 PG/ML — SIGNIFICANT CHANGE UP (ref 200–900)
WBC # BLD: 4.09 K/UL — SIGNIFICANT CHANGE UP (ref 3.8–10.5)
WBC # FLD AUTO: 4.09 K/UL — SIGNIFICANT CHANGE UP (ref 3.8–10.5)

## 2022-01-28 PROCEDURE — 99497 ADVNCD CARE PLAN 30 MIN: CPT

## 2022-01-28 PROCEDURE — 99233 SBSQ HOSP IP/OBS HIGH 50: CPT | Mod: GC

## 2022-01-28 PROCEDURE — 72157 MRI CHEST SPINE W/O & W/DYE: CPT | Mod: 26

## 2022-01-28 PROCEDURE — 72158 MRI LUMBAR SPINE W/O & W/DYE: CPT | Mod: 26

## 2022-01-28 RX ORDER — CEFTRIAXONE 500 MG/1
1000 INJECTION, POWDER, FOR SOLUTION INTRAMUSCULAR; INTRAVENOUS EVERY 24 HOURS
Refills: 0 | Status: COMPLETED | OUTPATIENT
Start: 2022-01-28 | End: 2022-01-31

## 2022-01-28 RX ORDER — BACLOFEN 100 %
5 POWDER (GRAM) MISCELLANEOUS THREE TIMES A DAY
Refills: 0 | Status: DISCONTINUED | OUTPATIENT
Start: 2022-01-28 | End: 2022-01-29

## 2022-01-28 RX ORDER — MORPHINE SULFATE 50 MG/1
2 CAPSULE, EXTENDED RELEASE ORAL EVERY 4 HOURS
Refills: 0 | Status: DISCONTINUED | OUTPATIENT
Start: 2022-01-28 | End: 2022-01-30

## 2022-01-28 RX ORDER — MORPHINE SULFATE 50 MG/1
4 CAPSULE, EXTENDED RELEASE ORAL EVERY 4 HOURS
Refills: 0 | Status: DISCONTINUED | OUTPATIENT
Start: 2022-01-28 | End: 2022-01-28

## 2022-01-28 RX ORDER — GABAPENTIN 400 MG/1
300 CAPSULE ORAL THREE TIMES A DAY
Refills: 0 | Status: DISCONTINUED | OUTPATIENT
Start: 2022-01-28 | End: 2022-01-30

## 2022-01-28 RX ORDER — ENOXAPARIN SODIUM 100 MG/ML
40 INJECTION SUBCUTANEOUS AT BEDTIME
Refills: 0 | Status: DISCONTINUED | OUTPATIENT
Start: 2022-01-28 | End: 2022-02-01

## 2022-01-28 RX ADMIN — MORPHINE SULFATE 2 MILLIGRAM(S): 50 CAPSULE, EXTENDED RELEASE ORAL at 09:09

## 2022-01-28 RX ADMIN — AMLODIPINE BESYLATE 10 MILLIGRAM(S): 2.5 TABLET ORAL at 05:10

## 2022-01-28 RX ADMIN — Medication 5 MILLIGRAM(S): at 14:22

## 2022-01-28 RX ADMIN — MORPHINE SULFATE 2 MILLIGRAM(S): 50 CAPSULE, EXTENDED RELEASE ORAL at 17:46

## 2022-01-28 RX ADMIN — GABAPENTIN 300 MILLIGRAM(S): 400 CAPSULE ORAL at 22:02

## 2022-01-28 RX ADMIN — MORPHINE SULFATE 2 MILLIGRAM(S): 50 CAPSULE, EXTENDED RELEASE ORAL at 12:43

## 2022-01-28 RX ADMIN — MORPHINE SULFATE 2 MILLIGRAM(S): 50 CAPSULE, EXTENDED RELEASE ORAL at 21:55

## 2022-01-28 RX ADMIN — Medication 1: at 22:41

## 2022-01-28 RX ADMIN — GABAPENTIN 300 MILLIGRAM(S): 400 CAPSULE ORAL at 17:46

## 2022-01-28 RX ADMIN — Medication 5 MILLIGRAM(S): at 18:03

## 2022-01-28 RX ADMIN — ATORVASTATIN CALCIUM 20 MILLIGRAM(S): 80 TABLET, FILM COATED ORAL at 22:03

## 2022-01-28 RX ADMIN — SENNA PLUS 2 TABLET(S): 8.6 TABLET ORAL at 22:03

## 2022-01-28 RX ADMIN — HYDROMORPHONE HYDROCHLORIDE 0.5 MILLIGRAM(S): 2 INJECTION INTRAMUSCULAR; INTRAVENOUS; SUBCUTANEOUS at 02:26

## 2022-01-28 RX ADMIN — HYDROMORPHONE HYDROCHLORIDE 0.5 MILLIGRAM(S): 2 INJECTION INTRAMUSCULAR; INTRAVENOUS; SUBCUTANEOUS at 05:10

## 2022-01-28 RX ADMIN — LOSARTAN POTASSIUM 100 MILLIGRAM(S): 100 TABLET, FILM COATED ORAL at 05:10

## 2022-01-28 RX ADMIN — Medication 2: at 09:10

## 2022-01-28 RX ADMIN — MORPHINE SULFATE 2 MILLIGRAM(S): 50 CAPSULE, EXTENDED RELEASE ORAL at 13:00

## 2022-01-28 RX ADMIN — GABAPENTIN 300 MILLIGRAM(S): 400 CAPSULE ORAL at 05:10

## 2022-01-28 RX ADMIN — Medication 1: at 12:54

## 2022-01-28 RX ADMIN — HYDROMORPHONE HYDROCHLORIDE 0.5 MILLIGRAM(S): 2 INJECTION INTRAMUSCULAR; INTRAVENOUS; SUBCUTANEOUS at 01:56

## 2022-01-28 RX ADMIN — MORPHINE SULFATE 2 MILLIGRAM(S): 50 CAPSULE, EXTENDED RELEASE ORAL at 22:30

## 2022-01-28 RX ADMIN — MORPHINE SULFATE 2 MILLIGRAM(S): 50 CAPSULE, EXTENDED RELEASE ORAL at 18:00

## 2022-01-28 RX ADMIN — Medication 5 MILLIGRAM(S): at 22:00

## 2022-01-28 RX ADMIN — MORPHINE SULFATE 2 MILLIGRAM(S): 50 CAPSULE, EXTENDED RELEASE ORAL at 08:23

## 2022-01-28 RX ADMIN — HYDROMORPHONE HYDROCHLORIDE 0.5 MILLIGRAM(S): 2 INJECTION INTRAMUSCULAR; INTRAVENOUS; SUBCUTANEOUS at 05:40

## 2022-01-28 RX ADMIN — ENOXAPARIN SODIUM 40 MILLIGRAM(S): 100 INJECTION SUBCUTANEOUS at 22:09

## 2022-01-28 NOTE — PROGRESS NOTE ADULT - PROBLEM SELECTOR PLAN 1
P/w 3 weeks of worsening b/l hip/lower back pain radiating down lateral thighs. CT non con from OSH negative for fractures, dislocation, subluxation, mets. X ray of lumbosacral spine also neg. DDx herniated disc vs spinal stenosis vs metastasis vs infection. Cord compression must be ruled out given weakness, urine retention, and difficulty ambulating.  -MRI lumbar with and without contrast to r/o cord compression, further evaluate for herniated disk, spinal stenosis, metastasis  -Tylenol for mild pain, IV Dilaudid 0.25mg for moderate or severe pain, increase dose as needed  -F/u bladder scan  -c/w lidocaine patch  - Bowel regimen P/w 3 weeks of worsening b/l hip/lower back pain radiating down lateral thighs. CT non con from OSH negative for fractures, dislocation, subluxation, mets. X ray of lumbosacral spine also neg. DDx herniated disc vs spinal stenosis vs metastasis vs infection. Cord compression must be ruled out given weakness, urine retention, and difficulty ambulating.  -MRI lumbar with and without contrast to r/o cord compression, further evaluate for herniated disk, spinal stenosis, metastasis  - Neurosurgery evaluation appreciated, concerned for possible cord compression so will keep NPO after MRI with sedation  - Morphine 2mg q4h for pain. Can increase if needed  -F/u bladder scan  -c/w lidocaine patch  - Bowel regimen

## 2022-01-28 NOTE — PROGRESS NOTE ADULT - PROBLEM SELECTOR PLAN 9
DVT prophylaxis: Lovenox   Diet: DASH/carb consistent  Bowel regimen: miralax BID, Senna  Dispo: Pending PT and clinical outcome  PCP: Dr. Remigio Rueda

## 2022-01-28 NOTE — GOALS OF CARE CONVERSATION - ADVANCED CARE PLANNING - CONVERSATION DETAILS
46F PMH cervical cancer (Stage 2B, s/p cisplatin and RT), HTN, HLD, T2DM p/w 3 weeks of worsening b/l hip/lower back pain and feet numbness. Pt states that b/l hip/lower back pain started about three weeks ago, sharp in nature, radiating down the lateral thighs, with mild numbness on dorsal sides of feet. Symptoms initially improved with Aleve, however noted blood tinged vaginal discharge and blood clots with urination .  Urine - Pos Leuk- Start Ceftriaxone  Oncology Germain [facility] Oncology  Dr Dinh Chemo - Onc  Dr White-OB/GYN onc  Awaiting MRI with Anesthesia due to claustrophobia r/o cord compression  Ox3  Patient wants to do new HCP and to have  daughter January Nolasco [phone] and father of her daughter Gonzalo Nolasco [phone] as HCP  NEW HCP form completed- copy on chart  Patient notes she wants to be FULL CODE

## 2022-01-28 NOTE — PROGRESS NOTE ADULT - PROBLEM SELECTOR PLAN 4
Na 129 at admission, most likely hypovolemic hyponatremia as patient stated she was nauseous with poor po intake. S/p 1LNS in ED. Now resolved.  -F/u BMPs Resolved

## 2022-01-28 NOTE — PROGRESS NOTE ADULT - SUBJECTIVE AND OBJECTIVE BOX
***INCOMPLETE NOTE***  Antelmo Dixon MD PGY-3  Internal Medicine  Pager [phone] / [phone]  After 7pm please page Night Float [phone]    Patient is a 46y old  Female who presents with a chief complaint of back pain (2022 12:40)      SUBJECTIVE / OVERNIGHT EVENTS:    MEDICATIONS  (STANDING):  amLODIPine   Tablet 10 milliGRAM(s) Oral daily  atorvastatin 20 milliGRAM(s) Oral at bedtime  dextrose 40% Gel 15 Gram(s) Oral once  dextrose 5%. 1000 milliLiter(s) (50 mL/Hr) IV Continuous <Continuous>  dextrose 5%. 1000 milliLiter(s) (100 mL/Hr) IV Continuous <Continuous>  dextrose 50% Injectable 25 Gram(s) IV Push once  dextrose 50% Injectable 12.5 Gram(s) IV Push once  dextrose 50% Injectable 25 Gram(s) IV Push once  enoxaparin Injectable 40 milliGRAM(s) SubCutaneous daily  gabapentin 300 milliGRAM(s) Oral two times a day  glucagon  Injectable 1 milliGRAM(s) IntraMuscular once  influenza   Vaccine 0.5 milliLiter(s) IntraMuscular once  insulin lispro (ADMELOG) corrective regimen sliding scale   SubCutaneous three times a day before meals  insulin lispro (ADMELOG) corrective regimen sliding scale   SubCutaneous at bedtime  losartan 100 milliGRAM(s) Oral daily  polyethylene glycol 3350 17 Gram(s) Oral two times a day  senna 2 Tablet(s) Oral at bedtime    MEDICATIONS  (PRN):  acetaminophen     Tablet .. 650 milliGRAM(s) Oral every 6 hours PRN Temp greater or equal to 38C (100.4F), Mild Pain (1 - 3)  LORazepam     Tablet 0.5 milliGRAM(s) Oral every 12 hours PRN Anxiety  morphine  - Injectable 2 milliGRAM(s) IV Push every 4 hours PRN Severe Pain (7 - 10)  ondansetron Injectable 4 milliGRAM(s) IV Push every 8 hours PRN Nausea and/or Vomiting      CAPILLARY BLOOD GLUCOSE      POCT Blood Glucose.: 208 mg/dL (2022 05:55)  POCT Blood Glucose.: 229 mg/dL (2022 23:16)  POCT Blood Glucose.: 201 mg/dL (2022 17:31)  POCT Blood Glucose.: 223 mg/dL (2022 12:43)  POCT Blood Glucose.: 209 mg/dL (2022 09:14)    I&O's Summary      PHYSICAL EXAM:  Vital Signs Last 24 Hrs  T(C): 36.7 (22 @ 05:00)  T(F): 98 (22 @ 05:00), Max: 98.2 (22 @ 08:32)  HR: 110 (22 @ 05:00) (92 - 110)  BP: 146/98 (22 @ 05:00)  BP(mean): --  RR: 16 (22 @ 05:00) (16 - 17)  SpO2: 100% (22 @ 05:00) (98% - 100%)  Wt(kg): --    Constitutional: NAD, awake and alert  EYES: EOMI  ENT:  Normal Hearing, no tonsillar exudates   Neck: Soft and supple , no thyromegaly   Respiratory: Breath sounds are clear bilaterally, No wheezing, rales or rhonchi  Cardiovascular: S1 and S2, regular rate and rhythm, no Murmurs, gallops or rubs, no JVD,    Gastrointestinal: Bowel Sounds present, soft, nontender, nondistended, no guarding, no rebound  Extremities: No cyanosis or clubbing; warm to touch  Vascular: 2+ peripheral pulses lower ex  Neurological: No focal deficits, CN II-XII intact bilaterally, sensation to light touch intact in all extremities, gait intact. Pupils are equally reactive to light and symmetrical in size.   Musculoskeletal: 5/5 strength b/l upper and lower extremities; no joint swelling.  Skin: No rashes  Psych: no depression or anhedonia, AAOx3  HEME: no bruises, no nose bleeds      LABS:                        10.0   3.92  )-----------( 241      ( 2022 06:35 )             29.9         134<L>  |  100  |  12  ----------------------------<  211<H>  3.8   |  25  |  0.72    Ca    9.1      2022 06:35  Phos  4.2       Mg     1.90         TPro  7.1  /  Alb  3.6  /  TBili  0.2  /  DBili  x   /  AST  12  /  ALT  15  /  AlkPhos  117  -          Urinalysis Basic - ( 2022 04:12 )    Color: Colorless / Appearance: Slightly Turbid / S.004 / pH: x  Gluc: x / Ketone: Negative  / Bili: Negative / Urobili: <2 mg/dL   Blood: x / Protein: Negative / Nitrite: Negative   Leuk Esterase: Large / RBC: 0 /HPF / WBC 62 /HPF   Sq Epi: x / Non Sq Epi: 0 /HPF / Bacteria: Many        RADIOLOGY & ADDITIONAL TESTS:    Imaging Personally Reviewed:    Consultant(s) Notes Reviewed:      Care Discussed with Consultants/Other Providers:   Antelmo Dixon MD PGY-3  Internal Medicine  Pager [phone] / [phone]  After 7pm please page Night Float [phone]    Patient is a 46y old  Female who presents with a chief complaint of back pain (2022 12:40)    SUBJECTIVE / OVERNIGHT EVENTS:  Continues to have severe pain in legs and glutes overnight. Has not had BM. Pending MRI with sedation.  Denies nausea, vomiting, diarrhea, fevers, chills, chest pain, SOB.    MEDICATIONS  (STANDING):  amLODIPine   Tablet 10 milliGRAM(s) Oral daily  atorvastatin 20 milliGRAM(s) Oral at bedtime  dextrose 40% Gel 15 Gram(s) Oral once  dextrose 5%. 1000 milliLiter(s) (50 mL/Hr) IV Continuous <Continuous>  dextrose 5%. 1000 milliLiter(s) (100 mL/Hr) IV Continuous <Continuous>  dextrose 50% Injectable 25 Gram(s) IV Push once  dextrose 50% Injectable 12.5 Gram(s) IV Push once  dextrose 50% Injectable 25 Gram(s) IV Push once  enoxaparin Injectable 40 milliGRAM(s) SubCutaneous daily  gabapentin 300 milliGRAM(s) Oral two times a day  glucagon  Injectable 1 milliGRAM(s) IntraMuscular once  influenza   Vaccine 0.5 milliLiter(s) IntraMuscular once  insulin lispro (ADMELOG) corrective regimen sliding scale   SubCutaneous three times a day before meals  insulin lispro (ADMELOG) corrective regimen sliding scale   SubCutaneous at bedtime  losartan 100 milliGRAM(s) Oral daily  polyethylene glycol 3350 17 Gram(s) Oral two times a day  senna 2 Tablet(s) Oral at bedtime    MEDICATIONS  (PRN):  acetaminophen     Tablet .. 650 milliGRAM(s) Oral every 6 hours PRN Temp greater or equal to 38C (100.4F), Mild Pain (1 - 3)  LORazepam     Tablet 0.5 milliGRAM(s) Oral every 12 hours PRN Anxiety  morphine  - Injectable 2 milliGRAM(s) IV Push every 4 hours PRN Severe Pain (7 - 10)  ondansetron Injectable 4 milliGRAM(s) IV Push every 8 hours PRN Nausea and/or Vomiting      CAPILLARY BLOOD GLUCOSE      POCT Blood Glucose.: 208 mg/dL (2022 05:55)  POCT Blood Glucose.: 229 mg/dL (2022 23:16)  POCT Blood Glucose.: 201 mg/dL (2022 17:31)  POCT Blood Glucose.: 223 mg/dL (2022 12:43)  POCT Blood Glucose.: 209 mg/dL (2022 09:14)    I&O's Summary      PHYSICAL EXAM:  Vital Signs Last 24 Hrs  T(C): 36.7 (22 @ 05:00)  T(F): 98 (22 @ 05:00), Max: 98.2 (22 @ 08:32)  HR: 110 (22 @ 05:00) (92 - 110)  BP: 146/98 (22 @ 05:00)  BP(mean): --  RR: 16 (22 @ 05:00) (16 - 17)  SpO2: 100% (22 @ 05:00) (98% - 100%)  Wt(kg): --      Constitutional: NAD, awake and alert, wincing in pain  EYES: EOMI  ENT:  Normal Hearing  Respiratory: Breath sounds are clear bilaterally, No wheezing, rales or rhonchi  Cardiovascular: S1 and S2, regular rate and rhythm, no Murmurs, gallops or rubs  Gastrointestinal: Abdomen soft, nontender, nondistended  Extremities: No cyanosis or clubbing; warm to touch, no LE edema  Vascular: 2+ peripheral pulses lower ex  Neurological: CN II-XII intact bilaterally, sensation to light touch intact in all extremities, gait unsteady. Pupils are equally reactive to light and symmetrical in size. Patellar and achilles reflexes diminished bilaterally.  Musculoskeletal: 5/5 strength b/l upper extremities; 3/5 strength RLE, 4/5 strength LLE  Skin: No rashes  HEME: no bruises, no nose bleeds    LABS:                        10.0   3.92  )-----------( 241      ( 2022 06:35 )             29.9         134<L>  |  100  |  12  ----------------------------<  211<H>  3.8   |  25  |  0.72    Ca    9.1      2022 06:35  Phos  4.2       Mg     1.90         TPro  7.1  /  Alb  3.6  /  TBili  0.2  /  DBili  x   /  AST  12  /  ALT  15  /  AlkPhos  117            Urinalysis Basic - ( 2022 04:12 )    Color: Colorless / Appearance: Slightly Turbid / S.004 / pH: x  Gluc: x / Ketone: Negative  / Bili: Negative / Urobili: <2 mg/dL   Blood: x / Protein: Negative / Nitrite: Negative   Leuk Esterase: Large / RBC: 0 /HPF / WBC 62 /HPF   Sq Epi: x / Non Sq Epi: 0 /HPF / Bacteria: Many        RADIOLOGY & ADDITIONAL TESTS:    Imaging Personally Reviewed:    Consultant(s) Notes Reviewed:      Care Discussed with Consultants/Other Providers:  Discussed with Anesthesia and MRI coordinator to obtain MRI today.  Discussed with neurosurgery to keep patient NPO after MRI until read in case of urgent surgery.

## 2022-01-28 NOTE — PROGRESS NOTE ADULT - PROBLEM SELECTOR PLAN 3
Diagnosed 06/2021, cervical adenocarcinoma Stage 2B, s/p cisplatin and RT 11/2021. Follows at [facility], Rad Onc Dr. Maria Luisa Germain, Med Onc Dr. CHU Dinh  -Pt noted bloody vaginal discharge and some clots 2 weeks ago, currently does not have bloody discharge  -Possibly from cancer vs other abnormal uterine bleed (MR pelvis 10/2021 shows L hydrosalpinx and hemorrhagic cysts w/ small endometriosis, R cyst resolved)  -Outpatient follow up for cancer  -F/u UA Diagnosed 06/2021, cervical adenocarcinoma Stage 2B, s/p cisplatin and RT 11/2021. Follows at [facility], Rad Onc Dr. Maria Luisa Germain, Med Onc Dr. CHU Dinh  -Pt noted bloody vaginal discharge and some clots 2 weeks ago, currently does not have bloody discharge  -Possibly from cancer vs other abnormal uterine bleed (MR pelvis 10/2021 shows L hydrosalpinx and hemorrhagic cysts w/ small endometriosis, R cyst resolved)  - Heme/Onc consult  -Outpatient follow up for cancer  -F/u UA

## 2022-01-28 NOTE — PROGRESS NOTE ADULT - PROBLEM SELECTOR PLAN 5
Hg 10 at admission, baseline 9-11. Iron studies suggest iron deficiency anemia.  - Monitor for bleeding given cervical CA and recent vaginal bleed.  - Can start iron supplementation if no constipation with opioids Hg 10 at admission, baseline 9-11. Iron studies suggest iron deficiency anemia.  - Monitor for bleeding given cervical CA and recent vaginal bleed.  - Ultimately will start iron supplementation

## 2022-01-28 NOTE — PROGRESS NOTE ADULT - ASSESSMENT
46F PMH cervical cancer (Stage 2B, s/p cisplatin and RT), HTN, HLD, T2DM p/w 3 weeks of worsening b/l hip/lower back pain and neuropathy concerning for possible cord compression, in need of MRI under sedation due to severe claustrophobia.  46F PMH cervical cancer (Stage 2B, s/p cisplatin and RT), HTN, HLD, T2DM p/w 3 weeks of worsening b/l hip/lower back pain and neuropathy concerning for possible cord compression, in need of MRI under sedation due to severe claustrophobia.

## 2022-01-28 NOTE — PROGRESS NOTE ADULT - PROBLEM SELECTOR PLAN 2
P/w 3 weeks of worsening b/l feet numbness on dorsal side, with L worse than R, and reaching up to L mid calf. DDx radicular pain vs chemotherapy induced peripheral neuropathy given recent completion of cisplatin therapy vs diabetes neuropathy  -Gabapentin 300mg BID, can increase to TID if needed  -F/u B12 and folate levels  -PT eval

## 2022-01-28 NOTE — CHART NOTE - NSCHARTNOTEFT_GEN_A_CORE
< from: MR Thoracic Spine w/wo IV Cont (01.28.22 @ 17:03) >    FINDINGS:    ALIGNMENT:  The alignment is normal.    VERTEBRAE: Mild multilevel degenerative changes are noted. No aggressive   osseous lesions.    DISCS: Multilevel disc desiccation and loss of height.    CORD: The thoracic cord is normal in size and signal. The conus   medullaris terminates at T12 level. Cauda equina is normal in appearance.    EVALUATION OF INDIVIDUAL LEVELS:  T11-T12: Left central disc protrusion. No spinal canal or neuroforaminal   stenosis.    L3-L4: Disc bulge. No spinal canal or neuroforaminal stenosis.    L4-L5: Disc bulge. No spinal canal or neuroforaminal stenosis.    PARAVERTEBRAL SOFT TISSUES: The visualized paravertebral soft tissues   appear within normal limits.    Cortical defects/lobulations of the bilateral kidneys again noted.    IMPRESSION:    -No thoracic cord or cauda equina compression.    < end of copied text >    No cord compression.  No role for neurosurgical intervention.  Neurosurgery signing off case, please reconsult PRN

## 2022-01-28 NOTE — PROGRESS NOTE ADULT - ATTENDING COMMENTS
46F with PMH of cervical ca, HTN, HLD, DM2 presenting w/ worsening radiculopathy. Pt seen at bedside, reports bilateral buttock pain that radiates down her legs. No bowel/urinary issues. Neurosurgery on board.  Pending MRI to further evaluate.    -Discussed w/ anesthesia/MRI supervisor - study to happen today.  -Continue w/ pain control. Adding baclofen as pt report muscle spasms.   -Incidentally found to have a positive UA -, continue short course of CTX for E Coli UTI  -Neurosurger recs appreciated - will await MRI results for further recs.  -Onc f/u.    Rest of plan as above.

## 2022-01-29 LAB
GLUCOSE BLDC GLUCOMTR-MCNC: 217 MG/DL — HIGH (ref 70–99)
GLUCOSE BLDC GLUCOMTR-MCNC: 219 MG/DL — HIGH (ref 70–99)
GLUCOSE BLDC GLUCOMTR-MCNC: 225 MG/DL — HIGH (ref 70–99)
GLUCOSE BLDC GLUCOMTR-MCNC: 299 MG/DL — HIGH (ref 70–99)

## 2022-01-29 PROCEDURE — 99233 SBSQ HOSP IP/OBS HIGH 50: CPT | Mod: GC

## 2022-01-29 PROCEDURE — 99223 1ST HOSP IP/OBS HIGH 75: CPT

## 2022-01-29 RX ORDER — INSULIN LISPRO 100/ML
VIAL (ML) SUBCUTANEOUS
Refills: 0 | Status: DISCONTINUED | OUTPATIENT
Start: 2022-01-29 | End: 2022-02-01

## 2022-01-29 RX ORDER — INSULIN LISPRO 100/ML
VIAL (ML) SUBCUTANEOUS AT BEDTIME
Refills: 0 | Status: DISCONTINUED | OUTPATIENT
Start: 2022-01-29 | End: 2022-02-01

## 2022-01-29 RX ORDER — ACETAMINOPHEN 500 MG
650 TABLET ORAL EVERY 6 HOURS
Refills: 0 | Status: DISCONTINUED | OUTPATIENT
Start: 2022-01-29 | End: 2022-02-01

## 2022-01-29 RX ORDER — OXYCODONE HYDROCHLORIDE 5 MG/1
5 TABLET ORAL EVERY 4 HOURS
Refills: 0 | Status: DISCONTINUED | OUTPATIENT
Start: 2022-01-29 | End: 2022-02-01

## 2022-01-29 RX ORDER — BACLOFEN 100 %
10 POWDER (GRAM) MISCELLANEOUS THREE TIMES A DAY
Refills: 0 | Status: DISCONTINUED | OUTPATIENT
Start: 2022-01-29 | End: 2022-02-01

## 2022-01-29 RX ORDER — LIDOCAINE 4 G/100G
1 CREAM TOPICAL DAILY
Refills: 0 | Status: DISCONTINUED | OUTPATIENT
Start: 2022-01-29 | End: 2022-02-01

## 2022-01-29 RX ORDER — PREGABALIN 225 MG/1
1000 CAPSULE ORAL DAILY
Refills: 0 | Status: DISCONTINUED | OUTPATIENT
Start: 2022-01-29 | End: 2022-02-01

## 2022-01-29 RX ADMIN — ENOXAPARIN SODIUM 40 MILLIGRAM(S): 100 INJECTION SUBCUTANEOUS at 22:47

## 2022-01-29 RX ADMIN — Medication 650 MILLIGRAM(S): at 17:43

## 2022-01-29 RX ADMIN — Medication 10 MILLIGRAM(S): at 20:18

## 2022-01-29 RX ADMIN — Medication 650 MILLIGRAM(S): at 22:47

## 2022-01-29 RX ADMIN — LIDOCAINE 1 PATCH: 4 CREAM TOPICAL at 23:14

## 2022-01-29 RX ADMIN — LIDOCAINE 1 PATCH: 4 CREAM TOPICAL at 18:37

## 2022-01-29 RX ADMIN — MORPHINE SULFATE 2 MILLIGRAM(S): 50 CAPSULE, EXTENDED RELEASE ORAL at 02:10

## 2022-01-29 RX ADMIN — LOSARTAN POTASSIUM 100 MILLIGRAM(S): 100 TABLET, FILM COATED ORAL at 06:12

## 2022-01-29 RX ADMIN — POLYETHYLENE GLYCOL 3350 17 GRAM(S): 17 POWDER, FOR SOLUTION ORAL at 17:44

## 2022-01-29 RX ADMIN — MORPHINE SULFATE 2 MILLIGRAM(S): 50 CAPSULE, EXTENDED RELEASE ORAL at 22:46

## 2022-01-29 RX ADMIN — LIDOCAINE 1 PATCH: 4 CREAM TOPICAL at 11:56

## 2022-01-29 RX ADMIN — Medication 4: at 12:56

## 2022-01-29 RX ADMIN — MORPHINE SULFATE 2 MILLIGRAM(S): 50 CAPSULE, EXTENDED RELEASE ORAL at 10:13

## 2022-01-29 RX ADMIN — CEFTRIAXONE 100 MILLIGRAM(S): 500 INJECTION, POWDER, FOR SOLUTION INTRAMUSCULAR; INTRAVENOUS at 03:28

## 2022-01-29 RX ADMIN — MORPHINE SULFATE 2 MILLIGRAM(S): 50 CAPSULE, EXTENDED RELEASE ORAL at 06:07

## 2022-01-29 RX ADMIN — Medication 2: at 09:06

## 2022-01-29 RX ADMIN — PREGABALIN 1000 MICROGRAM(S): 225 CAPSULE ORAL at 15:25

## 2022-01-29 RX ADMIN — Medication 10 MILLIGRAM(S): at 10:13

## 2022-01-29 RX ADMIN — GABAPENTIN 300 MILLIGRAM(S): 400 CAPSULE ORAL at 22:47

## 2022-01-29 RX ADMIN — Medication 10 MILLIGRAM(S): at 15:25

## 2022-01-29 RX ADMIN — GABAPENTIN 300 MILLIGRAM(S): 400 CAPSULE ORAL at 06:12

## 2022-01-29 RX ADMIN — Medication 6: at 17:43

## 2022-01-29 RX ADMIN — MORPHINE SULFATE 2 MILLIGRAM(S): 50 CAPSULE, EXTENDED RELEASE ORAL at 01:55

## 2022-01-29 RX ADMIN — AMLODIPINE BESYLATE 10 MILLIGRAM(S): 2.5 TABLET ORAL at 06:12

## 2022-01-29 RX ADMIN — OXYCODONE HYDROCHLORIDE 5 MILLIGRAM(S): 5 TABLET ORAL at 14:30

## 2022-01-29 RX ADMIN — GABAPENTIN 300 MILLIGRAM(S): 400 CAPSULE ORAL at 14:30

## 2022-01-29 RX ADMIN — MORPHINE SULFATE 2 MILLIGRAM(S): 50 CAPSULE, EXTENDED RELEASE ORAL at 06:37

## 2022-01-29 RX ADMIN — Medication 10 MILLIGRAM(S): at 11:56

## 2022-01-29 RX ADMIN — OXYCODONE HYDROCHLORIDE 5 MILLIGRAM(S): 5 TABLET ORAL at 19:36

## 2022-01-29 RX ADMIN — OXYCODONE HYDROCHLORIDE 5 MILLIGRAM(S): 5 TABLET ORAL at 18:42

## 2022-01-29 RX ADMIN — ATORVASTATIN CALCIUM 20 MILLIGRAM(S): 80 TABLET, FILM COATED ORAL at 22:47

## 2022-01-29 RX ADMIN — POLYETHYLENE GLYCOL 3350 17 GRAM(S): 17 POWDER, FOR SOLUTION ORAL at 06:12

## 2022-01-29 RX ADMIN — Medication 650 MILLIGRAM(S): at 11:56

## 2022-01-29 RX ADMIN — MORPHINE SULFATE 2 MILLIGRAM(S): 50 CAPSULE, EXTENDED RELEASE ORAL at 10:33

## 2022-01-29 RX ADMIN — OXYCODONE HYDROCHLORIDE 5 MILLIGRAM(S): 5 TABLET ORAL at 15:27

## 2022-01-29 RX ADMIN — MORPHINE SULFATE 2 MILLIGRAM(S): 50 CAPSULE, EXTENDED RELEASE ORAL at 23:00

## 2022-01-29 NOTE — PROGRESS NOTE ADULT - PROBLEM SELECTOR PLAN 2
P/w 3 weeks of worsening b/l feet numbness on dorsal side, with L worse than R, and reaching up to L mid calf. DDx radicular pain vs chemotherapy induced peripheral neuropathy given recent completion of cisplatin therapy vs diabetes neuropathy  -Gabapentin 300mg TID  - folate WNL, B12 lower end of normal, will start on Vit B12 1000mcg daily  -F/u B12 and folate levels  -PT eval - home w/ outpatient PT

## 2022-01-29 NOTE — CONSULT NOTE ADULT - ATTENDING COMMENTS
This is a 46 year old man with a history of cervical adenocarcinoma diagnosed may 2021, Stage IIB s/p Cisplatin and RT completed on 11/2021, hypertension, hyperlipidemia, diabetes, patient now presents for worsening hip and back pain. Patient was evaluated with MRI demonstrating no thoracic or sacral cord compression or cauda equina, no lytic lesions or fractures.  Pain and paresthesias start in the back, and then spread to the lower extremities.  Vague altered sensation in the lower extremities. Suspect this is related to neurotoxicity from the cisplatin.  Continue gabapentin 300mg BID for another day, if refractory to this dose increase to 300mg TID then perhaps 300mg QAM/PM and 600mg QHS until pain is better controlled.      Patient has a low B12 level which can also induce some neuratrophies, though suspect this is a small part of the clinical representation, can optimize with  administration of B12 supplementations.

## 2022-01-29 NOTE — PROGRESS NOTE ADULT - PROBLEM SELECTOR PLAN 1
P/w 3 weeks of worsening b/l hip/lower back pain radiating down lateral thighs. CT non con from OSH negative for fractures, dislocation, subluxation, mets. X ray of lumbosacral spine also neg. DDx herniated disc vs spinal stenosis vs metastasis vs infection. Cord compression must be ruled out given weakness, urine retention, and difficulty ambulating.  -MRI lumbar: no signs of chord compression  - NSGY: signing off  - Morphine 2mg q4h for pain. Can increase if needed  -F/u bladder scan  - pending Heme/Onc eval for possible chemo side effect  -c/w lidocaine patch  - Bowel regimen

## 2022-01-29 NOTE — PROGRESS NOTE ADULT - PROBLEM SELECTOR PLAN 5
Hg 10 at admission, baseline 9-11. Iron studies suggest iron deficiency anemia.  - Monitor for bleeding given cervical CA and recent vaginal bleed.  - Ultimately will start iron supplementation

## 2022-01-29 NOTE — CONSULT NOTE ADULT - SUBJECTIVE AND OBJECTIVE BOX
HPI:  46F PMHx cervical adenocarcinoma (Stage 2B, s/p cisplatin and RT), HTN, HLD, T2DM p/w 3 weeks of worsening b/l hip/lower back pain and feet numbness. Pt states that b/l hip/lower back pain started about three weeks ago, sharp in nature, radiating down the lateral thighs, with mild numbness on dorsal sides of feet. Symptoms initially improved with Aleve, however after Aleve dose increased 4-5 pills/day she noted blood tinged vaginal discharge and blood clots with urination not related to menstraul periods so stopped taking Aleve. Hip pain and feet numbness progressively worsened, to the point that she cannot walk or lie supine. Pain is mildly alleviated when lying prone, and better with activity. Pt presented to ER in [place] 2 weeks ago due to these symptoms, CT spine non-con showed multiple non-obstructing nephrolithiasis in L kidney but no fractures, deformities, subluxation were noted. She was discharged with gabapentin 300BID, Methocarbomol 500 TID, and Meloxicam 15mg q6, and states that the medications temporarily relieve symptoms.    Pt states that numbness is worse on L side, from dorsal foot to lateral mid-calf. Feels like it is "freezing" and must keep socks on. Also notes cramping intermittently in the toes. Pt also noticed that not all of her urine will come out, and must put pressure to completely relieve herself. Pt denies fever, chills, saddle anesthesia, loss of sensation on extremities, weight loss, urinary incontinence. Denied finger numbness/pain/tingling.  She is treated with gabapentin, oxycodone, lidocaine patch. She has to use walker to walk to bathroom after admission.     Oncology history     -Presented to [facility] on 5/28/21 with pelvic pain and retained tampon with a 5 day history of malodorous discharge with fever and tachycardia. Initially assumed to have PID and started on antibiotics, however cervix appeared irregular, dark and distorted. Subsequently taken to the OR for EUA, and cervical biospy done, showing adenocarcinoma, unclear at time if primary or secondary.    PATHOLOGY:  1) EMBx, 5/26/21, Dr. James   a) atypical endometrial glands exhibiting complex, cribiform architexture are present as a few scattered glands, further workup is necessary to R/O CAH or adenocarcinoma of the endometrium  2) EMBx, 5/28/21   a) minute polypoid fragment of endometrial tissue with partial infarction and focal glandular crowding without atypia   3) EUA, D&C, ECC, cervical biopsy, 5/30/21, [facility]   a) cervical/endocervix - adenocarcinoma in a background of necrosis and acute inflammation (primary vs. secondary)      -CT A/P on 5/29/21: lobulated bilateral renal cortex with cortical scarring bilaterally. Multiple nonobstructing stones on the left. no hydro. Endometrial cavity is slightly distended with fluid. An air containing structure is identified within the upper vaginal cavity (likely retained tampon). Mild fatty stranding in the pelvis. Likely a physiological cyst in the right ovary and a small corpus luteal or hemorrhagic cyst in the left ovary. Small free fluid. Appendix normal. No ascites. No LAD.     Initially it was thought to be a GI primary and patient underwent GI work up with EGD and colonoscopy, all WNL.    -PET/CT done on 7/5/21 trophic left left kidney. Lobulated bilateral renal cortex with cortical scarring bilaterally. Multiple non obstruction stones on the left, largest 7mm in the lower pole, stable since 5/29/21. Difficult to delineate increased FDG activity in the uterine cervix corresponding to known malignancy SUV 7.3. There is a separate focus of increased FDG activity in the endometrium which may be physiologic SUV 8.8. No enlarged FDG avid nodes.     -Recevied Weekly Cisplatin and EBRT. Completed treatment in November.         PAST MEDICAL & SURGICAL HISTORY:  Hypertension    Hypercholesteremia    Cervical ca    Obesity    Acid reflux    T2DM (type 2 diabetes mellitus)    No significant past surgical history        Allergies    No Known Allergies    Intolerances        MEDICATIONS  (STANDING):  acetaminophen     Tablet .. 650 milliGRAM(s) Oral every 6 hours  amLODIPine   Tablet 10 milliGRAM(s) Oral daily  atorvastatin 20 milliGRAM(s) Oral at bedtime  baclofen 10 milliGRAM(s) Oral three times a day  cefTRIAXone   IVPB 1000 milliGRAM(s) IV Intermittent every 24 hours  cyanocobalamin 1000 MICROGram(s) Oral daily  dextrose 40% Gel 15 Gram(s) Oral once  dextrose 5%. 1000 milliLiter(s) (50 mL/Hr) IV Continuous <Continuous>  dextrose 5%. 1000 milliLiter(s) (100 mL/Hr) IV Continuous <Continuous>  dextrose 50% Injectable 25 Gram(s) IV Push once  dextrose 50% Injectable 12.5 Gram(s) IV Push once  dextrose 50% Injectable 25 Gram(s) IV Push once  enoxaparin Injectable 40 milliGRAM(s) SubCutaneous at bedtime  gabapentin 300 milliGRAM(s) Oral three times a day  glucagon  Injectable 1 milliGRAM(s) IntraMuscular once  influenza   Vaccine 0.5 milliLiter(s) IntraMuscular once  insulin lispro (ADMELOG) corrective regimen sliding scale   SubCutaneous three times a day before meals  insulin lispro (ADMELOG) corrective regimen sliding scale   SubCutaneous at bedtime  lidocaine   4% Patch 1 Patch Transdermal daily  losartan 100 milliGRAM(s) Oral daily  polyethylene glycol 3350 17 Gram(s) Oral two times a day  senna 2 Tablet(s) Oral at bedtime    MEDICATIONS  (PRN):  acetaminophen     Tablet .. 650 milliGRAM(s) Oral every 6 hours PRN Temp greater or equal to 38C (100.4F), Mild Pain (1 - 3)  LORazepam     Tablet 0.5 milliGRAM(s) Oral every 12 hours PRN Anxiety  morphine  - Injectable 2 milliGRAM(s) IV Push every 4 hours PRN Severe Pain (7 - 10)  ondansetron Injectable 4 milliGRAM(s) IV Push every 8 hours PRN Nausea and/or Vomiting  oxyCODONE    IR 5 milliGRAM(s) Oral every 4 hours PRN Moderate Pain (4 - 6)      FAMILY HISTORY:  FH: breast cancer        SOCIAL HISTORY: No EtOH, no tobacco    REVIEW OF SYSTEMS:    CONSTITUTIONAL: Admitted Jarett pain and numbness; weakness in general; denied fevers or chills  EYES/ENT: No visual changes;  No vertigo or throat pain   NECK: No pain or stiffness  RESPIRATORY: No cough, wheezing, hemoptysis; No shortness of breath  CARDIOVASCULAR: No chest pain or palpitations  GASTROINTESTINAL: No abdominal or epigastric pain. No nausea, vomiting, or hematemesis; No diarrhea or constipation. No melena or hematochezia.  GENITOURINARY: No dysuria, frequency or hematuria  NEUROLOGICAL: No numbness or weakness  SKIN: No itching, burning, rashes, or lesions         T(F): 98 (01-29-22 @ 14:33), Max: 99 (01-28-22 @ 21:33)  HR: 100 (01-29-22 @ 14:33)  BP: 141/86 (01-29-22 @ 14:33)  RR: 17 (01-29-22 @ 14:33)  SpO2: 100% (01-29-22 @ 14:33)  Wt(kg): --    GENERAL: NAD, well-developed  HEAD:  Atraumatic, Normocephalic  EYES: EOMI, PERRLA, conjunctiva and sclera clear  NECK: Supple, No JVD  CHEST/LUNG: Clear to auscultation bilaterally; No wheeze  HEART: Regular rate and rhythm; No murmurs, rubs, or gallops  ABDOMEN: Soft, Nontender, Nondistended; Bowel sounds present  EXTREMITIES:  2+ Peripheral Pulses, No clubbing, cyanosis, or edema  NEUROLOGY: non-focal weakness; L leg sensitive to touch >R leg; touch sensation intact; straight leg test negative  SKIN: No rashes or lesions                          10.0   4.09  )-----------( 249      ( 28 Jan 2022 09:15 )             30.5       01-28    134<L>  |  96<L>  |  12  ----------------------------<  210<H>  3.9   |  27  |  0.68    Ca    9.6      28 Jan 2022 09:15  Phos  4.1     01-28  Mg     1.90     01-28

## 2022-01-29 NOTE — PROGRESS NOTE ADULT - PROBLEM SELECTOR PLAN 3
Diagnosed 06/2021, cervical adenocarcinoma Stage 2B, s/p cisplatin and RT 11/2021. Follows at [facility], Rad Onc Dr. Maria Luisa Germain, Med Onc Dr. CHU Dinh  -Pt noted bloody vaginal discharge and some clots 2 weeks ago, currently does not have bloody discharge  -Possibly from cancer vs other abnormal uterine bleed (MR pelvis 10/2021 shows L hydrosalpinx and hemorrhagic cysts w/ small endometriosis, R cyst resolved)  - Heme/Onc consult  -Outpatient follow up for cancer

## 2022-01-29 NOTE — PROGRESS NOTE ADULT - ATTENDING COMMENTS
46F with PMH of cervical ca, HTN, HLD, DM2 presenting w/ worsening radiculopathy. Pt seen at bedside, reports bilateral buttock pain that radiates down her legs. No bowel/urinary issues. Neurosurgery on board.  MRI of T/L spine done. No cord compression noted. Disc bulge noted at L3-5. This correlates to her exam findings.     -Pain control remain suboptimal. Still requiring IV morphine.  -Will add lidocaine patch daily. Will change baclofen to 10mg TID ATC.  -Will also add tylenol 650mg q6h ATC.  -Cont morphine IV PRN for now.   -States that she had steroid injection in the past before, but only provided relief x2 days.   -Incidentally found to have a positive UA -, continue short course of CTX for E Coli UTI  -Onc f/u.    Rest of plan as above. 46F with PMH of cervical ca, HTN, HLD, DM2 presenting w/ worsening radiculopathy. Pt seen at bedside, reports bilateral buttock pain that radiates down her legs. No bowel/urinary issues. Neurosurgery on board.  MRI of T/L spine done. No cord compression noted. Disc bulge noted at L3-5. This correlates to her exam findings.     -Pain control remain suboptimal. Still requiring IV morphine.  -Will add lidocaine patch daily. Will change baclofen to 10mg TID ATC.  -Will also add tylenol 650mg q6h ATC.  -Cont morphine IV PRN for now.   -Inc ISS to moderate.  -States that she had steroid injection in the past before, but only provided relief x2 days.   -Incidentally found to have a positive UA -, continue short course of CTX for E Coli UTI  -Onc f/u.    Rest of plan as above.

## 2022-01-29 NOTE — CONSULT NOTE ADULT - ASSESSMENT
46F PMHx cervical adenocarcinoma diagnosed in 5/2021 (Stage 2B, s/p cisplatin and RT completed in 11/2021), HTN, HLD, T2DM p/w 3 weeks of worsening b/l hip/lower back pain and feet numbness. She is treated with gabapentin, oxycodone, lidocaine patch. She has to use walker to walk to bathroom after admission.     MRI T/L no thoracic cord or cauda equina compression.     #Cervical adenocarcinoma   -s/p Cisplatin and RT   -Jarett pain/numbness may related to chemo side effect; agree with garbapentin starting with 300mg bid then gradually increase dose if necessary   -f/u LDH/Uric acid   -please send Ferritin;   -Low VitB12 level; please start VitB subQ once/week or PO 1000mcg daily till defeciency corrected   -Pt [place] f[place] [facility] [place] plan for [place] as inpatient      -Discussed with Attending Rio Moyer D.O Ph.D   Hematology/Oncology Fellow PGY4  Pager [phone]   After 5pm and on weekends please page on-call fellow   
46year old woman with Cervical CA presenting with BL hip/Lower back pain and numbness to feet x 3 weeks. Pt was sent in by oncologist with concern for cord compression.

## 2022-01-29 NOTE — PROGRESS NOTE ADULT - SUBJECTIVE AND OBJECTIVE BOX
Leonel Gonzalez MD  PGY 2 Department of Internal Medicine  Pager: [phone] ([facility])   Pager: [phone] ([facility])  Also available on Microsoft Teams      Patient is a 46y old  Female who presents with a chief complaint of back pain (28 Jan 2022 07:01)      OVERNIGHT EVENTS: No acute overnight events.    SUBJECTIVE: Pt seen and examined. States she still has L back pain radiating down L leg, improved from yesterday. Denies fevers, chills, CP, SOB, Abdominal pain, N/V, Diarrhea. No BM since monday    MEDICATIONS  (STANDING):  amLODIPine   Tablet 10 milliGRAM(s) Oral daily  atorvastatin 20 milliGRAM(s) Oral at bedtime  cefTRIAXone   IVPB 1000 milliGRAM(s) IV Intermittent every 24 hours  dextrose 40% Gel 15 Gram(s) Oral once  dextrose 5%. 1000 milliLiter(s) (50 mL/Hr) IV Continuous <Continuous>  dextrose 5%. 1000 milliLiter(s) (100 mL/Hr) IV Continuous <Continuous>  dextrose 50% Injectable 25 Gram(s) IV Push once  dextrose 50% Injectable 12.5 Gram(s) IV Push once  dextrose 50% Injectable 25 Gram(s) IV Push once  enoxaparin Injectable 40 milliGRAM(s) SubCutaneous at bedtime  gabapentin 300 milliGRAM(s) Oral three times a day  glucagon  Injectable 1 milliGRAM(s) IntraMuscular once  influenza   Vaccine 0.5 milliLiter(s) IntraMuscular once  insulin lispro (ADMELOG) corrective regimen sliding scale   SubCutaneous three times a day before meals  insulin lispro (ADMELOG) corrective regimen sliding scale   SubCutaneous at bedtime  losartan 100 milliGRAM(s) Oral daily  polyethylene glycol 3350 17 Gram(s) Oral two times a day  senna 2 Tablet(s) Oral at bedtime    MEDICATIONS  (PRN):  acetaminophen     Tablet .. 650 milliGRAM(s) Oral every 6 hours PRN Temp greater or equal to 38C (100.4F), Mild Pain (1 - 3)  baclofen 5 milliGRAM(s) Oral three times a day PRN Muscle Spasm  LORazepam     Tablet 0.5 milliGRAM(s) Oral every 12 hours PRN Anxiety  morphine  - Injectable 2 milliGRAM(s) IV Push every 4 hours PRN Severe Pain (7 - 10)  ondansetron Injectable 4 milliGRAM(s) IV Push every 8 hours PRN Nausea and/or Vomiting      I&O's Summary      Vital Signs Last 24 Hrs  T(C): 37 (29 Jan 2022 05:35), Max: 37.2 (28 Jan 2022 21:33)  T(F): 98.6 (29 Jan 2022 05:35), Max: 99 (28 Jan 2022 21:33)  HR: 100 (29 Jan 2022 05:35) (97 - 101)  BP: 145/86 (29 Jan 2022 05:35) (131/83 - 145/86)  BP(mean): --  RR: 18 (29 Jan 2022 05:35) (18 - 18)  SpO2: 100% (29 Jan 2022 05:35) (97% - 100%)    =================PHYSICAL EXAM=================    Constitutional: NAD, awake and alert, wincing in pain  EYES: EOMI  ENT:  Normal Hearing  Respiratory: Breath sounds are clear bilaterally, No wheezing, rales or rhonchi  Cardiovascular: S1 and S2, regular rate and rhythm, no Murmurs, gallops or rubs  Gastrointestinal: Abdomen soft, nontender, nondistended  Extremities: No cyanosis or clubbing; warm to touch, no LE edema  Vascular: 2+ peripheral pulses lower ex  Neurological: CN II-XII intact bilaterally, sensation to light touch intact in all extremities, gait unsteady. Pupils are equally reactive to light and symmetrical in size. Patellar and achilles reflexes diminished bilaterally.  Musculoskeletal: 5/5 strength b/l upper extremities; 3/5 strength RLE, 4/5 strength LLE  Skin: No rashes  HEME: no bruises, no nose bleeds      =================================================    LABS:                        10.0   4.09  )-----------( 249      ( 28 Jan 2022 09:15 )             30.5     Auto Eosinophil # x     / Auto Eosinophil % x     / Auto Neutrophil # x     / Auto Neutrophil % x     / BANDS % x        01-28    134<L>  |  96<L>  |  12  ----------------------------<  210<H>  3.9   |  27  |  0.68    Ca    9.6      28 Jan 2022 09:15  Mg     1.90     01-28  Phos  4.1     01-28                  RADIOLOGY & ADDITIONAL TESTS:    Imaging Personally Reviewed:    Consultant(s) Notes Reviewed:      Care Discussed with Consultants/Other Providers:

## 2022-01-29 NOTE — PROGRESS NOTE ADULT - PROBLEM SELECTOR PLAN 9
DVT prophylaxis: Lovenox   Diet: DASH/carb consistent  Bowel regimen: miralax BID, Senna  Dispo: PT recs home with outpatient PT  PCP: Dr. Remigio Rueda

## 2022-01-29 NOTE — PROGRESS NOTE ADULT - ASSESSMENT
46F PMH cervical cancer (Stage 2B, s/p cisplatin and RT), HTN, HLD, T2DM p/w 3 weeks of worsening b/l hip/lower back pain and neuropathy concerning for possible cord compression. MRI done showing no cord compression, now pending Heme/Onc eval for possible chemo side effect.

## 2022-01-30 LAB
ALBUMIN SERPL ELPH-MCNC: 3.9 G/DL — SIGNIFICANT CHANGE UP (ref 3.3–5)
ALP SERPL-CCNC: 130 U/L — HIGH (ref 40–120)
ALT FLD-CCNC: 16 U/L — SIGNIFICANT CHANGE UP (ref 4–33)
ANION GAP SERPL CALC-SCNC: 11 MMOL/L — SIGNIFICANT CHANGE UP (ref 7–14)
AST SERPL-CCNC: 15 U/L — SIGNIFICANT CHANGE UP (ref 4–32)
BILIRUB SERPL-MCNC: 0.2 MG/DL — SIGNIFICANT CHANGE UP (ref 0.2–1.2)
BUN SERPL-MCNC: 11 MG/DL — SIGNIFICANT CHANGE UP (ref 7–23)
CALCIUM SERPL-MCNC: 9.6 MG/DL — SIGNIFICANT CHANGE UP (ref 8.4–10.5)
CHLORIDE SERPL-SCNC: 97 MMOL/L — LOW (ref 98–107)
CO2 SERPL-SCNC: 24 MMOL/L — SIGNIFICANT CHANGE UP (ref 22–31)
CREAT SERPL-MCNC: 0.6 MG/DL — SIGNIFICANT CHANGE UP (ref 0.5–1.3)
FERRITIN SERPL-MCNC: 93 NG/ML — SIGNIFICANT CHANGE UP (ref 15–150)
GLUCOSE BLDC GLUCOMTR-MCNC: 197 MG/DL — HIGH (ref 70–99)
GLUCOSE BLDC GLUCOMTR-MCNC: 217 MG/DL — HIGH (ref 70–99)
GLUCOSE BLDC GLUCOMTR-MCNC: 248 MG/DL — HIGH (ref 70–99)
GLUCOSE BLDC GLUCOMTR-MCNC: 288 MG/DL — HIGH (ref 70–99)
GLUCOSE SERPL-MCNC: 231 MG/DL — HIGH (ref 70–99)
HCT VFR BLD CALC: 32.2 % — LOW (ref 34.5–45)
HGB BLD-MCNC: 10.7 G/DL — LOW (ref 11.5–15.5)
LDH SERPL L TO P-CCNC: 176 U/L — SIGNIFICANT CHANGE UP (ref 135–225)
MAGNESIUM SERPL-MCNC: 1.9 MG/DL — SIGNIFICANT CHANGE UP (ref 1.6–2.6)
MCHC RBC-ENTMCNC: 28.5 PG — SIGNIFICANT CHANGE UP (ref 27–34)
MCHC RBC-ENTMCNC: 33.2 GM/DL — SIGNIFICANT CHANGE UP (ref 32–36)
MCV RBC AUTO: 85.6 FL — SIGNIFICANT CHANGE UP (ref 80–100)
NRBC # BLD: 0 /100 WBCS — SIGNIFICANT CHANGE UP
NRBC # FLD: 0 K/UL — SIGNIFICANT CHANGE UP
PHOSPHATE SERPL-MCNC: 3.9 MG/DL — SIGNIFICANT CHANGE UP (ref 2.5–4.5)
PLATELET # BLD AUTO: 268 K/UL — SIGNIFICANT CHANGE UP (ref 150–400)
POTASSIUM SERPL-MCNC: 4 MMOL/L — SIGNIFICANT CHANGE UP (ref 3.5–5.3)
POTASSIUM SERPL-SCNC: 4 MMOL/L — SIGNIFICANT CHANGE UP (ref 3.5–5.3)
PROT SERPL-MCNC: 7.8 G/DL — SIGNIFICANT CHANGE UP (ref 6–8.3)
RBC # BLD: 3.76 M/UL — LOW (ref 3.8–5.2)
RBC # FLD: 13.3 % — SIGNIFICANT CHANGE UP (ref 10.3–14.5)
SODIUM SERPL-SCNC: 132 MMOL/L — LOW (ref 135–145)
URATE SERPL-MCNC: 4 MG/DL — SIGNIFICANT CHANGE UP (ref 2.5–7)
WBC # BLD: 4.07 K/UL — SIGNIFICANT CHANGE UP (ref 3.8–10.5)
WBC # FLD AUTO: 4.07 K/UL — SIGNIFICANT CHANGE UP (ref 3.8–10.5)

## 2022-01-30 PROCEDURE — 99233 SBSQ HOSP IP/OBS HIGH 50: CPT | Mod: GC

## 2022-01-30 RX ORDER — OXYCODONE HYDROCHLORIDE 5 MG/1
10 TABLET ORAL EVERY 4 HOURS
Refills: 0 | Status: DISCONTINUED | OUTPATIENT
Start: 2022-01-30 | End: 2022-02-01

## 2022-01-30 RX ORDER — MORPHINE SULFATE 50 MG/1
2 CAPSULE, EXTENDED RELEASE ORAL EVERY 4 HOURS
Refills: 0 | Status: DISCONTINUED | OUTPATIENT
Start: 2022-01-30 | End: 2022-02-01

## 2022-01-30 RX ORDER — GABAPENTIN 400 MG/1
300 CAPSULE ORAL
Refills: 0 | Status: DISCONTINUED | OUTPATIENT
Start: 2022-01-30 | End: 2022-02-01

## 2022-01-30 RX ORDER — GABAPENTIN 400 MG/1
600 CAPSULE ORAL AT BEDTIME
Refills: 0 | Status: DISCONTINUED | OUTPATIENT
Start: 2022-01-30 | End: 2022-02-01

## 2022-01-30 RX ORDER — INSULIN GLARGINE 100 [IU]/ML
15 INJECTION, SOLUTION SUBCUTANEOUS AT BEDTIME
Refills: 0 | Status: DISCONTINUED | OUTPATIENT
Start: 2022-01-30 | End: 2022-02-01

## 2022-01-30 RX ADMIN — OXYCODONE HYDROCHLORIDE 10 MILLIGRAM(S): 5 TABLET ORAL at 22:00

## 2022-01-30 RX ADMIN — PREGABALIN 1000 MICROGRAM(S): 225 CAPSULE ORAL at 11:37

## 2022-01-30 RX ADMIN — AMLODIPINE BESYLATE 10 MILLIGRAM(S): 2.5 TABLET ORAL at 06:13

## 2022-01-30 RX ADMIN — Medication 10 MILLIGRAM(S): at 21:29

## 2022-01-30 RX ADMIN — OXYCODONE HYDROCHLORIDE 5 MILLIGRAM(S): 5 TABLET ORAL at 08:00

## 2022-01-30 RX ADMIN — POLYETHYLENE GLYCOL 3350 17 GRAM(S): 17 POWDER, FOR SOLUTION ORAL at 17:23

## 2022-01-30 RX ADMIN — Medication 650 MILLIGRAM(S): at 11:38

## 2022-01-30 RX ADMIN — CEFTRIAXONE 100 MILLIGRAM(S): 500 INJECTION, POWDER, FOR SOLUTION INTRAMUSCULAR; INTRAVENOUS at 03:53

## 2022-01-30 RX ADMIN — OXYCODONE HYDROCHLORIDE 10 MILLIGRAM(S): 5 TABLET ORAL at 21:29

## 2022-01-30 RX ADMIN — Medication 10 MILLIGRAM(S): at 03:52

## 2022-01-30 RX ADMIN — MORPHINE SULFATE 2 MILLIGRAM(S): 50 CAPSULE, EXTENDED RELEASE ORAL at 03:03

## 2022-01-30 RX ADMIN — GABAPENTIN 600 MILLIGRAM(S): 400 CAPSULE ORAL at 21:29

## 2022-01-30 RX ADMIN — GABAPENTIN 300 MILLIGRAM(S): 400 CAPSULE ORAL at 14:14

## 2022-01-30 RX ADMIN — Medication 4: at 12:38

## 2022-01-30 RX ADMIN — LIDOCAINE 1 PATCH: 4 CREAM TOPICAL at 23:51

## 2022-01-30 RX ADMIN — Medication 6: at 17:41

## 2022-01-30 RX ADMIN — ENOXAPARIN SODIUM 40 MILLIGRAM(S): 100 INJECTION SUBCUTANEOUS at 21:30

## 2022-01-30 RX ADMIN — GABAPENTIN 300 MILLIGRAM(S): 400 CAPSULE ORAL at 06:13

## 2022-01-30 RX ADMIN — LOSARTAN POTASSIUM 100 MILLIGRAM(S): 100 TABLET, FILM COATED ORAL at 06:13

## 2022-01-30 RX ADMIN — OXYCODONE HYDROCHLORIDE 5 MILLIGRAM(S): 5 TABLET ORAL at 12:35

## 2022-01-30 RX ADMIN — OXYCODONE HYDROCHLORIDE 5 MILLIGRAM(S): 5 TABLET ORAL at 06:12

## 2022-01-30 RX ADMIN — Medication 4: at 08:41

## 2022-01-30 RX ADMIN — OXYCODONE HYDROCHLORIDE 10 MILLIGRAM(S): 5 TABLET ORAL at 16:50

## 2022-01-30 RX ADMIN — Medication 650 MILLIGRAM(S): at 06:13

## 2022-01-30 RX ADMIN — POLYETHYLENE GLYCOL 3350 17 GRAM(S): 17 POWDER, FOR SOLUTION ORAL at 06:13

## 2022-01-30 RX ADMIN — Medication 650 MILLIGRAM(S): at 17:23

## 2022-01-30 RX ADMIN — OXYCODONE HYDROCHLORIDE 10 MILLIGRAM(S): 5 TABLET ORAL at 15:54

## 2022-01-30 RX ADMIN — Medication 650 MILLIGRAM(S): at 23:01

## 2022-01-30 RX ADMIN — ATORVASTATIN CALCIUM 20 MILLIGRAM(S): 80 TABLET, FILM COATED ORAL at 21:29

## 2022-01-30 RX ADMIN — MORPHINE SULFATE 2 MILLIGRAM(S): 50 CAPSULE, EXTENDED RELEASE ORAL at 02:47

## 2022-01-30 RX ADMIN — LIDOCAINE 1 PATCH: 4 CREAM TOPICAL at 18:10

## 2022-01-30 RX ADMIN — Medication 10 MILLIGRAM(S): at 14:14

## 2022-01-30 RX ADMIN — OXYCODONE HYDROCHLORIDE 5 MILLIGRAM(S): 5 TABLET ORAL at 11:41

## 2022-01-30 RX ADMIN — SENNA PLUS 2 TABLET(S): 8.6 TABLET ORAL at 21:29

## 2022-01-30 RX ADMIN — INSULIN GLARGINE 15 UNIT(S): 100 INJECTION, SOLUTION SUBCUTANEOUS at 22:24

## 2022-01-30 RX ADMIN — LIDOCAINE 1 PATCH: 4 CREAM TOPICAL at 11:38

## 2022-01-30 NOTE — PROGRESS NOTE ADULT - SUBJECTIVE AND OBJECTIVE BOX
***INCOMPLETE NOTE***  Antelmo Dixon MD PGY-3  Internal Medicine  Pager [phone] / [phone]  After 7pm please page Night Float [phone]    Patient is a 46y old  Female who presents with a chief complaint of back pain (29 Jan 2022 17:35)      SUBJECTIVE / OVERNIGHT EVENTS:    MEDICATIONS  (STANDING):  acetaminophen     Tablet .. 650 milliGRAM(s) Oral every 6 hours  amLODIPine   Tablet 10 milliGRAM(s) Oral daily  atorvastatin 20 milliGRAM(s) Oral at bedtime  baclofen 10 milliGRAM(s) Oral three times a day  cefTRIAXone   IVPB 1000 milliGRAM(s) IV Intermittent every 24 hours  cyanocobalamin 1000 MICROGram(s) Oral daily  dextrose 40% Gel 15 Gram(s) Oral once  dextrose 5%. 1000 milliLiter(s) (50 mL/Hr) IV Continuous <Continuous>  dextrose 5%. 1000 milliLiter(s) (100 mL/Hr) IV Continuous <Continuous>  dextrose 50% Injectable 25 Gram(s) IV Push once  dextrose 50% Injectable 12.5 Gram(s) IV Push once  dextrose 50% Injectable 25 Gram(s) IV Push once  enoxaparin Injectable 40 milliGRAM(s) SubCutaneous at bedtime  gabapentin 300 milliGRAM(s) Oral three times a day  glucagon  Injectable 1 milliGRAM(s) IntraMuscular once  influenza   Vaccine 0.5 milliLiter(s) IntraMuscular once  insulin lispro (ADMELOG) corrective regimen sliding scale   SubCutaneous three times a day before meals  insulin lispro (ADMELOG) corrective regimen sliding scale   SubCutaneous at bedtime  lidocaine   4% Patch 1 Patch Transdermal daily  losartan 100 milliGRAM(s) Oral daily  polyethylene glycol 3350 17 Gram(s) Oral two times a day  senna 2 Tablet(s) Oral at bedtime    MEDICATIONS  (PRN):  acetaminophen     Tablet .. 650 milliGRAM(s) Oral every 6 hours PRN Temp greater or equal to 38C (100.4F), Mild Pain (1 - 3)  LORazepam     Tablet 0.5 milliGRAM(s) Oral every 12 hours PRN Anxiety  morphine  - Injectable 2 milliGRAM(s) IV Push every 4 hours PRN Severe Pain (7 - 10)  ondansetron Injectable 4 milliGRAM(s) IV Push every 8 hours PRN Nausea and/or Vomiting  oxyCODONE    IR 5 milliGRAM(s) Oral every 4 hours PRN Moderate Pain (4 - 6)      CAPILLARY BLOOD GLUCOSE      POCT Blood Glucose.: 217 mg/dL (29 Jan 2022 21:17)  POCT Blood Glucose.: 299 mg/dL (29 Jan 2022 17:34)  POCT Blood Glucose.: 219 mg/dL (29 Jan 2022 12:14)  POCT Blood Glucose.: 225 mg/dL (29 Jan 2022 08:26)    I&O's Summary      PHYSICAL EXAM:  Vital Signs Last 24 Hrs  T(C): 36.7 (01-30-22 @ 06:10)  T(F): 98 (01-30-22 @ 06:10), Max: 98.9 (01-29-22 @ 10:09)  HR: 88 (01-30-22 @ 06:10) (88 - 100)  BP: 148/98 (01-30-22 @ 06:10)  BP(mean): --  RR: 18 (01-30-22 @ 06:10) (17 - 19)  SpO2: 100% (01-30-22 @ 06:10) (100% - 100%)  Wt(kg): --    Constitutional: NAD, awake and alert  EYES: EOMI  ENT:  Normal Hearing, no tonsillar exudates   Neck: Soft and supple , no thyromegaly   Respiratory: Breath sounds are clear bilaterally, No wheezing, rales or rhonchi  Cardiovascular: S1 and S2, regular rate and rhythm, no Murmurs, gallops or rubs, no JVD,    Gastrointestinal: Bowel Sounds present, soft, nontender, nondistended, no guarding, no rebound  Extremities: No cyanosis or clubbing; warm to touch  Vascular: 2+ peripheral pulses lower ex  Neurological: No focal deficits, CN II-XII intact bilaterally, sensation to light touch intact in all extremities, gait intact. Pupils are equally reactive to light and symmetrical in size.   Musculoskeletal: 5/5 strength b/l upper and lower extremities; no joint swelling.  Skin: No rashes  Psych: no depression or anhedonia, AAOx3  HEME: no bruises, no nose bleeds      LABS:                        10.7   4.07  )-----------( 268      ( 30 Jan 2022 07:10 )             32.2     01-28    134<L>  |  96<L>  |  12  ----------------------------<  210<H>  3.9   |  27  |  0.68    Ca    9.6      28 Jan 2022 09:15  Phos  4.1     01-28  Mg     1.90     01-28                RADIOLOGY & ADDITIONAL TESTS:    Imaging Personally Reviewed:    Consultant(s) Notes Reviewed:      Care Discussed with Consultants/Other Providers:   Antelmo Dixon MD PGY-3  Internal Medicine  Pager [phone] / [phone]  After 7pm please page Night Float [phone]    Patient is a 46y old  Female who presents with a chief complaint of back pain (29 Jan 2022 17:35)    SUBJECTIVE / OVERNIGHT EVENTS:  No acute events overnight. Continues to have bilateral lower extremity pain and subjective weakness. She reports that pain was relatively well controlled yesterday until the evening, at which point it got worse. Reports she is walking. Has had 3 BMs yesterday, not liquid.   Denies nausea, vomiting, constipation, diarrhea, fevers, chills, chest pain, SOB.    MEDICATIONS  (STANDING):  acetaminophen     Tablet .. 650 milliGRAM(s) Oral every 6 hours  amLODIPine   Tablet 10 milliGRAM(s) Oral daily  atorvastatin 20 milliGRAM(s) Oral at bedtime  baclofen 10 milliGRAM(s) Oral three times a day  cefTRIAXone   IVPB 1000 milliGRAM(s) IV Intermittent every 24 hours  cyanocobalamin 1000 MICROGram(s) Oral daily  dextrose 40% Gel 15 Gram(s) Oral once  dextrose 5%. 1000 milliLiter(s) (50 mL/Hr) IV Continuous <Continuous>  dextrose 5%. 1000 milliLiter(s) (100 mL/Hr) IV Continuous <Continuous>  dextrose 50% Injectable 25 Gram(s) IV Push once  dextrose 50% Injectable 12.5 Gram(s) IV Push once  dextrose 50% Injectable 25 Gram(s) IV Push once  enoxaparin Injectable 40 milliGRAM(s) SubCutaneous at bedtime  gabapentin 300 milliGRAM(s) Oral three times a day  glucagon  Injectable 1 milliGRAM(s) IntraMuscular once  influenza   Vaccine 0.5 milliLiter(s) IntraMuscular once  insulin lispro (ADMELOG) corrective regimen sliding scale   SubCutaneous three times a day before meals  insulin lispro (ADMELOG) corrective regimen sliding scale   SubCutaneous at bedtime  lidocaine   4% Patch 1 Patch Transdermal daily  losartan 100 milliGRAM(s) Oral daily  polyethylene glycol 3350 17 Gram(s) Oral two times a day  senna 2 Tablet(s) Oral at bedtime    MEDICATIONS  (PRN):  acetaminophen     Tablet .. 650 milliGRAM(s) Oral every 6 hours PRN Temp greater or equal to 38C (100.4F), Mild Pain (1 - 3)  LORazepam     Tablet 0.5 milliGRAM(s) Oral every 12 hours PRN Anxiety  morphine  - Injectable 2 milliGRAM(s) IV Push every 4 hours PRN Severe Pain (7 - 10)  ondansetron Injectable 4 milliGRAM(s) IV Push every 8 hours PRN Nausea and/or Vomiting  oxyCODONE    IR 5 milliGRAM(s) Oral every 4 hours PRN Moderate Pain (4 - 6)      CAPILLARY BLOOD GLUCOSE      POCT Blood Glucose.: 217 mg/dL (29 Jan 2022 21:17)  POCT Blood Glucose.: 299 mg/dL (29 Jan 2022 17:34)  POCT Blood Glucose.: 219 mg/dL (29 Jan 2022 12:14)  POCT Blood Glucose.: 225 mg/dL (29 Jan 2022 08:26)    I&O's Summary      PHYSICAL EXAM:  Vital Signs Last 24 Hrs  T(C): 36.7 (01-30-22 @ 06:10)  T(F): 98 (01-30-22 @ 06:10), Max: 98.9 (01-29-22 @ 10:09)  HR: 88 (01-30-22 @ 06:10) (88 - 100)  BP: 148/98 (01-30-22 @ 06:10)  BP(mean): --  RR: 18 (01-30-22 @ 06:10) (17 - 19)  SpO2: 100% (01-30-22 @ 06:10) (100% - 100%)  Wt(kg): --    Constitutional: NAD, awake and alert, wincing in pain  EYES: EOMI  ENT:  Normal Hearing  Respiratory: Breath sounds are clear bilaterally, No wheezing, rales or rhonchi  Cardiovascular: S1 and S2, regular rate and rhythm, no Murmurs, gallops or rubs  Gastrointestinal: Abdomen soft, nontender, nondistended  Extremities: No cyanosis or clubbing; warm to touch, no LE edema  Vascular: 2+ peripheral pulses lower ex  Neurological: CN II-XII intact bilaterally, sensation to light touch intact in all extremities, gait unsteady. Pupils are equally reactive to light and symmetrical in size. Patellar and achilles reflexes diminished bilaterally.  Musculoskeletal: 5/5 strength b/l upper extremities; 4/5 strength RLE, 5/5 strength LLE  Skin: No rashes  HEME: no bruises, no nose bleeds    LABS:                        10.7   4.07  )-----------( 268      ( 30 Jan 2022 07:10 )             32.2     01-28    134<L>  |  96<L>  |  12  ----------------------------<  210<H>  3.9   |  27  |  0.68    Ca    9.6      28 Jan 2022 09:15  Phos  4.1     01-28  Mg     1.90     01-28      RADIOLOGY & ADDITIONAL TESTS:    Imaging Personally Reviewed:    Consultant(s) Notes Reviewed:    Heme/Onc    Care Discussed with Consultants/Other Providers:

## 2022-01-30 NOTE — PROGRESS NOTE ADULT - PROBLEM SELECTOR PLAN 1
P/w 3 weeks of worsening b/l hip/lower back pain radiating down lateral thighs. CT non con from OSH negative for fractures, dislocation, subluxation, mets. X ray of lumbosacral spine also neg. DDx herniated disc vs spinal stenosis vs metastasis vs infection. Cord compression must be ruled out given weakness, urine retention, and difficulty ambulating.  -MRI lumbar: no signs of chord compression  - NSGY: signing off  - Morphine 2mg q4h for pain. Can increase if needed  -F/u bladder scan  - pending Heme/Onc eval for possible chemo side effect  -c/w lidocaine patch  - Bowel regimen P/w 3 weeks of worsening b/l hip/lower back pain radiating down lateral thighs. CT non con from OSH negative for fractures, dislocation, subluxation, mets. X ray of lumbosacral spine also neg. DDx herniated disc vs spinal stenosis vs metastasis vs infection. Cord compression must be ruled out given weakness, urine retention, and difficulty ambulating. Likely 2/2 cisplatin therapy  -MRI lumbar: no signs of chord compression  - NSGY: signing off  - Oxy IR 5mg q4h for moderate pain, Oxy IR 10mg q4h for severe pain, IV morphine IR 2mg q4h for breakthrough  -c/w lidocaine patch  - Bowel regimen

## 2022-01-30 NOTE — PROGRESS NOTE ADULT - PROBLEM SELECTOR PLAN 6
On metformin 500mg BID at home. A1c 7.0  -Low ISS, monitor FSG On metformin 500mg BID at home. A1c 7.0  -Moderate ISS, monitor FSG  -Adding lantus due to suboptimal control. Titrate pRN.

## 2022-01-30 NOTE — PROGRESS NOTE ADULT - PROBLEM SELECTOR PLAN 3
Diagnosed 06/2021, cervical adenocarcinoma Stage 2B, s/p cisplatin and RT 11/2021. Follows at [facility], Rad Onc Dr. Maria Luisa Germain, Med Onc Dr. CHU Dinh  -Pt noted bloody vaginal discharge and some clots 2 weeks ago, currently does not have bloody discharge  -Possibly from cancer vs other abnormal uterine bleed (MR pelvis 10/2021 shows L hydrosalpinx and hemorrhagic cysts w/ small endometriosis, R cyst resolved)  - Heme/Onc consult  -Outpatient follow up for cancer

## 2022-01-30 NOTE — PROGRESS NOTE ADULT - PROBLEM SELECTOR PLAN 2
P/w 3 weeks of worsening b/l feet numbness on dorsal side, with L worse than R, and reaching up to L mid calf. DDx radicular pain vs chemotherapy induced peripheral neuropathy given recent completion of cisplatin therapy vs diabetes neuropathy  -Gabapentin 300mg TID  - folate WNL, B12 lower end of normal, will start on Vit B12 1000mcg daily  -F/u B12 and folate levels  -PT eval - home w/ outpatient PT P/w 3 weeks of worsening b/l feet numbness on dorsal side, with L worse than R, and reaching up to L mid calf. DDx radicular pain vs chemotherapy induced peripheral neuropathy given recent completion of cisplatin therapy vs diabetes neuropathy  - Increase gabapentin to 300 mg AM & PM, and 600mg qhs  - folate WNL, B12 lower end of normal, continue with Vit B12 1000mcg daily  -PT eval - home w/ outpatient PT, should continue to work with patient during hospitalization

## 2022-01-30 NOTE — PROGRESS NOTE ADULT - ATTENDING COMMENTS
46F with PMH of cervical ca, HTN, HLD, DM2 presenting w/ worsening radiculopathy. Pt seen at bedside, reports bilateral buttock pain that radiates down her legs. No bowel/urinary issues. Neurosurgery on board.  MRI of T/L spine done. No cord compression noted. Disc bulge noted at L3-5. This correlates to her exam findings.     Pt seen at bedside. Still c/o of periodic pain and spasms. Still w/ weakness noted particularly when walking. Otherwise, voiding w/o issues.     -Pain suboptimally controlled - cont lidocaine patch, tylenol, baclofen. Would increase oxycodone PRN to 10mg. Can keep IV morphine for now.   -Consider pall care input if pain remains poorly controlled.  Rest of plan as above.

## 2022-01-30 NOTE — PROGRESS NOTE ADULT - PROBLEM SELECTOR PLAN 5
Hg 10 at admission, baseline 9-11. Iron studies suggest iron deficiency anemia.  - Monitor for bleeding given cervical CA and recent vaginal bleed.  - Ultimately will start iron supplementation Hg 10 at admission, baseline 9-11. Iron studies suggest iron deficiency anemia.  - Monitor for bleeding given cervical CA and recent vaginal bleed.

## 2022-01-30 NOTE — PROGRESS NOTE ADULT - ASSESSMENT
46F PMH cervical cancer (Stage 2B, s/p cisplatin and RT), HTN, HLD, T2DM p/w 3 weeks of worsening b/l hip/lower back pain and neuropathy concerning for possible cord compression. MRI done showing no cord compression, now pending Heme/Onc eval for possible chemo side effect.     46F PMH cervical cancer (Stage 2B, s/p cisplatin and RT), HTN, HLD, T2DM p/w 3 weeks of worsening b/l hip/lower back pain and neuropathy concerning for possible cord compression. MRI done showing no cord compression, likely adverse effect 2/2 Cisplatin therapy

## 2022-01-31 LAB
GLUCOSE BLDC GLUCOMTR-MCNC: 207 MG/DL — HIGH (ref 70–99)
GLUCOSE BLDC GLUCOMTR-MCNC: 210 MG/DL — HIGH (ref 70–99)
GLUCOSE BLDC GLUCOMTR-MCNC: 274 MG/DL — HIGH (ref 70–99)
GLUCOSE BLDC GLUCOMTR-MCNC: 338 MG/DL — HIGH (ref 70–99)

## 2022-01-31 PROCEDURE — 99232 SBSQ HOSP IP/OBS MODERATE 35: CPT

## 2022-01-31 PROCEDURE — 99233 SBSQ HOSP IP/OBS HIGH 50: CPT | Mod: GC

## 2022-01-31 RX ORDER — INSULIN LISPRO 100/ML
3 VIAL (ML) SUBCUTANEOUS
Refills: 0 | Status: DISCONTINUED | OUTPATIENT
Start: 2022-01-31 | End: 2022-01-31

## 2022-01-31 RX ORDER — MORPHINE SULFATE 50 MG/1
30 CAPSULE, EXTENDED RELEASE ORAL ONCE
Refills: 0 | Status: DISCONTINUED | OUTPATIENT
Start: 2022-01-31 | End: 2022-01-31

## 2022-01-31 RX ORDER — INSULIN LISPRO 100/ML
4 VIAL (ML) SUBCUTANEOUS
Refills: 0 | Status: DISCONTINUED | OUTPATIENT
Start: 2022-01-31 | End: 2022-02-01

## 2022-01-31 RX ORDER — MORPHINE SULFATE 50 MG/1
30 CAPSULE, EXTENDED RELEASE ORAL EVERY 12 HOURS
Refills: 0 | Status: DISCONTINUED | OUTPATIENT
Start: 2022-01-31 | End: 2022-02-01

## 2022-01-31 RX ADMIN — Medication 10 MILLIGRAM(S): at 12:57

## 2022-01-31 RX ADMIN — OXYCODONE HYDROCHLORIDE 10 MILLIGRAM(S): 5 TABLET ORAL at 01:32

## 2022-01-31 RX ADMIN — Medication 4: at 17:57

## 2022-01-31 RX ADMIN — Medication 650 MILLIGRAM(S): at 17:44

## 2022-01-31 RX ADMIN — OXYCODONE HYDROCHLORIDE 10 MILLIGRAM(S): 5 TABLET ORAL at 06:22

## 2022-01-31 RX ADMIN — Medication 2: at 22:34

## 2022-01-31 RX ADMIN — OXYCODONE HYDROCHLORIDE 10 MILLIGRAM(S): 5 TABLET ORAL at 11:10

## 2022-01-31 RX ADMIN — CEFTRIAXONE 100 MILLIGRAM(S): 500 INJECTION, POWDER, FOR SOLUTION INTRAMUSCULAR; INTRAVENOUS at 03:13

## 2022-01-31 RX ADMIN — GABAPENTIN 300 MILLIGRAM(S): 400 CAPSULE ORAL at 05:52

## 2022-01-31 RX ADMIN — Medication 4: at 09:17

## 2022-01-31 RX ADMIN — GABAPENTIN 300 MILLIGRAM(S): 400 CAPSULE ORAL at 12:55

## 2022-01-31 RX ADMIN — LOSARTAN POTASSIUM 100 MILLIGRAM(S): 100 TABLET, FILM COATED ORAL at 05:52

## 2022-01-31 RX ADMIN — OXYCODONE HYDROCHLORIDE 10 MILLIGRAM(S): 5 TABLET ORAL at 05:52

## 2022-01-31 RX ADMIN — Medication 10 MILLIGRAM(S): at 21:37

## 2022-01-31 RX ADMIN — OXYCODONE HYDROCHLORIDE 10 MILLIGRAM(S): 5 TABLET ORAL at 23:41

## 2022-01-31 RX ADMIN — OXYCODONE HYDROCHLORIDE 10 MILLIGRAM(S): 5 TABLET ORAL at 18:30

## 2022-01-31 RX ADMIN — MORPHINE SULFATE 30 MILLIGRAM(S): 50 CAPSULE, EXTENDED RELEASE ORAL at 21:37

## 2022-01-31 RX ADMIN — Medication 650 MILLIGRAM(S): at 13:00

## 2022-01-31 RX ADMIN — Medication 8: at 12:46

## 2022-01-31 RX ADMIN — Medication 10 MILLIGRAM(S): at 05:52

## 2022-01-31 RX ADMIN — GABAPENTIN 600 MILLIGRAM(S): 400 CAPSULE ORAL at 21:38

## 2022-01-31 RX ADMIN — OXYCODONE HYDROCHLORIDE 10 MILLIGRAM(S): 5 TABLET ORAL at 02:02

## 2022-01-31 RX ADMIN — POLYETHYLENE GLYCOL 3350 17 GRAM(S): 17 POWDER, FOR SOLUTION ORAL at 05:53

## 2022-01-31 RX ADMIN — OXYCODONE HYDROCHLORIDE 10 MILLIGRAM(S): 5 TABLET ORAL at 17:41

## 2022-01-31 RX ADMIN — OXYCODONE HYDROCHLORIDE 10 MILLIGRAM(S): 5 TABLET ORAL at 22:41

## 2022-01-31 RX ADMIN — Medication 650 MILLIGRAM(S): at 05:52

## 2022-01-31 RX ADMIN — PREGABALIN 1000 MICROGRAM(S): 225 CAPSULE ORAL at 12:57

## 2022-01-31 RX ADMIN — SENNA PLUS 2 TABLET(S): 8.6 TABLET ORAL at 21:38

## 2022-01-31 RX ADMIN — MORPHINE SULFATE 30 MILLIGRAM(S): 50 CAPSULE, EXTENDED RELEASE ORAL at 22:37

## 2022-01-31 RX ADMIN — LIDOCAINE 1 PATCH: 4 CREAM TOPICAL at 13:02

## 2022-01-31 RX ADMIN — INSULIN GLARGINE 15 UNIT(S): 100 INJECTION, SOLUTION SUBCUTANEOUS at 22:33

## 2022-01-31 RX ADMIN — AMLODIPINE BESYLATE 10 MILLIGRAM(S): 2.5 TABLET ORAL at 05:52

## 2022-01-31 RX ADMIN — POLYETHYLENE GLYCOL 3350 17 GRAM(S): 17 POWDER, FOR SOLUTION ORAL at 17:46

## 2022-01-31 RX ADMIN — ATORVASTATIN CALCIUM 20 MILLIGRAM(S): 80 TABLET, FILM COATED ORAL at 21:38

## 2022-01-31 RX ADMIN — ENOXAPARIN SODIUM 40 MILLIGRAM(S): 100 INJECTION SUBCUTANEOUS at 21:38

## 2022-01-31 RX ADMIN — OXYCODONE HYDROCHLORIDE 10 MILLIGRAM(S): 5 TABLET ORAL at 10:37

## 2022-01-31 NOTE — PROGRESS NOTE ADULT - PROBLEM SELECTOR PLAN 1
P/w 3 weeks of worsening b/l hip/lower back pain radiating down lateral thighs. CT non con from OSH negative for fractures, dislocation, subluxation, mets. X ray of lumbosacral spine also neg. DDx herniated disc vs spinal stenosis vs metastasis vs infection. Cord compression must be ruled out given weakness, urine retention, and difficulty ambulating. Likely 2/2 cisplatin therapy  -MRI lumbar: no signs of chord compression  - NSGY: signing off  - Oxy IR 5mg q4h for moderate pain, Oxy IR 10mg q4h for severe pain, IV morphine IR 2mg q4h for breakthrough  -c/w lidocaine patch  - Bowel regimen P/w 3 weeks of worsening b/l hip/lower back pain radiating down lateral thighs. CT non con from OSH negative for fractures, dislocation, subluxation, mets. X ray of lumbosacral spine also neg. DDx herniated disc vs spinal stenosis vs metastasis vs infection. Cord compression must be ruled out given weakness, urine retention, and difficulty ambulating. Likely 2/2 cisplatin therapy  - MRI lumbar: no signs of cord compression  - NSGY: signed off  - Will start long acting MS Contin 30mg BID with oxycodone for breakthrough  - c/w lidocaine patch  - Bowel regimen

## 2022-01-31 NOTE — PROGRESS NOTE ADULT - PROBLEM SELECTOR PLAN 5
Hg 10 at admission, baseline 9-11. Iron studies suggest iron deficiency anemia.  - Monitor for bleeding given cervical CA and recent vaginal bleed.

## 2022-01-31 NOTE — REASON FOR VISIT
[Follow-Up Visit] : a follow-up [Emergent Follow-Up] : an emergent follow-up visit for [[email or web address]] : cervical cancer.

## 2022-01-31 NOTE — PROGRESS NOTE ADULT - SUBJECTIVE AND OBJECTIVE BOX
***INCOMPLETE NOTE***  Antelmo Dixon MD PGY-3  Internal Medicine  Pager [phone] / [phone]  After 7pm please page Night Float [phone] or [phone]    Patient is a 46y old  Female who presents with a chief complaint of back pain (30 Jan 2022 08:15)      SUBJECTIVE / OVERNIGHT EVENTS:    MEDICATIONS  (STANDING):  acetaminophen     Tablet .. 650 milliGRAM(s) Oral every 6 hours  amLODIPine   Tablet 10 milliGRAM(s) Oral daily  atorvastatin 20 milliGRAM(s) Oral at bedtime  baclofen 10 milliGRAM(s) Oral three times a day  cyanocobalamin 1000 MICROGram(s) Oral daily  dextrose 40% Gel 15 Gram(s) Oral once  dextrose 5%. 1000 milliLiter(s) (50 mL/Hr) IV Continuous <Continuous>  dextrose 5%. 1000 milliLiter(s) (100 mL/Hr) IV Continuous <Continuous>  dextrose 50% Injectable 25 Gram(s) IV Push once  dextrose 50% Injectable 12.5 Gram(s) IV Push once  dextrose 50% Injectable 25 Gram(s) IV Push once  enoxaparin Injectable 40 milliGRAM(s) SubCutaneous at bedtime  gabapentin 300 milliGRAM(s) Oral <User Schedule>  gabapentin 600 milliGRAM(s) Oral at bedtime  glucagon  Injectable 1 milliGRAM(s) IntraMuscular once  influenza   Vaccine 0.5 milliLiter(s) IntraMuscular once  insulin glargine Injectable (LANTUS) 15 Unit(s) SubCutaneous at bedtime  insulin lispro (ADMELOG) corrective regimen sliding scale   SubCutaneous three times a day before meals  insulin lispro (ADMELOG) corrective regimen sliding scale   SubCutaneous at bedtime  lidocaine   4% Patch 1 Patch Transdermal daily  losartan 100 milliGRAM(s) Oral daily  polyethylene glycol 3350 17 Gram(s) Oral two times a day  senna 2 Tablet(s) Oral at bedtime    MEDICATIONS  (PRN):  acetaminophen     Tablet .. 650 milliGRAM(s) Oral every 6 hours PRN Temp greater or equal to 38C (100.4F), Mild Pain (1 - 3)  LORazepam     Tablet 0.5 milliGRAM(s) Oral every 12 hours PRN Anxiety  morphine  - Injectable 2 milliGRAM(s) IV Push every 4 hours PRN breakthrough pain  ondansetron Injectable 4 milliGRAM(s) IV Push every 8 hours PRN Nausea and/or Vomiting  oxyCODONE    IR 5 milliGRAM(s) Oral every 4 hours PRN Moderate Pain (4 - 6)  oxyCODONE    IR 10 milliGRAM(s) Oral every 4 hours PRN Severe Pain (7 - 10)      CAPILLARY BLOOD GLUCOSE      POCT Blood Glucose.: 197 mg/dL (30 Jan 2022 21:38)  POCT Blood Glucose.: 288 mg/dL (30 Jan 2022 17:36)  POCT Blood Glucose.: 248 mg/dL (30 Jan 2022 12:15)  POCT Blood Glucose.: 217 mg/dL (30 Jan 2022 08:25)    I&O's Summary      PHYSICAL EXAM:  Vital Signs Last 24 Hrs  T(C): 36.3 (01-31-22 @ 05:50)  T(F): 97.3 (01-31-22 @ 05:50), Max: 97.9 (01-30-22 @ 10:05)  HR: 76 (01-31-22 @ 05:50) (76 - 97)  BP: 145/85 (01-31-22 @ 05:50)  BP(mean): --  RR: 18 (01-31-22 @ 05:50) (16 - 18)  SpO2: 100% (01-31-22 @ 05:50) (100% - 100%)  Wt(kg): --    Constitutional: NAD, awake and alert  EYES: EOMI  ENT:  Normal Hearing, no tonsillar exudates   Neck: Soft and supple , no thyromegaly   Respiratory: Breath sounds are clear bilaterally, No wheezing, rales or rhonchi  Cardiovascular: S1 and S2, regular rate and rhythm, no Murmurs, gallops or rubs, no JVD,    Gastrointestinal: Bowel Sounds present, soft, nontender, nondistended, no guarding, no rebound  Extremities: No cyanosis or clubbing; warm to touch  Vascular: 2+ peripheral pulses lower ex  Neurological: No focal deficits, CN II-XII intact bilaterally, sensation to light touch intact in all extremities, gait intact. Pupils are equally reactive to light and symmetrical in size.   Musculoskeletal: 5/5 strength b/l upper and lower extremities; no joint swelling.  Skin: No rashes  Psych: no depression or anhedonia, AAOx3  HEME: no bruises, no nose bleeds      LABS:                        10.7   4.07  )-----------( 268      ( 30 Jan 2022 07:10 )             32.2     01-30    132<L>  |  97<L>  |  11  ----------------------------<  231<H>  4.0   |  24  |  0.60    Ca    9.6      30 Jan 2022 07:10  Phos  3.9     01-30  Mg     1.90     01-30    TPro  7.8  /  Alb  3.9  /  TBili  0.2  /  DBili  x   /  AST  15  /  ALT  16  /  AlkPhos  130<H>  01-30              RADIOLOGY & ADDITIONAL TESTS:    Imaging Personally Reviewed:    Consultant(s) Notes Reviewed:      Care Discussed with Consultants/Other Providers:   ***INCOMPLETE NOTE***  Antelmo Dixon MD PGY-3  Internal Medicine  Pager [phone] / [phone]  After 7pm please page Night Float [phone] or [phone]    Patient is a 46y old  Female who presents with a chief complaint of back pain (30 Jan 2022 08:15)    SUBJECTIVE / OVERNIGHT EVENTS:  No acute events overnight. Reports her pain is controlled when she gets oxycodone 10mg, however she requires it every 4 hours. At the peak of relief, she describes her pain as 2/10, at the peak of her pain (at the time of dosing) she describes her pain as 5/10, which is intolerable for her. She had one soft BM yesterday.  Denies nausea, vomiting, constipation, diarrhea, fevers, chills, chest pain, SOB.    MEDICATIONS  (STANDING):  acetaminophen     Tablet .. 650 milliGRAM(s) Oral every 6 hours  amLODIPine   Tablet 10 milliGRAM(s) Oral daily  atorvastatin 20 milliGRAM(s) Oral at bedtime  baclofen 10 milliGRAM(s) Oral three times a day  cyanocobalamin 1000 MICROGram(s) Oral daily  dextrose 40% Gel 15 Gram(s) Oral once  dextrose 5%. 1000 milliLiter(s) (50 mL/Hr) IV Continuous <Continuous>  dextrose 5%. 1000 milliLiter(s) (100 mL/Hr) IV Continuous <Continuous>  dextrose 50% Injectable 25 Gram(s) IV Push once  dextrose 50% Injectable 12.5 Gram(s) IV Push once  dextrose 50% Injectable 25 Gram(s) IV Push once  enoxaparin Injectable 40 milliGRAM(s) SubCutaneous at bedtime  gabapentin 300 milliGRAM(s) Oral <User Schedule>  gabapentin 600 milliGRAM(s) Oral at bedtime  glucagon  Injectable 1 milliGRAM(s) IntraMuscular once  influenza   Vaccine 0.5 milliLiter(s) IntraMuscular once  insulin glargine Injectable (LANTUS) 15 Unit(s) SubCutaneous at bedtime  insulin lispro (ADMELOG) corrective regimen sliding scale   SubCutaneous three times a day before meals  insulin lispro (ADMELOG) corrective regimen sliding scale   SubCutaneous at bedtime  lidocaine   4% Patch 1 Patch Transdermal daily  losartan 100 milliGRAM(s) Oral daily  polyethylene glycol 3350 17 Gram(s) Oral two times a day  senna 2 Tablet(s) Oral at bedtime    MEDICATIONS  (PRN):  acetaminophen     Tablet .. 650 milliGRAM(s) Oral every 6 hours PRN Temp greater or equal to 38C (100.4F), Mild Pain (1 - 3)  LORazepam     Tablet 0.5 milliGRAM(s) Oral every 12 hours PRN Anxiety  morphine  - Injectable 2 milliGRAM(s) IV Push every 4 hours PRN breakthrough pain  ondansetron Injectable 4 milliGRAM(s) IV Push every 8 hours PRN Nausea and/or Vomiting  oxyCODONE    IR 5 milliGRAM(s) Oral every 4 hours PRN Moderate Pain (4 - 6)  oxyCODONE    IR 10 milliGRAM(s) Oral every 4 hours PRN Severe Pain (7 - 10)      CAPILLARY BLOOD GLUCOSE      POCT Blood Glucose.: 197 mg/dL (30 Jan 2022 21:38)  POCT Blood Glucose.: 288 mg/dL (30 Jan 2022 17:36)  POCT Blood Glucose.: 248 mg/dL (30 Jan 2022 12:15)  POCT Blood Glucose.: 217 mg/dL (30 Jan 2022 08:25)    I&O's Summary      PHYSICAL EXAM:  Vital Signs Last 24 Hrs  T(C): 36.3 (01-31-22 @ 05:50)  T(F): 97.3 (01-31-22 @ 05:50), Max: 97.9 (01-30-22 @ 10:05)  HR: 76 (01-31-22 @ 05:50) (76 - 97)  BP: 145/85 (01-31-22 @ 05:50)  BP(mean): --  RR: 18 (01-31-22 @ 05:50) (16 - 18)  SpO2: 100% (01-31-22 @ 05:50) (100% - 100%)  Wt(kg): --    Constitutional: NAD, awake and alert  EYES: EOMI  ENT:  Normal Hearing, no tonsillar exudates   Neck: Soft and supple , no thyromegaly   Respiratory: Breath sounds are clear bilaterally, No wheezing, rales or rhonchi  Cardiovascular: S1 and S2, regular rate and rhythm, no Murmurs, gallops or rubs, no JVD,    Gastrointestinal: Bowel Sounds present, soft, nontender, nondistended, no guarding, no rebound  Extremities: No cyanosis or clubbing; warm to touch  Vascular: 2+ peripheral pulses lower ex  Neurological: No focal deficits, CN II-XII intact bilaterally, sensation to light touch intact in all extremities, gait intact. Pupils are equally reactive to light and symmetrical in size.   Musculoskeletal: 5/5 strength b/l upper and lower extremities; no joint swelling.  Skin: No rashes  Psych: no depression or anhedonia, AAOx3  HEME: no bruises, no nose bleeds      LABS:                        10.7   4.07  )-----------( 268      ( 30 Jan 2022 07:10 )             32.2     01-30    132<L>  |  97<L>  |  11  ----------------------------<  231<H>  4.0   |  24  |  0.60    Ca    9.6      30 Jan 2022 07:10  Phos  3.9     01-30  Mg     1.90     01-30    TPro  7.8  /  Alb  3.9  /  TBili  0.2  /  DBili  x   /  AST  15  /  ALT  16  /  AlkPhos  130<H>  01-30              RADIOLOGY & ADDITIONAL TESTS:    Imaging Personally Reviewed:    Consultant(s) Notes Reviewed:      Care Discussed with Consultants/Other Providers:   Antelmo Dixon MD PGY-3  Internal Medicine  Pager [phone] / [phone]  After 7pm please page Night Float [phone] or [phone]    Patient is a 46y old  Female who presents with a chief complaint of back pain (30 Jan 2022 08:15)    SUBJECTIVE / OVERNIGHT EVENTS:  No acute events overnight. Reports her pain is controlled when she gets oxycodone 10mg, however she requires it every 4 hours. At the peak of relief, she describes her pain as 2/10, at the peak of her pain (at the time of dosing) she describes her pain as 5/10, which is intolerable for her. She had one soft BM yesterday.  Denies nausea, vomiting, constipation, diarrhea, fevers, chills, chest pain, SOB.    MEDICATIONS  (STANDING):  acetaminophen     Tablet .. 650 milliGRAM(s) Oral every 6 hours  amLODIPine   Tablet 10 milliGRAM(s) Oral daily  atorvastatin 20 milliGRAM(s) Oral at bedtime  baclofen 10 milliGRAM(s) Oral three times a day  cyanocobalamin 1000 MICROGram(s) Oral daily  dextrose 40% Gel 15 Gram(s) Oral once  dextrose 5%. 1000 milliLiter(s) (50 mL/Hr) IV Continuous <Continuous>  dextrose 5%. 1000 milliLiter(s) (100 mL/Hr) IV Continuous <Continuous>  dextrose 50% Injectable 25 Gram(s) IV Push once  dextrose 50% Injectable 12.5 Gram(s) IV Push once  dextrose 50% Injectable 25 Gram(s) IV Push once  enoxaparin Injectable 40 milliGRAM(s) SubCutaneous at bedtime  gabapentin 300 milliGRAM(s) Oral <User Schedule>  gabapentin 600 milliGRAM(s) Oral at bedtime  glucagon  Injectable 1 milliGRAM(s) IntraMuscular once  influenza   Vaccine 0.5 milliLiter(s) IntraMuscular once  insulin glargine Injectable (LANTUS) 15 Unit(s) SubCutaneous at bedtime  insulin lispro (ADMELOG) corrective regimen sliding scale   SubCutaneous three times a day before meals  insulin lispro (ADMELOG) corrective regimen sliding scale   SubCutaneous at bedtime  lidocaine   4% Patch 1 Patch Transdermal daily  losartan 100 milliGRAM(s) Oral daily  polyethylene glycol 3350 17 Gram(s) Oral two times a day  senna 2 Tablet(s) Oral at bedtime    MEDICATIONS  (PRN):  acetaminophen     Tablet .. 650 milliGRAM(s) Oral every 6 hours PRN Temp greater or equal to 38C (100.4F), Mild Pain (1 - 3)  LORazepam     Tablet 0.5 milliGRAM(s) Oral every 12 hours PRN Anxiety  morphine  - Injectable 2 milliGRAM(s) IV Push every 4 hours PRN breakthrough pain  ondansetron Injectable 4 milliGRAM(s) IV Push every 8 hours PRN Nausea and/or Vomiting  oxyCODONE    IR 5 milliGRAM(s) Oral every 4 hours PRN Moderate Pain (4 - 6)  oxyCODONE    IR 10 milliGRAM(s) Oral every 4 hours PRN Severe Pain (7 - 10)      CAPILLARY BLOOD GLUCOSE      POCT Blood Glucose.: 197 mg/dL (30 Jan 2022 21:38)  POCT Blood Glucose.: 288 mg/dL (30 Jan 2022 17:36)  POCT Blood Glucose.: 248 mg/dL (30 Jan 2022 12:15)  POCT Blood Glucose.: 217 mg/dL (30 Jan 2022 08:25)    I&O's Summary      PHYSICAL EXAM:  Vital Signs Last 24 Hrs  T(C): 36.3 (01-31-22 @ 05:50)  T(F): 97.3 (01-31-22 @ 05:50), Max: 97.9 (01-30-22 @ 10:05)  HR: 76 (01-31-22 @ 05:50) (76 - 97)  BP: 145/85 (01-31-22 @ 05:50)  BP(mean): --  RR: 18 (01-31-22 @ 05:50) (16 - 18)  SpO2: 100% (01-31-22 @ 05:50) (100% - 100%)  Wt(kg): --      Constitutional: NAD, awake and alert, wincing in pain  EYES: EOMI  ENT:  Normal Hearing  Respiratory: Breath sounds are clear bilaterally, No wheezing, rales or rhonchi  Cardiovascular: S1 and S2, regular rate and rhythm, no Murmurs, gallops or rubs  Gastrointestinal: Abdomen soft, nontender, nondistended  Extremities: No cyanosis or clubbing; warm to touch, no LE edema  Vascular: 2+ peripheral pulses lower ex  Neurological: CN II-XII intact bilaterally, sensation to light touch intact in all extremities, gait unsteady. Pupils are equally reactive to light and symmetrical in size.  Musculoskeletal: 5/5 strength b/l upper extremities; 4/5 strength RLE, 4/5 strength LLE  Skin: No rashes  HEME: no bruises, no nose bleeds    LABS:                        10.7   4.07  )-----------( 268      ( 30 Jan 2022 07:10 )             32.2     01-30    132<L>  |  97<L>  |  11  ----------------------------<  231<H>  4.0   |  24  |  0.60    Ca    9.6      30 Jan 2022 07:10  Phos  3.9     01-30  Mg     1.90     01-30    TPro  7.8  /  Alb  3.9  /  TBili  0.2  /  DBili  x   /  AST  15  /  ALT  16  /  AlkPhos  130<H>  01-30      RADIOLOGY & ADDITIONAL TESTS:    Imaging Personally Reviewed:    Consultant(s) Notes Reviewed:    Heme/Onc    Care Discussed with Consultants/Other Providers:

## 2022-01-31 NOTE — PROGRESS NOTE ADULT - PROBLEM SELECTOR PLAN 3
Diagnosed 06/2021, cervical adenocarcinoma Stage 2B, s/p cisplatin and RT 11/2021. Follows at [facility], Rad Onc Dr. Maria Luisa Germain, Med Onc Dr. CHU Dinh  -Pt noted bloody vaginal discharge and some clots 2 weeks ago, currently does not have bloody discharge  -Possibly from cancer vs other abnormal uterine bleed (MR pelvis 10/2021 shows L hydrosalpinx and hemorrhagic cysts w/ small endometriosis, R cyst resolved)  - Heme/Onc consult  -Outpatient follow up for cancer

## 2022-01-31 NOTE — PROGRESS NOTE ADULT - ATTENDING COMMENTS
Patient seen and examined with team  Agree with above A/P  46F PMH cervical cancer (Stage 2B, s/p cisplatin and RT), HTN, HLD, T2DM p/w 3 weeks of worsening b/l hip/lower back pain and neuropathy concerning for possible cord compression, in need of MRI under sedation due to severe claustrophobia.   patient notes low back pain 9/10 and uncomfortable to lay or sit- she stands but has poor gait-   Patient happy mri shows no cord compression, but still notes leg and back pain. last bm was yesterday.  PE Vital Signs Last 24 Hrs T(F): 97.6, HR: 86, BP: 120/83, RR: 18, SpO2: 97% ra  lungs ct, cor rrr, abd soft n/t, ext neg e/c/c. mild pain with SLR  rd< from: MR Thoracic Spine w/wo IV Cont (01.28.22 @ 17:03) >  EVALUATION OF INDIVIDUAL LEVELS:  T11-T12: Left central disc protrusion. No spinal canal or neuroforaminal   stenosis.  L3-L4: Disc bulge. No spinal canal or neuroforaminal stenosis.  L4-L5: Disc bulge. No spinal canal or neuroforaminal stenosis.  PARAVERTEBRAL SOFT TISSUES: The visualized paravertebral soft tissues   appear within normal limits.  Cortical defects/lobulations of the bilateral kidneys again noted.  IMPRESSION:  -No thoracic cord or cauda equina compression.  A/P  cord compression ruled out- patient has disc bulges and neuropathy sec to chemotherapy causing pain to back and legs  #pain control with gabapentin.  tylenols prn mild will start MS contin 30 mg q12 as well as using the oxy ir prn , with senna and Miralax bid for bowel regimen  # DVT proph Lovenox sq  # dvt proph lovenox sq  Plan d/w patient and team and rn

## 2022-01-31 NOTE — PROGRESS NOTE ADULT - PROBLEM SELECTOR PLAN 2
P/w 3 weeks of worsening b/l feet numbness on dorsal side, with L worse than R, and reaching up to L mid calf. DDx radicular pain vs chemotherapy induced peripheral neuropathy given recent completion of cisplatin therapy vs diabetes neuropathy  - Increase gabapentin to 300 mg AM & PM, and 600mg qhs  - folate WNL, B12 lower end of normal, continue with Vit B12 1000mcg daily  -PT eval - home w/ outpatient PT, should continue to work with patient during hospitalization P/w 3 weeks of worsening b/l feet numbness on dorsal side, with L worse than R, and reaching up to L mid calf. DDx radicular pain vs chemotherapy induced peripheral neuropathy given recent completion of cisplatin therapy vs diabetes neuropathy  - Now on gabapentin to 300 mg AM & PM, and 600mg qhs  - folate WNL, B12 lower end of normal, continue with Vit B12 1000mcg daily  -PT eval - home w/ outpatient PT, should continue to work with patient during hospitalization

## 2022-01-31 NOTE — PROGRESS NOTE ADULT - SUBJECTIVE AND OBJECTIVE BOX
INTERVAL HPI/OVERNIGHT EVENTS:  Patient seen at bedside.      VITAL SIGNS:  T(F): 97.6 (01-31-22 @ 09:00)  HR: 90 (01-31-22 @ 09:00)  BP: 126/72 (01-31-22 @ 09:00)  RR: 18 (01-31-22 @ 09:00)  SpO2: 97% (01-31-22 @ 09:00)  Wt(kg): --    PHYSICAL EXAM:    Constitutional: NAD, resting in bed comfortably  Eyes: EOMI, sclera non-icteric  Neck: supple, no LAP  Respiratory: CTA b/l, good air entry b/l, no wheezing, rhonchi or crackels  Cardiovascular: RRR, normal S1S2, no M/R/G  Gastrointestinal: soft, NTND  Extremities: no edema  Neurological: AAOx3, non focal  Skin: Normal temperature    MEDICATIONS  (STANDING):  acetaminophen     Tablet .. 650 milliGRAM(s) Oral every 6 hours  amLODIPine   Tablet 10 milliGRAM(s) Oral daily  atorvastatin 20 milliGRAM(s) Oral at bedtime  baclofen 10 milliGRAM(s) Oral three times a day  cyanocobalamin 1000 MICROGram(s) Oral daily  dextrose 40% Gel 15 Gram(s) Oral once  dextrose 5%. 1000 milliLiter(s) (50 mL/Hr) IV Continuous <Continuous>  dextrose 5%. 1000 milliLiter(s) (100 mL/Hr) IV Continuous <Continuous>  dextrose 50% Injectable 25 Gram(s) IV Push once  dextrose 50% Injectable 12.5 Gram(s) IV Push once  dextrose 50% Injectable 25 Gram(s) IV Push once  enoxaparin Injectable 40 milliGRAM(s) SubCutaneous at bedtime  gabapentin 300 milliGRAM(s) Oral <User Schedule>  gabapentin 600 milliGRAM(s) Oral at bedtime  glucagon  Injectable 1 milliGRAM(s) IntraMuscular once  influenza   Vaccine 0.5 milliLiter(s) IntraMuscular once  insulin glargine Injectable (LANTUS) 15 Unit(s) SubCutaneous at bedtime  insulin lispro (ADMELOG) corrective regimen sliding scale   SubCutaneous three times a day before meals  insulin lispro (ADMELOG) corrective regimen sliding scale   SubCutaneous at bedtime  lidocaine   4% Patch 1 Patch Transdermal daily  losartan 100 milliGRAM(s) Oral daily  morphine ER Tablet 30 milliGRAM(s) Oral every 12 hours  polyethylene glycol 3350 17 Gram(s) Oral two times a day  senna 2 Tablet(s) Oral at bedtime    MEDICATIONS  (PRN):  acetaminophen     Tablet .. 650 milliGRAM(s) Oral every 6 hours PRN Temp greater or equal to 38C (100.4F), Mild Pain (1 - 3)  LORazepam     Tablet 0.5 milliGRAM(s) Oral every 12 hours PRN Anxiety  morphine  - Injectable 2 milliGRAM(s) IV Push every 4 hours PRN breakthrough pain  ondansetron Injectable 4 milliGRAM(s) IV Push every 8 hours PRN Nausea and/or Vomiting  oxyCODONE    IR 5 milliGRAM(s) Oral every 4 hours PRN Moderate Pain (4 - 6)  oxyCODONE    IR 10 milliGRAM(s) Oral every 4 hours PRN Severe Pain (7 - 10)      Allergies    No Known Allergies    Intolerances        LABS:                        10.7   4.07  )-----------( 268      ( 30 Jan 2022 07:10 )             32.2     01-30    132<L>  |  97<L>  |  11  ----------------------------<  231<H>  4.0   |  24  |  0.60    Ca    9.6      30 Jan 2022 07:10  Phos  3.9     01-30  Mg     1.90     01-30    TPro  7.8  /  Alb  3.9  /  TBili  0.2  /  DBili  x   /  AST  15  /  ALT  16  /  AlkPhos  130<H>  01-30          RADIOLOGY & ADDITIONAL TESTS:  Studies reviewed.

## 2022-01-31 NOTE — PROGRESS NOTE ADULT - ASSESSMENT
46F PMH cervical cancer (Stage 2B, s/p cisplatin and RT), HTN, HLD, T2DM p/w 3 weeks of worsening b/l hip/lower back pain and neuropathy concerning for possible cord compression. MRI done showing no cord compression, likely adverse effect 2/2 Cisplatin therapy

## 2022-01-31 NOTE — REVIEW OF SYSTEMS
[Negative] : Constitutional [Hematuria: Grade 0] : Hematuria: Grade 0 [Urinary Incontinence: Grade 0] : Urinary Incontinence: Grade 0  [Urinary Retention: Grade 0] : Urinary Retention: Grade 0 [Urinary Tract Pain: Grade 0] : Urinary Tract Pain: Grade 0 [Urinary Urgency: Grade 0] : Urinary Urgency: Grade 0 [Urinary Frequency: Grade 0] : Urinary Frequency: Grade 0 [Anxiety: Grade 1 - Mild symptoms; intervention not indicated] : Anxiety: Grade 1 - Mild symptoms; intervention not indicated [[email or web address]] : back pain

## 2022-01-31 NOTE — PROGRESS NOTE ADULT - PROBLEM SELECTOR PLAN 6
On metformin 500mg BID at home. A1c 7.0  -Moderate ISS, monitor FSG  -Adding lantus due to suboptimal control. Titrate pRN. On metformin 500mg BID at home. A1c 7.0  -Moderate ISS, monitor FSG  -Adding lantus due to suboptimal control. Will add mealtime insulin as well

## 2022-01-31 NOTE — PROGRESS NOTE ADULT - PROBLEM SELECTOR PLAN 9
DVT prophylaxis: Lovenox   Diet: DASH/carb consistent  Bowel regimen: miralax BID, Senna  Dispo: PT recs home with outpatient PT  PCP: Dr. Remiigo Rueda

## 2022-02-01 ENCOUNTER — TRANSCRIPTION ENCOUNTER (OUTPATIENT)
Age: 47
End: 2022-02-01

## 2022-02-01 VITALS
SYSTOLIC BLOOD PRESSURE: 131 MMHG | DIASTOLIC BLOOD PRESSURE: 84 MMHG | OXYGEN SATURATION: 99 % | TEMPERATURE: 98 F | RESPIRATION RATE: 18 BRPM | HEART RATE: 84 BPM

## 2022-02-01 PROBLEM — E11.9 TYPE 2 DIABETES MELLITUS WITHOUT COMPLICATIONS: Chronic | Status: ACTIVE | Noted: 2022-01-27

## 2022-02-01 LAB
ANION GAP SERPL CALC-SCNC: 10 MMOL/L — SIGNIFICANT CHANGE UP (ref 7–14)
BUN SERPL-MCNC: 10 MG/DL — SIGNIFICANT CHANGE UP (ref 7–23)
CALCIUM SERPL-MCNC: 10.3 MG/DL — SIGNIFICANT CHANGE UP (ref 8.4–10.5)
CHLORIDE SERPL-SCNC: 98 MMOL/L — SIGNIFICANT CHANGE UP (ref 98–107)
CO2 SERPL-SCNC: 28 MMOL/L — SIGNIFICANT CHANGE UP (ref 22–31)
CREAT SERPL-MCNC: 0.65 MG/DL — SIGNIFICANT CHANGE UP (ref 0.5–1.3)
GLUCOSE BLDC GLUCOMTR-MCNC: 172 MG/DL — HIGH (ref 70–99)
GLUCOSE BLDC GLUCOMTR-MCNC: 177 MG/DL — HIGH (ref 70–99)
GLUCOSE SERPL-MCNC: 136 MG/DL — HIGH (ref 70–99)
HCT VFR BLD CALC: 30.9 % — LOW (ref 34.5–45)
HGB BLD-MCNC: 9.9 G/DL — LOW (ref 11.5–15.5)
MAGNESIUM SERPL-MCNC: 2 MG/DL — SIGNIFICANT CHANGE UP (ref 1.6–2.6)
MCHC RBC-ENTMCNC: 28 PG — SIGNIFICANT CHANGE UP (ref 27–34)
MCHC RBC-ENTMCNC: 32 GM/DL — SIGNIFICANT CHANGE UP (ref 32–36)
MCV RBC AUTO: 87.5 FL — SIGNIFICANT CHANGE UP (ref 80–100)
NRBC # BLD: 0 /100 WBCS — SIGNIFICANT CHANGE UP
NRBC # FLD: 0 K/UL — SIGNIFICANT CHANGE UP
PHOSPHATE SERPL-MCNC: 4.6 MG/DL — HIGH (ref 2.5–4.5)
PLATELET # BLD AUTO: 289 K/UL — SIGNIFICANT CHANGE UP (ref 150–400)
POTASSIUM SERPL-MCNC: 4.3 MMOL/L — SIGNIFICANT CHANGE UP (ref 3.5–5.3)
POTASSIUM SERPL-SCNC: 4.3 MMOL/L — SIGNIFICANT CHANGE UP (ref 3.5–5.3)
RBC # BLD: 3.53 M/UL — LOW (ref 3.8–5.2)
RBC # FLD: 13.5 % — SIGNIFICANT CHANGE UP (ref 10.3–14.5)
SODIUM SERPL-SCNC: 136 MMOL/L — SIGNIFICANT CHANGE UP (ref 135–145)
WBC # BLD: 3.03 K/UL — LOW (ref 3.8–10.5)
WBC # FLD AUTO: 3.03 K/UL — LOW (ref 3.8–10.5)

## 2022-02-01 PROCEDURE — 99239 HOSP IP/OBS DSCHRG MGMT >30: CPT

## 2022-02-01 RX ORDER — GABAPENTIN 400 MG/1
600 CAPSULE ORAL
Refills: 0 | Status: DISCONTINUED | OUTPATIENT
Start: 2022-02-01 | End: 2022-02-01

## 2022-02-01 RX ORDER — PREGABALIN 225 MG/1
1 CAPSULE ORAL
Qty: 30 | Refills: 0
Start: 2022-02-01 | End: 2022-03-02

## 2022-02-01 RX ORDER — MORPHINE SULFATE 50 MG/1
0.5 CAPSULE, EXTENDED RELEASE ORAL
Qty: 0 | Refills: 0 | DISCHARGE
Start: 2022-02-01 | End: 2022-03-02

## 2022-02-01 RX ORDER — SENNA PLUS 8.6 MG/1
2 TABLET ORAL
Qty: 60 | Refills: 0
Start: 2022-02-01 | End: 2022-03-02

## 2022-02-01 RX ORDER — OXYCODONE HYDROCHLORIDE 5 MG/1
1 TABLET ORAL
Qty: 42 | Refills: 0
Start: 2022-02-01 | End: 2022-02-07

## 2022-02-01 RX ORDER — OXYCODONE HYDROCHLORIDE 5 MG/1
2 TABLET ORAL
Qty: 0 | Refills: 0 | DISCHARGE
Start: 2022-02-01 | End: 2022-02-07

## 2022-02-01 RX ORDER — NALOXONE HYDROCHLORIDE 4 MG/.1ML
1 SPRAY NASAL
Qty: 1 | Refills: 0
Start: 2022-02-01

## 2022-02-01 RX ORDER — MORPHINE SULFATE 50 MG/1
1 CAPSULE, EXTENDED RELEASE ORAL
Qty: 60 | Refills: 0
Start: 2022-02-01 | End: 2022-03-02

## 2022-02-01 RX ORDER — GABAPENTIN 400 MG/1
1 CAPSULE ORAL
Qty: 0 | Refills: 0 | DISCHARGE

## 2022-02-01 RX ORDER — GABAPENTIN 400 MG/1
2 CAPSULE ORAL
Qty: 120 | Refills: 0
Start: 2022-02-01 | End: 2022-03-02

## 2022-02-01 RX ORDER — POLYETHYLENE GLYCOL 3350 17 G/17G
17 POWDER, FOR SOLUTION ORAL
Qty: 1020 | Refills: 0
Start: 2022-02-01 | End: 2022-03-02

## 2022-02-01 RX ORDER — OXYCODONE HYDROCHLORIDE 5 MG/1
1 TABLET ORAL
Qty: 40 | Refills: 0
Start: 2022-02-01 | End: 2022-02-10

## 2022-02-01 RX ORDER — LOSARTAN POTASSIUM 100 MG/1
1 TABLET, FILM COATED ORAL
Qty: 0 | Refills: 0 | DISCHARGE

## 2022-02-01 RX ORDER — MORPHINE SULFATE 50 MG/1
1 CAPSULE, EXTENDED RELEASE ORAL
Qty: 10 | Refills: 0
Start: 2022-02-01 | End: 2022-02-05

## 2022-02-01 RX ADMIN — POLYETHYLENE GLYCOL 3350 17 GRAM(S): 17 POWDER, FOR SOLUTION ORAL at 06:21

## 2022-02-01 RX ADMIN — Medication 0.5 MILLIGRAM(S): at 16:03

## 2022-02-01 RX ADMIN — OXYCODONE HYDROCHLORIDE 10 MILLIGRAM(S): 5 TABLET ORAL at 06:20

## 2022-02-01 RX ADMIN — Medication 2: at 08:44

## 2022-02-01 RX ADMIN — Medication 650 MILLIGRAM(S): at 06:21

## 2022-02-01 RX ADMIN — GABAPENTIN 300 MILLIGRAM(S): 400 CAPSULE ORAL at 06:20

## 2022-02-01 RX ADMIN — OXYCODONE HYDROCHLORIDE 10 MILLIGRAM(S): 5 TABLET ORAL at 09:12

## 2022-02-01 RX ADMIN — Medication 650 MILLIGRAM(S): at 13:05

## 2022-02-01 RX ADMIN — AMLODIPINE BESYLATE 10 MILLIGRAM(S): 2.5 TABLET ORAL at 06:21

## 2022-02-01 RX ADMIN — MORPHINE SULFATE 30 MILLIGRAM(S): 50 CAPSULE, EXTENDED RELEASE ORAL at 09:54

## 2022-02-01 RX ADMIN — Medication 4 UNIT(S): at 12:32

## 2022-02-01 RX ADMIN — LIDOCAINE 1 PATCH: 4 CREAM TOPICAL at 02:18

## 2022-02-01 RX ADMIN — Medication 10 MILLIGRAM(S): at 06:21

## 2022-02-01 RX ADMIN — PREGABALIN 1000 MICROGRAM(S): 225 CAPSULE ORAL at 12:25

## 2022-02-01 RX ADMIN — Medication 2: at 12:23

## 2022-02-01 RX ADMIN — LIDOCAINE 1 PATCH: 4 CREAM TOPICAL at 12:13

## 2022-02-01 RX ADMIN — OXYCODONE HYDROCHLORIDE 5 MILLIGRAM(S): 5 TABLET ORAL at 12:58

## 2022-02-01 RX ADMIN — Medication 650 MILLIGRAM(S): at 08:30

## 2022-02-01 RX ADMIN — OXYCODONE HYDROCHLORIDE 5 MILLIGRAM(S): 5 TABLET ORAL at 17:14

## 2022-02-01 RX ADMIN — Medication 650 MILLIGRAM(S): at 12:22

## 2022-02-01 RX ADMIN — LOSARTAN POTASSIUM 100 MILLIGRAM(S): 100 TABLET, FILM COATED ORAL at 06:21

## 2022-02-01 RX ADMIN — Medication 4 UNIT(S): at 08:52

## 2022-02-01 RX ADMIN — Medication 10 MILLIGRAM(S): at 15:06

## 2022-02-01 RX ADMIN — MORPHINE SULFATE 30 MILLIGRAM(S): 50 CAPSULE, EXTENDED RELEASE ORAL at 11:21

## 2022-02-01 NOTE — PROGRESS NOTE ADULT - SUBJECTIVE AND OBJECTIVE BOX
***INCOMPLETE NOTE***  Antelmo Dixon MD PGY-3  Internal Medicine  Pager [phone] / [phone]  After 7pm please page Night Float [phone] or [phone]    Patient is a 46y old  Female who presents with a chief complaint of back pain (31 Jan 2022 12:43)      SUBJECTIVE / OVERNIGHT EVENTS:    MEDICATIONS  (STANDING):  acetaminophen     Tablet .. 650 milliGRAM(s) Oral every 6 hours  amLODIPine   Tablet 10 milliGRAM(s) Oral daily  atorvastatin 20 milliGRAM(s) Oral at bedtime  baclofen 10 milliGRAM(s) Oral three times a day  cyanocobalamin 1000 MICROGram(s) Oral daily  dextrose 40% Gel 15 Gram(s) Oral once  dextrose 5%. 1000 milliLiter(s) (50 mL/Hr) IV Continuous <Continuous>  dextrose 5%. 1000 milliLiter(s) (100 mL/Hr) IV Continuous <Continuous>  dextrose 50% Injectable 25 Gram(s) IV Push once  dextrose 50% Injectable 12.5 Gram(s) IV Push once  dextrose 50% Injectable 25 Gram(s) IV Push once  enoxaparin Injectable 40 milliGRAM(s) SubCutaneous at bedtime  gabapentin 300 milliGRAM(s) Oral <User Schedule>  gabapentin 600 milliGRAM(s) Oral at bedtime  glucagon  Injectable 1 milliGRAM(s) IntraMuscular once  influenza   Vaccine 0.5 milliLiter(s) IntraMuscular once  insulin glargine Injectable (LANTUS) 15 Unit(s) SubCutaneous at bedtime  insulin lispro (ADMELOG) corrective regimen sliding scale   SubCutaneous three times a day before meals  insulin lispro (ADMELOG) corrective regimen sliding scale   SubCutaneous at bedtime  insulin lispro Injectable (ADMELOG) 4 Unit(s) SubCutaneous three times a day before meals  lidocaine   4% Patch 1 Patch Transdermal daily  losartan 100 milliGRAM(s) Oral daily  morphine ER Tablet 30 milliGRAM(s) Oral every 12 hours  polyethylene glycol 3350 17 Gram(s) Oral two times a day  senna 2 Tablet(s) Oral at bedtime    MEDICATIONS  (PRN):  acetaminophen     Tablet .. 650 milliGRAM(s) Oral every 6 hours PRN Temp greater or equal to 38C (100.4F), Mild Pain (1 - 3)  LORazepam     Tablet 0.5 milliGRAM(s) Oral every 12 hours PRN Anxiety  morphine  - Injectable 2 milliGRAM(s) IV Push every 4 hours PRN breakthrough pain  ondansetron Injectable 4 milliGRAM(s) IV Push every 8 hours PRN Nausea and/or Vomiting  oxyCODONE    IR 5 milliGRAM(s) Oral every 4 hours PRN Moderate Pain (4 - 6)  oxyCODONE    IR 10 milliGRAM(s) Oral every 4 hours PRN Severe Pain (7 - 10)      CAPILLARY BLOOD GLUCOSE      POCT Blood Glucose.: 274 mg/dL (31 Jan 2022 22:08)  POCT Blood Glucose.: 210 mg/dL (31 Jan 2022 17:50)  POCT Blood Glucose.: 338 mg/dL (31 Jan 2022 12:12)  POCT Blood Glucose.: 207 mg/dL (31 Jan 2022 08:24)    I&O's Summary      PHYSICAL EXAM:  Vital Signs Last 24 Hrs  T(C): 36.7 (02-01-22 @ 06:17)  T(F): 98 (02-01-22 @ 06:17), Max: 98 (02-01-22 @ 06:17)  HR: 88 (02-01-22 @ 06:17) (86 - 92)  BP: 130/75 (02-01-22 @ 06:17)  BP(mean): --  RR: 18 (02-01-22 @ 06:17) (18 - 18)  SpO2: 98% (02-01-22 @ 06:17) (97% - 98%)  Wt(kg): --    Constitutional: NAD, awake and alert  EYES: EOMI  ENT:  Normal Hearing, no tonsillar exudates   Neck: Soft and supple , no thyromegaly   Respiratory: Breath sounds are clear bilaterally, No wheezing, rales or rhonchi  Cardiovascular: S1 and S2, regular rate and rhythm, no Murmurs, gallops or rubs, no JVD,    Gastrointestinal: Bowel Sounds present, soft, nontender, nondistended, no guarding, no rebound  Extremities: No cyanosis or clubbing; warm to touch  Vascular: 2+ peripheral pulses lower ex  Neurological: No focal deficits, CN II-XII intact bilaterally, sensation to light touch intact in all extremities, gait intact. Pupils are equally reactive to light and symmetrical in size.   Musculoskeletal: 5/5 strength b/l upper and lower extremities; no joint swelling.  Skin: No rashes  Psych: no depression or anhedonia, AAOx3  HEME: no bruises, no nose bleeds      LABS:                        9.9    3.03  )-----------( 289      ( 01 Feb 2022 06:54 )             30.9     02-01    136  |  98  |  10  ----------------------------<  136<H>  4.3   |  28  |  0.65    Ca    10.3      01 Feb 2022 06:54  Phos  4.6     02-01  Mg     2.00     02-01                RADIOLOGY & ADDITIONAL TESTS:    Imaging Personally Reviewed:    Consultant(s) Notes Reviewed:      Care Discussed with Consultants/Other Providers:   Antelmo Dixon MD PGY-3  Internal Medicine  Pager [phone] / [phone]  After 7pm please page Night Float [phone] or [phone]    Patient is a 46y old  Female who presents with a chief complaint of back pain (31 Jan 2022 12:43)    SUBJECTIVE / OVERNIGHT EVENTS:  No acute events overnight. Reports feeling a bit dizzy and tired upon standing and working with PT. Had anxiety yesterday but was able to calm down by talking with daughter and did not require ativan. Reports ongoing pain, more in LLE, but overall improved from prior. She has been trying to stretch out oxycodone dosing and is now tolerating it at a dosing interval of 5-6 hours. Has been having regular bowel movements.   Denies nausea, vomiting, constipation, diarrhea, fevers, chills, chest pain, SOB.    MEDICATIONS  (STANDING):  acetaminophen     Tablet .. 650 milliGRAM(s) Oral every 6 hours  amLODIPine   Tablet 10 milliGRAM(s) Oral daily  atorvastatin 20 milliGRAM(s) Oral at bedtime  baclofen 10 milliGRAM(s) Oral three times a day  cyanocobalamin 1000 MICROGram(s) Oral daily  dextrose 40% Gel 15 Gram(s) Oral once  dextrose 5%. 1000 milliLiter(s) (50 mL/Hr) IV Continuous <Continuous>  dextrose 5%. 1000 milliLiter(s) (100 mL/Hr) IV Continuous <Continuous>  dextrose 50% Injectable 25 Gram(s) IV Push once  dextrose 50% Injectable 12.5 Gram(s) IV Push once  dextrose 50% Injectable 25 Gram(s) IV Push once  enoxaparin Injectable 40 milliGRAM(s) SubCutaneous at bedtime  gabapentin 300 milliGRAM(s) Oral <User Schedule>  gabapentin 600 milliGRAM(s) Oral at bedtime  glucagon  Injectable 1 milliGRAM(s) IntraMuscular once  influenza   Vaccine 0.5 milliLiter(s) IntraMuscular once  insulin glargine Injectable (LANTUS) 15 Unit(s) SubCutaneous at bedtime  insulin lispro (ADMELOG) corrective regimen sliding scale   SubCutaneous three times a day before meals  insulin lispro (ADMELOG) corrective regimen sliding scale   SubCutaneous at bedtime  insulin lispro Injectable (ADMELOG) 4 Unit(s) SubCutaneous three times a day before meals  lidocaine   4% Patch 1 Patch Transdermal daily  losartan 100 milliGRAM(s) Oral daily  morphine ER Tablet 30 milliGRAM(s) Oral every 12 hours  polyethylene glycol 3350 17 Gram(s) Oral two times a day  senna 2 Tablet(s) Oral at bedtime    MEDICATIONS  (PRN):  acetaminophen     Tablet .. 650 milliGRAM(s) Oral every 6 hours PRN Temp greater or equal to 38C (100.4F), Mild Pain (1 - 3)  LORazepam     Tablet 0.5 milliGRAM(s) Oral every 12 hours PRN Anxiety  morphine  - Injectable 2 milliGRAM(s) IV Push every 4 hours PRN breakthrough pain  ondansetron Injectable 4 milliGRAM(s) IV Push every 8 hours PRN Nausea and/or Vomiting  oxyCODONE    IR 5 milliGRAM(s) Oral every 4 hours PRN Moderate Pain (4 - 6)  oxyCODONE    IR 10 milliGRAM(s) Oral every 4 hours PRN Severe Pain (7 - 10)      CAPILLARY BLOOD GLUCOSE      POCT Blood Glucose.: 274 mg/dL (31 Jan 2022 22:08)  POCT Blood Glucose.: 210 mg/dL (31 Jan 2022 17:50)  POCT Blood Glucose.: 338 mg/dL (31 Jan 2022 12:12)  POCT Blood Glucose.: 207 mg/dL (31 Jan 2022 08:24)    I&O's Summary      PHYSICAL EXAM:  Vital Signs Last 24 Hrs  T(C): 36.7 (02-01-22 @ 06:17)  T(F): 98 (02-01-22 @ 06:17), Max: 98 (02-01-22 @ 06:17)  HR: 88 (02-01-22 @ 06:17) (86 - 92)  BP: 130/75 (02-01-22 @ 06:17)  BP(mean): --  RR: 18 (02-01-22 @ 06:17) (18 - 18)  SpO2: 98% (02-01-22 @ 06:17) (97% - 98%)  Wt(kg): --    Constitutional: NAD, awake and alert, wincing in pain  EYES: EOMI  ENT:  Normal Hearing  Respiratory: Breath sounds are clear bilaterally, No wheezing, rales or rhonchi  Cardiovascular: S1 and S2, regular rate and rhythm, no Murmurs, gallops or rubs  Gastrointestinal: Abdomen soft, nontender, nondistended  Extremities: No cyanosis or clubbing; warm to touch, no LE edema  Vascular: 2+ peripheral pulses lower ex  Neurological: CN II-XII intact bilaterally, sensation to light touch intact in all extremities, gait unsteady. Pupils are equally reactive to light and symmetrical in size.  Musculoskeletal: 5/5 strength b/l upper extremities; 4/5 strength RLE, 4/5 strength LLE  Skin: No rashes  HEME: no bruises, no nose bleeds    LABS:                        9.9    3.03  )-----------( 289      ( 01 Feb 2022 06:54 )             30.9     02-01    136  |  98  |  10  ----------------------------<  136<H>  4.3   |  28  |  0.65    Ca    10.3      01 Feb 2022 06:54  Phos  4.6     02-01  Mg     2.00     02-01      RADIOLOGY & ADDITIONAL TESTS:    Imaging Personally Reviewed:    Consultant(s) Notes Reviewed:    Heme/Onc    Care Discussed with Consultants/Other Providers:

## 2022-02-01 NOTE — DISCHARGE NOTE PROVIDER - NSDCCPTREATMENT_GEN_ALL_CORE_FT
PRINCIPAL PROCEDURE  Procedure: MRI LS spine  Findings and Treatment: FINDINGS:  ALIGNMENT:  The alignment is normal.  VERTEBRAE: Mild multilevel degenerative changes are noted. No aggressive   osseous lesions.  DISCS: Multilevel disc desiccation and loss of height.  CORD: The thoracic cord is normal in size and signal. The conus   medullaris terminates at T12 level. Cauda equina is normal in appearance.  EVALUATION OF INDIVIDUAL LEVELS:  T11-T12: Left central disc protrusion. No spinal canal or neuroforaminal   stenosis.  L3-L4: Disc bulge. No spinal canal or neuroforaminal stenosis.  L4-L5: Disc bulge. No spinal canal or neuroforaminal stenosis.  PARAVERTEBRAL SOFT TISSUES: The visualized paravertebral soft tissues   appear within normal limits.  Cortical defects/lobulations of the bilateral kidneys again noted.  IMPRESSION:  -No thoracic cord or cauda equina compression.

## 2022-02-01 NOTE — PROGRESS NOTE ADULT - PROBLEM SELECTOR PLAN 1
P/w 3 weeks of worsening b/l hip/lower back pain radiating down lateral thighs. CT non con from OSH negative for fractures, dislocation, subluxation, mets. X ray of lumbosacral spine also neg. DDx herniated disc vs spinal stenosis vs metastasis vs infection. Cord compression must be ruled out given weakness, urine retention, and difficulty ambulating. Likely 2/2 cisplatin therapy  - MRI lumbar: no signs of cord compression  - NSGY: signed off  - Will start long acting MS Contin 30mg BID with oxycodone for breakthrough  - c/w lidocaine patch  - Bowel regimen P/w 3 weeks of worsening b/l hip/lower back pain radiating down lateral thighs. CT non con from OSH negative for fractures, dislocation, subluxation, mets. X ray of lumbosacral spine also neg. DDx herniated disc vs spinal stenosis vs metastasis vs infection. Cord compression must be ruled out given weakness, urine retention, and difficulty ambulating. Likely 2/2 cisplatin therapy  - MRI lumbar: no signs of cord compression  - NSGY: signed off  - C/w MS Contin 30mg BID with oxycodone for breakthrough  - c/w lidocaine patch  - Bowel regimen  - Will DC with follow up with Dr. Olimpia Gonsalves at [facility]

## 2022-02-01 NOTE — DISCHARGE NOTE PROVIDER - NSDCFUSCHEDAPPT_GEN_ALL_CORE_FT
MARY TEN A ; 02/07/2022 ; NPP Rad Nucmed [address]  MARY TEN A ; 02/07/2022 ; NPP Rad Nucmed [address]  MARY TEN A ; 02/14/2022 ; NPP OB/GYN Urology [address]  MARY TEN A ; 02/24/2022 ; NPP [facility]

## 2022-02-01 NOTE — DISCHARGE NOTE PROVIDER - NSDCFUADDAPPT_GEN_ALL_CORE_FT
If you would like to establish care with a new primary care physician, please call [phone] ([phone])

## 2022-02-01 NOTE — DISCHARGE NOTE PROVIDER - CARE PROVIDERS DIRECT ADDRESSES
,[email or web address]

## 2022-02-01 NOTE — PROGRESS NOTE ADULT - PROBLEM SELECTOR PROBLEM 1
Bilateral hip pain

## 2022-02-01 NOTE — DISCHARGE NOTE NURSING/CASE MANAGEMENT/SOCIAL WORK - PATIENT PORTAL LINK FT
You can access the FollowMyHealth Patient Portal offered by [facility] by registering at the following website: [email or web address]. By joining [employer]’s FollowMyHealth portal, you will also be able to view your health information using other applications (apps) compatible with our system.

## 2022-02-01 NOTE — PROGRESS NOTE ADULT - PROBLEM SELECTOR PLAN 8
C/w atorvastatin 20mg daily

## 2022-02-01 NOTE — DISCHARGE NOTE PROVIDER - PROVIDER TOKENS
PROVIDER:[TOKEN:[[id number]]],PROVIDER:[TOKEN:[[id number]]] PROVIDER:[TOKEN:[[id number]],FOLLOWUP:[1 week],ESTABLISHEDPATIENT:[T]],PROVIDER:[TOKEN:[[id number]]] PROVIDER:[TOKEN:[[id number]],FOLLOWUP:[1 week],ESTABLISHEDPATIENT:[T]],PROVIDER:[[id number]],SCHEDULEDAPPT:[02/24/2022],SCHEDULEDAPPTTIME:[09:00 AM]],PROVIDER:[[id number]],SCHEDULEDAPPT:[02/14/2022],SCHEDULEDAPPTTIME:[05:00 PM],ESTABLISHEDPATIENT:[T]],PROVIDER:[[id number]],ESTABLISHEDPATIENT:[T]]

## 2022-02-01 NOTE — PROGRESS NOTE ADULT - PROBLEM SELECTOR PLAN 6
On metformin 500mg BID at home. A1c 7.0  -Moderate ISS, monitor FSG  -Adding lantus due to suboptimal control. Will add mealtime insulin as well On metformin 500mg BID at home. A1c 7.0  -Moderate ISS, monitor FSG  - DC on home regimen

## 2022-02-01 NOTE — PROGRESS NOTE ADULT - PROBLEM SELECTOR PROBLEM 6
T2DM (type 2 diabetes mellitus)

## 2022-02-01 NOTE — PROGRESS NOTE ADULT - PROBLEM SELECTOR PLAN 2
P/w 3 weeks of worsening b/l feet numbness on dorsal side, with L worse than R, and reaching up to L mid calf. DDx radicular pain vs chemotherapy induced peripheral neuropathy given recent completion of cisplatin therapy vs diabetes neuropathy  - Now on gabapentin to 300 mg AM & PM, and 600mg qhs  - folate WNL, B12 lower end of normal, continue with Vit B12 1000mcg daily  -PT eval - home w/ outpatient PT, should continue to work with patient during hospitalization P/w 3 weeks of worsening b/l feet numbness on dorsal side, with L worse than R, and reaching up to L mid calf. DDx radicular pain vs chemotherapy induced peripheral neuropathy given recent completion of cisplatin therapy vs diabetes neuropathy  - Gabapentin 600mg BID  - folate WNL, B12 lower end of normal, continue with Vit B12 1000mcg daily  -PT eval - home w/ outpatient PT, should continue to work with patient during hospitalization

## 2022-02-01 NOTE — DISCHARGE NOTE PROVIDER - HOSPITAL COURSE
46F PMH cervical cancer (Stage 2B, s/p cisplatin and RT), HTN, HLD, T2DM p/w 3 weeks of worsening b/l hip/lower back pain and feet numbness. Pt states that b/l hip/lower back pain started about three weeks ago, sharp in nature, radiating down the lateral thighs, with mild numbness on dorsal sides of feet. Symptoms initially improved with Aleve, however noted blood tinged vaginal discharge and blood clots with urination not related to menstraul periods so stopped taking Aleve. Hip pain and feet numbness progressively worsened, to the point that she cannot walk or lie supine. Pain is mildly alleviated when lying prone, and better with activity. Pt presented to ER in [place] 2 weeks ago due to these symptoms, CT spine non-con showed multiple non-obstructing nephrolithiasis in L kidney but no fractures, deformities, subluxation were noted. She was discharged with gabapentin 300BID, Methocarbomol 500 TID, and Meloxicam 15mg q6, and states that the medications temporarily relieve symptoms.    Pt states that numbness is worse on L side, from dorsal foot to lateral mid-calf. Feels like it is "freezing" and must keep socks on. Also notes cramping intermittently in the toes. Pt also noticed that not all of her urine will come out, and must put pressure to completely relieve herself. Pt denies fever, chills, saddle anesthesia, loss of sensation on extremities, weight loss, urinary incontinence.    In the ED, /82, , RR 16, 100%RA, 98.7F. Given NS1L, gabapentin, oxycodone, lidocaine patch. (27 Jan 2022 00:32)    Neurosurgery evaluated the patient and agreed cord compression should be ruled out. MRI was attempted on hospital day 1 to rule out cord compression, however the patient did not tolerate it due to claustrophobia. Due to this, MRI under sedation was performed urgently, and cord compression was ruled out.    The patient's pain was managed with gabapentin, which was uptitrated to 600mg BID, and oxycodone IR, which was eventually transitioned to long acting morphine sulfate with oxycodone IR for breakthrough pain.    46F PMH cervical cancer (Stage 2B, s/p cisplatin and RT), HTN, HLD, T2DM p/w 3 weeks of worsening b/l hip/lower back pain and feet numbness. Pt states that b/l hip/lower back pain started about three weeks ago, sharp in nature, radiating down the lateral thighs, with mild numbness on dorsal sides of feet. Symptoms initially improved with Aleve, however noted blood tinged vaginal discharge and blood clots with urination not related to menstraul periods so stopped taking Aleve. Hip pain and feet numbness progressively worsened, to the point that she cannot walk or lie supine. Pain is mildly alleviated when lying prone, and better with activity. Pt presented to ER in [place] 2 weeks ago due to these symptoms, CT spine non-con showed multiple non-obstructing nephrolithiasis in L kidney but no fractures, deformities, subluxation were noted. She was discharged with gabapentin 300BID, Methocarbomol 500 TID, and Meloxicam 15mg q6, and states that the medications temporarily relieve symptoms.    Pt states that numbness is worse on L side, from dorsal foot to lateral mid-calf. Feels like it is "freezing" and must keep socks on. Also notes cramping intermittently in the toes. Pt also noticed that not all of her urine will come out, and must put pressure to completely relieve herself. Pt denies fever, chills, saddle anesthesia, loss of sensation on extremities, weight loss, urinary incontinence.    In the ED, /82, , RR 16, 100%RA, 98.7F. Given NS1L, gabapentin, oxycodone, lidocaine patch. (27 Jan 2022 00:32)    Neurosurgery evaluated the patient and agreed cord compression should be ruled out. MRI was attempted on hospital day 1 to rule out cord compression, however the patient did not tolerate it due to claustrophobia. Due to this, MRI under sedation was performed urgently, and cord compression was ruled out.    The patient's pain was managed with gabapentin, which was uptitrated to 600mg BID, and oxycodone IR, which was eventually transitioned to long acting morphine sulfate with oxycodone IR for breakthrough pain, which did control her pain to a tolerable degree so that she could be discharged home. She has an appointment with Dr. Olimpia Gonsalves   46F PMH cervical cancer (Stage 2B, s/p cisplatin and RT), HTN, HLD, T2DM p/w 3 weeks of worsening b/l hip/lower back pain and feet numbness. Pt states that b/l hip/lower back pain started about three weeks ago, sharp in nature, radiating down the lateral thighs, with mild numbness on dorsal sides of feet. Symptoms initially improved with Aleve, however noted blood tinged vaginal discharge and blood clots with urination not related to menstraul periods so stopped taking Aleve. Hip pain and feet numbness progressively worsened, to the point that she cannot walk or lie supine. Pain is mildly alleviated when lying prone, and better with activity. Pt presented to ER in [place] 2 weeks ago due to these symptoms, CT spine non-con showed multiple non-obstructing nephrolithiasis in L kidney but no fractures, deformities, subluxation were noted. She was discharged with gabapentin 300BID, Methocarbomol 500 TID, and Meloxicam 15mg q6, and states that the medications temporarily relieve symptoms.    Pt states that numbness is worse on L side, from dorsal foot to lateral mid-calf. Feels like it is "freezing" and must keep socks on. Also notes cramping intermittently in the toes. Pt also noticed that not all of her urine will come out, and must put pressure to completely relieve herself. Pt denies fever, chills, saddle anesthesia, loss of sensation on extremities, weight loss, urinary incontinence.    In the ED, /82, , RR 16, 100%RA, 98.7F. Given NS1L, gabapentin, oxycodone, lidocaine patch. (27 Jan 2022 00:32)    Neurosurgery evaluated the patient and agreed cord compression should be ruled out. MRI was attempted on hospital day 1 to rule out cord compression, however the patient did not tolerate it due to claustrophobia. Due to this, MRI under sedation was performed urgently, and cord compression was ruled out.    The patient's pain was managed with gabapentin, which was uptitrated to 600mg BID, and oxycodone IR, which was eventually transitioned to long acting morphine sulfate with oxycodone IR for breakthrough pain, which did control her pain to a tolerable degree so that she could be discharged home. She has an appointment with Dr. Olimpia Gonsalves on 2/24.    46F PMH cervical cancer (Stage 2B, s/p cisplatin and RT), HTN, HLD, T2DM p/w 3 weeks of worsening b/l hip/lower back pain and feet numbness. Pt states that b/l hip/lower back pain started about three weeks ago, sharp in nature, radiating down the lateral thighs, with mild numbness on dorsal sides of feet. Symptoms initially improved with Aleve, however noted blood tinged vaginal discharge and blood clots with urination not related to menstraul periods so stopped taking Aleve. Hip pain and feet numbness progressively worsened, to the point that she cannot walk or lie supine. Pain is mildly alleviated when lying prone, and better with activity. Pt presented to ER in [place] 2 weeks ago due to these symptoms, CT spine non-con showed multiple non-obstructing nephrolithiasis in L kidney but no fractures, deformities, subluxation were noted. She was discharged with gabapentin 300BID, Methocarbomol 500 TID, and Meloxicam 15mg q6, and states that the medications temporarily relieve symptoms.    Pt states that numbness is worse on L side, from dorsal foot to lateral mid-calf. Feels like it is "freezing" and must keep socks on. Also notes cramping intermittently in the toes. Pt also noticed that not all of her urine will come out, and must put pressure to completely relieve herself. Pt denies fever, chills, saddle anesthesia, loss of sensation on extremities, weight loss, urinary incontinence.    In the ED, /82, , RR 16, 100%RA, 98.7F. Given NS1L, gabapentin, oxycodone, lidocaine patch. (27 Jan 2022 00:32)    Neurosurgery evaluated the patient and agreed cord compression should be ruled out. MRI was attempted on hospital day 1 to rule out cord compression, however the patient did not tolerate it due to claustrophobia. Due to this, MRI under sedation was performed urgently, and cord compression was ruled out.    The patient's pain was managed with gabapentin, which was uptitrated to 600mg BID, and oxycodone IR, which was eventually transitioned to long acting morphine sulfate with oxycodone IR for breakthrough pain, which did control her pain to a tolerable degree so that she could be discharged home. She has an appointment with Dr. Olimpia Gonsalves on 2/24.   Discussed with Dr. Braun that the patient will be provided with 30 days of long acting morphine and 40 pills of oxycodone to get her to her appointment with Dr. Gonsalves.

## 2022-02-01 NOTE — DISCHARGE NOTE NURSING/CASE MANAGEMENT/SOCIAL WORK - NSDCFUADDAPPT_GEN_ALL_CORE_FT
If you would like to establish care with a new primary care physician, please call [phone] ([phone])

## 2022-02-01 NOTE — PROGRESS NOTE ADULT - PROBLEM SELECTOR PLAN 3
Diagnosed 06/2021, cervical adenocarcinoma Stage 2B, s/p cisplatin and RT 11/2021. Follows at [facility], Rad Onc Dr. Maria Luisa Germain, Med Onc Dr. CHU Dinh  -Pt noted bloody vaginal discharge and some clots 2 weeks ago, currently does not have bloody discharge  -Possibly from cancer vs other abnormal uterine bleed (MR pelvis 10/2021 shows L hydrosalpinx and hemorrhagic cysts w/ small endometriosis, R cyst resolved)  - Heme/Onc consult  -Outpatient follow up for cancer Diagnosed 06/2021, cervical adenocarcinoma Stage 2B, s/p cisplatin and RT 11/2021. Follows at [facility], Rad Onc Dr. Maria Luisa Germain, Med Onc Dr. CHU Dinh  -Pt noted bloody vaginal discharge and some clots 2 weeks ago, currently does not have bloody discharge  -Possibly from cancer vs other abnormal uterine bleed (MR pelvis 10/2021 shows L hydrosalpinx and hemorrhagic cysts w/ small endometriosis, R cyst resolved)  - Heme/Onc consult, recs appreciated  -Outpatient follow up for cancer

## 2022-02-01 NOTE — DISCHARGE NOTE PROVIDER - CARE PROVIDER_API CALL
Priscilla Dinh)  Hematology; Internal Medicine; Medical Oncology  [address] [place] [address]  Phone: [phone]  Fax: [phone]  Follow Up Time:     Olimpia Rasmussen)  [facility] Medicine; Internal Medicine  [address] [place] [address]  Phone: [phone]  Fax: [phone]  Follow Up Time:    Priscilla Dinh)  Hematology; Internal Medicine; Medical Oncology  [address] [place] [address]  Phone: [phone]  Fax: [phone]  Established Patient  Follow Up Time: 1 week    Olimpia Rasmussen)  [facility] Medicine; Internal Medicine  [address] [place] [address]  Phone: [phone]  Fax: [phone]  Follow Up Time:    Priscilla Dinh)  Hematology; Internal Medicine; Medical Oncology  [address] [place] [address]  Phone: [phone]  Fax: [phone]  Established Patient  Follow Up Time: 1 week    Olimpia Rasmussen)  [facility]; Internal Medicine  [address] [place] [address]  Phone: [phone]  Fax: [phone]  Scheduled Appointment: 02/24/2022 09:00 AM    Bob Hampton)  Female Pelvic MedReconst Surg; Obstetrics and Gynecology  [address] [place] [address]  Phone: [phone]  Fax: [phone]  Established Patient  Scheduled Appointment: 02/14/2022 05:00 PM    Remigio Francis (DO)  Family Medicine  [address] [place] [address]  Established Patient  Follow Up Time:

## 2022-02-01 NOTE — DISCHARGE NOTE PROVIDER - NSDCCPCAREPLAN_GEN_ALL_CORE_FT
PRINCIPAL DISCHARGE DIAGNOSIS  Diagnosis: Bilateral hip pain  Assessment and Plan of Treatment: You came to the hospital with difficulty walking from pain in both hips and legs. Because of concern for compression of your spinal cord, which can be an emergency, an MRI was performed.       PRINCIPAL DISCHARGE DIAGNOSIS  Diagnosis: Bilateral hip pain  Assessment and Plan of Treatment: You came to the hospital with difficulty walking from pain in both hips and legs. Because of concern for compression of your spinal cord, which can be an emergency, an MRI was performed. This MRI did not show any emergent findings. It is most likely that your pain is because of a side effect of your chemotherapy. It is important that you follow up with your oncologist after you are discharged. You also have an appointment with Dr. Olimpia Gonsalves on 2/24 for assistance with pain management. You are being prescribed long acting morphine, which you should take twice a day, and you will be given some short acting oxycodone. Do not drive while taking any of this medication. You will also be prescribed gabapentin 600mg twice daily for the pain.   It is also important that you take a bowel regimen, Senna and Miralax, every day while taking this medication, as it can cause constipation. Because opioid pain medications such as morphine and oxycodone are high risk medications which can cause overdose, you will be prescribed a medication to reverse these medications in the setting of an overdose. The signs of an overdose include confusion, unresponsiveness, slowed breathing, and slowed heart rate. Please discuss with your family or someone else reliable how to recognize and overdose use this medication on you in the event of an overdose.

## 2022-02-01 NOTE — PROGRESS NOTE ADULT - PROBLEM SELECTOR PLAN 7
C/w home losartan 100mg daily and amlodipine 10mg daily

## 2022-02-01 NOTE — PROGRESS NOTE ADULT - ATTENDING COMMENTS
46F PMH cervical cancer (Stage 2B, s/p cisplatin and RT), HTN, HLD, T2DM p/w 3 weeks of worsening b/l hip/lower back pain and neuropathy concerning for possible cord compression. MRI done showing no cord compression, likely adverse effect 2/2 Cisplatin therapy Patient seen and examined with team  Agree with above A/P  46F PMH cervical cancer (Stage 2B, s/p cisplatin and RT), HTN, HLD, T2DM p/w 3 weeks of worsening b/l hip/lower back pain and neuropathy concerning for possible cord compression, in need of MRI under sedation due to severe claustrophobia.   patient notes low back pain 9/10 and uncomfortable to lay or sit- she stands but has poor gait-   Patient happy mri shows no cord compression, but still notes leg and back pain. last bm was yesterday.  PE Vital Signs Last 24 Hrs T(F): 97.6, HR: 86, BP: 120/83, RR: 18, SpO2: 97% ra  lungs ct, cor rrr, abd soft n/t, ext neg e/c/c. mild pain with SLR  rd< from: MR Thoracic Spine w/wo IV Cont (01.28.22 @ 17:03) >  EVALUATION OF INDIVIDUAL LEVELS:  T11-T12: Left central disc protrusion. No spinal canal or neuroforaminal   stenosis.  L3-L4: Disc bulge. No spinal canal or neuroforaminal stenosis.  L4-L5: Disc bulge. No spinal canal or neuroforaminal stenosis.  PARAVERTEBRAL SOFT TISSUES: The visualized paravertebral soft tissues   appear within normal limits.  Cortical defects/lobulations of the bilateral kidneys again noted.  IMPRESSION:  -No thoracic cord or cauda equina compression.  A/P  cord compression ruled out- patient has disc bulges and neuropathy sec to chemotherapy causing pain to back and legs  #pain control with gabapentin.  tylenols prn mild will start MS contin 30 mg q12 as well as using the oxy ir prn , with senna and Miralax bid for bowel regimen  # DVT proph Lovenox sq  d/w patient and team. No driving with narcotics  Out patient f/u with Dr Dinh and Dr Andrés Clark for pain control  Plan d/w patient and team and rn.

## 2022-02-01 NOTE — DISCHARGE NOTE PROVIDER - NSDCMRMEDTOKEN_GEN_ALL_CORE_FT
amLODIPine 10 mg oral tablet: 1 tab(s) orally once a day  gabapentin 300 mg oral capsule: 1 capsule orally 2 times a day  Lipitor 20 mg oral tablet: 1 tab(s) orally once a day  LORazepam 0.5 mg oral tablet: 1 tab(s) orally every 6 hours PRN  (Dispensed on 11/15/21 x #28 tabs)  losartan 100 mg oral tablet: 1 tab(s) orally once a day  meloxicam 15 mg oral tablet: 1 tab(s) orally every 6 hours  metFORMIN 500 mg oral tablet: ER tablets: 2 tab(s) orally 2 times a day  methocarbamol 500 mg oral tablet: 1 tab(s) orally 3 times a day   amLODIPine 10 mg oral tablet: 1 tab(s) orally once a day  gabapentin 300 mg oral capsule: 1 capsule orally 2 times a day  Lipitor 20 mg oral tablet: 1 tab(s) orally once a day  LORazepam 0.5 mg oral tablet: 1 tab(s) orally every 6 hours PRN  (Dispensed on 11/15/21 x #28 tabs)  losartan 100 mg oral tablet: 1 tab(s) orally once a day  meloxicam 15 mg oral tablet: 1 tab(s) orally every 6 hours  metFORMIN 500 mg oral tablet: ER tablets: 2 tab(s) orally 2 times a day  methocarbamol 500 mg oral tablet: 1 tab(s) orally 3 times a day  MS Contin 30 mg oral tablet, extended release: 1 tab(s) orally 2 times a day MDD:2 tabs   amLODIPine 10 mg oral tablet: 1 tab(s) orally once a day  cyanocobalamin 1000 mcg oral tablet: 1 tab(s) orally once a day  gabapentin 300 mg oral capsule: 2 cap(s) orally 2 times a day   Lipitor 20 mg oral tablet: 1 tab(s) orally once a day  LORazepam 0.5 mg oral tablet: 1 tab(s) orally every 6 hours PRN  (Dispensed on 11/15/21 x #28 tabs)  meloxicam 15 mg oral tablet: 1 tab(s) orally every 6 hours  metFORMIN 500 mg oral tablet: ER tablets: 2 tab(s) orally 2 times a day  methocarbamol 500 mg oral tablet: 1 tab(s) orally 3 times a day  MS Contin 30 mg oral tablet, extended release: 1 tab(s) orally 2 times a day MDD:2 tabs  Narcan 4 mg/0.1 mL nasal spray: 1 each intranasally every 10 minutes if you suspect opiate overdose  oxyCODONE 5 mg oral tablet: 1 tab(s) orally 4 times a day as needed for severe pain MDD:4 tabs  polyethylene glycol 3350 oral powder for reconstitution: 17 gram(s) orally 2 times a day  senna oral tablet: 2 tab(s) orally once a day (at bedtime)

## 2022-02-01 NOTE — PROGRESS NOTE ADULT - PROVIDER SPECIALTY LIST ADULT
Heme/Onc
Internal Medicine

## 2022-02-07 ENCOUNTER — OUTPATIENT (OUTPATIENT)
Dept: OUTPATIENT SERVICES | Facility: HOSPITAL | Age: 47
LOS: 1 days | End: 2022-02-07
Payer: MEDICAID

## 2022-02-07 ENCOUNTER — APPOINTMENT (OUTPATIENT)
Dept: NUCLEAR MEDICINE | Facility: IMAGING CENTER | Age: 47
End: 2022-02-07
Payer: MEDICAID

## 2022-02-07 DIAGNOSIS — C53.9 MALIGNANT NEOPLASM OF CERVIX UTERI, UNSPECIFIED: ICD-10-CM

## 2022-02-07 PROCEDURE — 78815 PET IMAGE W/CT SKULL-THIGH: CPT

## 2022-02-07 PROCEDURE — 78815 PET IMAGE W/CT SKULL-THIGH: CPT | Mod: 26,PS

## 2022-02-07 PROCEDURE — A9552: CPT

## 2022-02-09 ENCOUNTER — OUTPATIENT (OUTPATIENT)
Dept: OUTPATIENT SERVICES | Facility: HOSPITAL | Age: 47
LOS: 1 days | Discharge: HOME | End: 2022-02-09

## 2022-02-09 DIAGNOSIS — C53.9 MALIGNANT NEOPLASM OF CERVIX UTERI, UNSPECIFIED: ICD-10-CM

## 2022-02-10 ENCOUNTER — APPOINTMENT (OUTPATIENT)
Dept: HEMATOLOGY ONCOLOGY | Facility: CLINIC | Age: 47
End: 2022-02-10
Payer: MEDICAID

## 2022-02-10 VITALS
HEIGHT: 63.98 IN | RESPIRATION RATE: 16 BRPM | DIASTOLIC BLOOD PRESSURE: 83 MMHG | SYSTOLIC BLOOD PRESSURE: 138 MMHG | TEMPERATURE: 97.4 F | HEART RATE: 103 BPM | OXYGEN SATURATION: 97 % | BODY MASS INDEX: 28.53 KG/M2 | WEIGHT: 167.11 LBS

## 2022-02-10 PROCEDURE — 99215 OFFICE O/P EST HI 40 MIN: CPT

## 2022-02-10 PROCEDURE — 99205 OFFICE O/P NEW HI 60 MIN: CPT

## 2022-02-10 RX ORDER — LOSARTAN POTASSIUM 100 MG/1
100 TABLET, FILM COATED ORAL
Qty: 30 | Refills: 0 | Status: DISCONTINUED | COMMUNITY
Start: 2021-08-07 | End: 2022-02-10

## 2022-02-10 RX ORDER — FAMOTIDINE 20 MG/1
20 TABLET, FILM COATED ORAL
Qty: 6 | Refills: 0 | Status: DISCONTINUED | COMMUNITY
Start: 2021-10-13 | End: 2022-02-10

## 2022-02-10 RX ORDER — GLIPIZIDE 10 MG/1
10 TABLET, FILM COATED, EXTENDED RELEASE ORAL
Qty: 60 | Refills: 0 | Status: DISCONTINUED | COMMUNITY
Start: 2021-08-07 | End: 2022-02-10

## 2022-02-14 ENCOUNTER — APPOINTMENT (OUTPATIENT)
Dept: UROGYNECOLOGY | Facility: CLINIC | Age: 47
End: 2022-02-14
Payer: MEDICAID

## 2022-02-14 NOTE — REASON FOR VISIT
RN CM and SW continue to coordinate care with expected discharge back to [facility] 7/7/21, via ambulance. On 7/6 RN CM and SW coordinating care of this pt to [facility]--return to [facility]. DC summary done as of 1445 and ambulance needed 7/6, with ambulance unavailable 7/6 until late evening. Plan to DC back to SNF , 7/7 as ambulance avail.     [Consultation] : a consultation visit

## 2022-02-16 ENCOUNTER — APPOINTMENT (OUTPATIENT)
Dept: NEUROLOGY | Facility: CLINIC | Age: 47
End: 2022-02-16
Payer: MEDICAID

## 2022-02-16 PROCEDURE — 99215 OFFICE O/P EST HI 40 MIN: CPT

## 2022-02-16 PROCEDURE — 99205 OFFICE O/P NEW HI 60 MIN: CPT

## 2022-02-16 NOTE — HISTORY OF PRESENT ILLNESS
[FreeTextEntry1] : TEXT PREPARED IN ANTICIPATION OF VISIT\par \par 47 yo RH woman  with PMHx adenocarcinoma of cervix (unclear if Primary or Secondary), referred by palliative care for 3-4 weeks of progressively worsening back pain with bilateral foot numbness.\par \par According to the [employer] notes, she developed bilateral lower back pain in early 2021. Pain improved with Alleve, but stopped this medication due to increased vaginal bleeding. pain became increasingly severe, developing a radicular component culminating in ED visit while in [place] where an epidural steroid injection helped and CT scans showed no vertebral body deformities. Her pain is worse at night, relieved to some extent lying flat and with activity. There is a pins and needles component in the feet. Much earlier there was also some numbness + pins and needles in the fingertips.\par \par ONCOLOGIC HISTORY\par 21 - Found to have cervical adenocarcinoma upon presentation with malodorous discharge.\par PATH: atypical endometrial glands with complex, cribiform architecture and further workup required to clarify if "CAH or adenocarcinoma."\par 2021 - s/p EUA, D&C, ECC, cervical biopsy\par PATH: adenocarcinoma in background of necrosis and inflammation.\par Initial thoughts of GI primary, but EGD and colonoscopy negative for neoplasm.\par 21 - PET-CT showed multiple renal stones, increased FDG in uterine cervic at SUV 7.3, separate focus of increased FDG in endometrium at SUV 8.8, and no lymphadenopathy/hypermetabolism.\par 10/5/2021 - saw heme-onc (RAMOS) who noted stage IIB cervical cancer, tolerating therapy well, grade I intermittent neuropathy of feet and fingers.\par 2021 -- s/p chemoRT with weekly Cisplatin + (4500cGy/25 Fx to pelvis) + boost (500cGy) + brachytherapy (700cGy)\par 2021 - saw heme-onc (RAMOS), who noted adenocarcinoma most likely of cervical origin, with disease involving endometrium. Dr. Wells noted complaints of weakness, fatigue, leg numbness, and hip pain.\par 2021 - saw gyne-onc (GEMA), who noted complaints of incomplete voiding + poorly controlled hyperglycemia.\par 2021 - called rad onc with complaints of bilateral lower back pain radiating down legs started "few weeks ago."\par 1/10/2022 - called with worsening lower back pain radiating into both legs + numbness/tingling, worse at night. Symptoms developed 2 months ago, but worse over past 2 weeks.\par 1/10/2022 - called me onc with pain in th ebuttocks complains getting progressively worse. "whenever I walk it hurst. Whenever I sit it huts. Numbness in my toes is getting worse."\par 2022 - rad onc advised patient to go to local ED. Patient was in [place]. She was given steroids but suffered increasing pain and did not think she could handle the flight back to [place] for local care.\par 22 - reports worsening buttock pain and "pins and needles" in toes.\par 2022 - saw radiation oncology (BLOOM) who found, "Patient was in [place] where she had hip pain/buttoch pain 9/10 radiating down both legs for which she went to hospital; also had treated UTI. Had vaginal discharge w/o pain. MRI requested as PCP concerned about cord compression." and sent patient to ED for "Pain severe in hip pain/buttoch pain 9/10 radiating down both legs, need to r/o cord compression."\par 2/10/2022 - saw Palliative care (AMY), reporting worsening LEFT sided numbness ("freezing") and cramping pain.Current pain regimen:\par MS ER 30mg BID\par PRN Oxycodone 5mg (uses sparingly as it makes her sleepy) \par Gabapentin 600mg BID\par Methocarbamol 500mg TID\par Meloxicam 15mg QD\par

## 2022-02-16 NOTE — PHYSICAL EXAM
[Motor Strength Lower Extremities Bilaterally] : there was weakness in both lower extremities [2+] : Triceps left 2+ [3+] : Brachioradialis left 3+ [0] : Ankle jerk left 0 [Paresis Pronator Drift Right-Sided] : no pronator drift on the right [Paresis Pronator Drift Left-Sided] : no pronator drift on the left [Motor Strength Upper Extremities Bilaterally] : strength was normal in both upper extremities [Dysdiadochokinesia Bilaterally] : not present [Coordination - Dysmetria Impaired Finger-to-Nose Bilateral] : not present [Plantar Reflex Right Only] : normal on the right [Plantar Reflex Left Only] : normal on the left [FreeTextEntry6] : iliopsoas 4/5 bilaterally, 4+ on the left and 4 on the right\par hamstrings 4/5 left stronger than right\par quads 4/5 \par anterior tibialis 4/5, weaker on the left, 4-/5\par obturator 4/5\par gastrocs 5/5 on the right and 4/5 on the left [FreeTextEntry8] : walks with a walker

## 2022-02-16 NOTE — DISCUSSION/SUMMARY
[FreeTextEntry1] : CRAMPY PAIN IN TOES -- this is likely a peripheral neuropathy from the cisplatin. It may have been exacerbated by underlying diabetes. Likely to improve with Cymbalta 30mg daily x 1 weeks, then 60mg dily thereafter. She will call near the end of the month with an update. I advised her that improvement typically occurs after 2-3 weeks of therapy.\par \par BILATERAL LEG WEAKNESS -- There is a degree of elaboration on exam, but she is weak in all the muscles of the legs and absent DTRs at the knee, ankle and medial gastrocs. She has bilateral plexopathy, which may be related to the 4500+540+700 cGy of RT received. The peripheral nerves are highly resistant to the adverse effects of RT, but RT toxicity can occur at high enough doses of RT (usually when >54Gy) are administered. Perhaps underlying neuropathy from the cisplatin and diabetes also contributed. The timing of onset is within weeks to months of RT, so hopefully will improve quickly as well as is usually the case with early-delayed (arising 2-30 weeks) RT toxicity. Peripheral neuropathy is rare (1/15 in most studies [id number], [id number], [id number], [id number]). I note this is not painful and there is no visible malignancy on the recent PET study.\par \par PAIN -- To continue with Palliative care for pain management. I recommend PT for ankle foot orthotic.\par \par DISPO -- I think EMG/NCV studies are reasonable. Perhaps hyperbaric oxygen therapy would benefit her. I will discuss with rad onc.

## 2022-02-24 ENCOUNTER — APPOINTMENT (OUTPATIENT)
Dept: HEMATOLOGY ONCOLOGY | Facility: CLINIC | Age: 47
End: 2022-02-24

## 2022-02-24 NOTE — ASSESSMENT
[FreeTextEntry1] : 46yoF with:\par \par 1. Cervical Cancer - s/p weekly Cisplatin + EBRT, completed 11/2021. Follow up with Med Onc, Rad Onc.\par \par 2. Acute Low back pain with radiculopathy, L>R - Patient's chart reviewed in detail. Etiology of patient's acute pain and weakness is not clear at this time however an association with radiation +/- cisplatin must be considered.  Will continue with patient's current regimen, add on short course of steroids and enlist the help of Neuro-Oncology for input on this case. \par \par C/w MS ER 30mg BID, PRN Oxycodone IR 5mg, Gabapentin 600mg BID, Methocarbamol 500mg TID, Meloxicam 15mg QD.\par \par 3. Encounter for Palliative Care - Emotional support provided.\par \par Follow up in 2 weeks, call sooner with questions or issues.

## 2022-02-24 NOTE — DATA REVIEWED
[FreeTextEntry1] : PET/CT (2/7/22):\par FINDINGS:\par HEAD/NECK: Physiologic FDG activity in visualized brain, head, and neck.\par CHEST: No abnormal FDG activity. No lymphadenopathy.\par LUNGS: No abnormal FDG activity. No lung nodule.\par PLEURA/PERICARDIUM: No abnormal FDG activity. No effusion.\par \par HEPATOBILIARY/PANCREAS:  Physiologic FDG activity. Liver SUV mean is 3.1, previously 2.7.\par \par SPLEEN: Physiologic FDG activity. Normal in size.\par \par ADRENAL GLANDS: No abnormal FDG activity. No nodule.\par \par KIDNEYS/URINARY BLADDER: Physiologic excreted FDG activity. No hydronephrosis. Bilateral cortical scarring, atrophic left kidney, and multiple nonobstructing left intrarenal calculi measuring up to 7 mm, \par unchanged.\par \par REPRODUCTIVE ORGANS: No abnormal FDG activity. Persistent but decreased FDG activity in the uterine cervix (SUV 6.1, image 194; previously 7.3). Resolution of endometrial hypermetabolism.\par \par ABDOMINOPELVIC NODES: No enlarged or FDG-avid lymph node.\par \par BOWEL/PERITONEUM/MESENTERY: Unchanged pancolonic and rectal hypermetabolism, without corresponding abnormality on CT, may be related to metformin.\par \par BONES/SOFT TISSUES: No abnormal FDG activity.\par \par IMPRESSION: Compared to FDG-PET/CT scan dated 7/5/2021:\par 1. Persistent but decreased hypermetabolism in the uterine cervix may be related to persistent/recurrent disease versus posttreatment inflammation. Resolution of endometrial hypermetabolism which may have \par been physiologic.\par  2. Unchanged nonspecific pancolonic and rectal hypermetabolism which may be related to metformin.

## 2022-02-24 NOTE — HISTORY OF PRESENT ILLNESS
[FreeTextEntry1] : 46yoF with cervical adenocarcinoma presents for initial palliative care visit, referred by Oncology.  \horacio alves Patient initially diagnosed with cervical cancer 5/2021. Underwent weekly cisplatin and pelvic RT 4500cgy 25 fx plus parametrial boost 540cgy 3 fx and 4 brachytherapy procedures (hybrid therapy) 700cgy x 4.long alves Patient was recently hospitalized at [facility] (1/26 - 2/1/22) after presenting with 3 weeks of worsening b/l hip/lower back pain and feet numbness. Pain is sharp in nature, radiating down the lateral thighs, with mild numbness on dorsal sides of feet. Symptoms initially improved with Aleve, however noted blood tinged vaginal discharge and blood clots with urination not related to menstrual periods so stopped taking Aleve. Hip pain and feet numbness progressively worsened, to the point that she cannot walk or lie supine. Pain is mildly alleviated when lying prone, and better with activity. Pt presented to ER in [place] 2 weeks prior to admission due to these symptoms, CT spine non-con showed multiple non-obstructing nephrolithiasis in L kidney but no fractures, deformities, subluxation were noted. She was discharged with gabapentin 300mg BID, Methocarbomol 500mg TID, and Meloxicam 15mg. She is referred today for continuation of pain management. long alves Patient states that she developed pain in her L buttock approximately 6 weeks ago. It started as a pinching pain in her L buttock. It worsened to the point that the pain felt as if someone where punching her in the area.  Standing up and walking helped to relieve the pain a bit.  She found that she was pacing a lot in her home to help the pain. long alves She was advised to use Acetaminophen 1000mg, was using it about twice daily.   This initially helped a bit. The pain progressed, eventually affecting both left and right. Associated with tingling in her toes. Was advised to use Aleve BID. Was advised to increase to 2 pills BID which caused her vaginal bleeding, she stopped. long alves Was in [place] at the time and was sent to the hospital where she states she received a steroid shot to her back. This helped her very much for a while. She was also prescribed a lidocaine 5% patch there but this was not approved by her insurance. Pain came back and was intense.  She returned to [place] on 1/24 and presented to [facility] on 1/26 at which time she was admitted to [facility]. \par \par Pt states that numbness is worse on L side, from dorsal foot to lateral mid-calf. Feels like it is "freezing" and must keep socks on. Also notes cramping intermittently in the toes. Pt also noticed that not all of her urine will come out, and must put pressure to completely relieve herself\par \par Current pain regimen:\par MS ER 30mg BID\par PRN Oxycodone 5mg (uses sparingly as it makes her sleepy) \par Gabapentin 600mg BID\par Methocarbamol 500mg TID\par Meloxicam 15mg QD\par \par When she walks her low back pain gets better but her legs/feet feel tight and crampy. \par She feels weakness in her legs, lifting her legs is difficult, moving her toes is difficult.  She describes a sensation of coldness of her toes. \par She started home PT yesterday, has [facility] home care in place. Is using a rolling walker for assistance with ambulation. \par \par ROS:\par +tearful, anxious about her pain - she has lorazepam which she uses PRN when she gets very worked up. She's upset about her current state, she is dependent on others which is hard to get used to. \par +constipation - uses miralax and senna daily. \par +some nausea, no vomiting\par +appetite is low\par +12lb weight loss in the last 6 weeks\par Denies urinary issues, \par \par Patient is single, lives with her sister, parents, daughter. \par She has a 26yo son and a 24yo daughter.\par She is unemployed. \par \par PMD: Dr. Valentino Comer ([phone])\par \par [employer]-Stop Ref#: [id number]
Yes

## 2022-02-24 NOTE — PHYSICAL EXAM
[General Appearance - Alert] : alert [Sclera] : the sclera and conjunctiva were normal [Normal Oral Mucosa] : normal oral mucosa [Neck Appearance] : the appearance of the neck was normal [] : no respiratory distress [Auscultation Breath Sounds / Voice Sounds] : lungs were clear to auscultation bilaterally [Heart Rate And Rhythm] : heart rate was normal and rhythm regular [Heart Sounds] : normal S1 and S2 [Bowel Sounds] : normal bowel sounds [Abdomen Soft] : soft [Abdomen Tenderness] : non-tender [Skin Color & Pigmentation] : normal skin color and pigmentation [Oriented To Time, Place, And Person] : oriented to person, place, and time [FreeTextEntry1] : drake

## 2022-03-02 ENCOUNTER — APPOINTMENT (OUTPATIENT)
Dept: HEMATOLOGY ONCOLOGY | Facility: CLINIC | Age: 47
End: 2022-03-02
Payer: MEDICAID

## 2022-03-02 VITALS
SYSTOLIC BLOOD PRESSURE: 116 MMHG | DIASTOLIC BLOOD PRESSURE: 81 MMHG | BODY MASS INDEX: 27.27 KG/M2 | HEART RATE: 119 BPM | WEIGHT: 158.73 LBS | OXYGEN SATURATION: 99 % | TEMPERATURE: 97.1 F

## 2022-03-02 PROCEDURE — 99214 OFFICE O/P EST MOD 30 MIN: CPT

## 2022-03-08 ENCOUNTER — APPOINTMENT (OUTPATIENT)
Dept: UROGYNECOLOGY | Facility: CLINIC | Age: 47
End: 2022-03-08
Payer: MEDICAID

## 2022-03-08 VITALS
SYSTOLIC BLOOD PRESSURE: 119 MMHG | BODY MASS INDEX: 26.98 KG/M2 | WEIGHT: 158 LBS | HEIGHT: 64 IN | DIASTOLIC BLOOD PRESSURE: 85 MMHG | HEART RATE: 115 BPM | TEMPERATURE: 98.2 F

## 2022-03-08 DIAGNOSIS — R33.9 RETENTION OF URINE, UNSPECIFIED: ICD-10-CM

## 2022-03-08 LAB
BILIRUB UR QL STRIP: NORMAL
CLARITY UR: CLEAR
COLLECTION METHOD: NORMAL
GLUCOSE UR-MCNC: NORMAL
HCG UR QL: 0.2 EU/DL
HGB UR QL STRIP.AUTO: NORMAL
KETONES UR-MCNC: NORMAL
LEUKOCYTE ESTERASE UR QL STRIP: NORMAL
NITRITE UR QL STRIP: NORMAL
PH UR STRIP: 6
PROT UR STRIP-MCNC: NORMAL
SP GR UR STRIP: 1

## 2022-03-08 PROCEDURE — 51736 URINE FLOW MEASUREMENT: CPT

## 2022-03-08 PROCEDURE — 51725 SIMPLE CYSTOMETROGRAM: CPT

## 2022-03-08 PROCEDURE — 99204 OFFICE O/P NEW MOD 45 MIN: CPT | Mod: 25

## 2022-03-08 NOTE — ASSESSMENT
[FreeTextEntry1] : 46yoF with:\par \par 1. Cervical Cancer - s/p weekly Cisplatin + EBRT, completed 11/2021. Follow up with Med Onc, Rad Onc.\par \par 2. Acute Low back pain with radiculopathy, L>R - Appreciate Neuro-Oncology input on this challenging case with running dx of platinum induced neuropathy with possible radiation toxicity causing her weakness.\par \par Increase MS ER to 30mg TID, c/w PRN Oxycodone IR 10mg, Gabapentin 600mg BID, Methocarbamol 500mg TID\par Pt will hold duloxetine 30mg at this time, patient is concerned it is exacerbating her pain.\par \par She is to continue with physical therapy. \par \par 3. Encounter for Palliative Care - Emotional support provided.\par \par Follow up in 2 weeks, call sooner with questions or issues.

## 2022-03-08 NOTE — HISTORY OF PRESENT ILLNESS
[FreeTextEntry1] : Is a 46-year-old with adenocarcinoma of the cervix status post chemotherapy and radiation therapy.  Chief complaint is incomplete bladder emptying urinary frequency urgency and hesitancy reviewing oncology and neurology history and notes it would appear that she has severe pain in the back lower buttocks and pelvis.  . The patient by report had an MRI but I do not see any notes on that and the most recent neurology chart note.  There are suggestions in the neurology note that ruling out cord compression would be of import.  Patient seems to think that a structural explanation for her lumbosacral and radicular symptoms was ruled out.\par \par She has many symptoms which appear to relate to derangement of the lumbosacral neurologic system.  She has weakness of the lower extremities requiring a walker at age 46, numbness of bilateral feet buttock pain and now voiding dysfunction.\par \par Her specific description of incontinence is restricted to the bathroom or after voiding she has to sit and wait to deliver another 2 to 3 ounces of fluid she does vaginal and clitoral stimulation to accomplish the final emptying of the bladder.  She does not have these problems when she is not in the bathroom.  She admits to significant overhydration drinking large bottles of dry mouth which she presumes is related to her medications..\par \par The office assessment revealed limited mobility with a walker.  She is able to squeeze the vagina and requested.  The vagina has a normal entrance but becomes narrowed at the mid vagina presumably related to radiation changes.  I am unable to visualize the upper vagina cervix or uterus.\par \par Post voiding residual volume was tested by catheterization and found to be 20 cc.  She is clearly not having a great amount of retention.\par \par \par Simple cystometry was performed and the first sensation was at 200 cc only mild sensation at 300 cc and at 500 she did not feel that she was at capacity.  This testing suggests decreased sensation to bladder filling either neurologically mediated or as a result of several months habit of overhydration bladder distention and lack of contractility.  This also may be related to her inability to empty the final few ounces when she voids.  At 500 cc bladder filling no bladder contractions were appreciated.  Urine analysis is negative.\par \par VOIDING FLOW PATTERN IS NORMAL AND SHE VOIDED 545 CC AFTER 55 CC FILL.  THERE IS NO RETENTION AND CONTRACTILITY APPEARS ACCEPTABLE BASED ON FLOW\par \par \par In summary this patient has adequate bladder emptying with a residual volume of only a few ounces.  I recommended her reducing her fluid intake we discussed the balance between her thirst and what volume she chooses to take to resolve her thirst.  I gave her the name of a number of products that induce salivation further see if we can reduce the overhydration.  I have suggested she void on a time schedule every 2 to 2-1/2 hours rather than waiting for nature signal as nature signal is quite late for her and I do feel she is likely over distending her bladder and having less contractility as a result.\par \par I would not treat with any anticholinergic certainly as she already has dry mouth and I think dietary habits as described above would be the best intervention I recommended double voiding and continuing to do gentle vulvar or vaginal stimulation that is what it takes to empty the last few ounces.  This problem only happens when she is in the bathroom.   Formal urodynamics might delineate in more detail the physiology of her emptying but I do not think they are necessary at this point. She seems to have numerous symptoms suggesting generalized lumbosacral nerve derangement\par \par \par RECOMMEND:\par \par VIT C\par CRANBERRY TABS\par Timed voiding \par Reduce Overhydration\par Double void or vulvar digital stimulation to complete void\par MRI and or urodynamics would be more advanced assessment if above not efficacious and if MRI had not been verified. \par \par \par Review of previous notes, labs, current prescriptions was performed.\par History , Physical counseling was performed. \par All questions answered in lay language\par Total time for encounter was   51    min\par A chaperone was present for the entirety of the encounter\par \par \par

## 2022-03-08 NOTE — HISTORY OF PRESENT ILLNESS
[FreeTextEntry1] : 46yoF with cervical adenocarcinoma presents for follow-up palliative care visit, referred by Oncology.  \horacio alves Patient initially diagnosed with cervical cancer 5/2021. Underwent weekly cisplatin and pelvic RT 4500cgy 25 fx plus parametrial boost 540cgy 3 fx and 4 brachytherapy procedures (hybrid therapy) 700cgy x 4.\horacio alves Patient was recently hospitalized at [facility] (1/26 - 2/1/22) after presenting with 3 weeks of worsening b/l hip/lower back pain and feet numbness. Pain is sharp in nature, radiating down the lateral thighs, with mild numbness on dorsal sides of feet. Symptoms initially improved with Aleve, however noted blood tinged vaginal discharge and blood clots with urination not related to menstrual periods so stopped taking Aleve. Hip pain and feet numbness progressively worsened, to the point that she cannot walk or lie supine. Pain is mildly alleviated when lying prone, and better with activity. Pt presented to ER in [place] 2 weeks prior to admission due to these symptoms, CT spine non-con showed multiple non-obstructing nephrolithiasis in L kidney but no fractures, deformities, subluxation were noted. She was discharged with gabapentin 300mg BID, Methocarbomol 500mg TID, and Meloxicam 15mg. She is referred today for continuation of pain management. \horacio alves Patient states that she developed pain in her L buttock approximately 6 weeks ago. It started as a pinching pain in her L buttock. It worsened to the point that the pain felt as if someone where punching her in the area.  Standing up and walking helped to relieve the pain a bit.  She found that she was pacing a lot in her home to help the pain. \horacio alves She was advised to use Acetaminophen 1000mg, was using it about twice daily.   This initially helped a bit. The pain progressed, eventually affecting both left and right. Associated with tingling in her toes. Was advised to use Aleve BID. Was advised to increase to 2 pills BID which caused her vaginal bleeding, she stopped. long alves Was in [place] at the time and was sent to the hospital where she states she received a steroid shot to her back. This helped her very much for a while. She was also prescribed a lidocaine 5% patch there but this was not approved by her insurance. Pain came back and was intense.  She returned to [place] on 1/24 and presented to [facility] on 1/26 at which time she was admitted to [facility]. \par \par Pt states that numbness is worse on L side, from dorsal foot to lateral mid-calf. Feels like it is "freezing" and must keep socks on. Also notes cramping intermittently in the toes. Pt also noticed that not all of her urine will come out, and must put pressure to completely relieve herself\par \par When she walks her low back pain gets better but her legs/feet feel tight and crampy. \par She feels weakness in her legs, lifting her legs is difficult, moving her toes is difficult.  She describes a sensation of coldness of her toes. \par She started home PT yesterday, has [facility] home care in place. Is using a rolling walker for assistance with ambulation. \par \par \par Interval Hx (3/2/22):\par Since our first visit pt has since established with Neuro Oncology. Was started on Duloxetine 30mg with plans to increase to BID. \par She notes that upon initiation of duloxetine which she was taking at bedtime it caused her to have vivid dreams that were disconcerting for her. We spoke and she altered the time of dosage to 12pm.  She has not yet increased to BID. She is concerned that it might be causing her to have worsened cramping in b/l legs. She describes feeling as if she is wearing tight boots on her legs.  The cramping has made even standing up difficult. She is barely sleeping as the pain is a central focus at nighttime. Last night took melatonin 10mg and was able to sleep for 3 hours. Is distressed about her pain and debility.\par \par Getting in a hot shower helps to alleviate some of the acute pain and cramping. \par \par Current pain regimen:\par MS ER 30mg BID\par Oxycodone IR 10mg PRN (uses sparingly, has used it 2x in the last week)\par Gabapentin 600mg BID\par Methocarbamol 500mg TID\par Duloxetine 30mg QD\par \par ROS:\par +tearful, anxious about her pain - she has lorazepam which she uses PRN when she gets very worked up. She's upset about her current state, she is dependent on others which is hard to get used to. \par +constipation - uses miralax and senna daily. \par +some nausea, no vomiting\par +appetite is low\par +12lb weight loss in the last 6 weeks\par Denies urinary issues, \par \par Patient is single, lives with her sister, parents, daughter. \par She has a 26yo son and a 24yo daughter.\par She is unemployed. \par \par PMD: Dr. Valentino Comer ([phone])\par \par I-Stop Ref#:

## 2022-03-09 ENCOUNTER — NON-APPOINTMENT (OUTPATIENT)
Age: 47
End: 2022-03-09

## 2022-03-11 ENCOUNTER — APPOINTMENT (OUTPATIENT)
Dept: HEMATOLOGY ONCOLOGY | Facility: CLINIC | Age: 47
End: 2022-03-11
Payer: MEDICAID

## 2022-03-11 VITALS
HEART RATE: 108 BPM | TEMPERATURE: 97.5 F | BODY MASS INDEX: 26.91 KG/M2 | DIASTOLIC BLOOD PRESSURE: 87 MMHG | RESPIRATION RATE: 16 BRPM | OXYGEN SATURATION: 98 % | WEIGHT: 156.75 LBS | SYSTOLIC BLOOD PRESSURE: 128 MMHG

## 2022-03-11 DIAGNOSIS — I10 ESSENTIAL (PRIMARY) HYPERTENSION: ICD-10-CM

## 2022-03-11 PROCEDURE — 99214 OFFICE O/P EST MOD 30 MIN: CPT

## 2022-03-11 RX ORDER — DULOXETINE HYDROCHLORIDE 30 MG/1
30 CAPSULE, DELAYED RELEASE PELLETS ORAL
Qty: 60 | Refills: 1 | Status: DISCONTINUED | COMMUNITY
Start: 2022-02-16 | End: 2022-03-11

## 2022-03-16 ENCOUNTER — NON-APPOINTMENT (OUTPATIENT)
Age: 47
End: 2022-03-16

## 2022-03-23 ENCOUNTER — OUTPATIENT (OUTPATIENT)
Dept: OUTPATIENT SERVICES | Facility: HOSPITAL | Age: 47
LOS: 1 days | Discharge: HOME | End: 2022-03-23

## 2022-03-23 DIAGNOSIS — C53.9 MALIGNANT NEOPLASM OF CERVIX UTERI, UNSPECIFIED: ICD-10-CM

## 2022-03-23 NOTE — ASSESSMENT
[FreeTextEntry1] : 46yoF with:\par \par 1. Cervical Cancer - s/p weekly Cisplatin + EBRT, completed 11/2021. Follow up with Med Onc, Rad Onc.\par \par 2. Acute Low back pain with radiculopathy, L>R - Appreciate Neuro-Oncology input on this challenging case with running dx of platinum induced neuropathy with possible radiation toxicity causing her weakness.\par - Increase MS ER to 30mg TID, c/w PRN Oxycodone IR 10mg, Gabapentin 600mg BID, Methocarbamol 500mg TID\par Pt will hold duloxetine 30mg at this time, patient is concerned it is exacerbating her pain.\par - Discussed incremental increases of medical cannabis.\par - She is to initiate physical therapy. New RX provided today. \par \par 3. Encounter for Palliative Care - Emotional support provided.\par \par Follow up in 2 weeks, call sooner with questions or issues.

## 2022-03-23 NOTE — HISTORY OF PRESENT ILLNESS
[FreeTextEntry1] : 46yoF with cervical adenocarcinoma presents for follow-up palliative care visit, referred by Oncology.  \horacio alves Patient initially diagnosed with cervical cancer 5/2021. Underwent weekly cisplatin and pelvic RT 4500cgy 25 fx plus parametrial boost 540cgy 3 fx and 4 brachytherapy procedures (hybrid therapy) 700cgy x 4.\horacio alves Patient was recently hospitalized at [facility] (1/26 - 2/1/22) after presenting with 3 weeks of worsening b/l hip/lower back pain and feet numbness. Pain is sharp in nature, radiating down the lateral thighs, with mild numbness on dorsal sides of feet. Symptoms initially improved with Aleve, however noted blood tinged vaginal discharge and blood clots with urination not related to menstrual periods so stopped taking Aleve. Hip pain and feet numbness progressively worsened, to the point that she cannot walk or lie supine. Pain is mildly alleviated when lying prone, and better with activity. Pt presented to ER in [place] 2 weeks prior to admission due to these symptoms, CT spine non-con showed multiple non-obstructing nephrolithiasis in L kidney but no fractures, deformities, subluxation were noted. She was discharged with gabapentin 300mg BID, Methocarbomol 500mg TID, and Meloxicam 15mg. She is referred today for continuation of pain management. \horacio alves Patient states that she developed pain in her L buttock approximately 6 weeks ago. It started as a pinching pain in her L buttock. It worsened to the point that the pain felt as if someone where punching her in the area.  Standing up and walking helped to relieve the pain a bit.  She found that she was pacing a lot in her home to help the pain. \horacio alves She was advised to use Acetaminophen 1000mg, was using it about twice daily.   This initially helped a bit. The pain progressed, eventually affecting both left and right. Associated with tingling in her toes. Was advised to use Aleve BID. Was advised to increase to 2 pills BID which caused her vaginal bleeding, she stopped. long alves Was in [place] at the time and was sent to the hospital where she states she received a steroid shot to her back. This helped her very much for a while. She was also prescribed a lidocaine 5% patch there but this was not approved by her insurance. Pain came back and was intense.  She returned to [place] on 1/24 and presented to [facility] on 1/26 at which time she was admitted to [facility]. \par \par Pt states that numbness is worse on L side, from dorsal foot to lateral mid-calf. Feels like it is "freezing" and must keep socks on. Also notes cramping intermittently in the toes. Pt also noticed that not all of her urine will come out, and must put pressure to completely relieve herself\par \par When she walks her low back pain gets better but her legs/feet feel tight and crampy. \par She feels weakness in her legs, lifting her legs is difficult, moving her toes is difficult.  She describes a sensation of coldness of her toes. \par She started home PT yesterday, has [facility] care in place. Is using a rolling walker for assistance with ambulation. \par \par Interval Hx (3/11/22):\par Patient reports some minimal improvement of b/l leg cramping with discontinuation of Duloxetine.  She continues to describe a feeling as if she is wearing tight boots on her legs.  The cramping has made even standing up difficult. She is barely sleeping as the pain is a central focus at nighttime.  Remains on MS ER 30mg BID, she did not increase to TID as previously recommended.   Is using PRN Oxycodone IR 10mg very sparingly.  She prefers to limit use of pharmaceuticals.She obtained medical cannabis 1:1 THC:CBD and has trialed ~2mg THC/dose without any effect.  She has used topical medical cannabis but has experienced some localized swelling without erythema. \par \par She has not started PT as of yet.  Is distressed about her pain and debility.\par \par Getting in a hot shower helps to alleviate some of the acute pain and cramping. \par \par She discontinued duloxetine and felt improvement of pain.\par Current pain regimen:\par MS ER 30mg BID\par Oxycodone IR 10mg PRN (uses sparingly, has used it 2x in the last week)\par Gabapentin 600mg BID\par Methocarbamol 500mg TID\par \par ROS:\par +tearful, anxious about her pain - she has lorazepam which she uses PRN when she gets very worked up. She's upset about her current state, she is dependent on others which is hard to get used to. \par +constipation - uses senna daily, PRN Miralax\par +some nausea, no vomiting\par +appetite is low\par +12lb weight loss in the last 6 weeks\par Denies urinary issues.\par \par Patient is single, lives with her sister, parents, daughter. \par She has a 26yo son and a 26yo daughter.\par She is unemployed. \par \par PMD: Dr. Valentino Comer ([phone])\par \par I-Stop Ref#: [id number]

## 2022-03-23 NOTE — PHYSICAL EXAM
[General Appearance - Alert] : alert [Normal Oral Mucosa] : normal oral mucosa [Sclera] : the sclera and conjunctiva were normal [Neck Appearance] : the appearance of the neck was normal [] : no respiratory distress [Auscultation Breath Sounds / Voice Sounds] : lungs were clear to auscultation bilaterally [Heart Rate And Rhythm] : heart rate was normal and rhythm regular [Heart Sounds] : normal S1 and S2 [Bowel Sounds] : normal bowel sounds [Abdomen Tenderness] : non-tender [Abdomen Soft] : soft [Skin Color & Pigmentation] : normal skin color and pigmentation [Oriented To Time, Place, And Person] : oriented to person, place, and time [FreeTextEntry1] : Tearful at times

## 2022-03-25 ENCOUNTER — LABORATORY RESULT (OUTPATIENT)
Age: 47
End: 2022-03-25

## 2022-03-25 ENCOUNTER — APPOINTMENT (OUTPATIENT)
Dept: HEMATOLOGY ONCOLOGY | Facility: CLINIC | Age: 47
End: 2022-03-25
Payer: MEDICAID

## 2022-03-25 VITALS
HEIGHT: 63.98 IN | TEMPERATURE: 97.7 F | SYSTOLIC BLOOD PRESSURE: 125 MMHG | DIASTOLIC BLOOD PRESSURE: 82 MMHG | HEART RATE: 93 BPM | OXYGEN SATURATION: 96 % | WEIGHT: 154.32 LBS | BODY MASS INDEX: 26.35 KG/M2 | RESPIRATION RATE: 16 BRPM

## 2022-03-25 PROCEDURE — 99213 OFFICE O/P EST LOW 20 MIN: CPT

## 2022-03-28 PROBLEM — I10 HYPERTENSION, UNSPECIFIED TYPE: Status: ACTIVE | Noted: 2021-06-10

## 2022-03-28 RX ORDER — AMLODIPINE BESYLATE 10 MG/1
10 TABLET ORAL DAILY
Qty: 30 | Refills: 0 | Status: ACTIVE | COMMUNITY
Start: 2021-08-07 | End: 1900-01-01

## 2022-03-31 ENCOUNTER — NON-APPOINTMENT (OUTPATIENT)
Age: 47
End: 2022-03-31

## 2022-04-01 ENCOUNTER — APPOINTMENT (OUTPATIENT)
Dept: HEMATOLOGY ONCOLOGY | Facility: CLINIC | Age: 47
End: 2022-04-01
Payer: MEDICAID

## 2022-04-01 PROCEDURE — 99205 OFFICE O/P NEW HI 60 MIN: CPT | Mod: 95

## 2022-04-05 ENCOUNTER — APPOINTMENT (OUTPATIENT)
Dept: GYNECOLOGIC ONCOLOGY | Facility: CLINIC | Age: 47
End: 2022-04-05

## 2022-04-07 NOTE — ASSESSMENT
[FreeTextEntry1] : 46yoF with:\par \par 1. Cervical Cancer - s/p weekly Cisplatin + EBRT, completed 11/2021. Follow up with Med Onc, Rad Onc.\par \par 2. Acute Low back pain with radiculopathy, L>R - Appreciate Neuro-Oncology input on this challenging case with running dx of platinum induced neuropathy with possible radiation toxicity causing her weakness.\par Duloxetine seems to have exacerbated pain and spasming.The MS ER increase to TID \par \par - Lower MS ER back down to 30mg BID, c/w PRN Oxycodone IR 10mg, Gabapentin 600mg BID, Methocarbamol 500mg TID\par \par -PM&R referral - would appreciate the input of Physiatry \par -c/w PT \par \par 3. Anxiety - Provided extensive emotional support and validation of her worries\par \par \par Follow up in 2 weeks, call sooner with questions or issues.

## 2022-04-07 NOTE — PHYSICAL EXAM
[General Appearance - Alert] : alert [Sclera] : the sclera and conjunctiva were normal [Normal Oral Mucosa] : normal oral mucosa [Neck Appearance] : the appearance of the neck was normal [] : no respiratory distress [Auscultation Breath Sounds / Voice Sounds] : lungs were clear to auscultation bilaterally [Heart Rate And Rhythm] : heart rate was normal and rhythm regular [Heart Sounds] : normal S1 and S2 [Bowel Sounds] : normal bowel sounds [Abdomen Soft] : soft [Abdomen Tenderness] : non-tender [Skin Color & Pigmentation] : normal skin color and pigmentation [Oriented To Time, Place, And Person] : oriented to person, place, and time [FreeTextEntry1] : Tearful at times

## 2022-04-07 NOTE — HISTORY OF PRESENT ILLNESS
[FreeTextEntry1] : 46yoF with cervical adenocarcinoma presents for follow-up palliative care visit, referred by Oncology.  \horacio alves Patient initially diagnosed with cervical cancer 5/2021. Underwent weekly cisplatin and pelvic RT 4500cgy 25 fx plus parametrial boost 540cgy 3 fx and 4 brachytherapy procedures (hybrid therapy) 700cgy x 4.\horacio alves Patient was recently hospitalized at [facility] (1/26 - 2/1/22) after presenting with 3 weeks of worsening b/l hip/lower back pain and feet numbness. Pain is sharp in nature, radiating down the lateral thighs, with mild numbness on dorsal sides of feet. Symptoms initially improved with Aleve, however noted blood tinged vaginal discharge and blood clots with urination not related to menstrual periods so stopped taking Aleve. Hip pain and feet numbness progressively worsened, to the point that she cannot walk or lie supine. Pain is mildly alleviated when lying prone, and better with activity. Pt presented to ER in [place] 2 weeks prior to admission due to these symptoms, CT spine non-con showed multiple non-obstructing nephrolithiasis in L kidney but no fractures, deformities, subluxation were noted. She was discharged with gabapentin 300mg BID, Methocarbomol 500mg TID, and Meloxicam 15mg. She is referred today for continuation of pain management. \horacio alves Patient states that she developed pain in her L buttock approximately 6 weeks ago. It started as a pinching pain in her L buttock. It worsened to the point that the pain felt as if someone where punching her in the area.  Standing up and walking helped to relieve the pain a bit.  She found that she was pacing a lot in her home to help the pain. \horacio alves She was advised to use Acetaminophen 1000mg, was using it about twice daily.   This initially helped a bit. The pain progressed, eventually affecting both left and right. Associated with tingling in her toes. Was advised to use Aleve BID. Was advised to increase to 2 pills BID which caused her vaginal bleeding, she stopped. long alves Was in [place] at the time and was sent to the hospital where she states she received a steroid shot to her back. This helped her very much for a while. She was also prescribed a lidocaine 5% patch there but this was not approved by her insurance. Pain came back and was intense.  She returned to [place] on 1/24 and presented to [facility] on 1/26 at which time she was admitted to [facility]. \par \par Pt states that numbness is worse on L side, from dorsal foot to lateral mid-calf. Feels like it is "freezing" and must keep socks on. Also notes cramping intermittently in the toes. Pt also noticed that not all of her urine will come out, and must put pressure to completely relieve herself\par \par When she walks her low back pain gets better but her legs/feet feel tight and crampy. \par She feels weakness in her legs, lifting her legs is difficult, moving her toes is difficult.  She describes a sensation of coldness of her toes. \par She started home PT yesterday, has [facility] home care in place. Is using a rolling walker for assistance with ambulation. \par \par Interval Hx (3/25/22):\par After increasing the MS ER to TID she has been struggling with b/l LE edema. She has tried leg elevation but her feet start to fall asleep. She does not find that the increase in the MS ER has caused her to feel less pain. \par She has not started PT as of yet.  Is distressed about her pain and debility.\par Getting in a hot shower helps to alleviate some of the acute pain and cramping. \par \par Current pain regimen:\par MS ER 30mg TID\par Oxycodone IR 5-10mg PRN \par Gabapentin 600mg BID\par Methocarbamol 500mg TID\par \par ROS:\par +tearful, anxious about her pain - she has lorazepam which she uses PRN when she gets very worked up. She's upset about her current state, she is dependent on others which is hard to get used to. \par +constipation - uses senna daily, PRN Miralax\par +some nausea, no vomiting\par +appetite is low\par +12lb weight loss in the last 6 weeks\par Denies urinary issues.\par \par Patient is single, lives with her sister, parents, daughter. \par She has a 28yo son and a 24yo daughter.\par She is unemployed. \par \par PMD: Dr. Valentino Comer ([phone])\par \par I-Stop Ref#: [id number]

## 2022-04-08 ENCOUNTER — APPOINTMENT (OUTPATIENT)
Dept: HEMATOLOGY ONCOLOGY | Facility: CLINIC | Age: 47
End: 2022-04-08
Payer: MEDICAID

## 2022-04-08 PROCEDURE — 99215 OFFICE O/P EST HI 40 MIN: CPT | Mod: 95

## 2022-04-11 ENCOUNTER — APPOINTMENT (OUTPATIENT)
Dept: HEMATOLOGY ONCOLOGY | Facility: CLINIC | Age: 47
End: 2022-04-11
Payer: MEDICAID

## 2022-04-11 PROCEDURE — 99213 OFFICE O/P EST LOW 20 MIN: CPT | Mod: 95

## 2022-04-12 ENCOUNTER — APPOINTMENT (OUTPATIENT)
Dept: UROGYNECOLOGY | Facility: CLINIC | Age: 47
End: 2022-04-12
Payer: MEDICAID

## 2022-04-12 DIAGNOSIS — R35.0 FREQUENCY OF MICTURITION: ICD-10-CM

## 2022-04-12 DIAGNOSIS — R39.11 HESITANCY OF MICTURITION: ICD-10-CM

## 2022-04-12 PROCEDURE — 99213 OFFICE O/P EST LOW 20 MIN: CPT | Mod: 95

## 2022-04-12 NOTE — PHYSICAL EXAM
[General Appearance - Alert] : alert [Skin Color & Pigmentation] : normal skin color and pigmentation [Oriented To Time, Place, And Person] : oriented to person, place, and time [FreeTextEntry1] : Tearful at times

## 2022-04-12 NOTE — ASSESSMENT
[FreeTextEntry1] : 46yoF with:\par \par 1. Cervical Cancer - s/p weekly Cisplatin + EBRT, completed 11/2021. Follow up with Med Onc, Rad Onc. PET/CT ordered. To see Dr. Bill re: recent vaginal bleeding. \par \par 2. Acute Low back pain with radiculopathy, L>R - Appreciate Neuro-Oncology input on this challenging case with running dx of platinum induced neuropathy with possible radiation toxicity causing her weakness. \par Duloxetine seems to have exacerbated pain and spasming.  Dr. Germain will discuss utility of initiating pentoxifylline and tocopherol during their upcoming visit.\par \par - C/w MS ER 30mg BID, c/w PRN Oxycodone IR 10mg, Gabapentin 600mg BID, Methocarbamol 500mg TID. \par \par -PM&R referral - would appreciate the input of Physiatry \par -To resume with PT after COVID infection. \par \par 3. Anxiety - Provided extensive emotional support and validation of her worries. Appreciate behavioral health input.  \par \par Follow up in 2 weeks, call sooner with questions or issues.

## 2022-04-12 NOTE — HISTORY OF PRESENT ILLNESS
[FreeTextEntry1] : 46yoF with cervical adenocarcinoma presents for follow-up palliative care visit, referred by Oncology.  \horacio alves Patient initially diagnosed with cervical cancer 5/2021. Underwent weekly cisplatin and pelvic RT 4500cgy 25 fx plus parametrial boost 540cgy 3 fx and 4 brachytherapy procedures (hybrid therapy) 700cgy x 4.\horacio alves Patient was recently hospitalized at [facility] (1/26 - 2/1/22) after presenting with 3 weeks of worsening b/l hip/lower back pain and feet numbness. Pain is sharp in nature, radiating down the lateral thighs, with mild numbness on dorsal sides of feet. Symptoms initially improved with Aleve, however noted blood tinged vaginal discharge and blood clots with urination not related to menstrual periods so stopped taking Aleve. Hip pain and feet numbness progressively worsened, to the point that she cannot walk or lie supine. Pain is mildly alleviated when lying prone, and better with activity. Pt presented to ER in [place] 2 weeks prior to admission due to these symptoms, CT spine non-con showed multiple non-obstructing nephrolithiasis in L kidney but no fractures, deformities, subluxation were noted. She was discharged with gabapentin 300mg BID, Methocarbomol 500mg TID, and Meloxicam 15mg. She is referred today for continuation of pain management. \horacio alves Patient states that she developed pain in her L buttock approximately 6 weeks ago. It started as a pinching pain in her L buttock. It worsened to the point that the pain felt as if someone where punching her in the area.  Standing up and walking helped to relieve the pain a bit.  She found that she was pacing a lot in her home to help the pain. \horacio alves She was advised to use Acetaminophen 1000mg, was using it about twice daily.   This initially helped a bit. The pain progressed, eventually affecting both left and right. Associated with tingling in her toes. Was advised to use Aleve BID. Was advised to increase to 2 pills BID which caused her vaginal bleeding, she stopped. long alves Was in [place] at the time and was sent to the hospital where she states she received a steroid shot to her back. This helped her very much for a while. She was also prescribed a lidocaine 5% patch there but this was not approved by her insurance. Pain came back and was intense.  She returned to [place] on 1/24 and presented to [facility] on 1/26 at which time she was admitted to [facility]. \par \par Pt states that numbness is worse on L side, from dorsal foot to lateral mid-calf. Feels like it is "freezing" and must keep socks on. Also notes cramping intermittently in the toes. Pt also noticed that not all of her urine will come out, and must put pressure to completely relieve herself\par \par When she walks her low back pain gets better but her legs/feet feel tight and crampy. \par She feels weakness in her legs, lifting her legs is difficult, moving her toes is difficult.  She describes a sensation of coldness of her toes. \par She started home PT yesterday, has [facility] home care in place. Is using a rolling walker for assistance with ambulation. \par \par Interval Hx (4/11/22):\par Has been having foot cramping and for this pain she has to use the oxycodone IR 5mg, this helps. \par Was treated with macrobid for a UTI in the interim since our last visit. Symptoms have improved. \par Has not yet started PT due to becoming infected with COVID. She lost her taste briefly and was fatigued last week but has not had other symptoms since. \par Getting in a hot shower helps to alleviate some of the acute pain and cramping. \par \par Current pain regimen:\par MS ER 30mg BID\par Oxycodone IR 5-10mg PRN \par Gabapentin 600mg BID\par Methocarbamol 500mg TID\par \par medical cannabis not helping\par \par ROS:\par +tearful, anxious about her pain - she has lorazepam which she uses PRN when she gets very worked up. She's upset about her current state, she is dependent on others which is hard to get used to. Has established care with psychiatrist Dr. Lang.\par +constipation - uses senna daily, PRN Miralax\par +some nausea, no vomiting\par +appetite has been OK. \par +vaginal bleeding that started two weeks ago, it has resolved. She was supposed to see Gyn Onc last week but due to nita COVID. She will see Dr. Bill on 4/18. \par Denies urinary issues.\par \par Patient is single, lives with her sister, parents, daughter. \par She has a 28yo son and a 26yo daughter.\par She is unemployed. \par \par PMD: Dr. Valentino Comer ([phone])\par \par I-Stop Ref#: [id number]

## 2022-04-12 NOTE — HISTORY OF PRESENT ILLNESS
[Home] : at home, [unfilled] , at the time of the visit. [Medical Office: ([facility])___] : at the medical office located in  [FreeTextEntry1] : \par \par \par \par \par \par \par Pt was noted to have overhydration.  She got an infection with less fluid intake but found no help and UTI as well.  On further discussion she did not have persistent dysuria but felt that it was irritated at the exit point.  We discussed possible treatment for atrophy given the cancer history she would prefer not to have any hormones.  I counseled her to use petroleum in the periurethral area and the introitus.  She will have to balance a reduction in the overhydration habit and also trying to avoid having over concentrated urine which is probably irritating the areas of atrophy and postradiation changes.\par \par We discussed medications for the overactive bladder and the anticholinergics with the risk of dry mouth are not desired.  The option of Botox was reviewed with the patient but at the present time she would like to try timed voiding and symptom control of the vulva and periurethral region.\par \par Review of previous notes, labs, current prescriptions was performed.\par All questions answered in lay language\par Total time for encounter was     32  min\par A chaperone was present for the entirety of the encounter\par \par

## 2022-04-18 ENCOUNTER — APPOINTMENT (OUTPATIENT)
Dept: GYNECOLOGIC ONCOLOGY | Facility: CLINIC | Age: 47
End: 2022-04-18
Payer: MEDICAID

## 2022-04-18 ENCOUNTER — APPOINTMENT (OUTPATIENT)
Dept: PHYSICAL MEDICINE AND REHAB | Facility: CLINIC | Age: 47
End: 2022-04-18
Payer: MEDICAID

## 2022-04-18 VITALS
HEIGHT: 63.98 IN | DIASTOLIC BLOOD PRESSURE: 79 MMHG | SYSTOLIC BLOOD PRESSURE: 121 MMHG | WEIGHT: 145.38 LBS | BODY MASS INDEX: 24.82 KG/M2 | HEART RATE: 93 BPM

## 2022-04-18 VITALS
HEART RATE: 101 BPM | TEMPERATURE: 98.6 F | SYSTOLIC BLOOD PRESSURE: 123 MMHG | OXYGEN SATURATION: 98 % | DIASTOLIC BLOOD PRESSURE: 71 MMHG

## 2022-04-18 DIAGNOSIS — C80.1 MALIGNANT (PRIMARY) NEOPLASM, UNSPECIFIED: ICD-10-CM

## 2022-04-18 DIAGNOSIS — N76.0 ACUTE VAGINITIS: ICD-10-CM

## 2022-04-18 PROCEDURE — 99205 OFFICE O/P NEW HI 60 MIN: CPT

## 2022-04-18 PROCEDURE — 99215 OFFICE O/P EST HI 40 MIN: CPT

## 2022-04-18 RX ORDER — ACETAMINOPHEN 500 MG
TABLET ORAL
Refills: 0 | Status: DISCONTINUED | COMMUNITY
End: 2022-04-18

## 2022-04-18 RX ORDER — ATORVASTATIN CALCIUM 20 MG/1
20 TABLET, FILM COATED ORAL
Qty: 30 | Refills: 0 | Status: DISCONTINUED | COMMUNITY
Start: 2021-08-07 | End: 2022-04-18

## 2022-04-18 NOTE — ASSESSMENT
[FreeTextEntry1] : 47 yo f pmh cervical cancer presenting for worsening back pain, LE weakness, sensory loss and foot swelling\par - sees Dr. Lucero, ordered for EMG/NCV.  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 \par -will refer for ortho spine given severe radicular back pain and weakness\par -will order b/l AFO for foot drop\par -unclear cause for b/l LE swelling, will discuss with Dr. Ney Clark if gabapentin may be contributing\par -follow up in 4 weeks\par I spent a total of 60  minutes on this encounter including documentation, face to face time, care coordination and reviewing prior records.\par \par

## 2022-04-18 NOTE — HISTORY OF PRESENT ILLNESS
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  Takes MS ER 30mg BID, with oxy IR 10mg prn, gabapentin 600mg BID, methocarbamol 500mg TID. \par she is accompanied by her daughter today. \par Pain is constant, ranges from 3-10/10. described as shooting, stabbing, needles, cramping and numbness.  \par \par Patient reports she had mild discomfort after RT which she attributes to positioning during treatment.  She was able to walk, pain was stable until she was in [place], noticed more difficulty with ambulation, and noticed radiating down pain down LLE.   She went to the ER, was given pain medication, when she \par \par weight went from 184 in January to 145 now. States she is not eating enough.  She feels food is not appetizing. \par She also has LE swelling b/l feet.  Has difficulty wearing shoes due to foot swelling b/l. \par \par reported fatigue, hip and back pain, LE weakness. She ambulates with RW.  \par \par She tried compression socks but could not tolerate them. \par recent PET scan 2/8 showed no enlarge LN in the pelvis. \par \par Has difficulty sleeping due to pain, states she has to sleep with her legs externally rotated due to pain. \par \par has tried medical marijuana without improvement. \par \par She tried duloxetine but couldn’t tolerate.  \par \par She denies bowel or bladder incontinence.

## 2022-04-18 NOTE — PHYSICAL EXAM
[FreeTextEntry1] : GEN: AAOx3, NAD.\par PSYCH:tearful at times. Responds appropriately to commands.\par EYES: Sclerae Anicteric. No discharge. EOMI.\par RESP: Breathing unlabored.\par CV: No varicosities noted.\par EXT: + pedal edema b/l \par SKIN: No lesions noted.\par STRENGTH: 5/5 b/l UE, RLE HF 4/5, KE 4/5, df 0/5, PF 2/5,   LLE HF 4-/5, KE 4-/5,  DF 0/5 PF 3/5\par TONE: Normal, No clonus.\par REFLEXES:hypoactive symmetric patella \par SENS: impaired to LT bilateral lower extremities.\par INSP: Spine alignment is midline, with no evidence of scoliosis. \par GAIT: ambulating with RW, in diabetic shoes. \par SPECIAL:  Slump test positive bilaterally\par \par

## 2022-04-19 ENCOUNTER — TRANSCRIPTION ENCOUNTER (OUTPATIENT)
Age: 47
End: 2022-04-19

## 2022-04-22 ENCOUNTER — NON-APPOINTMENT (OUTPATIENT)
Age: 47
End: 2022-04-22

## 2022-04-23 ENCOUNTER — OUTPATIENT (OUTPATIENT)
Dept: OUTPATIENT SERVICES | Facility: HOSPITAL | Age: 47
LOS: 1 days | End: 2022-04-23
Payer: MEDICAID

## 2022-04-23 ENCOUNTER — APPOINTMENT (OUTPATIENT)
Dept: NUCLEAR MEDICINE | Facility: IMAGING CENTER | Age: 47
End: 2022-04-23
Payer: MEDICAID

## 2022-04-23 DIAGNOSIS — Z00.8 ENCOUNTER FOR OTHER GENERAL EXAMINATION: ICD-10-CM

## 2022-04-23 DIAGNOSIS — C53.9 MALIGNANT NEOPLASM OF CERVIX UTERI, UNSPECIFIED: ICD-10-CM

## 2022-04-23 PROCEDURE — 78815 PET IMAGE W/CT SKULL-THIGH: CPT | Mod: 26,PS

## 2022-04-23 PROCEDURE — A9552: CPT

## 2022-04-23 PROCEDURE — 78815 PET IMAGE W/CT SKULL-THIGH: CPT

## 2022-04-27 ENCOUNTER — APPOINTMENT (OUTPATIENT)
Dept: UROLOGY | Facility: CLINIC | Age: 47
End: 2022-04-27
Payer: MEDICAID

## 2022-04-27 DIAGNOSIS — N20.0 CALCULUS OF KIDNEY: ICD-10-CM

## 2022-04-27 LAB — CYTOLOGY CVX/VAG DOC THIN PREP: ABNORMAL

## 2022-04-27 PROCEDURE — 99214 OFFICE O/P EST MOD 30 MIN: CPT

## 2022-04-27 PROCEDURE — 99204 OFFICE O/P NEW MOD 45 MIN: CPT

## 2022-04-27 NOTE — PHYSICAL EXAM
[General Appearance - Well Developed] : well developed [General Appearance - Well Nourished] : well nourished [Normal Appearance] : normal appearance [Well Groomed] : well groomed [General Appearance - In No Acute Distress] : no acute distress [] : no respiratory distress [Respiration, Rhythm And Depth] : normal respiratory rhythm and effort [Exaggerated Use Of Accessory Muscles For Inspiration] : no accessory muscle use [Oriented To Time, Place, And Person] : oriented to person, place, and time [FreeTextEntry1] : anxious about current new issues, expresses decisional regret regarding treatment

## 2022-04-27 NOTE — HISTORY OF PRESENT ILLNESS
[FreeTextEntry1] : TEN HERNANDEZ is a 46 year F who presents for new right hydro, left renal calculi, in setting of cervical cancer stage 2b, history of EBRT and cisplatin. PET CT 4/23/22 demonstrates new right hydronephrosis (no sig on 2/7/22 CT), down to level of the pelvis. Left kidney modestly atrophic, with ~ 7x9 mm left lower pole and ~ 3 mm left mid renal stone. No hydro on left.\par \par Multiple current issues- fatigue, hip and back pain, weakness (uses a walker).\par \par PMH: cervical cancer, chemo-induced neuropathy, gerd, htn, hyperlipidemia, type 2 dm\par PSH: EBRT\par FH: no sig  issues\par Meds: reviewed in allscripts\par SH: never a smoker\par Allergies: nkda\par  [Currently Experiencing ___] :  [unfilled]

## 2022-04-29 ENCOUNTER — NON-APPOINTMENT (OUTPATIENT)
Age: 47
End: 2022-04-29

## 2022-04-29 LAB
ANION GAP SERPL CALC-SCNC: 15 MMOL/L
BASOPHILS # BLD AUTO: 0.01 K/UL
BASOPHILS NFR BLD AUTO: 0.2 %
BUN SERPL-MCNC: 6 MG/DL
CALCIUM SERPL-MCNC: 9.9 MG/DL
CHLORIDE SERPL-SCNC: 95 MMOL/L
CO2 SERPL-SCNC: 26 MMOL/L
CREAT SERPL-MCNC: 0.64 MG/DL
EGFR: 109 ML/MIN/1.73M2
EOSINOPHIL # BLD AUTO: 0.08 K/UL
EOSINOPHIL NFR BLD AUTO: 1.3 %
GLUCOSE SERPL-MCNC: 113 MG/DL
HCT VFR BLD CALC: 30.2 %
HGB BLD-MCNC: 9.2 G/DL
IMM GRANULOCYTES NFR BLD AUTO: 0.3 %
LYMPHOCYTES # BLD AUTO: 0.51 K/UL
LYMPHOCYTES NFR BLD AUTO: 8.1 %
MAN DIFF?: NORMAL
MCHC RBC-ENTMCNC: 25.3 PG
MCHC RBC-ENTMCNC: 30.5 GM/DL
MCV RBC AUTO: 83.2 FL
MONOCYTES # BLD AUTO: 0.5 K/UL
MONOCYTES NFR BLD AUTO: 7.9 %
NEUTROPHILS # BLD AUTO: 5.18 K/UL
NEUTROPHILS NFR BLD AUTO: 82.2 %
PLATELET # BLD AUTO: 415 K/UL
POTASSIUM SERPL-SCNC: 4.2 MMOL/L
RBC # BLD: 3.63 M/UL
RBC # FLD: 15.7 %
SODIUM SERPL-SCNC: 137 MMOL/L
WBC # FLD AUTO: 6.3 K/UL

## 2022-05-02 ENCOUNTER — APPOINTMENT (OUTPATIENT)
Dept: RADIATION ONCOLOGY | Facility: CLINIC | Age: 47
End: 2022-05-02
Payer: MEDICAID

## 2022-05-02 ENCOUNTER — APPOINTMENT (OUTPATIENT)
Dept: NEUROSURGERY | Facility: CLINIC | Age: 47
End: 2022-05-02
Payer: MEDICAID

## 2022-05-02 ENCOUNTER — OUTPATIENT (OUTPATIENT)
Dept: OUTPATIENT SERVICES | Facility: HOSPITAL | Age: 47
LOS: 1 days | Discharge: HOME | End: 2022-05-02

## 2022-05-02 VITALS
OXYGEN SATURATION: 98 % | RESPIRATION RATE: 16 BRPM | WEIGHT: 145 LBS | BODY MASS INDEX: 24.9 KG/M2 | HEART RATE: 59 BPM | SYSTOLIC BLOOD PRESSURE: 110 MMHG | TEMPERATURE: 98.8 F | DIASTOLIC BLOOD PRESSURE: 74 MMHG

## 2022-05-02 DIAGNOSIS — C53.9 MALIGNANT NEOPLASM OF CERVIX UTERI, UNSPECIFIED: ICD-10-CM

## 2022-05-02 PROCEDURE — 99203 OFFICE O/P NEW LOW 30 MIN: CPT

## 2022-05-02 PROCEDURE — 99213 OFFICE O/P EST LOW 20 MIN: CPT

## 2022-05-02 RX ORDER — PROCHLORPERAZINE MALEATE 10 MG/1
10 TABLET ORAL EVERY 6 HOURS
Qty: 120 | Refills: 3 | Status: DISCONTINUED | COMMUNITY
Start: 2021-09-08 | End: 2022-05-02

## 2022-05-02 RX ORDER — NITROFURANTOIN MACROCRYSTALS 100 MG/1
100 CAPSULE ORAL
Qty: 10 | Refills: 0 | Status: DISCONTINUED | COMMUNITY
Start: 2022-03-28 | End: 2022-05-02

## 2022-05-02 RX ORDER — SILVER SULFADIAZINE 10 MG/G
1 CREAM TOPICAL TWICE DAILY
Qty: 1 | Refills: 0 | Status: DISCONTINUED | COMMUNITY
Start: 2021-10-05 | End: 2022-05-02

## 2022-05-02 RX ORDER — METHYLPREDNISOLONE 4 MG/1
4 TABLET ORAL
Qty: 1 | Refills: 0 | Status: DISCONTINUED | COMMUNITY
Start: 2022-02-10 | End: 2022-05-02

## 2022-05-02 RX ORDER — ONDANSETRON 4 MG/1
4 TABLET, ORALLY DISINTEGRATING ORAL
Qty: 20 | Refills: 1 | Status: DISCONTINUED | COMMUNITY
Start: 2021-10-05 | End: 2022-05-02

## 2022-05-02 NOTE — REVIEW OF SYSTEMS
[Feeling Poorly] : feeling poorly [Feeling Tired] : feeling tired [Leg Weakness] : leg weakness [Numbness] : numbness [Tingling] : tingling [Abnormal Sensation] : an abnormal sensation

## 2022-05-02 NOTE — REVIEW OF SYSTEMS
[Lower Ext Edema] : lower extremity edema [Vaginal Discharge] : vaginal discharge [Muscle Weakness] : muscle weakness [Negative] : Allergic/Immunologic [Peripheral Sensory Neuropathy: Grade 2 - Moderate symptoms; limiting instrumental ADL] : Peripheral Sensory Neuropathy: Grade 2 - Moderate symptoms; limiting instrumental ADL

## 2022-05-03 ENCOUNTER — APPOINTMENT (OUTPATIENT)
Dept: UROLOGY | Facility: CLINIC | Age: 47
End: 2022-05-03
Payer: MEDICAID

## 2022-05-03 DIAGNOSIS — G62.82 RADIATION-INDUCED POLYNEUROPATHY: ICD-10-CM

## 2022-05-03 PROCEDURE — 99442: CPT

## 2022-05-03 NOTE — ASSESSMENT
[FreeTextEntry1] : Reviewed findings, labs, discussed patient concerns.\par \par Will proceed at earliest opportunity with cysto stent

## 2022-05-03 NOTE — HISTORY OF PRESENT ILLNESS
[Other Location: e.g. School (Enter Location, City,State)___] : at [unfilled], at the time of the visit. [Other Location: e.g. Home (Enter Location, City,State)___] : at [unfilled] [Verbal consent obtained from patient] : the patient, [unfilled] [FreeTextEntry1] : The patient-doctor relationship has been established in a face to face fashion via real time video/audio HIPAA compliant communication using telemedicine software. The patient's identity has been confirmed. The patient was previously emailed a copy of the telemedicine consent. They have had a chance to review and has now given verbal consent and has requested care to be assessed and treated via telemedicine. They understand there may be limitations in this process, and that they may need further followup care in the office and/or hospital settings.\par \par Verbal consent given on May  3 2022  1:40PM\par \par TEN HERNANDEZ is a 46 year F who presents for f/u labs, planning. New right hydronephrosis on recent PET scan, representing some change in status. Right kidney best kidney. Creat on BMP wnl 0.64, unchanged from prior. Anemia noted, though appears stable, up from 11/201.\par \par Reviewed all findings with pt, and that case was discussed with Dr. Bill who plans on EUA, biopsies under anesthesia at time of stent placement.\par \par 05/03/2022 \par \par The patient denies fevers, chills, nausea and/or vomiting, and no unexplained weight loss.\par \par All pertinent parts of the patient PFSH (past medical, family, and social histories), laboratory, radiological studies and available physician notes were reviewed prior to starting the face-to-face portion of the telemedicine visit. Questionnaire results, where appropriate, were discussed with the patient.\par

## 2022-05-04 NOTE — ASSESSMENT
[FreeTextEntry1] : Ms. Cedric Wilson is 46 year old woman presenting with h/o  Cervical cancer and Chemotherapy//radiation induced neuropathy. Dr. Hopper explains \par PET MRI Looks good from a neurosurgical perspective\par Radiation/ Chemotherapy induced neuropathy \par Sciatica Symptoms due to painful neuropathy due to radiation treatment\par Follow up with Dr. Ney Birmingham.\par No neurosurgical interventions recommended

## 2022-05-04 NOTE — HISTORY OF PRESENT ILLNESS
[FreeTextEntry1] : Back pain and difficulty walking [de-identified] : Ms. Freda Wilson is a 46 year old woman presenting with H/O Cervical Cancer (2021), HTN, PID, Hydronephrosis, DM, Hyperlipidemia, GERD, Nephrolithiasis who presents for evaluation of her thoracic spine. She ambulates with walker and LSO brace in place. She presents with persistent back pain and difficulty walking. She is under the care Dr. Lea for palliative pain management.

## 2022-05-05 ENCOUNTER — APPOINTMENT (OUTPATIENT)
Dept: HEMATOLOGY ONCOLOGY | Facility: CLINIC | Age: 47
End: 2022-05-05
Payer: MEDICAID

## 2022-05-05 VITALS
TEMPERATURE: 97.2 F | DIASTOLIC BLOOD PRESSURE: 73 MMHG | SYSTOLIC BLOOD PRESSURE: 113 MMHG | OXYGEN SATURATION: 97 % | HEART RATE: 92 BPM | RESPIRATION RATE: 16 BRPM | WEIGHT: 145.92 LBS | BODY MASS INDEX: 25.07 KG/M2

## 2022-05-05 PROCEDURE — 99214 OFFICE O/P EST MOD 30 MIN: CPT

## 2022-05-06 ENCOUNTER — APPOINTMENT (OUTPATIENT)
Dept: HEMATOLOGY ONCOLOGY | Facility: CLINIC | Age: 47
End: 2022-05-06

## 2022-05-06 PROCEDURE — 99215 OFFICE O/P EST HI 40 MIN: CPT | Mod: 95

## 2022-05-09 NOTE — REASON FOR VISIT
[Routine Follow-Up] : routine follow-up visit for [Follow-Up Visit] : a follow-up [Family Member] : family member [FreeTextEntry2] : cervical cancer.

## 2022-05-09 NOTE — VITALS
[Maximal Pain Intensity: 10/10] : 10/10 [Least Pain Intensity: 3/10] : 3/10 [Pain Location: ___] : Pain Location: [unfilled] [Pain Interferes with ADLs] : Pain interferes with activities of daily living. [Opioid] : opioid [ECOG Performance Status: 1 - Restricted in physically strenuous activity but ambulatory and able to carry out work of a light or sedentary nature] : Performance Status: 1 - Restricted in physically strenuous activity but ambulatory and able to carry out work of a light or sedentary nature, e.g., light house work, office work [60: Requires occasional assistance, but is able to care for most of his/her needs] : 60: Requires occasional assistance, but is able to care for most of his/her needs

## 2022-05-12 NOTE — PHYSICAL EXAM
[General Appearance - Alert] : alert [Skin Color & Pigmentation] : normal skin color and pigmentation [Oriented To Time, Place, And Person] : oriented to person, place, and time [Sclera] : the sclera and conjunctiva were normal [Normal Oral Mucosa] : normal oral mucosa [Neck Appearance] : the appearance of the neck was normal [] : no respiratory distress [Auscultation Breath Sounds / Voice Sounds] : lungs were clear to auscultation bilaterally [Heart Sounds] : normal S1 and S2 [Heart Rate And Rhythm] : heart rate was normal and rhythm regular [Bowel Sounds] : normal bowel sounds [Abdomen Soft] : soft [Abdomen Tenderness] : non-tender [FreeTextEntry1] : Tearful at times

## 2022-05-12 NOTE — ASSESSMENT
[FreeTextEntry1] : 46yoF with:\par \par 1. Cervical Cancer - s/p weekly Cisplatin + EBRT, completed 11/2021. Follow up with Med Onc, Rad Onc. PET/CT ordered. To see Dr. Bill re: recent vaginal bleeding. \par \par 2. Acute Low back pain with radiculopathy, L>R - Appreciate Neuro-Oncology input on this challenging case with running dx of platinum induced neuropathy with possible radiation toxicity causing her weakness. \par Duloxetine seems to have exacerbated pain and spasming.  Appreciate the input of additional consultants - physiatry and Neurosurgery.\par \par - C/w MS ER 30mg BID, c/w PRN Oxycodone IR 10mg, Gabapentin 600mg BID, Methocarbamol 500mg TID. \par -C/w PT \par \par 3. Anxiety - Provided extensive emotional support and validation of her worries. Appreciate behavioral health input.  c/w behavioral health follow up. \par \par Follow up in 2 weeks, call sooner with questions or issues.

## 2022-05-12 NOTE — DATA REVIEWED
[FreeTextEntry1] : PET/CT (4/2022):\par COMPARISON: FDG PET/CT dated 2/7/2022. Baseline study dated 7/5/2021 was reviewed.\par FINDINGS:\par HEAD/NECK: Physiologic FDG activity in visualized brain, head, and neck.\par CHEST: No abnormal FDG activity. No lymphadenopathy.\par LUNGS: No abnormal FDG activity. No lung nodule.\par PLEURA/PERICARDIUM: No abnormal FDG activity. No effusion.\par HEPATOBILIARY/PANCREAS: Physiologic FDG activity. Liver SUV mean is 2.0; previously 3.2.\par SPLEEN: Physiologic FDG activity. Normal in size.\par ADRENAL GLANDS: No abnormal FDG activity. No nodule.\par KIDNEYS/URINARY BLADDER: Physiologic excreted FDG activity in bilateral kidneys, right ureter, and urinary bladder. New moderate right hydroureteronephrosis. Right ureter is dilated down to the level of the cervix. Bilateral cortical scarring, atrophic left kidney, and multiple nonobstructing left intrarenal calculi measuring up to 7 mm, unchanged.\par \par REPRODUCTIVE ORGANS: Persistent peripheral hypermetabolism and central photopenia in the uterine cervix, increased in size and metabolism (SUV 7.4; image 184; previous SUV 6.1), and difficult to delineate on CT. No increased FDG activity in the endometrium.\par \par ABDOMINOPELVIC NODES: No enlarged or FDG-avid lymph node.\par \par BOWEL/PERITONEUM/MESENTERY: Unchanged pancolonic and rectal hypermetabolism, without corresponding abnormality on CT, may be related to Metformin.\par \par BONES/SOFT TISSUES: No abnormal FDG activity.\par \par IMPRESSION: Compared to FDG-PET/CT scan dated 2/7/2022:\par 1. Persistent peripheral hypermetabolism and central photopenia in the uterine cervix, increased in size and metabolism, is concerning for residual disease. MRI may be obtained for further evaluation.\par \par 2. New moderate right hydroureteronephrosis. Right ureter is dilated down to the level of the cervix. CT urogram may be obtained for further evaluation.\par \par 3. Unchanged nonspecific pancolonic and rectal hypermetabolism may be related to metformin.

## 2022-05-12 NOTE — HISTORY OF PRESENT ILLNESS
[FreeTextEntry1] : 46yoF with cervical adenocarcinoma presents for follow-up palliative care visit, referred by Oncology.  \horacio alves Patient initially diagnosed with cervical cancer 5/2021. Underwent weekly cisplatin and pelvic RT 4500cgy 25 fx plus parametrial boost 540cgy 3 fx and 4 brachytherapy procedures (hybrid therapy) 700cgy x 4.\horacio alves Patient was recently hospitalized at [facility] (1/26 - 2/1/22) after presenting with 3 weeks of worsening b/l hip/lower back pain and feet numbness. Pain is sharp in nature, radiating down the lateral thighs, with mild numbness on dorsal sides of feet. Symptoms initially improved with Aleve, however noted blood tinged vaginal discharge and blood clots with urination not related to menstrual periods so stopped taking Aleve. Hip pain and feet numbness progressively worsened, to the point that she cannot walk or lie supine. Pain is mildly alleviated when lying prone, and better with activity. Pt presented to ER in [place] 2 weeks prior to admission due to these symptoms, CT spine non-con showed multiple non-obstructing nephrolithiasis in L kidney but no fractures, deformities, subluxation were noted. She was discharged with gabapentin 300mg BID, Methocarbomol 500mg TID, and Meloxicam 15mg. She is referred today for continuation of pain management. \horacio alves Patient states that she developed pain in her L buttock approximately 6 weeks ago. It started as a pinching pain in her L buttock. It worsened to the point that the pain felt as if someone where punching her in the area.  Standing up and walking helped to relieve the pain a bit.  She found that she was pacing a lot in her home to help the pain. \horacio alves She was advised to use Acetaminophen 1000mg, was using it about twice daily.   This initially helped a bit. The pain progressed, eventually affecting both left and right. Associated with tingling in her toes. Was advised to use Aleve BID. Was advised to increase to 2 pills BID which caused her vaginal bleeding, she stopped. long alves Was in [place] at the time and was sent to the hospital where she states she received a steroid shot to her back. This helped her very much for a while. She was also prescribed a lidocaine 5% patch there but this was not approved by her insurance. Pain came back and was intense.  She returned to [place] on 1/24 and presented to [facility] on 1/26 at which time she was admitted to [facility]. \par \par Pt states that numbness is worse on L side, from dorsal foot to lateral mid-calf. Feels like it is "freezing" and must keep socks on. Also notes cramping intermittently in the toes. Pt also noticed that not all of her urine will come out, and must put pressure to completely relieve herself\par \par When she walks her low back pain gets better but her legs/feet feel tight and crampy. \par She feels weakness in her legs, lifting her legs is difficult, moving her toes is difficult.  She describes a sensation of coldness of her toes. \par She started home PT yesterday, has [facility] home care in place. Is using a rolling walker for assistance with ambulation. \par \par Interval Hx (5/5/22):\par Since our last visit patient has met with multiple specialists.  She was found on recent PET/CT (ordered due to vaginal bleeding) to have R hydroureteronephrosis and it is recommended by Urology that a ureteral stent be placed. She will be going for biopsy of the cervix as well as there is hypermetabolism of the cervix concerning for residual disease. \par \par Patient has been having difficult time coping with her complex medical situation and the number of appointments she has to keep up with. The news of possibly having residual disease is overwhelming for her. \par \par Has been having foot cramping, R>L,  and for this pain she has to use the oxycodone IR 5mg, this helps. \par Getting in a hot shower helps to alleviate some of the acute pain and cramping. \par Has been going for PT twice weekly. \par \par Current pain regimen:\par MS ER 30mg BID\par Oxycodone IR 5-10mg PRN \par Gabapentin 600mg BID\par Methocarbamol 500mg TID\par Tylenol 1000mg PRN (takes before PT)\par \par medical cannabis not helping\par \par ROS:\par +tearful, anxious about her pain - she has lorazepam which she uses PRN when she gets very worked up. She's upset about her current state, she is dependent on others which is hard to get used to. Has established care with psychiatrist Dr. Lang.\par +constipation - uses senna daily, PRN Miralax\par +some nausea, no vomiting\par +appetite has been OK. \par Denies urinary issues.\par \par Patient is single, lives with her sister, parents, daughter. \par She has a 28yo son and a 24yo daughter.\par She is unemployed. \par \par PMD: Dr. Valentino Comer ([phone])\par \par I-Stop Ref#: [id number]

## 2022-05-23 DIAGNOSIS — Z01.818 ENCOUNTER FOR OTHER PREPROCEDURAL EXAMINATION: ICD-10-CM

## 2022-05-24 ENCOUNTER — OUTPATIENT (OUTPATIENT)
Dept: OUTPATIENT SERVICES | Facility: HOSPITAL | Age: 47
LOS: 1 days | End: 2022-05-24
Payer: MEDICAID

## 2022-05-24 ENCOUNTER — APPOINTMENT (OUTPATIENT)
Dept: NEUROLOGY | Facility: CLINIC | Age: 47
End: 2022-05-24
Payer: MEDICAID

## 2022-05-24 VITALS
TEMPERATURE: 98 F | RESPIRATION RATE: 16 BRPM | WEIGHT: 149.03 LBS | HEIGHT: 64 IN | OXYGEN SATURATION: 98 % | SYSTOLIC BLOOD PRESSURE: 122 MMHG | DIASTOLIC BLOOD PRESSURE: 74 MMHG | HEART RATE: 94 BPM

## 2022-05-24 DIAGNOSIS — C53.9 MALIGNANT NEOPLASM OF CERVIX UTERI, UNSPECIFIED: ICD-10-CM

## 2022-05-24 DIAGNOSIS — E11.9 TYPE 2 DIABETES MELLITUS WITHOUT COMPLICATIONS: ICD-10-CM

## 2022-05-24 DIAGNOSIS — I10 ESSENTIAL (PRIMARY) HYPERTENSION: ICD-10-CM

## 2022-05-24 LAB
A1C WITH ESTIMATED AVERAGE GLUCOSE RESULT: 5.6 % — SIGNIFICANT CHANGE UP (ref 4–5.6)
ALBUMIN SERPL ELPH-MCNC: 4.1 G/DL — SIGNIFICANT CHANGE UP (ref 3.3–5)
ALP SERPL-CCNC: 93 U/L — SIGNIFICANT CHANGE UP (ref 40–120)
ALT FLD-CCNC: 11 U/L — SIGNIFICANT CHANGE UP (ref 4–33)
ANION GAP SERPL CALC-SCNC: 11 MMOL/L — SIGNIFICANT CHANGE UP (ref 7–14)
APPEARANCE UR: CLEAR — SIGNIFICANT CHANGE UP
AST SERPL-CCNC: 10 U/L — SIGNIFICANT CHANGE UP (ref 4–32)
BACTERIA # UR AUTO: ABNORMAL
BILIRUB SERPL-MCNC: <0.2 MG/DL — SIGNIFICANT CHANGE UP (ref 0.2–1.2)
BILIRUB UR-MCNC: NEGATIVE — SIGNIFICANT CHANGE UP
BUN SERPL-MCNC: 20 MG/DL — SIGNIFICANT CHANGE UP (ref 7–23)
CALCIUM SERPL-MCNC: 9.7 MG/DL — SIGNIFICANT CHANGE UP (ref 8.4–10.5)
CHLORIDE SERPL-SCNC: 88 MMOL/L — LOW (ref 98–107)
CO2 SERPL-SCNC: 29 MMOL/L — SIGNIFICANT CHANGE UP (ref 22–31)
COLOR SPEC: COLORLESS — SIGNIFICANT CHANGE UP
CREAT SERPL-MCNC: 1.48 MG/DL — HIGH (ref 0.5–1.3)
DIFF PNL FLD: ABNORMAL
EGFR: 43 ML/MIN/1.73M2 — LOW
EPI CELLS # UR: SIGNIFICANT CHANGE UP /HPF (ref 0–5)
ESTIMATED AVERAGE GLUCOSE: 114 — SIGNIFICANT CHANGE UP
GLUCOSE SERPL-MCNC: 107 MG/DL — HIGH (ref 70–99)
GLUCOSE UR QL: NEGATIVE — SIGNIFICANT CHANGE UP
HCG SERPL-ACNC: <5 MIU/ML — SIGNIFICANT CHANGE UP
HCT VFR BLD CALC: 26.4 % — LOW (ref 34.5–45)
HGB BLD-MCNC: 8.3 G/DL — LOW (ref 11.5–15.5)
HYALINE CASTS # UR AUTO: 3 /LPF — SIGNIFICANT CHANGE UP (ref 0–7)
KETONES UR-MCNC: NEGATIVE — SIGNIFICANT CHANGE UP
LEUKOCYTE ESTERASE UR-ACNC: ABNORMAL
MCHC RBC-ENTMCNC: 26.4 PG — LOW (ref 27–34)
MCHC RBC-ENTMCNC: 31.4 GM/DL — LOW (ref 32–36)
MCV RBC AUTO: 84.1 FL — SIGNIFICANT CHANGE UP (ref 80–100)
NITRITE UR-MCNC: NEGATIVE — SIGNIFICANT CHANGE UP
NRBC # BLD: 0 /100 WBCS — SIGNIFICANT CHANGE UP
NRBC # FLD: 0 K/UL — SIGNIFICANT CHANGE UP
PH UR: 6 — SIGNIFICANT CHANGE UP (ref 5–8)
PLATELET # BLD AUTO: 388 K/UL — SIGNIFICANT CHANGE UP (ref 150–400)
POTASSIUM SERPL-MCNC: 4.5 MMOL/L — SIGNIFICANT CHANGE UP (ref 3.5–5.3)
POTASSIUM SERPL-SCNC: 4.5 MMOL/L — SIGNIFICANT CHANGE UP (ref 3.5–5.3)
PROT SERPL-MCNC: 7.6 G/DL — SIGNIFICANT CHANGE UP (ref 6–8.3)
PROT UR-MCNC: ABNORMAL
RBC # BLD: 3.14 M/UL — LOW (ref 3.8–5.2)
RBC # FLD: 15.9 % — HIGH (ref 10.3–14.5)
RBC CASTS # UR COMP ASSIST: 1 /HPF — SIGNIFICANT CHANGE UP (ref 0–4)
SODIUM SERPL-SCNC: 128 MMOL/L — LOW (ref 135–145)
SP GR SPEC: 1 — SIGNIFICANT CHANGE UP (ref 1–1.05)
UROBILINOGEN FLD QL: SIGNIFICANT CHANGE UP
WBC # BLD: 5.66 K/UL — SIGNIFICANT CHANGE UP (ref 3.8–10.5)
WBC # FLD AUTO: 5.66 K/UL — SIGNIFICANT CHANGE UP (ref 3.8–10.5)
WBC UR QL: 33 /HPF — HIGH (ref 0–5)

## 2022-05-24 PROCEDURE — 93010 ELECTROCARDIOGRAM REPORT: CPT

## 2022-05-24 PROCEDURE — 95909 NRV CNDJ TST 5-6 STUDIES: CPT

## 2022-05-24 PROCEDURE — 95886 MUSC TEST DONE W/N TEST COMP: CPT

## 2022-05-24 RX ORDER — SODIUM CHLORIDE 9 MG/ML
1000 INJECTION, SOLUTION INTRAVENOUS
Refills: 0 | Status: DISCONTINUED | OUTPATIENT
Start: 2022-06-21 | End: 2022-07-05

## 2022-05-24 RX ORDER — METFORMIN HYDROCHLORIDE 850 MG/1
1 TABLET ORAL
Qty: 0 | Refills: 0 | DISCHARGE

## 2022-05-24 RX ORDER — MELOXICAM 15 MG/1
1 TABLET ORAL
Qty: 0 | Refills: 0 | DISCHARGE

## 2022-05-24 RX ORDER — ATORVASTATIN CALCIUM 80 MG/1
1 TABLET, FILM COATED ORAL
Qty: 0 | Refills: 0 | DISCHARGE

## 2022-05-24 NOTE — H&P PST ADULT - PROBLEM SELECTOR PLAN 1
Pt scheduled for surgery Exam Under Anesthesia Poss Vaginal and Cervical Biopsies with Dr Bill tentatively 6/2/22 and pt states Dr Hoenig  and preop instructions including instructions for taking Famotidine  on the day of surgery, given verbally and with use of  written materials, and patient confirming understanding of such instructions using  teach back method. Pt scheduled for surgery Exam Under Anesthesia Poss Vaginal and Cervical Biopsies with Dr Bill  and Dr Hoenig tentatively 6/2/22  and preop instructions including instructions for taking Famotidine  on the day of surgery, given verbally and with use of  written materials, and patient confirming understanding of such instructions using  teach back method.

## 2022-05-24 NOTE — H&P PST ADULT - MUSCULOSKELETAL COMMENTS
Hx lower back pain with leg pain, numbness and weakness - pt using walker Pt c/o of leg pain and cramping - use of morphine and codeine for pain relief

## 2022-05-24 NOTE — H&P PST ADULT - GENITOURINARY COMMENTS
Pt hx vaginal discharge and fever 5/21 - pt found to have cervical ca - pt has had Chemo and RT and now for Tandem procedure - pt denies pain or discharge at this time Hx cervical Ca - Chemo / RT - pt reports vaginal bleeding 3/22 - PET scan done and right hydronephrosis noted and poss residual disease

## 2022-05-24 NOTE — H&P PST ADULT - NSICDXPASTMEDICALHX_GEN_ALL_CORE_FT
PAST MEDICAL HISTORY:  Acid reflux     Cervical ca     Hypercholesteremia     Hypertension     Obesity     Pre-diabetes      PAST MEDICAL HISTORY:  Acid reflux     Cervical ca     H/O hydronephrosis     Hypercholesteremia     Hypertension     Obesity     T2DM (type 2 diabetes mellitus)

## 2022-05-24 NOTE — H&P PST ADULT - ATTENDING COMMENTS
Seen in ASU with her daughter. Planed procedure disc, incl EUA by learner, poss vaginal, cervical, uterine sampling (incl D&C). All Q/A. Consent form reviewed.

## 2022-05-24 NOTE — H&P PST ADULT - NSICDXFAMILYHX_GEN_ALL_CORE_FT
FAMILY HISTORY:  No pertinent family history in first degree relatives     FAMILY HISTORY:  FH: breast cancer

## 2022-05-24 NOTE — H&P PST ADULT - ENDOCRINE COMMENTS
Hx DM - on oral meds - pt has improved A1c from 9-6 - does not check FBG Hx DM - on oral med - does not check FBG

## 2022-05-24 NOTE — H&P PST ADULT - HISTORY OF PRESENT ILLNESS
Pt is a 46 yr old female scheduled for Insertion of Tandem and Ring Ovoid Stage with Dr Germain tentatively 10/11/21 - pt Dx with cervical ca after being seen in ER for fever and vaginal discharge 5/21 - pt now has had Chemo and RT - pt denies vaginal discharge at this time - pt denies pain. Hx HTN, HLD, DM   Pt denies COVID   Pt has not had vaccine   Patient instructed to contact surgeon's office concerning COVID test prior to surgery      Pt is a 46 yr old female scheduled for Exam Under Anesthesia Poss Vaginal and Cervical Biopsies with Dr Bill tentatively 6/2/22 and pt states Dr Hoenig will insert ureteral stents  - pt Dx with cervical ca 5/21 / underwent Chemo / RT finishing 11/21 - hx HTN, DM HLD - pt now presents with c/o of chronic back pain and weakness in both legs - pt using walker and has slow, sliding gait.   Pt has not had vaccine   Patient instructed to contact surgeon's office concerning COVID test prior to surgery      Pt is a 46 yr old female scheduled for Exam Under Anesthesia Poss Vaginal and Cervical Biopsies with Dr Bill tentatively 6/2/22 and pt states Dr Hoenig will insert ureteral stent  - pt Dx with cervical ca 5/21 / underwent Chemo / RT finishing 11/21 - hx HTN, DM HLD - pt now presents with c/o of chronic severe back and leg pain and weakness in both legs - Pt on codeine and morphine for pain relief -  pt using walker and has steppage gait with foot drop.  Testing noted right hydronephrosis and stent planned. Pt has not had vaccine   Patient instructed to contact surgeon's office concerning COVID test prior to surgery

## 2022-05-25 LAB
CULTURE RESULTS: SIGNIFICANT CHANGE UP
SPECIMEN SOURCE: SIGNIFICANT CHANGE UP

## 2022-05-26 ENCOUNTER — APPOINTMENT (OUTPATIENT)
Dept: HEMATOLOGY ONCOLOGY | Facility: CLINIC | Age: 47
End: 2022-05-26
Payer: MEDICAID

## 2022-05-26 VITALS
BODY MASS INDEX: 25.14 KG/M2 | RESPIRATION RATE: 16 BRPM | WEIGHT: 146.36 LBS | DIASTOLIC BLOOD PRESSURE: 71 MMHG | SYSTOLIC BLOOD PRESSURE: 108 MMHG | OXYGEN SATURATION: 98 % | TEMPERATURE: 97.2 F | HEART RATE: 106 BPM

## 2022-05-26 PROCEDURE — 99214 OFFICE O/P EST MOD 30 MIN: CPT

## 2022-05-27 ENCOUNTER — NON-APPOINTMENT (OUTPATIENT)
Age: 47
End: 2022-05-27

## 2022-06-01 ENCOUNTER — NON-APPOINTMENT (OUTPATIENT)
Age: 47
End: 2022-06-01

## 2022-06-01 LAB — SARS-COV-2 N GENE NPH QL NAA+PROBE: DETECTED

## 2022-06-01 NOTE — ASSESSMENT
[FreeTextEntry1] : 46yoF with:\par \par 1. Cervical Cancer - s/p weekly Cisplatin + EBRT, completed 11/2021. Follow up with Med Onc, Rad Onc. \par Recent PET/CT shows hypermetabolism in the uterine cervix. She will be going for ureteral stent placement and biopsy on 6/2. \par \par 2. Low back pain/ b/l LE pain with radiculopathy, L>R - Appreciate Neuro-Oncology input on this challenging case with running dx of platinum induced neuropathy with possible radiation toxicity causing her weakness. \par Duloxetine seems to have exacerbated pain and spasming.  Appreciate the input of additional consultants - Physiatry, Neurosurgery and Neurology.\par \par - C/w MS ER 30mg BID, c/w PRN Oxycodone IR 10mg, Gabapentin 600mg BID, Methocarbamol 500mg TID. \par -C/w PT \par \par 3. Anxiety - Provided extensive emotional support and validation of her worries. Appreciate behavioral health input.  c/w behavioral health follow up. \par \par Follow up in 3 weeks, call sooner with questions or issues.

## 2022-06-03 ENCOUNTER — APPOINTMENT (OUTPATIENT)
Dept: PHYSICAL MEDICINE AND REHAB | Facility: CLINIC | Age: 47
End: 2022-06-03
Payer: MEDICAID

## 2022-06-03 PROCEDURE — 99203 OFFICE O/P NEW LOW 30 MIN: CPT | Mod: 95

## 2022-06-03 NOTE — PHYSICAL EXAM
[FreeTextEntry1] : limited telehealth exam\par +b/l LE edema\par using larger shoes due to edema, states they don't fit at times\par + weakness

## 2022-06-03 NOTE — HISTORY OF PRESENT ILLNESS
[Home] : at home, [unfilled] , at the time of the visit. [Medical Office: ([facility])___] : at the medical office located in  [Verbal consent obtained from patient] : the patient, [unfilled] [FreeTextEntry1] : 46 yo f pmh cervical ca s/p cisplatin, EBRT 11/2021 presents for telehealth follow up visit for radicular back pain,\par and chemo induced neuropathy with possible radiation induced neuropathy with symptoms of pain and weakness. Takes MS ER 30mg BID, with oxy IR 10mg prn, gabapentin 600mg BID, methocarbamol 500mg TID.  \par Pain is still constant, ranges from 3-10/10. described as shooting, stabbing, needles, cramping and numbness. \par Sees PT for balance, exercises.   \par Denies falls but reports near falls.    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 \par \par She ambulates with RW.  Seen by neurosurgery since last visit who also feels symptoms are due to chemo and radiation induced neuropathy.   She was ordered for AFO last visit but did not get them yet, but states her LE edema has persisted. \par \par  \par \par

## 2022-06-03 NOTE — ASSESSMENT
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  unable to tolerate compression \par -follow up 6-8 weeks, continue PT\par I spent a total of 30 minutes on this encounter including documentation, face to face time, care coordination and reviewing prior records.\par \par \par  \par

## 2022-06-14 ENCOUNTER — NON-APPOINTMENT (OUTPATIENT)
Age: 47
End: 2022-06-14

## 2022-06-14 ENCOUNTER — APPOINTMENT (OUTPATIENT)
Dept: GYNECOLOGIC ONCOLOGY | Facility: HOSPITAL | Age: 47
End: 2022-06-14

## 2022-06-15 NOTE — ASU PREOP CHECKLIST - ORDERS/MEDICATION ADMINISTRATION RECORD ON CHART
Stephanie Vuong MD  Urology Clinic office visit  NEW PATIENT    Patient:  Akbar Gardner  YOB: 1957  Date: 6/15/2022    HISTORY OF PRESENT ILLNESS:   The patient is a 75 y.o. female who presents today for evaluation of the following problems:      1. Recurrent UTI         Overall the problem(s) : are worsening. Associated Symptoms: No dysuria, gross hematuria. Pain Severity:      Summary of old records: N/A  (Patient's old records, notes and chart reviewed and summarized above.)      Onset years  Ucx eneterobacter 5/25/2022; klebsiella before that  Severity is described as mild-moderate. The course of symptoms over time is chronic and recurrent. Alleviating factors: abx  Worsening factors: none  Lower urinary tract symptoms: frequency;dysuria with infections    CT 4/4/2022  The liver, spleen, pancreas, adrenal glands and kidneys are grossly normal   with the limitations of a noncontrast study. Urinalysis today:  No results found for this visit on 06/15/22. Last BUN and creatinine:  Lab Results   Component Value Date    BUN 28 (H) 04/04/2022     Lab Results   Component Value Date    CREATININE 1.71 (H) 04/04/2022       Imaging Reviewed during this Office Visit:   (results were independently reviewed by physician and radiology report verified)  I independently reviewed and verified the images and reports from:    No results found.       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    Obesity     Peripheral neuropathy     Pneumonia 8/30/2015    Pulmonary hypertension ([address] 01/01/2012    by echocardiogram    Shoulder pain, bilateral 08/21/2013    [facility]     Unspecified essential hypertension     Essential hypertension    Vitamin D deficiency 11/05/2014     Past Surgical History:   Procedure Laterality Date    APPENDECTOMY      BACK INJECTION  02/25/2021    [facility] Right lumbar medial branch block using Fluroscopy    BRAIN SURGERY  05/24/2016    left temporal meningoencephalocele with herniation of cerebrospinal fluid and temporal lobe contents into the lateral sphenoid sinus, status post left temporal craniotomy for closure of [place], Dr. Sudhakar Moy COLONOSCOPY  05/03/2012    diverticular disease    COLONOSCOPY N/A 12/23/2020    [facility]OPSY [facility] 1 hyperplastic polyp, random biopsies negative   [facility] DENTAL SURGERY  08/2015    full dental extraction    FEMUR CLOSED REDUCTION Left 03/05/2020    has [facility]    FEMUR FRACTURE SURGERY Left 03/06/2021    left retrograde femoral nailing.  Dr. Cindy Rubio, [place] REMOVAL  05/28/2016    left-sided temporal craniotomy wound reexploration with removal of small retained foreign body (plastic from Ruth clip from surgery 3 days prior), [facility], Dr. Aiden Yuen ([facility])  09/06/2005    supracervical hysterectomy bilateral salpingo oophectomy    KNEE ARTHROSCOPY Left     NERVE BLOCK  09/09/2019    right side L2 through L5 block nerve,facet joint,lumbar,medial branch per Dr Beto Villa at . [facility]  2011    endoscopic mucosal resection of duodenal polyp    POLYSOMNOGRAPHY  11/30/2020    no significant sleep apnea    SHOULDER ARTHROPLASTY Right 10/18/2018    José Antonio Márquez MD; done at [address] [place] [address] ARTHROSCOPY Right 03/07/2019    revision arthroscopy with debridement,revision mini-open rotator cuff repair per Dr Sandra Hay at [place] [address] UPPER GASTROINTESTINAL ENDOSCOPY  05/27/2020    gastric ulcer, small hiatal hernia, Dr. Arbne Martin, [facility] N/A 12/23/2020    EGD BIOPSY performed by Emelyn Cantor MD at [address] [facility] Elizalde's esophagus     Family History   Problem Relation Age of Onset    Cancer Mother     Breast Cancer Mother 52    Hypertension Father     Diabetes Father     Cancer Father      Outpatient Medications Marked as Taking for the 6/15/22 encounter (Office Visit) with Joann Trent MD   Medication Sig Dispense Refill    ondansetron (ZOFRAN ODT) 4 MG disintegrating tablet Take 1 tablet by mouth every 8 hours as needed for Nausea or Vomiting 20 tablet 1    diphenhydrAMINE (BENADRYL) 12.5 MG/5ML elixir Take 5 mLs by mouth nightly as needed for Itching or Allergies (can use of nausea as well.) 118 mL 2    famotidine (PEPCID) 20 MG tablet Take 1 tablet by mouth nightly as needed (dyspepsia) 30 tablet 3    prochlorperazine (COMPAZINE) 5 MG tablet Take 1-2 tablets by mouth every 6 hours as needed for Nausea 40 tablet 0    pioglitazone-metFORMIN (ACTOPLUS MET)  MG per tablet TAKE 1 TABLET TWICE A DAY WITH MEALS 180 tablet 0    Promethazine HCl (PHENERGAN PO) Take by mouth      blood glucose monitor strips Test 1 times a day & as needed for symptoms of irregular blood glucose. Dispense sufficient amount for indicated testing frequency plus additional to accommodate PRN testing needs. 100 strip 1    DULoxetine (CYMBALTA) 60 MG extended release capsule Take 1 capsule by mouth daily 90 capsule 1    lisinopril-hydroCHLOROthiazide (PRINZIDE;ZESTORETIC) 20-12.5 MG per tablet Take 1 tablet by mouth daily      pantoprazole (PROTONIX) 40 MG tablet Take 1 tablet by mouth 2 times daily take 1 tablet by mouth daily (Patient taking differently: Take 40 mg by mouth 2 times daily take 1 tablet by mouth daily.     Patient states she takes 2 pills at the same times in the morning instead of 1 pill twice a day.) 60 tablet 5    Cholecalciferol (VITAMIN D3) 1.25 MG ([address] UT) CAPS TAKE 1 CAPSULE ONCE A WEEK 13 capsule 1    HYDROcodone-acetaminophen (NORCO) 5-325 MG per tablet Indications: Patient is taking 1 tablet a day       Gabapentin (NEURONTIN PO) Take 600 mg by mouth 5 times daily Only taking 3 times a day. Asa [aspirin]  Social History     Tobacco Use   Smoking Status Never Smoker   Smokeless Tobacco Never Used       Social History     Substance and Sexual Activity   Alcohol Use Yes    Alcohol/week: 0.0 standard drinks    Comment: Maybe twice a year, small amounts       REVIEW OF SYSTEMS:  Constitutional: negative  Eyes: negative  Respiratory: negative  Cardiovascular: negative  Gastrointestinal: negative  Genitourinary: negative except for what is in HPI  Musculoskeletal: negative  Skin: negative   Neurological: negative  Hematological/Lymphatic: negative  Psychological: negative    Physical Exam:    This a 75 y.o. female      Vitals:    06/15/22 0828   BP: 122/70   Pulse: 56   Resp: 20   SpO2: 95%     Constitutional: Patient in no acute distress. Neuro: Alert and oriented to person, place and time. Psych: mood and affect normal  HEENT negative  Lungs: Respiratory effort is normal  Cardiovascular: Normal peripheral pulses  Abdomen: Soft, non-tender, non-distended   Lymphatics: No palpable lymphadenopathy. Bladder non-tender and not distended. Assessment and Plan      1. Recurrent UTI           Plan:       Recurrent UTIs: Discussed double voiding techniques to improve bladder emptying. Discussed staying well hydrated, >60 oz/day. Cranberry pills BID. D-mannose. CT reviewed, no stones    Cystoscopy, bilateral retrograde pyelogram, under MAC to complete UTI work up      No follow-ups on file. Prescriptions Ordered:  No orders of the defined types were placed in this encounter.      Orders Placed:  No orders of the defined types were placed in this encounter.            MD Karyn Valerio M.D, MD [facility] none ordered preop/done [id number],,emend 40mg p/o/done

## 2022-06-17 ENCOUNTER — APPOINTMENT (OUTPATIENT)
Dept: GYNECOLOGIC ONCOLOGY | Facility: CLINIC | Age: 47
End: 2022-06-17
Payer: MEDICAID

## 2022-06-17 ENCOUNTER — APPOINTMENT (OUTPATIENT)
Dept: GYNECOLOGIC ONCOLOGY | Facility: CLINIC | Age: 47
End: 2022-06-17

## 2022-06-17 PROCEDURE — 99212 OFFICE O/P EST SF 10 MIN: CPT

## 2022-06-20 ENCOUNTER — TRANSCRIPTION ENCOUNTER (OUTPATIENT)
Age: 47
End: 2022-06-20

## 2022-06-20 NOTE — ASU PATIENT PROFILE, ADULT - FALL HARM RISK - RISK INTERVENTIONS

## 2022-06-20 NOTE — ASU PATIENT PROFILE, ADULT - NSICDXPASTMEDICALHX_GEN_ALL_CORE_FT
PAST MEDICAL HISTORY:  Acid reflux     Cervical ca     H/O hydronephrosis     Hypercholesteremia     Hypertension     Obesity     T2DM (type 2 diabetes mellitus)

## 2022-06-21 ENCOUNTER — APPOINTMENT (OUTPATIENT)
Dept: UROLOGY | Facility: HOSPITAL | Age: 47
End: 2022-06-21

## 2022-06-21 ENCOUNTER — OUTPATIENT (OUTPATIENT)
Dept: OUTPATIENT SERVICES | Facility: HOSPITAL | Age: 47
LOS: 1 days | Discharge: HOME | End: 2022-06-21
Payer: MEDICAID

## 2022-06-21 ENCOUNTER — TRANSCRIPTION ENCOUNTER (OUTPATIENT)
Age: 47
End: 2022-06-21

## 2022-06-21 ENCOUNTER — RESULT REVIEW (OUTPATIENT)
Age: 47
End: 2022-06-21

## 2022-06-21 ENCOUNTER — APPOINTMENT (OUTPATIENT)
Dept: GYNECOLOGIC ONCOLOGY | Facility: HOSPITAL | Age: 47
End: 2022-06-21

## 2022-06-21 VITALS — RESPIRATION RATE: 20 BRPM | OXYGEN SATURATION: 100 % | HEART RATE: 100 BPM

## 2022-06-21 VITALS
HEART RATE: 79 BPM | SYSTOLIC BLOOD PRESSURE: 120 MMHG | RESPIRATION RATE: 18 BRPM | WEIGHT: 149.03 LBS | DIASTOLIC BLOOD PRESSURE: 63 MMHG | OXYGEN SATURATION: 99 % | TEMPERATURE: 99 F | HEIGHT: 64 IN

## 2022-06-21 DIAGNOSIS — C53.9 MALIGNANT NEOPLASM OF CERVIX UTERI, UNSPECIFIED: ICD-10-CM

## 2022-06-21 LAB — HCG UR QL: NEGATIVE — SIGNIFICANT CHANGE UP

## 2022-06-21 PROCEDURE — 74420 UROGRAPHY RTRGR +-KUB: CPT | Mod: 26

## 2022-06-21 PROCEDURE — 52332 CYSTOSCOPY AND TREATMENT: CPT | Mod: RT

## 2022-06-21 PROCEDURE — 57410 PELVIC EXAMINATION: CPT

## 2022-06-21 PROCEDURE — 88305 TISSUE EXAM BY PATHOLOGIST: CPT | Mod: 26

## 2022-06-21 DEVICE — URETERAL STENT PERCUFLEX PLUS 6FR 26CM: Type: IMPLANTABLE DEVICE | Status: FUNCTIONAL

## 2022-06-21 DEVICE — GUIDEWIRE SENSOR DUAL-FLEX NITINOL STRAIGHT .038" X 150CM: Type: IMPLANTABLE DEVICE | Status: FUNCTIONAL

## 2022-06-21 RX ORDER — ONDANSETRON 8 MG/1
4 TABLET, FILM COATED ORAL ONCE
Refills: 0 | Status: DISCONTINUED | OUTPATIENT
Start: 2022-06-21 | End: 2022-07-05

## 2022-06-21 RX ORDER — SODIUM CHLORIDE 9 MG/ML
1000 INJECTION, SOLUTION INTRAVENOUS
Refills: 0 | Status: DISCONTINUED | OUTPATIENT
Start: 2022-06-21 | End: 2022-07-05

## 2022-06-21 RX ORDER — HYDROMORPHONE HYDROCHLORIDE 2 MG/ML
0.5 INJECTION INTRAMUSCULAR; INTRAVENOUS; SUBCUTANEOUS
Refills: 0 | Status: DISCONTINUED | OUTPATIENT
Start: 2022-06-21 | End: 2022-06-21

## 2022-06-21 RX ORDER — ACETAMINOPHEN 500 MG
1000 TABLET ORAL ONCE
Refills: 0 | Status: DISCONTINUED | OUTPATIENT
Start: 2022-06-21 | End: 2022-07-05

## 2022-06-21 RX ADMIN — SODIUM CHLORIDE 125 MILLILITER(S): 9 INJECTION, SOLUTION INTRAVENOUS at 10:30

## 2022-06-21 NOTE — PACU DISCHARGE NOTE - HYDRATION STATUS:
SUBJECTIVE:   Mr. Rom Bernstein. is a 62 y.o. male who is here for follow up of routine medical issues. Previously he saw Dr. Julius Torres, and before that Dr. David Ross. Chief Complaint   Patient presents with    Hypertension     pt here today for 6 month f/u and pt wants to get lab work done another day        Back pain stable. \"The xrays showed arthritis in my back. \"  TMJ hasn't been bothering him. Had colonoscopy and polypectomy with Dr. Junie Martinez in 2018. Now sees a urologist at [facility]--Dr. Jorge Henry; no longer sees Dr. Peter Encarnacion due to insurance; He has had prostate cancer; He completed XRT in 2014. He is no longer seeing Dr. Rangel Newman, for CKD--\"as needed if the labs get worse. \"   He saw Dr. Shani Yun and had PFT, due to sarcoid. This issue is quiescent. He had diagnosis years ago, with liver biopsy. He gets yearly eye check. At this time, he is otherwise doing well and has brought no other complaints to my attention today. For a list of the medical issues addressed today, see the assessment and plan below. PMH:   Past Medical History:   Diagnosis Date    Arthritis     CKD (chronic kidney disease) 2014    Hypercholesterolemia     Hypertension     Prostate cancer ([facility] 2014    Sarcoid        Past Surgical History:   Procedure Laterality Date    HX PROSTATECTOMY         All: He has No Known Allergies. Current Outpatient Medications   Medication Sig    simvastatin (ZOCOR) 10 mg tablet TAKE 1 TABLET BY MOUTH ONCE NIGHTLY    losartan-hydrochlorothiazide (HYZAAR) 50-12.5 mg per tablet Take 1 Tab by mouth daily. No current facility-administered medications for this visit. FH: His mother  68 of lung cancer, HTN. Father  of lung cancer age 68. One sister  age 1 of heart defect. Sister and brother are alive and well. 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 He reports that  has never smoked.  he has never used smokeless tobacco. He reports that he drinks alcohol. He reports that he does not use drugs. ROS: See above; Complete ROS otherwise negative. OBJECTIVE:   Vitals:   Visit Vitals  /79 (BP 1 Location: Left arm, BP Patient Position: Sitting)   Pulse 88   Temp 97.9 °F (36.6 °C) (Oral)   Resp 16   Ht 6' 2\" (1.88 m)   Wt 244 lb 6.4 oz (110.9 kg)   SpO2 96%   BMI 31.38 kg/m²      Gen: Pleasant 62 y.o. W male in NAD. HEENT: PERRLA. EOMI. OP moist and pink. Neck: Supple. No LAD. HEART: RRR, No M/G/R.    LUNGS: CTAB No W/R. ABDOMEN: S, NT, ND, BS+. EXTREMITIES: Warm. No C/C/E.  MUSCULOSKELETAL: Normal ROM, muscle strength 5/5 all groups. NEURO: Alert and oriented x 3. Cranial nerves grossly intact. No focal sensory or motor deficits noted. SKIN: Warm. Dry. No rashes or other lesions noted. Lab Results   Component Value Date/Time    Sodium 140 08/03/2018 12:17 PM    Potassium 4.3 08/03/2018 12:17 PM    Chloride 98 08/03/2018 12:17 PM    CO2 27 08/03/2018 12:17 PM    Anion gap 8 08/06/2013 05:00 AM    Glucose 75 08/03/2018 12:17 PM    BUN 14 08/03/2018 12:17 PM    Creatinine 1.36 (H) 08/03/2018 12:17 PM    BUN/Creatinine ratio 10 08/03/2018 12:17 PM    GFR est AA 64 08/03/2018 12:17 PM    GFR est non-AA 56 (L) 08/03/2018 12:17 PM    Calcium 9.4 08/03/2018 12:17 PM    Bilirubin, total 0.9 08/03/2018 12:17 PM    ALT (SGPT) 28 08/03/2018 12:17 PM    AST (SGOT) 25 08/03/2018 12:17 PM    Alk.  phosphatase 108 08/03/2018 12:17 PM    Protein, total 7.4 08/03/2018 12:17 PM    Albumin 4.0 08/03/2018 12:17 PM    Globulin 5.5 (H) 07/31/2013 12:55 PM    A-G Ratio 1.2 08/03/2018 12:17 PM       Lab Results   Component Value Date/Time    Cholesterol, total 154 08/03/2018 12:17 PM    HDL Cholesterol 45 08/03/2018 12:17 PM    LDL,Direct 93 05/21/2014 12:26 PM    LDL, calculated 80 08/03/2018 12:17 PM    Triglyceride 143 08/03/2018 12:17 PM        Lab Results   Component Value Date/Time    Hemoglobin A1c 6.2 (H) 08/03/2018 12:17 PM       Lab Results   Component Value Date/Time    WBC 8.5 08/03/2018 12:17 PM    HGB 15.0 08/03/2018 12:17 PM    HCT 44.0 08/03/2018 12:17 PM    PLATELET 410 70/98/1195 12:17 PM    MCV 91 08/03/2018 12:17 PM         ASSESSMENT/ PLAN:   1. HTN (hypertension): BP fine today. 2. Hypercholesteremia: Last labs look normal. No changes. 3. CKD (chronic kidney disease), stage 3 (moderate): GFR as above. Mild. Dr. Lacie Bird cut him loose. 4. Prostate cancer: s/p XRT and previous follow up Dr. Gene Bello. Now seeing urologist, Dr. Bekah Tellez. 5. Knee pain, right:  Doing better. 6. Sarcoid: He has seen Dr. Jovanna Love. 7. Hyperglycemia / prediabetes: Watch diet and exercise. 8. Colon cancer screening: Refer to Dr. Lety Arana. 9. Muffled hearing: Follow-up Disposition:  Return in about 6 months (around 8/6/2019) for HTN. I have reviewed the patient's medications and risks/side effects/benefits were discussed. Diagnosis(-es) explained to patient and questions answered. Literature provided where appropriate. Discussed the patient's BMI with him. The BMI follow up plan is as follows:     dietary management education, guidance, and counseling  encourage exercise  monitor weight  prescribed dietary intake    An After Visit Summary was printed and given to the patient. Satisfactory

## 2022-06-21 NOTE — BRIEF OPERATIVE NOTE - NSICDXBRIEFPREOP_GEN_ALL_CORE_FT
PRE-OP DIAGNOSIS:  Ureteral stricture, right 21-Jun-2022 11:24:35  Al Martinez  
PRE-OP DIAGNOSIS:  Cervical cancer 21-Jun-2022 17:17:18  Antelmo Bill

## 2022-06-21 NOTE — ASU DISCHARGE PLAN (ADULT/PEDIATRIC) - CARE PROVIDER_API CALL
Antelmo Bill)  Gynecologic Oncology; Obstetrics and Gynecology  [address] [place] [address]  Phone: [phone]  Fax: [phone]  Scheduled Appointment: 07/01/2022 01:30 PM

## 2022-06-21 NOTE — ASU DISCHARGE PLAN (ADULT/PEDIATRIC) - NURSING INSTRUCTIONS
DO NOT take any Tylenol (Acetaminophen) or narcotics containing Tylenol until after 340 pm ______ . You received Tylenol during your operation and it can cause damage to your liver if too much is taken within a 24 hour time period.

## 2022-06-21 NOTE — BRIEF OPERATIVE NOTE - NSICDXBRIEFPROCEDURE_GEN_ALL_CORE_FT
PROCEDURES:  Cystoscopic insertion of ureteral stent 21-Jun-2022 11:24:21  Al Martinez  
PROCEDURES:  Vaginal biopsy 21-Jun-2022 17:16:40  Antelmo Bill  Cervical biopsy 21-Jun-2022 17:16:54  Antelmo Bill  Pelvic examination 21-Jun-2022 17:17:04  Antelmo Bill

## 2022-06-21 NOTE — BRIEF OPERATIVE NOTE - OPERATION/FINDINGS
EUA: nl vulva. Distal vag without lesions. WE on right mid vag wall-biopsied. [place] of vag; upper 1/3 with circumferential fibrosis. Nodular area at apex/cervix - biopsied. B/M: left pm smooth soft fibrosis. Rt pm denser fibrosis without apparent nodularity. Monsel's applied. Hemostasis noted.
tandem right 6x26 JJ

## 2022-06-21 NOTE — BRIEF OPERATIVE NOTE - NSICDXBRIEFPOSTOP_GEN_ALL_CORE_FT
POST-OP DIAGNOSIS:  Ureteral stricture, right 21-Jun-2022 11:24:38  Al Martinez  
POST-OP DIAGNOSIS:  Cervical cancer 21-Jun-2022 17:17:30  Antelmo Bill

## 2022-06-21 NOTE — CHART NOTE - NSCHARTNOTEFT_GEN_A_CORE
Patient examined s/p EUA, cervical biopsies, vaginal biopsies, and ureteral stent placement. Patient endorsed that at home her pain was controlled with gabapentin and oxycodone. Patient stated that she had enough pain medication at home and did not need any additional prescriptions sent to the pharmacy at this time. Patient is doing well post-operatively. Patient has voided, is out of bed, and is tolerating clears. No complaints of pain at the time of assessment.    Noy Pederson, PGY-2

## 2022-06-21 NOTE — ASU DISCHARGE PLAN (ADULT/PEDIATRIC) - NS MD DC FALL RISK RISK
For information on Fall & Injury Prevention, visit: [email or web address] OR  [email or web address] OR  https://www.cdc.gov/steadi/patient.html

## 2022-06-21 NOTE — ASU PREOP CHECKLIST - SITE MARKED BY ANESTHESIOLOGIST
Parth Bernard Patient Age: 61 year old  MESSAGE: Interpreting service used: No      IM/FP- patient states he was trying to go have his MRI done at [facility] but the order wasnt available . patient is requesting that clinical staff sends the order over to [facility] so that patient caan have this test completed. patient is requesting a call back when order has been sent over      WEIGHT AND HEIGHT:   Wt Readings from Last 1 Encounters:   01/03/20 83.9 kg (185 lb)     Ht Readings from Last 1 Encounters:   01/03/20 5' 6.5\" (1.689 m)     BMI Readings from Last 1 Encounters:   01/03/20 29.41 kg/m²       ALLERGIES: no known allergies.  Current Outpatient Medications   Medication   • traMADol (ULTRAM) 50 MG tablet   • omeprazole (PRILOSEC) 40 MG capsule   • ondansetron (ZOFRAN) 4 MG tablet   • Semaglutide (OZEMPIC) (1.34 mg/ml) 0.25 or 0.5 MG/DOSE Solution Pen-injector   • empagliflozin-metFORMIN (SYNJARDY) 12.5-500 MG tablet   • sildenafil (VIAGRA) 50 MG tablet   • Insulin Glargine (TOUJEO SOLOSTAR SC)   • Blood Glucose Monitoring Suppl (FIFTY50 GLUCOSE METER 2.0) w/Device Kit   • blood glucose test strip   • Continuous Blood Gluc  (FREESTYLE TORSTEN READER) Device   • Continuous Blood Gluc  (FREESTYLE TORSTEN READER) Device   • Continuous Blood Gluc Sensor (FREESTYLE TORSTEN SENSOR SYSTEM) Misc   • Continuous Blood Gluc Sensor (FREESTYLE TORSTEN SENSOR SYSTEM) Misc   • insulin aspart (NOVOLOG FLEXPEN) 100 UNIT/ML pen-injector     No current facility-administered medications for this visit.      PHARMACY to use: see below           Pharmacy preference(s) on file:   [id number] - [place], [place] - [address] AT [facility] [address]  [address]  [facility] [address]  Phone: [phone] Fax: [phone]      CALL BACK INFO: Ok to leave response (including medical information) with family member or on answering machine  ROUTING: Patient's physician/staff        PCP: Román Taylor MD         INS:  Payor: BLUE CROSS BLUE [employer]IELD [place] / Plan: PPO [id number] / Product Type: PPO MISC   PATIENT ADDRESS:  [address] [place] [address]   [address]  [facility] [address]     n/a

## 2022-06-22 ENCOUNTER — NON-APPOINTMENT (OUTPATIENT)
Age: 47
End: 2022-06-22

## 2022-06-23 LAB — SURGICAL PATHOLOGY STUDY: SIGNIFICANT CHANGE UP

## 2022-06-24 ENCOUNTER — NON-APPOINTMENT (OUTPATIENT)
Age: 47
End: 2022-06-24

## 2022-06-24 ENCOUNTER — OUTPATIENT (OUTPATIENT)
Dept: OUTPATIENT SERVICES | Facility: HOSPITAL | Age: 47
LOS: 1 days | Discharge: HOME | End: 2022-06-24

## 2022-06-24 DIAGNOSIS — C53.9 MALIGNANT NEOPLASM OF CERVIX UTERI, UNSPECIFIED: ICD-10-CM

## 2022-06-24 LAB — GLUCOSE BLDC GLUCOMTR-MCNC: 85 MG/DL — SIGNIFICANT CHANGE UP (ref 70–99)

## 2022-06-27 ENCOUNTER — APPOINTMENT (OUTPATIENT)
Dept: UROLOGY | Facility: CLINIC | Age: 47
End: 2022-06-27

## 2022-07-01 ENCOUNTER — APPOINTMENT (OUTPATIENT)
Dept: GYNECOLOGIC ONCOLOGY | Facility: CLINIC | Age: 47
End: 2022-07-01

## 2022-07-01 VITALS — DIASTOLIC BLOOD PRESSURE: 79 MMHG | SYSTOLIC BLOOD PRESSURE: 116 MMHG | HEART RATE: 87 BPM | TEMPERATURE: 97.9 F

## 2022-07-01 PROCEDURE — 99213 OFFICE O/P EST LOW 20 MIN: CPT

## 2022-07-06 ENCOUNTER — NON-APPOINTMENT (OUTPATIENT)
Age: 47
End: 2022-07-06

## 2022-07-08 ENCOUNTER — APPOINTMENT (OUTPATIENT)
Dept: HEMATOLOGY ONCOLOGY | Facility: CLINIC | Age: 47
End: 2022-07-08

## 2022-07-08 ENCOUNTER — LABORATORY RESULT (OUTPATIENT)
Age: 47
End: 2022-07-08

## 2022-07-08 ENCOUNTER — OUTPATIENT (OUTPATIENT)
Dept: OUTPATIENT SERVICES | Facility: HOSPITAL | Age: 47
LOS: 1 days | Discharge: HOME | End: 2022-07-08

## 2022-07-08 DIAGNOSIS — F43.21 ADJUSTMENT DISORDER WITH DEPRESSED MOOD: ICD-10-CM

## 2022-07-08 DIAGNOSIS — R50.9 FEVER, UNSPECIFIED: ICD-10-CM

## 2022-07-08 PROCEDURE — 99213 OFFICE O/P EST LOW 20 MIN: CPT | Mod: 95

## 2022-07-08 NOTE — PHYSICAL EXAM
[General Appearance - Alert] : alert [Oriented To Time, Place, And Person] : oriented to person, place, and time [FreeTextEntry1] : Tearful at times

## 2022-07-08 NOTE — REASON FOR VISIT
[Follow-Up] : a follow-up visit [Other: _____] : [unfilled] [Home] : at home, [unfilled] , at the time of the visit. [Medical Office: ([facility])___] : at the medical office located in  [Patient] : the patient [FreeTextEntry2] : Freda Wilson

## 2022-07-08 NOTE — HISTORY OF PRESENT ILLNESS
[FreeTextEntry1] : 47yoF with h/o cervical adenocarcinoma presents for follow-up palliative care visit, referred by Oncology.  \horacio alves Patient initially diagnosed with cervical cancer 5/2021. Underwent weekly cisplatin and pelvic RT 4500cgy 25 fx plus parametrial boost 540cgy 3 fx and 4 brachytherapy procedures (hybrid therapy) 700cgy x 4.\horacio alves Patient was recently hospitalized at [facility] (1/26 - 2/1/22) after presenting with 3 weeks of worsening b/l hip/lower back pain and feet numbness. Pain is sharp in nature, radiating down the lateral thighs, with mild numbness on dorsal sides of feet. Symptoms initially improved with Aleve, however noted blood tinged vaginal discharge and blood clots with urination not related to menstrual periods so stopped taking Aleve. Hip pain and feet numbness progressively worsened, to the point that she cannot walk or lie supine. Pain is mildly alleviated when lying prone, and better with activity. Pt presented to ER in [place] 2 weeks prior to admission due to these symptoms, CT spine non-con showed multiple non-obstructing nephrolithiasis in L kidney but no fractures, deformities, subluxation were noted. She was discharged with gabapentin 300mg BID, Methocarbomol 500mg TID, and Meloxicam 15mg. She is referred today for continuation of pain management. \horacio alves Patient states that she developed pain in her L buttock approximately 6 weeks ago. It started as a pinching pain in her L buttock. It worsened to the point that the pain felt as if someone where punching her in the area.  Standing up and walking helped to relieve the pain a bit.  She found that she was pacing a lot in her home to help the pain. \horacio alves She was advised to use Acetaminophen 1000mg, was using it about twice daily.   This initially helped a bit. The pain progressed, eventually affecting both left and right. Associated with tingling in her toes. Was advised to use Aleve BID. Was advised to increase to 2 pills BID which caused her vaginal bleeding, she stopped. long alves Was in [place] at the time and was sent to the hospital where she states she received a steroid shot to her back. This helped her very much for a while. She was also prescribed a lidocaine 5% patch there but this was not approved by her insurance. Pain came back and was intense.  She returned to [place] on 1/24 and presented to [facility] on 1/26 at which time she was admitted to [facility]. \par \par Pt states that numbness is worse on L side, from dorsal foot to lateral mid-calf. Feels like it is "freezing" and must keep socks on. Also notes cramping intermittently in the toes. Pt also noticed that not all of her urine will come out, and must put pressure to completely relieve herself\par \par When she walks her low back pain gets better but her legs/feet feel tight and crampy. \par She feels weakness in her legs, lifting her legs is difficult, moving her toes is difficult.  She describes a sensation of coldness of her toes. \par She started home PT yesterday, has [facility] home care in place. Is using a rolling walker for assistance with ambulation. \par \par Interval Hx (7/8/22): \par Patient is seen for follow-up via telemedicine. She is recovering from surgery last month for a uretal stent placement. She also had a cervical biopsy at that time, biopsy results were benign. She had a subjective fever for two days and has been afebrile since yesterday. She reports intermittent pain to her back following surgery. Today she is very tired, she couldn't sleep last night due to the pain in her legs. The pain was relieved with oxycodone and a dose of Tylenol. She continues to have swelling and pitting edema to her legs. She tries to regularly elevate her legs but it is difficult for her to stay still that long. She continues to have foot cramping, right more than left.  Lower extremity weakness continues, right leg is chronically weaker. She has been receiving PT twice a week and has three more sessions left.  She reports yellow/orange vaginal discharge and occasionally specs of black. She previously discussed this with her GYN onc and was told everything is normal.   \par \par Current pain regimen:\par MS ER 30mg BID\par Oxycodone IR 5-10mg PRN  (2-3 times daily)\par Gabapentin 600mg BID\par Methocarbamol 500mg TID\par Tylenol 1000mg PRN (takes before PT)\par \par medical cannabis not helping\par \par ROS:\par +tearful, anxious about her pain - improved; Has established care with psychiatrist Dr. Lang.\par +constipation - uses senna daily, PRN Miralax\par +some nausea, no vomiting - improved\par +appetite has been OK. \par Denies urinary issues.\par \par Patient is single, lives with her sister, parents, daughter. \par She has a 26yo son and a 24yo daughter.\par She is unemployed. \par \par PMD: Dr. Valentino Comer ([phone])\par \par I-Stop Ref#: [id number]

## 2022-07-08 NOTE — ASSESSMENT
[FreeTextEntry1] : 47yoF with:\par \par 1. Cervical Cancer - s/p weekly Cisplatin + EBRT, completed 11/2021. Follow up with Med Onc, Rad Onc. \par Recent PET/CT shows hypermetabolism in the uterine cervix. S/p ureteral stent placement and biopsy (benign results) on 6/2. \par \par 2. Low back pain/ b/l LE pain with radiculopathy, L>R - Appreciate Neuro-Oncology input on this challenging case with running dx of platinum induced neuropathy with possible radiation toxicity causing her weakness. \par  Appreciate the input of additional consultants - Physiatry, Neurosurgery and Neurology. \par \par - C/w MS ER 30mg BID, c/w PRN Oxycodone IR 10mg, Gabapentin 600mg BID, Methocarbamol 500mg TID. \par - C/w PT \par \par 3. Anxiety - Provided extensive emotional support and validation of her worries. Appreciate behavioral health input.  c/w behavioral health follow up. \par \par 4. Subjective fever - will order u/a, urine cx and bloodwork. \par \par Follow up in 1 month; call sooner with questions or issues.

## 2022-07-11 ENCOUNTER — APPOINTMENT (OUTPATIENT)
Dept: HEMATOLOGY ONCOLOGY | Facility: CLINIC | Age: 47
End: 2022-07-11

## 2022-07-11 PROCEDURE — 90791 PSYCH DIAGNOSTIC EVALUATION: CPT | Mod: 95

## 2022-07-14 ENCOUNTER — APPOINTMENT (OUTPATIENT)
Dept: HEMATOLOGY ONCOLOGY | Facility: CLINIC | Age: 47
End: 2022-07-14

## 2022-07-14 ENCOUNTER — RESULT REVIEW (OUTPATIENT)
Age: 47
End: 2022-07-14

## 2022-07-14 ENCOUNTER — LABORATORY RESULT (OUTPATIENT)
Age: 47
End: 2022-07-14

## 2022-07-14 ENCOUNTER — NON-APPOINTMENT (OUTPATIENT)
Age: 47
End: 2022-07-14

## 2022-07-14 LAB
BASOPHILS # BLD AUTO: 0.01 K/UL — SIGNIFICANT CHANGE UP (ref 0–0.2)
BASOPHILS NFR BLD AUTO: 0.2 % — SIGNIFICANT CHANGE UP (ref 0–2)
EOSINOPHIL # BLD AUTO: 0.19 K/UL — SIGNIFICANT CHANGE UP (ref 0–0.5)
EOSINOPHIL NFR BLD AUTO: 4.5 % — SIGNIFICANT CHANGE UP (ref 0–6)
HCT VFR BLD CALC: 24.4 % — LOW (ref 34.5–45)
HGB BLD-MCNC: 7.8 G/DL — LOW (ref 11.5–15.5)
IMM GRANULOCYTES NFR BLD AUTO: 0.5 % — SIGNIFICANT CHANGE UP (ref 0–1.5)
LYMPHOCYTES # BLD AUTO: 0.4 K/UL — LOW (ref 1–3.3)
LYMPHOCYTES # BLD AUTO: 9.4 % — LOW (ref 13–44)
MCHC RBC-ENTMCNC: 27.7 PG — SIGNIFICANT CHANGE UP (ref 27–34)
MCHC RBC-ENTMCNC: 32 G/DL — SIGNIFICANT CHANGE UP (ref 32–36)
MCV RBC AUTO: 86.5 FL — SIGNIFICANT CHANGE UP (ref 80–100)
MONOCYTES # BLD AUTO: 0.36 K/UL — SIGNIFICANT CHANGE UP (ref 0–0.9)
MONOCYTES NFR BLD AUTO: 8.5 % — SIGNIFICANT CHANGE UP (ref 2–14)
NEUTROPHILS # BLD AUTO: 3.28 K/UL — SIGNIFICANT CHANGE UP (ref 1.8–7.4)
NEUTROPHILS NFR BLD AUTO: 76.9 % — SIGNIFICANT CHANGE UP (ref 43–77)
NRBC # BLD: 0 /100 WBCS — SIGNIFICANT CHANGE UP (ref 0–0)
PLATELET # BLD AUTO: 382 K/UL — SIGNIFICANT CHANGE UP (ref 150–400)
RBC # BLD: 2.82 M/UL — LOW (ref 3.8–5.2)
RBC # FLD: 15.3 % — HIGH (ref 10.3–14.5)
WBC # BLD: 4.26 K/UL — SIGNIFICANT CHANGE UP (ref 3.8–10.5)
WBC # FLD AUTO: 4.26 K/UL — SIGNIFICANT CHANGE UP (ref 3.8–10.5)

## 2022-07-14 NOTE — DISCHARGE NOTE PROVIDER - NSFTFHOMEHTHYNRD_GEN_ALL_CORE
If you had a biopsy, you should not take aspirin or aspirin like products for the next 10 days unless instructed to do so by your doctor. If you had a biopsy, check with your doctor before taking any blood thinners such as warfarin (Coumadin).
Yes

## 2022-07-15 DIAGNOSIS — F43.23 ADJUSTMENT DISORDER WITH MIXED ANXIETY AND DEPRESSED MOOD: ICD-10-CM

## 2022-07-18 DIAGNOSIS — N39.0 URINARY TRACT INFECTION, SITE NOT SPECIFIED: ICD-10-CM

## 2022-07-18 DIAGNOSIS — B95.5 URINARY TRACT INFECTION, SITE NOT SPECIFIED: ICD-10-CM

## 2022-07-18 LAB
ANION GAP SERPL CALC-SCNC: 15 MMOL/L
APPEARANCE: ABNORMAL
BILIRUBIN URINE: NEGATIVE
BLOOD URINE: ABNORMAL
BUN SERPL-MCNC: 12 MG/DL
CALCIUM SERPL-MCNC: 9.5 MG/DL
CHLORIDE SERPL-SCNC: 96 MMOL/L
CO2 SERPL-SCNC: 24 MMOL/L
COLOR: NORMAL
CREAT SERPL-MCNC: 0.94 MG/DL
EGFR: 74 ML/MIN/1.73M2
GLUCOSE QUALITATIVE U: NEGATIVE
GLUCOSE SERPL-MCNC: 143 MG/DL
KETONES URINE: NEGATIVE
LEUKOCYTE ESTERASE URINE: ABNORMAL
NITRITE URINE: NEGATIVE
PH URINE: 7
POTASSIUM SERPL-SCNC: 4.6 MMOL/L
PROTEIN URINE: ABNORMAL
SODIUM SERPL-SCNC: 135 MMOL/L
SPECIFIC GRAVITY URINE: 1
UROBILINOGEN URINE: NORMAL

## 2022-08-01 ENCOUNTER — APPOINTMENT (OUTPATIENT)
Dept: MRI IMAGING | Facility: CLINIC | Age: 47
End: 2022-08-01

## 2022-08-01 ENCOUNTER — OUTPATIENT (OUTPATIENT)
Dept: OUTPATIENT SERVICES | Facility: HOSPITAL | Age: 47
LOS: 1 days | End: 2022-08-01
Payer: MEDICAID

## 2022-08-01 DIAGNOSIS — Z00.8 ENCOUNTER FOR OTHER GENERAL EXAMINATION: ICD-10-CM

## 2022-08-01 DIAGNOSIS — C53.9 MALIGNANT NEOPLASM OF CERVIX UTERI, UNSPECIFIED: ICD-10-CM

## 2022-08-01 PROCEDURE — 72197 MRI PELVIS W/O & W/DYE: CPT | Mod: 26

## 2022-08-01 PROCEDURE — A9585: CPT

## 2022-08-01 PROCEDURE — 72197 MRI PELVIS W/O & W/DYE: CPT

## 2022-08-05 ENCOUNTER — LABORATORY RESULT (OUTPATIENT)
Age: 47
End: 2022-08-05

## 2022-08-05 ENCOUNTER — RESULT REVIEW (OUTPATIENT)
Age: 47
End: 2022-08-05

## 2022-08-05 ENCOUNTER — APPOINTMENT (OUTPATIENT)
Dept: HEMATOLOGY ONCOLOGY | Facility: CLINIC | Age: 47
End: 2022-08-05

## 2022-08-05 VITALS
RESPIRATION RATE: 16 BRPM | HEART RATE: 93 BPM | SYSTOLIC BLOOD PRESSURE: 108 MMHG | WEIGHT: 150.11 LBS | DIASTOLIC BLOOD PRESSURE: 69 MMHG | OXYGEN SATURATION: 94 % | TEMPERATURE: 97 F | BODY MASS INDEX: 25.79 KG/M2

## 2022-08-05 DIAGNOSIS — Z00.00 ENCOUNTER FOR GENERAL ADULT MEDICAL EXAMINATION W/OUT ABNORMAL FINDINGS: ICD-10-CM

## 2022-08-05 LAB
BASOPHILS # BLD AUTO: 0.01 K/UL — SIGNIFICANT CHANGE UP (ref 0–0.2)
BASOPHILS NFR BLD AUTO: 0.2 % — SIGNIFICANT CHANGE UP (ref 0–2)
BUN SERPL-MCNC: 12 MG/DL — SIGNIFICANT CHANGE UP (ref 7–23)
CA-I BLDA-SCNC: 1.29 MMOL/L — SIGNIFICANT CHANGE UP (ref 1.12–1.3)
CHLORIDE SERPL-SCNC: 96 MMOL/L — SIGNIFICANT CHANGE UP (ref 96–108)
CO2 SERPL-SCNC: 31 MMOL/L — SIGNIFICANT CHANGE UP (ref 22–31)
CREAT SERPL-MCNC: 0.9 MG/DL — SIGNIFICANT CHANGE UP (ref 0.5–1.3)
EOSINOPHIL # BLD AUTO: 0.11 K/UL — SIGNIFICANT CHANGE UP (ref 0–0.5)
EOSINOPHIL NFR BLD AUTO: 2.7 % — SIGNIFICANT CHANGE UP (ref 0–6)
GLUCOSE SERPL-MCNC: 105 MG/DL — HIGH (ref 70–99)
HCT VFR BLD CALC: 27 % — LOW (ref 34.5–45)
HGB BLD-MCNC: 8.4 G/DL — LOW (ref 11.5–15.5)
IMM GRANULOCYTES NFR BLD AUTO: 0.2 % — SIGNIFICANT CHANGE UP (ref 0–1.5)
LYMPHOCYTES # BLD AUTO: 0.48 K/UL — LOW (ref 1–3.3)
LYMPHOCYTES # BLD AUTO: 12 % — LOW (ref 13–44)
MAGNESIUM SERPL-MCNC: 1.8 MG/DL
MCHC RBC-ENTMCNC: 27.2 PG — SIGNIFICANT CHANGE UP (ref 27–34)
MCHC RBC-ENTMCNC: 31.1 G/DL — LOW (ref 32–36)
MCV RBC AUTO: 87.4 FL — SIGNIFICANT CHANGE UP (ref 80–100)
MONOCYTES # BLD AUTO: 0.42 K/UL — SIGNIFICANT CHANGE UP (ref 0–0.9)
MONOCYTES NFR BLD AUTO: 10.5 % — SIGNIFICANT CHANGE UP (ref 2–14)
NEUTROPHILS # BLD AUTO: 2.98 K/UL — SIGNIFICANT CHANGE UP (ref 1.8–7.4)
NEUTROPHILS NFR BLD AUTO: 74.4 % — SIGNIFICANT CHANGE UP (ref 43–77)
NRBC # BLD: 0 /100 WBCS — SIGNIFICANT CHANGE UP (ref 0–0)
PLATELET # BLD AUTO: 243 K/UL — SIGNIFICANT CHANGE UP (ref 150–400)
POTASSIUM SERPL-MCNC: 4 MMOL/L — SIGNIFICANT CHANGE UP (ref 3.5–5.3)
POTASSIUM SERPL-SCNC: 4 MMOL/L — SIGNIFICANT CHANGE UP (ref 3.5–5.3)
RBC # BLD: 3.09 M/UL — LOW (ref 3.8–5.2)
RBC # FLD: 15.9 % — HIGH (ref 10.3–14.5)
SODIUM SERPL-SCNC: 137 MMOL/L — SIGNIFICANT CHANGE UP (ref 135–145)
WBC # BLD: 4.01 K/UL — SIGNIFICANT CHANGE UP (ref 3.8–10.5)
WBC # FLD AUTO: 4.01 K/UL — SIGNIFICANT CHANGE UP (ref 3.8–10.5)

## 2022-08-05 PROCEDURE — 99214 OFFICE O/P EST MOD 30 MIN: CPT

## 2022-08-05 NOTE — PHYSICAL EXAM
[General Appearance - Alert] : alert [Oriented To Time, Place, And Person] : oriented to person, place, and time [Sclera] : the sclera and conjunctiva were normal [Normal Oral Mucosa] : normal oral mucosa [Neck Appearance] : the appearance of the neck was normal [] : no respiratory distress [Auscultation Breath Sounds / Voice Sounds] : lungs were clear to auscultation bilaterally [Heart Rate And Rhythm] : heart rate was normal and rhythm regular [Heart Sounds] : normal S1 and S2 [Bowel Sounds] : normal bowel sounds [Abdomen Soft] : soft [Abdomen Tenderness] : non-tender [Skin Color & Pigmentation] : normal skin color and pigmentation [FreeTextEntry1] : Tearful at times

## 2022-08-05 NOTE — ASSESSMENT
[FreeTextEntry1] : 47yoF with:\par \par # Cervical Cancer - s/p weekly Cisplatin + EBRT, completed 11/2021. s/p R ureteral stent placement 6/2022. Follow up with Med Onc, Rad Onc. \par \par # Low back pain/ b/l LE pain with radiculopathy, L>R - Appreciate Neuro-Oncology input on this challenging case with running dx of platinum induced neuropathy with possible radiation toxicity causing her weakness. \par  Appreciate the input of additional consultants - Physiatry, Neurosurgery and Neurology. \par \par - C/w MS ER 30mg BID, c/w PRN Oxycodone IR 10mg, Gabapentin 600mg BID, Methocarbamol 500mg TID. \par - C/w PT \par \par # Anxiety - Provided extensive emotional support and validation of her worries. Appreciate behavioral health input.  c/w behavioral health follow up. \par \par # Insomnia - C/w PRN lorazepam QHS. \par \par # Lower extremity edema - Increase dosing of furosemide to 40mg BID x 1 week, in addition take potassium 20meq daily supplements. Pt to call next week to follow up on status of her LE edema. \par \par # Encounter for Palliative Care - Emotional support provided.\par \par Follow up in 1 month; call sooner with questions or issues.

## 2022-08-05 NOTE — HISTORY OF PRESENT ILLNESS
[FreeTextEntry1] : 47yoF with h/o cervical adenocarcinoma presents for follow-up palliative care visit, referred by Oncology.  \horacio alves Patient initially diagnosed with cervical cancer 5/2021. Underwent weekly cisplatin and pelvic RT 4500cgy 25 fx plus parametrial boost 540cgy 3 fx and 4 brachytherapy procedures (hybrid therapy) 700cgy x 4.\horacio alves Patient was recently hospitalized at [facility] (1/26 - 2/1/22) after presenting with 3 weeks of worsening b/l hip/lower back pain and feet numbness. Pain is sharp in nature, radiating down the lateral thighs, with mild numbness on dorsal sides of feet. Symptoms initially improved with Aleve, however noted blood tinged vaginal discharge and blood clots with urination not related to menstrual periods so stopped taking Aleve. Hip pain and feet numbness progressively worsened, to the point that she cannot walk or lie supine. Pain is mildly alleviated when lying prone, and better with activity. Pt presented to ER in [place] 2 weeks prior to admission due to these symptoms, CT spine non-con showed multiple non-obstructing nephrolithiasis in L kidney but no fractures, deformities, subluxation were noted. She was discharged with gabapentin 300mg BID, Methocarbomol 500mg TID, and Meloxicam 15mg. She is referred today for continuation of pain management. \horacio alves Patient states that she developed pain in her L buttock approximately 6 weeks ago. It started as a pinching pain in her L buttock. It worsened to the point that the pain felt as if someone where punching her in the area.  Standing up and walking helped to relieve the pain a bit.  She found that she was pacing a lot in her home to help the pain. \horacio alves She was advised to use Acetaminophen 1000mg, was using it about twice daily.   This initially helped a bit. The pain progressed, eventually affecting both left and right. Associated with tingling in her toes. Was advised to use Aleve BID. Was advised to increase to 2 pills BID which caused her vaginal bleeding, she stopped. long alves Was in [place] at the time and was sent to the hospital where she states she received a steroid shot to her back. This helped her very much for a while. She was also prescribed a lidocaine 5% patch there but this was not approved by her insurance. Pain came back and was intense.  She returned to [place] on 1/24 and presented to [facility] on 1/26 at which time she was admitted to [facility]. \par \par Pt states that numbness is worse on L side, from dorsal foot to lateral mid-calf. Feels like it is "freezing" and must keep socks on. Also notes cramping intermittently in the toes. Pt also noticed that not all of her urine will come out, and must put pressure to completely relieve herself\par \par When she walks her low back pain gets better but her legs/feet feel tight and crampy. \par She feels weakness in her legs, lifting her legs is difficult, moving her toes is difficult.  She describes a sensation of coldness of her toes. \par She started home PT yesterday, has [facility] home care in place. Is using a rolling walker for assistance with ambulation. \par \par Interval Hx (8/5/22): \par Patient is seen for follow-up. She is very happy to hear her MRI result "No evidence of residual cervical mass or pelvic lymphadenopathy." Pain is largely tolerable on current pain regimen listed below. However she continues to have swelling and pitting edema to her feet and legs, extending to just above the knees. She began furosemide 20mg daily three days ago and notes a decrease in swelling to her knee but her lower legs remain tight and heavy. She tries to elevate her legs when possible but given that she has to move positions frequently due to leg cramping this is not realistic. She continues to have foot cramping, right more than left.  Lower extremity weakness persists, right leg is chronically weaker. She receives PT twice a week but missed her last session. She reports two unassisted falls two days ago - one occurred when she was very drowsy and didn't fully wake up before trying to walk. The other was a mechanical fall in which she got tripped up in her house slippers. Denies loss of consciousness. S/p UTI treatment but reports light blood in her urine. Denies urinary symptoms including dysuria and frequency. Appreciates a resftful nights sleep with lorazepam 0.5mg last night.\par \par Current pain regimen:\par MS ER 30mg BID\par Oxycodone IR 5-10mg PRN  (2-3 times daily)\par Gabapentin 600mg BID\par Methocarbamol 500mg TID\par Tylenol 1000mg PRN (takes before PT)\par \par medical cannabis not helping\par \par ROS:\par +tearful, anxious about her pain - improved; Has established care with psychiatrist Dr. Lang.\par +constipation - uses senna daily, PRN Miralax\par +some nausea, no vomiting - improved\par +appetite has been OK. \par Denies urinary issues.\par \par Patient is single, lives with her sister, parents, daughter. \par She has a 26yo son and a 26yo daughter.\par She is unemployed. \par \par PMD: Dr. Valentino Comer ([phone])\par \par I-Stop Ref#: [id number]

## 2022-08-08 ENCOUNTER — APPOINTMENT (OUTPATIENT)
Dept: HEMATOLOGY ONCOLOGY | Facility: CLINIC | Age: 47
End: 2022-08-08

## 2022-08-08 PROCEDURE — 90834 PSYTX W PT 45 MINUTES: CPT | Mod: 95

## 2022-08-11 ENCOUNTER — NON-APPOINTMENT (OUTPATIENT)
Age: 47
End: 2022-08-11

## 2022-08-11 ENCOUNTER — APPOINTMENT (OUTPATIENT)
Dept: GASTROENTEROLOGY | Facility: CLINIC | Age: 47
End: 2022-08-11

## 2022-08-11 VITALS
SYSTOLIC BLOOD PRESSURE: 90 MMHG | HEART RATE: 83 BPM | DIASTOLIC BLOOD PRESSURE: 60 MMHG | BODY MASS INDEX: 25.16 KG/M2 | TEMPERATURE: 97 F | WEIGHT: 142 LBS | HEIGHT: 63 IN | OXYGEN SATURATION: 98 %

## 2022-08-11 PROCEDURE — 99214 OFFICE O/P EST MOD 30 MIN: CPT

## 2022-08-11 NOTE — REVIEW OF SYSTEMS
[Fever] : no fever [Chills] : no chills [Eyesight Problems] : no eyesight problems [Discharge From Eyes] : no purulent discharge from the eyes [Nosebleeds] : no nosebleeds [Chest Pain] : no chest pain [Cough] : no cough [SOB on Exertion] : no shortness of breath during exertion [As Noted in HPI] : as noted in HPI [Pelvic Pain] : no pelvic pain [Vaginal Discharge] : no vaginal discharge [Arthralgias] : no arthralgias [Itching] : no itching [Anxiety] : no anxiety [Depression] : no depression [Muscle Weakness] : no muscle weakness [Swollen Glands] : no swollen glands

## 2022-08-11 NOTE — ASSESSMENT
[FreeTextEntry1] : h/o brbpr, self limited, constipaiton on morphine, s/p colonoscoy in 2021 hemorrhoids noted, probable, hemorrhoidal bleed\par \par plan movantik daily\par         rectal cream as needed

## 2022-08-11 NOTE — HISTORY OF PRESENT ILLNESS
[FreeTextEntry1] : 46 yo with h/o cervical cancer undergoing treatment, s/p ureteral stent, patient on pain medication and c/o constipaton, 2 to 3 bms a week not all large formed.brown. last episode of brbpr, 3 days ago when straining. in toiled bowel . no abdominal pain.no weigh tloss.\par \par s/p colonoscopy in 2021 mild diverticulosis, ih

## 2022-08-11 NOTE — PHYSICAL EXAM
[General Appearance - Alert] : alert [General Appearance - In No Acute Distress] : in no acute distress [Sclera] : the sclera and conjunctiva were normal [Hearing Threshold Finger Rub Not [place]] : hearing was normal [Respiration, Rhythm And Depth] : normal respiratory rhythm and effort [Heart Sounds] : normal S1 and S2 [Bowel Sounds] : normal bowel sounds [Abdomen Soft] : soft [Abdomen Tenderness] : non-tender [Abnormal Walk] : normal gait [] : no rash [Skin Lesions] : no skin lesions [No Focal Deficits] : no focal deficits [Oriented To Time, Place, And Person] : oriented to person, place, and time

## 2022-08-12 LAB
APPEARANCE: ABNORMAL
BILIRUBIN URINE: NEGATIVE
BLOOD URINE: ABNORMAL
COLOR: NORMAL
GLUCOSE QUALITATIVE U: NEGATIVE
KETONES URINE: NEGATIVE
LEUKOCYTE ESTERASE URINE: ABNORMAL
NITRITE URINE: NEGATIVE
PH URINE: 7
PROTEIN URINE: ABNORMAL
SPECIFIC GRAVITY URINE: 1
UROBILINOGEN URINE: NORMAL

## 2022-08-17 ENCOUNTER — APPOINTMENT (OUTPATIENT)
Dept: UROLOGY | Facility: CLINIC | Age: 47
End: 2022-08-17

## 2022-08-17 ENCOUNTER — APPOINTMENT (OUTPATIENT)
Dept: HEMATOLOGY ONCOLOGY | Facility: CLINIC | Age: 47
End: 2022-08-17

## 2022-08-17 PROCEDURE — 99214 OFFICE O/P EST MOD 30 MIN: CPT

## 2022-08-17 PROCEDURE — 90832 PSYTX W PT 30 MINUTES: CPT | Mod: 95

## 2022-08-17 NOTE — PHYSICAL EXAM
[General Appearance - Well Developed] : well developed [General Appearance - Well Nourished] : well nourished [Normal Appearance] : normal appearance [Well Groomed] : well groomed [General Appearance - In No Acute Distress] : no acute distress [Edema] : no peripheral edema [] : no respiratory distress [Respiration, Rhythm And Depth] : normal respiratory rhythm and effort [Exaggerated Use Of Accessory Muscles For Inspiration] : no accessory muscle use [Oriented To Time, Place, And Person] : oriented to person, place, and time [Affect] : the affect was normal [Mood] : the mood was normal [Not Anxious] : not anxious [FreeTextEntry1] : gait assist

## 2022-08-17 NOTE — HISTORY OF PRESENT ILLNESS
[FreeTextEntry1] : Pt returns after cysto, right retrograde, right tandem stent placement on 6/21/22.\par \par 46 yo female, with h/o cervical cancer. New right hydro. Underwent joint procedure with Dr. Bill. Biopsies neg by report. MRI f/u pelvis without evidence recurrent disease.\par \par Retrograde and other films reviewed- area of narrowing appears somewhat short by retrograde.\par

## 2022-08-17 NOTE — ASSESSMENT
[FreeTextEntry1] : Appears to be tolerating right tandem stents well.\par \par D/w pt options regarding ureteral obstruction, including planned exchanges of tandem stents and continued f/u from underlying disease, attempts with endoureterotomy/dilation and radiological f/u, and operative reconstruction; risks, chances for success.\par \par All reviewed at length- if short segment, could consider endoureterotomy, though in setting of radiation, long term results are less than with more invasive procedures, but also with less periop morbidity and risk.\par \par Pt interested in trying endoureterotomy if feasible at time of next planned stent exchange. If looks favorable, could remove stents in office approx 6 weeks post op and reassess for future obstruction.\par \par Will schedule

## 2022-09-08 ENCOUNTER — OUTPATIENT (OUTPATIENT)
Dept: OUTPATIENT SERVICES | Facility: HOSPITAL | Age: 47
LOS: 1 days | Discharge: HOME | End: 2022-09-08

## 2022-09-08 DIAGNOSIS — C53.9 MALIGNANT NEOPLASM OF CERVIX UTERI, UNSPECIFIED: ICD-10-CM

## 2022-09-12 ENCOUNTER — APPOINTMENT (OUTPATIENT)
Dept: HEMATOLOGY ONCOLOGY | Facility: CLINIC | Age: 47
End: 2022-09-12

## 2022-09-12 PROCEDURE — 99213 OFFICE O/P EST LOW 20 MIN: CPT | Mod: 95

## 2022-09-12 RX ORDER — LORAZEPAM 0.5 MG/1
0.5 TABLET ORAL 3 TIMES DAILY
Qty: 60 | Refills: 0 | Status: DISCONTINUED | COMMUNITY
Start: 2022-03-02 | End: 2022-09-12

## 2022-09-12 RX ORDER — LORAZEPAM 0.5 MG/1
0.5 TABLET ORAL EVERY 6 HOURS
Qty: 28 | Refills: 0 | Status: DISCONTINUED | COMMUNITY
Start: 2021-11-15 | End: 2022-09-12

## 2022-09-12 RX ORDER — AMOXICILLIN 500 MG/1
500 TABLET, FILM COATED ORAL 3 TIMES DAILY
Qty: 21 | Refills: 0 | Status: DISCONTINUED | COMMUNITY
Start: 2022-07-18 | End: 2022-09-12

## 2022-09-12 NOTE — PHYSICAL EXAM
[General Appearance - Alert] : alert [Oriented To Time, Place, And Person] : oriented to person, place, and time [General Appearance - In No Acute Distress] : in no acute distress [Sensation] : the sensory exam was normal to light touch and pinprick [FreeTextEntry1] : Tearful at times

## 2022-09-12 NOTE — HISTORY OF PRESENT ILLNESS
[FreeTextEntry1] : 47yoF with h/o cervical adenocarcinoma presents for follow-up palliative care visit, referred by Oncology.  \horacio alves Patient initially diagnosed with cervical cancer 5/2021. Underwent weekly cisplatin and pelvic RT 4500cgy 25 fx plus parametrial boost 540cgy 3 fx and 4 brachytherapy procedures (hybrid therapy) 700cgy x 4.\horacio alves Patient was recently hospitalized at [facility] (1/26 - 2/1/22) after presenting with 3 weeks of worsening b/l hip/lower back pain and feet numbness. Pain is sharp in nature, radiating down the lateral thighs, with mild numbness on dorsal sides of feet. Symptoms initially improved with Aleve, however noted blood tinged vaginal discharge and blood clots with urination not related to menstrual periods so stopped taking Aleve. Hip pain and feet numbness progressively worsened, to the point that she cannot walk or lie supine. Pain is mildly alleviated when lying prone, and better with activity. Pt presented to ER in [place] 2 weeks prior to admission due to these symptoms, CT spine non-con showed multiple non-obstructing nephrolithiasis in L kidney but no fractures, deformities, subluxation were noted. She was discharged with gabapentin 300mg BID, Methocarbomol 500mg TID, and Meloxicam 15mg. She is referred today for continuation of pain management. \horacio alves Patient states that she developed pain in her L buttock approximately 6 weeks ago. It started as a pinching pain in her L buttock. It worsened to the point that the pain felt as if someone where punching her in the area.  Standing up and walking helped to relieve the pain a bit.  She found that she was pacing a lot in her home to help the pain. \horacio alves She was advised to use Acetaminophen 1000mg, was using it about twice daily.   This initially helped a bit. The pain progressed, eventually affecting both left and right. Associated with tingling in her toes. Was advised to use Aleve BID. Was advised to increase to 2 pills BID which caused her vaginal bleeding, she stopped. long alves Was in [place] at the time and was sent to the hospital where she states she received a steroid shot to her back. This helped her very much for a while. She was also prescribed a lidocaine 5% patch there but this was not approved by her insurance. Pain came back and was intense.  She returned to [place] on 1/24 and presented to [facility] on 1/26 at which time she was admitted to [facility]. \par \par Pt states that numbness is worse on L side, from dorsal foot to lateral mid-calf. Feels like it is "freezing" and must keep socks on. Also notes cramping intermittently in the toes. Pt also noticed that not all of her urine will come out, and must put pressure to completely relieve herself\par \par When she walks her low back pain gets better but her legs/feet feel tight and crampy. \par She feels weakness in her legs, lifting her legs is difficult, moving her toes is difficult.  She describes a sensation of coldness of her toes. \par She started home PT yesterday, has [facility] home care in place. Is using a rolling walker for assistance with ambulation. \par \par Interval Hx (9/12/22): \par Patient is seen for follow-up via telemedicine. \par She is feeling well. Pain is controlled on current pain regimen listed below. Appetite is stable, eating two meals a day.  Leg swelling is improved however continues to have significant pedal edema, cannot wear regular shoes. She is using furosemide 20mg daily. She continues to report light blood in her urine, denies urinary symptoms including dysuria and frequency. She has been experiencing increased anxiety, her daughter uses 0.125mg alprazolam with good effect and Freda is asking if she can try using alprazolam instead of previously prescribed lorazepam. Sleep is improved. \par \par Current pain regimen:\par MS ER 30mg BID\par Oxycodone IR 5-10mg PRN  (2-3 times daily)\par Gabapentin 600mg BID\par Methocarbamol 500mg TID\par Tylenol 1000mg PRN (takes before PT)\par \par medical cannabis not helping\par \par ROS:\par +tearful, anxious about her pain - improved; Has established care with psychiatrist Dr. Lang.\par +constipation - uses senna daily, PRN Miralax\par +some nausea, no vomiting - improved\par +appetite has been OK. \par Denies urinary issues.\par \par Patient is single, lives with her sister, parents, daughter. \par She has a 28yo son and a 26yo daughter.\par She is unemployed. \par \par PMD: Dr. Valentino Comer ([phone])\par \par I-Stop Ref#: [id number]

## 2022-09-12 NOTE — DATA REVIEWED
[FreeTextEntry1] : MRI pelvis  (08/01/2022): \par COMPARISON: PET/CT scan April 23, 2022, MRI October 06, 2021\par \par CONTRAST/COMPLICATIONS:\par IV Contrast: Gadavist  6.5 cc administered   1 cc discarded\par Oral Contrast: NONE\par Complications: None reported at time of study completion\par \par PROCEDURE:\par MRI of the pelvis was performed.\par Levsin 0.25 mg administered sublingual.\par \par FINDINGS:\par UTERUS: Cervix is small in size and demonstrates low T2 signal, compatible with post-treatment change.  No abnormal cervical enhancement.\par ENDOMETRIUM: Within normal\par JUNCTIONAL ZONE: Within normal limites\par \par RIGHT OVARY: Within normal limits.\par LEFT OVARY: Within normal limits.\par ADNEXA: Within normal limits.\par \par BLADDER: Within normal limits. Note is made of a right ureteral stent.\par \par LYMPH NODES: No pelvic lymphadenopathy.\par \par VISUALIZED PORTIONS:\par \par ABDOMINAL ORGANS: Within normal limits.\par BOWEL: Within normal limits.\par PERITONEUM: No ascites.\par VESSELS: Within normal limits.\par ABDOMINAL WALL: Within normal limits.\par BONES: Within normal limits.\par \par IMPRESSION:\par No evidence of residual cervical mass or pelvic lymphadenopathy.

## 2022-09-12 NOTE — REASON FOR VISIT
[Follow-Up] : a follow-up visit [Other: _____] : [unfilled] [Home] : at home, [unfilled] , at the time of the visit. [Medical Office: ([facility] [place]___] : at the medical office located in  [Patient] : the patient [FreeTextEntry2] : Freda Wilson

## 2022-09-12 NOTE — ASSESSMENT
[FreeTextEntry1] : 47yoF with:\par \par # Cervical Cancer - s/p weekly Cisplatin + EBRT, completed 11/2021. s/p R ureteral stent placement 6/2022. Follow up with Med Onc, Rad Onc. PET CT scheduled for early October. \par \par # Low back pain/ b/l LE pain with radiculopathy, L>R - Appreciate Neuro-Oncology input on this challenging case with running dx of platinum induced neuropathy with possible radiation toxicity causing her weakness. \par  Appreciate the input of additional consultants - Physiatry, Neurosurgery and Neurology. \par \par - C/w MS ER 30mg BID, c/w PRN Oxycodone IR 10mg, Gabapentin 600mg BID, Methocarbamol 500mg TID. \par - C/w PT \par \par # Anxiety - Rx sent for PRN alprazolam .25mg for anxiety attacks.  Provided extensive emotional support and validation of her worries. Appreciate behavioral health input.  c/w behavioral health follow up.  Pt not to use lorazepam.\par \par # Lower extremity edema - Increase dosing of furosemide to 40mg, in addition take potassium 20meq daily supplements. \par \par # Encounter for Palliative Care - Emotional support provided.\par \par Follow up in 3 weeks, call sooner with questions or issues.

## 2022-09-27 ENCOUNTER — OUTPATIENT (OUTPATIENT)
Dept: OUTPATIENT SERVICES | Facility: HOSPITAL | Age: 47
LOS: 1 days | Discharge: HOME | End: 2022-09-27

## 2022-09-27 DIAGNOSIS — F43.20 ADJUSTMENT DISORDER, UNSPECIFIED: ICD-10-CM

## 2022-10-03 ENCOUNTER — APPOINTMENT (OUTPATIENT)
Dept: HEMATOLOGY ONCOLOGY | Facility: CLINIC | Age: 47
End: 2022-10-03

## 2022-10-03 PROCEDURE — 90834 PSYTX W PT 45 MINUTES: CPT | Mod: 95

## 2022-10-04 ENCOUNTER — APPOINTMENT (OUTPATIENT)
Dept: UROLOGY | Facility: CLINIC | Age: 47
End: 2022-10-04

## 2022-10-04 PROCEDURE — 99442: CPT

## 2022-10-04 NOTE — ASSESSMENT
[FreeTextEntry1] : D/w pt at length\par due for CT/pet upcoming- will review for stone location at time of surgery\par options reviewed, including eswl or urs with laser- given plan for right tandem stent change and possible endoureterotomy, will recommend left URS with laser and possible cvac for fragment extraction\par \par all options/risks/benefits reviewed-\par will proceed as above at upcoming

## 2022-10-04 NOTE — HISTORY OF PRESENT ILLNESS
[Home] : at home, [unfilled] , at the time of the visit. [Other Location: e.g. Home (Enter Location, City,State)___] : at [unfilled] [Verbal consent obtained from patient] : the patient, [unfilled] [FreeTextEntry1] : The patient-doctor relationship has been established in a face to face fashion via real time video/audio HIPAA compliant communication using telemedicine software. The patient's identity has been confirmed. The patient was previously emailed a copy of the telemedicine consent. They have had a chance to review and has now given verbal consent and has requested care to be assessed and treated via telemedicine. They understand there may be limitations in this process, and that they may need further followup care in the office and/or hospital settings.\par \par Verbal consent given on Oct  4 2022 11:00AM\par \par TEN HERNANDEZ is a 47 year F who presents for f/u right ureteral obstruction, left kidney stones. ~ 2 weeks ago, had septic stone episode while in [place]. Underwent emergency left stent. Has been o/w tolerating tandem right stents well since placement 6/2022.\par \par Planning for right stent exchange, possible endoureterotomy 10/24/22\par \par no fever at this time, feeling well.\par \par 10/04/2022 \par \par The patient denies fevers, chills, nausea and/or vomiting, and no unexplained weight loss.\par \par All pertinent parts of the patient PFSH (past medical, family, and social histories), laboratory, radiological studies and available physician notes were reviewed prior to starting the face-to-face portion of the telemedicine visit. Questionnaire results, where appropriate, were discussed with the patient.\par

## 2022-10-10 ENCOUNTER — APPOINTMENT (OUTPATIENT)
Dept: NUCLEAR MEDICINE | Facility: IMAGING CENTER | Age: 47
End: 2022-10-10

## 2022-10-10 ENCOUNTER — OUTPATIENT (OUTPATIENT)
Dept: OUTPATIENT SERVICES | Facility: HOSPITAL | Age: 47
LOS: 1 days | End: 2022-10-10
Payer: MEDICAID

## 2022-10-10 DIAGNOSIS — C53.9 MALIGNANT NEOPLASM OF CERVIX UTERI, UNSPECIFIED: ICD-10-CM

## 2022-10-10 DIAGNOSIS — Z00.8 ENCOUNTER FOR OTHER GENERAL EXAMINATION: ICD-10-CM

## 2022-10-10 PROCEDURE — 78815 PET IMAGE W/CT SKULL-THIGH: CPT | Mod: 26,PS

## 2022-10-10 PROCEDURE — A9552: CPT

## 2022-10-10 PROCEDURE — 78815 PET IMAGE W/CT SKULL-THIGH: CPT

## 2022-10-11 ENCOUNTER — OUTPATIENT (OUTPATIENT)
Dept: OUTPATIENT SERVICES | Facility: HOSPITAL | Age: 47
LOS: 1 days | End: 2022-10-11
Payer: MEDICAID

## 2022-10-11 VITALS
DIASTOLIC BLOOD PRESSURE: 71 MMHG | WEIGHT: 154.1 LBS | OXYGEN SATURATION: 100 % | RESPIRATION RATE: 20 BRPM | SYSTOLIC BLOOD PRESSURE: 105 MMHG | HEART RATE: 89 BPM | TEMPERATURE: 98 F | HEIGHT: 64 IN

## 2022-10-11 DIAGNOSIS — Z01.818 ENCOUNTER FOR OTHER PREPROCEDURAL EXAMINATION: ICD-10-CM

## 2022-10-11 DIAGNOSIS — E11.9 TYPE 2 DIABETES MELLITUS WITHOUT COMPLICATIONS: ICD-10-CM

## 2022-10-11 DIAGNOSIS — I10 ESSENTIAL (PRIMARY) HYPERTENSION: ICD-10-CM

## 2022-10-11 DIAGNOSIS — Z96.0 PRESENCE OF UROGENITAL IMPLANTS: Chronic | ICD-10-CM

## 2022-10-11 DIAGNOSIS — N13.30 UNSPECIFIED HYDRONEPHROSIS: ICD-10-CM

## 2022-10-11 DIAGNOSIS — F43.23 ADJUSTMENT DISORDER WITH MIXED ANXIETY AND DEPRESSED MOOD: ICD-10-CM

## 2022-10-11 DIAGNOSIS — Z87.448 PERSONAL HISTORY OF OTHER DISEASES OF URINARY SYSTEM: ICD-10-CM

## 2022-10-11 DIAGNOSIS — N13.5 CROSSING VESSEL AND STRICTURE OF URETER WITHOUT HYDRONEPHROSIS: ICD-10-CM

## 2022-10-11 LAB
A1C WITH ESTIMATED AVERAGE GLUCOSE RESULT: 5.6 % — SIGNIFICANT CHANGE UP (ref 4–5.6)
ANION GAP SERPL CALC-SCNC: 10 MMOL/L — SIGNIFICANT CHANGE UP (ref 5–17)
BUN SERPL-MCNC: 31 MG/DL — HIGH (ref 7–23)
CALCIUM SERPL-MCNC: 9.9 MG/DL — SIGNIFICANT CHANGE UP (ref 8.4–10.5)
CHLORIDE SERPL-SCNC: 97 MMOL/L — SIGNIFICANT CHANGE UP (ref 96–108)
CO2 SERPL-SCNC: 31 MMOL/L — SIGNIFICANT CHANGE UP (ref 22–31)
CREAT SERPL-MCNC: 0.86 MG/DL — SIGNIFICANT CHANGE UP (ref 0.5–1.3)
EGFR: 83 ML/MIN/1.73M2 — SIGNIFICANT CHANGE UP
ESTIMATED AVERAGE GLUCOSE: 114 MG/DL — SIGNIFICANT CHANGE UP (ref 68–114)
GLUCOSE SERPL-MCNC: 92 MG/DL — SIGNIFICANT CHANGE UP (ref 70–99)
HCT VFR BLD CALC: 29.6 % — LOW (ref 34.5–45)
HGB BLD-MCNC: 9.1 G/DL — LOW (ref 11.5–15.5)
MCHC RBC-ENTMCNC: 26.6 PG — LOW (ref 27–34)
MCHC RBC-ENTMCNC: 30.7 GM/DL — LOW (ref 32–36)
MCV RBC AUTO: 86.5 FL — SIGNIFICANT CHANGE UP (ref 80–100)
NRBC # BLD: 0 /100 WBCS — SIGNIFICANT CHANGE UP (ref 0–0)
PLATELET # BLD AUTO: 293 K/UL — SIGNIFICANT CHANGE UP (ref 150–400)
POTASSIUM SERPL-MCNC: 3.3 MMOL/L — LOW (ref 3.5–5.3)
POTASSIUM SERPL-SCNC: 3.3 MMOL/L — LOW (ref 3.5–5.3)
RBC # BLD: 3.42 M/UL — LOW (ref 3.8–5.2)
RBC # FLD: 17.6 % — HIGH (ref 10.3–14.5)
SODIUM SERPL-SCNC: 138 MMOL/L — SIGNIFICANT CHANGE UP (ref 135–145)
WBC # BLD: 3.41 K/UL — LOW (ref 3.8–10.5)
WBC # FLD AUTO: 3.41 K/UL — LOW (ref 3.8–10.5)

## 2022-10-11 PROCEDURE — 83036 HEMOGLOBIN GLYCOSYLATED A1C: CPT

## 2022-10-11 PROCEDURE — 87086 URINE CULTURE/COLONY COUNT: CPT

## 2022-10-11 PROCEDURE — G0463: CPT

## 2022-10-11 PROCEDURE — 80048 BASIC METABOLIC PNL TOTAL CA: CPT

## 2022-10-11 PROCEDURE — 85027 COMPLETE CBC AUTOMATED: CPT

## 2022-10-11 RX ORDER — IBUPROFEN 200 MG
1 TABLET ORAL
Qty: 0 | Refills: 0 | DISCHARGE

## 2022-10-11 RX ORDER — SODIUM CHLORIDE 9 MG/ML
1000 INJECTION, SOLUTION INTRAVENOUS
Refills: 0 | Status: DISCONTINUED | OUTPATIENT
Start: 2022-10-24 | End: 2022-11-07

## 2022-10-11 NOTE — H&P PST ADULT - HISTORY OF PRESENT ILLNESS
47 yr old female with history of adenocarcinoma of Cervic (5/2021)- s/p radiation & chemo - completed 11/2021, Right hydronephrosis - s/p Right ureteral stent placement 6/2022, Low back pian - pain management , with recent history of Urosepsis in [place] 9/15/2022- 9/18/2022- found to have left kidney stones . Pt had ureteral stent placement of left side at that time. Now coming in for Cystoscopy, Right tandem ureteral stent exchange, Right ureteroscopy, Possible laser endoureterotomy on 10/24/2022.     Denies Recent travel, Exposure or Covid symptoms  Covid test- 10/21/2022   47 yr old female with history of adenocarcinoma of Cervic (5/2021)- s/p radiation & chemo - completed 11/2021, Right hydronephrosis - s/p Right ureteral stent placement 6/2022, Low back pian - pain management - difficulty walking from chemo induced neuropathy ( walk with walker ) , with recent history of Urosepsis in [place] 9/15/2022- 9/18/2022- found to have left kidney stones . Pt had ureteral stent placement of left side at that time. Now coming in for Cystoscopy, Right tandem ureteral stent exchange, Right ureteroscopy, Possible laser endoureterotomy on 10/24/2022.     Denies Recent travel, Exposure or Covid symptoms  Covid test- 10/21/2022   47 yr old female with history of adenocarcinoma of Cervic (5/2021)- s/p radiation & chemo - completed 11/2021, Right hydronephrosis - s/p Right ureteral stent placement 6/2022, Low back pian - pain management - difficulty walking from chemo induced neuropathy ( walk with walker ) , with recent history of Urosepsis in [place] 9/15/2022- 9/18/2022- found to have left ureteral/kidney stones . Pt had ureteral stent placement of left side at that time. Now coming in for Cystoscopy, Right tandem ureteral stent exchange, Right ureteroscopy, Possible laser endoureterotomy on 10/24/2022.     Denies Recent travel, Exposure or Covid symptoms  Covid test- 10/21/2022

## 2022-10-11 NOTE — H&P PST ADULT - NSICDXPASTSURGICALHX_GEN_ALL_CORE_FT
PAST SURGICAL HISTORY:  S/P ureteral stent placement 9/2022- left    S/P ureteral stent placement right 6/22

## 2022-10-11 NOTE — H&P PST ADULT - FALL HARM RISK - HARM RISK INTERVENTIONS
Communicate Risk of Fall with Harm to all staff/Reinforce activity limits and safety measures with patient and family/Tailored Fall Risk Interventions/Use of alarms - bed, chair and/or voice tab/Visual Cue: Yellow wristband and red socks/Bed in lowest position, wheels locked, appropriate side rails in place/Call bell, personal items and telephone in reach/Instruct patient to call for assistance before getting out of bed or chair/Non-slip footwear when patient is out of bed/[place] to call system/Physically safe environment - no spills, clutter or unnecessary equipment/Purposeful Proactive Rounding/Room/bathroom lighting operational, light cord in reach

## 2022-10-11 NOTE — H&P PST ADULT - PROBLEM SELECTOR PLAN 1
Cystoscopy, Right tandem ureteral stent exchange, Right ureteroscopy, Possible laser endoureterotomy on 10/24/2022. UCG - day of surgery

## 2022-10-11 NOTE — H&P PST ADULT - NSICDXPASTMEDICALHX_GEN_ALL_CORE_FT
PAST MEDICAL HISTORY:  2019 novel coronavirus disease (COVID-19) 2021, 6/2022- mild symptoms    Anxiety disorder     Bilateral lumbar radiculopathy     Cervical ca 5/2021- chemo & radiation - last 11/2021    Chemotherapy-induced neuropathy     GERD (gastroesophageal reflux disease)     H/O hemorrhoids     H/O hydronephrosis     H/O sepsis 9/15/-9/18/2022- treated with antibiotics    History of panic attacks     Hypertension     Low back pain radiating to both legs     Pedal edema     T2DM (type 2 diabetes mellitus)

## 2022-10-12 LAB
CULTURE RESULTS: SIGNIFICANT CHANGE UP
SPECIMEN SOURCE: SIGNIFICANT CHANGE UP

## 2022-10-13 ENCOUNTER — APPOINTMENT (OUTPATIENT)
Dept: GERIATRICS | Facility: CLINIC | Age: 47
End: 2022-10-13

## 2022-10-13 PROBLEM — G62.0 DRUG-INDUCED POLYNEUROPATHY: Chronic | Status: ACTIVE | Noted: 2022-10-11

## 2022-10-13 PROBLEM — M54.16 RADICULOPATHY, LUMBAR REGION: Chronic | Status: ACTIVE | Noted: 2022-10-11

## 2022-10-13 PROBLEM — K21.9 GASTRO-ESOPHAGEAL REFLUX DISEASE WITHOUT ESOPHAGITIS: Chronic | Status: ACTIVE | Noted: 2022-10-11

## 2022-10-13 PROBLEM — M54.50 LOW BACK PAIN, UNSPECIFIED: Chronic | Status: ACTIVE | Noted: 2022-10-11

## 2022-10-13 PROBLEM — R60.0 LOCALIZED EDEMA: Chronic | Status: ACTIVE | Noted: 2022-10-11

## 2022-10-13 PROBLEM — F41.9 ANXIETY DISORDER, UNSPECIFIED: Chronic | Status: ACTIVE | Noted: 2022-10-11

## 2022-10-13 PROBLEM — Z87.19 PERSONAL HISTORY OF OTHER DISEASES OF THE DIGESTIVE SYSTEM: Chronic | Status: ACTIVE | Noted: 2022-10-11

## 2022-10-13 PROCEDURE — 99214 OFFICE O/P EST MOD 30 MIN: CPT

## 2022-10-13 NOTE — REASON FOR VISIT
[Follow-Up] : a follow-up visit [Other: _____] : [unfilled] [Home] : at home, [unfilled] , at the time of the visit. [Medical Office: ([facility])___] : at the medical office located in  [Patient] : the patient [FreeTextEntry2] : Freda Wilson

## 2022-10-18 NOTE — ASSESSMENT
[FreeTextEntry1] : 47yoF with:\par \par # Cervical Cancer - s/p weekly Cisplatin + EBRT, completed 11/2021. s/p R ureteral stent placement 6/2022. Follow up with Med Onc, Rad Onc. PET CT scheduled for early October. \par \par # Low back pain/ b/l LE pain with radiculopathy, L>R - Appreciate Neuro-Oncology input on this challenging case with running dx of platinum induced neuropathy with possible radiation toxicity causing her weakness. \par  Appreciate the input of additional consultants - Physiatry, Neurosurgery and Neurology. \par - Decrease MS ER to 15mg BID, c/w PRN Oxycodone IR 10mg, Gabapentin 600mg BID, Methocarbamol 500mg TID. \par - C/w PT \par \par # Anxiety - C/w PRN alprazolam .25mg for anxiety attacks.  Provided extensive emotional support and validation of her worries. Appreciate behavioral health input.  c/w behavioral health follow up.\par \par # Lower extremity edema - Furosemide on hold presently per her PCP. \par \par # Encounter for Palliative Care - Emotional support provided.\par \par Follow up in 4 weeks, call sooner with questions or issues.

## 2022-10-18 NOTE — HISTORY OF PRESENT ILLNESS
[FreeTextEntry1] : 47yoF with h/o cervical adenocarcinoma presents for follow-up palliative care visit, referred by Oncology.  \horacio alves Patient initially diagnosed with cervical cancer 5/2021. Underwent weekly cisplatin and pelvic RT 4500cgy 25 fx plus parametrial boost 540cgy 3 fx and 4 brachytherapy procedures (hybrid therapy) 700cgy x 4.\horacio alves Patient was recently hospitalized at [facility] (1/26 - 2/1/22) after presenting with 3 weeks of worsening b/l hip/lower back pain and feet numbness. Pain is sharp in nature, radiating down the lateral thighs, with mild numbness on dorsal sides of feet. Symptoms initially improved with Aleve, however noted blood tinged vaginal discharge and blood clots with urination not related to menstrual periods so stopped taking Aleve. Hip pain and feet numbness progressively worsened, to the point that she cannot walk or lie supine. Pain is mildly alleviated when lying prone, and better with activity. Pt presented to ER in [place] 2 weeks prior to admission due to these symptoms, CT spine non-con showed multiple non-obstructing nephrolithiasis in L kidney but no fractures, deformities, subluxation were noted. She was discharged with gabapentin 300mg BID, Methocarbomol 500mg TID, and Meloxicam 15mg. She is referred today for continuation of pain management. \horacio alves Patient states that she developed pain in her L buttock approximately 6 weeks ago. It started as a pinching pain in her L buttock. It worsened to the point that the pain felt as if someone where punching her in the area.  Standing up and walking helped to relieve the pain a bit.  She found that she was pacing a lot in her home to help the pain. \horacio alves She was advised to use Acetaminophen 1000mg, was using it about twice daily.   This initially helped a bit. The pain progressed, eventually affecting both left and right. Associated with tingling in her toes. Was advised to use Aleve BID. Was advised to increase to 2 pills BID which caused her vaginal bleeding, she stopped. long alves Was in [place] at the time and was sent to the hospital where she states she received a steroid shot to her back. This helped her very much for a while. She was also prescribed a lidocaine 5% patch there but this was not approved by her insurance. Pain came back and was intense.  She returned to [place] on 1/24 and presented to [facility] on 1/26 at which time she was admitted to [facility]. \par \par Pt states that numbness is worse on L side, from dorsal foot to lateral mid-calf. Feels like it is "freezing" and must keep socks on. Also notes cramping intermittently in the toes. Pt also noticed that not all of her urine will come out, and must put pressure to completely relieve herself\par \par When she walks her low back pain gets better but her legs/feet feel tight and crampy. \par She feels weakness in her legs, lifting her legs is difficult, moving her toes is difficult.  She describes a sensation of coldness of her toes. \par She started home PT yesterday, has [facility] home care in place. Is using a rolling walker for assistance with ambulation. \par \par Interval Hx (10/13/22): \par Patient is seen for follow-up. Recent PET scan with interval resolution, she is happy to hear the positive news. \par She is s/p hospitalization in [place] for septic shock secondary to a blocked stent. She had a new stent placed at that time. She is having a procedure to remove stents and ablate kidney stones, awaiting clearance from her PCP.  Hypokalemia continues, her primary care doctor has doubled her dose of potassium and furosemide is on hold. Pain is controlled on current pain regimen listed below. For the past two days she has been taking MS ER once a day and did not feel a difference in her pain control. She reports stiffness and cramping to her foot. She is awaiting reevaluation for physical therapy. Leg swelling is improved however continues with pedal edema. Anxiety remains prominent, PRN alprazolam is helpful. \par Sleep is improved overall. \par \par Current pain regimen:\par MS ER 30mg BID\par Oxycodone IR 5-10mg PRN  (1-3 times daily)\par Gabapentin 600mg BID\par Methocarbamol 500mg TID\par Tylenol 1000mg PRN (takes before PT)\par \par medical cannabis not helping\par \par ROS:\par +tearful, anxious about her pain - improved; Has established care with psychiatrist Dr. Lang.\par +constipation - uses senna daily, PRN Miralax\par +some nausea, no vomiting - improved\par +appetite has been OK. \par Denies urinary issues.\par \par Patient is single, lives with her sister, parents, daughter. \par She has a 26yo son and a 26yo daughter.\par She is unemployed. \par \par PMD: Dr. Valentino Comer ([phone])\par \par I-Stop Ref#:  [id number]

## 2022-10-18 NOTE — PHYSICAL EXAM
[General Appearance - Alert] : alert [General Appearance - In No Acute Distress] : in no acute distress [Sensation] : the sensory exam was normal to light touch and pinprick [Oriented To Time, Place, And Person] : oriented to person, place, and time [FreeTextEntry1] : Tearful at times

## 2022-10-18 NOTE — DATA REVIEWED
[FreeTextEntry1] : PET CT  (10/10/2022):\par \par COMPARISON:  PET/CT from 4/23/2022.\par \par OTHER STUDIES USED FOR CORRELATION: Pelvic MRI from 8/1/2022.\par \par FINDINGS:\par \par HEAD/NECK: Physiologic FDG activity in visualized brain, head, and neck.\par \par CHEST: No abnormal FDG activity. No lymphadenopathy.\par \par LUNGS: No abnormal FDG activity. No lung nodule.\par \par PLEURA/PERICARDIUM: No abnormal FDG activity. No effusion.\par \par HEPATOBILIARY/PANCREAS:  Physiologic FDG activity. Liver SUV mean is 2.0, unchanged.\par \par SPLEEN: Physiologic FDG activity. Normal in size.\par \par ADRENAL GLANDS: No abnormal FDG activity. No nodule.\par \par KIDNEYS/URINARY BLADDER: Physiologic excreted FDG activity. Interval resolution of previously seen moderate right and mild left hydroureteronephrosis status post interval placement of bilateral ureteral stents.\par \par REPRODUCTIVE ORGANS: No abnormal FDG activity. Interval resolution of previously seen central photopenia with peripheral hypermetabolism in the uterine cervix. No abnormal activity in the endometrium.\par \par ABDOMINOPELVIC NODES: No enlarged or FDG-avid lymph node.\par \par BOWEL/PERITONEUM/MESENTERY: Pancolonic hypermetabolism is less extensive, however rectal hypermetabolism is not significantly changed. This may again be related to metformin treatment.\par \par BONES/SOFT TISSUES: No abnormal FDG activity.\par \par IMPRESSION: Compared to FDG-PET/CT scan dated 4/23/2022:\par \par 1. Interval resolution of central photopenia with peripheral hypermetabolism in the uterine cervix.\par \par 2. Interval resolution of moderate right and mild left hydroureteronephrosis status post placement of bilateral ureteral stents.\par \par 3. Pancolonic hypermetabolism is less extensive however rectal hypermetabolism is not significantly changed. This is nonspecific and may be related to metformin. Please correlate clinically.

## 2022-10-20 ENCOUNTER — OUTPATIENT (OUTPATIENT)
Dept: OUTPATIENT SERVICES | Facility: HOSPITAL | Age: 47
LOS: 1 days | End: 2022-10-20
Payer: MEDICAID

## 2022-10-20 ENCOUNTER — APPOINTMENT (OUTPATIENT)
Dept: RADIOLOGY | Facility: IMAGING CENTER | Age: 47
End: 2022-10-20

## 2022-10-20 DIAGNOSIS — C53.9 MALIGNANT NEOPLASM OF CERVIX UTERI, UNSPECIFIED: ICD-10-CM

## 2022-10-20 DIAGNOSIS — Z96.0 PRESENCE OF UROGENITAL IMPLANTS: Chronic | ICD-10-CM

## 2022-10-20 DIAGNOSIS — Z92.3 PERSONAL HISTORY OF IRRADIATION: ICD-10-CM

## 2022-10-20 PROCEDURE — 77080 DXA BONE DENSITY AXIAL: CPT | Mod: 26

## 2022-10-20 PROCEDURE — 77080 DXA BONE DENSITY AXIAL: CPT

## 2022-10-21 ENCOUNTER — OUTPATIENT (OUTPATIENT)
Dept: OUTPATIENT SERVICES | Facility: HOSPITAL | Age: 47
LOS: 1 days | End: 2022-10-21
Payer: MEDICAID

## 2022-10-21 DIAGNOSIS — Z96.0 PRESENCE OF UROGENITAL IMPLANTS: Chronic | ICD-10-CM

## 2022-10-21 DIAGNOSIS — Z11.52 ENCOUNTER FOR SCREENING FOR COVID-19: ICD-10-CM

## 2022-10-21 LAB — SARS-COV-2 RNA SPEC QL NAA+PROBE: SIGNIFICANT CHANGE UP

## 2022-10-23 ENCOUNTER — TRANSCRIPTION ENCOUNTER (OUTPATIENT)
Age: 47
End: 2022-10-23

## 2022-10-24 ENCOUNTER — APPOINTMENT (OUTPATIENT)
Dept: UROLOGY | Facility: HOSPITAL | Age: 47
End: 2022-10-24

## 2022-10-24 ENCOUNTER — RESULT REVIEW (OUTPATIENT)
Age: 47
End: 2022-10-24

## 2022-10-24 ENCOUNTER — OUTPATIENT (OUTPATIENT)
Dept: OUTPATIENT SERVICES | Facility: HOSPITAL | Age: 47
LOS: 1 days | End: 2022-10-24
Payer: MEDICAID

## 2022-10-24 ENCOUNTER — TRANSCRIPTION ENCOUNTER (OUTPATIENT)
Age: 47
End: 2022-10-24

## 2022-10-24 VITALS
DIASTOLIC BLOOD PRESSURE: 80 MMHG | OXYGEN SATURATION: 98 % | HEART RATE: 74 BPM | RESPIRATION RATE: 17 BRPM | SYSTOLIC BLOOD PRESSURE: 123 MMHG

## 2022-10-24 VITALS
WEIGHT: 154.1 LBS | DIASTOLIC BLOOD PRESSURE: 80 MMHG | HEART RATE: 93 BPM | HEIGHT: 64 IN | SYSTOLIC BLOOD PRESSURE: 126 MMHG | TEMPERATURE: 99 F | OXYGEN SATURATION: 98 % | RESPIRATION RATE: 16 BRPM

## 2022-10-24 DIAGNOSIS — Z96.0 PRESENCE OF UROGENITAL IMPLANTS: Chronic | ICD-10-CM

## 2022-10-24 DIAGNOSIS — N13.5 CROSSING VESSEL AND STRICTURE OF URETER WITHOUT HYDRONEPHROSIS: ICD-10-CM

## 2022-10-24 DIAGNOSIS — N13.30 UNSPECIFIED HYDRONEPHROSIS: ICD-10-CM

## 2022-10-24 LAB — GLUCOSE BLDC GLUCOMTR-MCNC: 86 MG/DL — SIGNIFICANT CHANGE UP (ref 70–99)

## 2022-10-24 PROCEDURE — C1889: CPT

## 2022-10-24 PROCEDURE — C9803: CPT

## 2022-10-24 PROCEDURE — 88300 SURGICAL PATH GROSS: CPT | Mod: 26

## 2022-10-24 PROCEDURE — 52353 CYSTOURETERO W/LITHOTRIPSY: CPT | Mod: LT

## 2022-10-24 PROCEDURE — 74420 UROGRAPHY RTRGR +-KUB: CPT | Mod: 26

## 2022-10-24 PROCEDURE — 88300 SURGICAL PATH GROSS: CPT

## 2022-10-24 PROCEDURE — U0005: CPT

## 2022-10-24 PROCEDURE — C1726: CPT

## 2022-10-24 PROCEDURE — 82962 GLUCOSE BLOOD TEST: CPT

## 2022-10-24 PROCEDURE — 52344 CYSTO/URETERO STRICTURE TX: CPT | Mod: RT,59

## 2022-10-24 PROCEDURE — 76000 FLUOROSCOPY <1 HR PHYS/QHP: CPT

## 2022-10-24 PROCEDURE — C1758: CPT

## 2022-10-24 PROCEDURE — C1769: CPT

## 2022-10-24 PROCEDURE — 82365 CALCULUS SPECTROSCOPY: CPT

## 2022-10-24 PROCEDURE — U0003: CPT

## 2022-10-24 PROCEDURE — 52341 CYSTO W/URETER STRICTURE TX: CPT | Mod: RT

## 2022-10-24 DEVICE — LASER FIBER SOLTIVE 200 BALL TIP: Type: IMPLANTABLE DEVICE | Status: FUNCTIONAL

## 2022-10-24 DEVICE — BALLOON CATH UROMAX ULTRA 18FR X 4CM: Type: IMPLANTABLE DEVICE | Status: FUNCTIONAL

## 2022-10-24 DEVICE — GUIDEWIRE SENSOR DUAL-FLEX NITINOL STRAIGHT .035" X 150CM: Type: IMPLANTABLE DEVICE | Status: FUNCTIONAL

## 2022-10-24 DEVICE — URETERAL CATH OPEN END 5FR 70CM: Type: IMPLANTABLE DEVICE | Status: FUNCTIONAL

## 2022-10-24 DEVICE — STONE BASKET ZEROTIP NITINOL 4-WIRE 1.9FR 120CM X 12MM: Type: IMPLANTABLE DEVICE | Status: FUNCTIONAL

## 2022-10-24 RX ORDER — FENTANYL CITRATE 50 UG/ML
25 INJECTION INTRAVENOUS
Refills: 0 | Status: DISCONTINUED | OUTPATIENT
Start: 2022-10-24 | End: 2022-10-24

## 2022-10-24 RX ORDER — TAMSULOSIN HYDROCHLORIDE 0.4 MG/1
1 CAPSULE ORAL
Qty: 30 | Refills: 0
Start: 2022-10-24 | End: 2022-11-22

## 2022-10-24 RX ORDER — LIDOCAINE HCL 20 MG/ML
0.2 VIAL (ML) INJECTION ONCE
Refills: 0 | Status: COMPLETED | OUTPATIENT
Start: 2022-10-24 | End: 2022-10-24

## 2022-10-24 RX ORDER — ONDANSETRON 8 MG/1
4 TABLET, FILM COATED ORAL ONCE
Refills: 0 | Status: COMPLETED | OUTPATIENT
Start: 2022-10-24 | End: 2022-10-24

## 2022-10-24 RX ORDER — CEFAZOLIN SODIUM 1 G
2000 VIAL (EA) INJECTION ONCE
Refills: 0 | Status: COMPLETED | OUTPATIENT
Start: 2022-10-24 | End: 2022-10-24

## 2022-10-24 RX ORDER — HYDROMORPHONE HYDROCHLORIDE 2 MG/ML
0.5 INJECTION INTRAMUSCULAR; INTRAVENOUS; SUBCUTANEOUS ONCE
Refills: 0 | Status: DISCONTINUED | OUTPATIENT
Start: 2022-10-24 | End: 2022-10-24

## 2022-10-24 RX ADMIN — FENTANYL CITRATE 25 MICROGRAM(S): 50 INJECTION INTRAVENOUS at 15:41

## 2022-10-24 RX ADMIN — FENTANYL CITRATE 25 MICROGRAM(S): 50 INJECTION INTRAVENOUS at 15:25

## 2022-10-24 RX ADMIN — HYDROMORPHONE HYDROCHLORIDE 0.5 MILLIGRAM(S): 2 INJECTION INTRAMUSCULAR; INTRAVENOUS; SUBCUTANEOUS at 15:53

## 2022-10-24 RX ADMIN — FENTANYL CITRATE 25 MICROGRAM(S): 50 INJECTION INTRAVENOUS at 15:23

## 2022-10-24 RX ADMIN — ONDANSETRON 4 MILLIGRAM(S): 8 TABLET, FILM COATED ORAL at 15:53

## 2022-10-24 RX ADMIN — FENTANYL CITRATE 25 MICROGRAM(S): 50 INJECTION INTRAVENOUS at 15:35

## 2022-10-24 RX ADMIN — FENTANYL CITRATE 25 MICROGRAM(S): 50 INJECTION INTRAVENOUS at 15:45

## 2022-10-24 NOTE — ASU PATIENT PROFILE, ADULT - FALL HARM RISK - PATIENT NEEDS ASSISTANCE
Physical Therapy Daily Treatment     Visit Count: 2  Plan of Care Dates: Initial: 3/23/2018 Through: 5/18/2018  Insurance Information: Work Injury Information:   Current Employer: [employer]   :   Restrictions: No lifting or carrying > 20 lbs with right arm. Avoid repetitive movements with right arm. No squatting.    Full Duty Work Demands:  Current Work Status:   Claim Number:    Claim Status:      Next Referring Provider Visit: 4/03/18     Referred by: Dr. Matilde Mitchell PA-C  Medical Diagnosis (from order):  Strain of right shoulder, initial encounter [S46.911A]  Strain of right elbow, initial encounter [S56.911A]  Treatment Diagnosis: Shoulder Symptoms with Pain, Impaired Joint Mobility, Impaired Range of Motion and Impaired Motor Function/Muscle Performance  Insurance: 1. N/A  2. N/A     Date of Onset: new onset; 3/21/18   Diagnosis Precautions: none  Relevant co-morbidities and medications: Hypertension   Relevant Tests: None      Medical/surgical history, medications and relevant tests have been reviewed    SUBJECTIVE   Patient reports mild pain, 1/10 in right shoulder.  He has stiffness in right elbow.  Current Pain: 1/10.    Functional Change: none**    OBJECTIVE   AROM right flexion- 160  right ER- 70    Treatment   Therapeutic Exercise:   review home exercises  Added 1# weight to side lying ER x 2o  Shoulder pulleys flexion, abduction x 20 each*  Scapular retraction row- green Thera Band x 15   Scapular retraction, shoulder extension row x 20  Shoulder abduction  x 20  Cross body stretch 3 position x 20 seconds each*  Wall push up x 10*  *      Current Home Program (not performed this date except as noted above):   Access Code: [email or web address]   URL: [email or web address]            Date: 03/23/2018   Prepared by: Abdulaziz Cole      Exercises  Sidelying Shoulder External Rotation - 10 reps - 2x daily - 7x weekly  Shoulder Adduction with Anchored Resistance - 20 reps - 2x  daily - 7x weekly  Single Arm Shoulder Extension with Resistance - 10 reps - 2x daily - 7x weekly    ASSESSMENT   Patient reports no more than mild pain in shoulder with exrcises.  This improved with activities today.    Pain after treatment: 0/10  Result of above outlined education: Verbalizes understanding and Demonstrates understanding    Goals:       To be obtained by end of this plan of care:  1. HEP: Pt independent with modified and progressed HEP.  2. Pain: Patient will decrease involved shoulder pain to 0/10  to aid in return to work activities.  3. Strength: Patient will increase involved shoulder strength to 4+/5 to aid in tolerating repetitive overhead/upper extremity activity.  4. Pt will be able to reach behind back without  pain/difficulty to improve function in dressing.   5. Pt will be able to reach over head without  pain/difficulty to improve function in cooking, reaching into cupboard, grooming.  6. Pt will be able to sleep 6 hours without disruption from pain.   7. Pt will be able to return to full work duties with approval from referring physician.    PLAN   Continue functional strengthening, scapular stabilization; Modalities prn     THERAPY DAILY BILLING   Primary Insurance:  N/A  Secondary Insurance: N/A    Evaluation Procedures:  No evaluation codes were used on this date of service    Timed Procedures:  Therapeutic Exercise, 43 minutes    Untimed Procedures:  No untimed codes were used on this date of service    Total Treatment Time: 43 minutes        No assistance needed

## 2022-10-24 NOTE — ASU DISCHARGE PLAN (ADULT/PEDIATRIC) - CARE PROVIDER_API CALL
Hoenig, David M (MD)  Urology  [facility]- Dept. of Urology, [address] [place] [address]  Phone: [phone]  Fax: [phone]  Established Patient  Follow Up Time:

## 2022-10-24 NOTE — ASU PATIENT PROFILE, ADULT - FALL HARM RISK - HARM RISK INTERVENTIONS
Communicate Risk of Fall with Harm to all staff/Reinforce activity limits and safety measures with patient and family/Tailored Fall Risk Interventions/Use of alarms - bed, chair and/or voice tab/Visual Cue: Yellow wristband and red socks/Bed in lowest position, wheels locked, appropriate side rails in place/Call bell, personal items and telephone in reach/Instruct patient to call for assistance before getting out of bed or chair/Non-slip footwear when patient is out of bed/[place] to call system/Physically safe environment - no spills, clutter or unnecessary equipment/Purposeful Proactive Rounding/Room/bathroom lighting operational, light cord in reach

## 2022-10-24 NOTE — BRIEF OPERATIVE NOTE - OPERATION/FINDINGS
Cystoscopy  L stent removal, L ureteroscopy, laser lithotripsy  R stents removal, R ureteroscopy, balloon dilation

## 2022-10-24 NOTE — PRE-ANESTHESIA EVALUATION ADULT - NSANTHOSAYNRD_GEN_A_CORE
No. LEESA screening performed.  STOP BANG Legend: 0-2 = LOW Risk; 3-4 = INTERMEDIATE Risk; 5-8 = HIGH Risk no DME

## 2022-10-24 NOTE — ASU PREOP CHECKLIST - VERIFY SURGICAL SITE/SIDE WITH PATIENT
removal of bilateral stent, right kidney stones laser/done removal of bilateral stent,, leftt kidney stones laser/done

## 2022-10-24 NOTE — ASU DISCHARGE PLAN (ADULT/PEDIATRIC) - NS MD DC FALL RISK RISK
For information on Fall & Injury Prevention, visit: [email or web address] OR  [email or web address] OR  https://www.cdc.gov/steadi/patient.html

## 2022-10-28 ENCOUNTER — RESULT REVIEW (OUTPATIENT)
Age: 47
End: 2022-10-28

## 2022-10-28 ENCOUNTER — APPOINTMENT (OUTPATIENT)
Dept: GYNECOLOGIC ONCOLOGY | Facility: CLINIC | Age: 47
End: 2022-10-28

## 2022-10-28 VITALS
HEIGHT: 63 IN | SYSTOLIC BLOOD PRESSURE: 127 MMHG | WEIGHT: 147.71 LBS | HEART RATE: 105 BPM | DIASTOLIC BLOOD PRESSURE: 81 MMHG | BODY MASS INDEX: 26.17 KG/M2

## 2022-10-28 DIAGNOSIS — N89.8 OTHER SPECIFIED NONINFLAMMATORY DISORDERS OF VAGINA: ICD-10-CM

## 2022-10-28 LAB
BASOPHILS # BLD AUTO: 0.02 K/UL — SIGNIFICANT CHANGE UP (ref 0–0.2)
BASOPHILS NFR BLD AUTO: 0.5 % — SIGNIFICANT CHANGE UP (ref 0–2)
EOSINOPHIL # BLD AUTO: 0.13 K/UL — SIGNIFICANT CHANGE UP (ref 0–0.5)
EOSINOPHIL NFR BLD AUTO: 3.3 % — SIGNIFICANT CHANGE UP (ref 0–6)
HCT VFR BLD CALC: 34.5 % — SIGNIFICANT CHANGE UP (ref 34.5–45)
HGB BLD-MCNC: 11 G/DL — LOW (ref 11.5–15.5)
IMM GRANULOCYTES NFR BLD AUTO: 0.3 % — SIGNIFICANT CHANGE UP (ref 0–0.9)
LYMPHOCYTES # BLD AUTO: 0.48 K/UL — LOW (ref 1–3.3)
LYMPHOCYTES # BLD AUTO: 12 % — LOW (ref 13–44)
MCHC RBC-ENTMCNC: 27.5 PG — SIGNIFICANT CHANGE UP (ref 27–34)
MCHC RBC-ENTMCNC: 31.9 G/DL — LOW (ref 32–36)
MCV RBC AUTO: 86.3 FL — SIGNIFICANT CHANGE UP (ref 80–100)
MONOCYTES # BLD AUTO: 0.32 K/UL — SIGNIFICANT CHANGE UP (ref 0–0.9)
MONOCYTES NFR BLD AUTO: 8 % — SIGNIFICANT CHANGE UP (ref 2–14)
NEUTROPHILS # BLD AUTO: 3.03 K/UL — SIGNIFICANT CHANGE UP (ref 1.8–7.4)
NEUTROPHILS NFR BLD AUTO: 75.9 % — SIGNIFICANT CHANGE UP (ref 43–77)
NRBC # BLD: 0 /100 WBCS — SIGNIFICANT CHANGE UP (ref 0–0)
PLATELET # BLD AUTO: 297 K/UL — SIGNIFICANT CHANGE UP (ref 150–400)
RBC # BLD: 4 M/UL — SIGNIFICANT CHANGE UP (ref 3.8–5.2)
RBC # FLD: 15.9 % — HIGH (ref 10.3–14.5)
WBC # BLD: 3.99 K/UL — SIGNIFICANT CHANGE UP (ref 3.8–10.5)
WBC # FLD AUTO: 3.99 K/UL — SIGNIFICANT CHANGE UP (ref 3.8–10.5)

## 2022-10-28 PROCEDURE — 99213 OFFICE O/P EST LOW 20 MIN: CPT

## 2022-11-01 LAB
ALBUMIN SERPL ELPH-MCNC: 4.5 G/DL
ALP BLD-CCNC: 139 U/L
ALT SERPL-CCNC: 20 U/L
ANION GAP SERPL CALC-SCNC: 14 MMOL/L
AST SERPL-CCNC: 18 U/L
BILIRUB SERPL-MCNC: 0.2 MG/DL
BUN SERPL-MCNC: 34 MG/DL
CALCIUM SERPL-MCNC: 10.9 MG/DL
CHLORIDE SERPL-SCNC: 95 MMOL/L
CO2 SERPL-SCNC: 24 MMOL/L
CREAT SERPL-MCNC: 1.47 MG/DL
EGFR: 44 ML/MIN/1.73M2
GLUCOSE SERPL-MCNC: 139 MG/DL
NIDUS STONE QN: SIGNIFICANT CHANGE UP
POTASSIUM SERPL-SCNC: 4.7 MMOL/L
PROT SERPL-MCNC: 8.8 G/DL
SODIUM SERPL-SCNC: 133 MMOL/L

## 2022-11-07 LAB — SURGICAL PATHOLOGY STUDY: SIGNIFICANT CHANGE UP

## 2022-11-08 ENCOUNTER — NON-APPOINTMENT (OUTPATIENT)
Age: 47
End: 2022-11-08

## 2022-11-08 ENCOUNTER — APPOINTMENT (OUTPATIENT)
Dept: HEMATOLOGY ONCOLOGY | Facility: CLINIC | Age: 47
End: 2022-11-08

## 2022-11-08 LAB — CYTOLOGY CVX/VAG DOC THIN PREP: ABNORMAL

## 2022-11-08 PROCEDURE — 90832 PSYTX W PT 30 MINUTES: CPT | Mod: 95

## 2022-11-14 ENCOUNTER — APPOINTMENT (OUTPATIENT)
Dept: GASTROENTEROLOGY | Facility: CLINIC | Age: 47
End: 2022-11-14

## 2022-11-14 VITALS
SYSTOLIC BLOOD PRESSURE: 100 MMHG | HEART RATE: 97 BPM | HEIGHT: 63 IN | WEIGHT: 160 LBS | OXYGEN SATURATION: 98 % | DIASTOLIC BLOOD PRESSURE: 60 MMHG | BODY MASS INDEX: 28.35 KG/M2

## 2022-11-14 DIAGNOSIS — K59.00 CONSTIPATION, UNSPECIFIED: ICD-10-CM

## 2022-11-14 DIAGNOSIS — K62.5 HEMORRHAGE OF ANUS AND RECTUM: ICD-10-CM

## 2022-11-14 PROCEDURE — 99213 OFFICE O/P EST LOW 20 MIN: CPT

## 2022-11-16 NOTE — HISTORY OF PRESENT ILLNESS
[FreeTextEntry1] : 46 yo female with h/o cervical cancer, anxiety s/p colonoscopy in 2021 reveals mild diverticulosis, small IH and to repeat in 10 years. patient with c/o constipation, not taking laxative, and occassional brbpr on stool. no weight loss\par no n/v. no gerd\par \par no  family h/o colon ca

## 2022-11-16 NOTE — REASON FOR VISIT
[Follow-up] : a follow-up of an existing diagnosis [Family Member] : family member [FreeTextEntry1] : brbpr

## 2022-11-16 NOTE — ASSESSMENT
[FreeTextEntry1] : h/o brbpr small amount with hard brown stool ddx hemorrhoid, fissure\par s/p colonoscopy in 2021\par \par plan miralax daily\par  proctozone as needed\par if no improvement referral to colorectal surgery, referral and phone number given to patient.

## 2022-11-16 NOTE — PHYSICAL EXAM
[Normal] : the appearance was normal, no neck mass [No CVA Tenderness] : no CVA  tenderness [Abnormal Walk] : normal gait [] : no rash [No Focal Deficits] : no focal deficits [Oriented To Time, Place, And Person] : oriented to person, place, and time [de-identified] : walks with walker

## 2022-11-16 NOTE — REVIEW OF SYSTEMS
[As Noted in HPI] : as noted in HPI [Arthralgias (joint pain)] : arthralgias [Muscle Weakness] : muscle weakness [Fever] : no fever [Eye Pain] : no eye pain [Sore Throat] : no sore throat [Chest Pain] : no chest pain [Skin Wound] : no skin wound [Easy Bleeding] : no tendency for easy bleeding [Easy Bruising] : no tendency for easy bruising

## 2022-11-17 ENCOUNTER — OUTPATIENT (OUTPATIENT)
Dept: OUTPATIENT SERVICES | Facility: HOSPITAL | Age: 47
LOS: 1 days | Discharge: HOME | End: 2022-11-17

## 2022-11-17 ENCOUNTER — APPOINTMENT (OUTPATIENT)
Dept: GERIATRICS | Facility: CLINIC | Age: 47
End: 2022-11-17

## 2022-11-17 VITALS
HEART RATE: 105 BPM | SYSTOLIC BLOOD PRESSURE: 116 MMHG | RESPIRATION RATE: 16 BRPM | BODY MASS INDEX: 28.2 KG/M2 | DIASTOLIC BLOOD PRESSURE: 78 MMHG | OXYGEN SATURATION: 98 % | WEIGHT: 159.17 LBS | TEMPERATURE: 98 F

## 2022-11-17 DIAGNOSIS — Z96.0 PRESENCE OF UROGENITAL IMPLANTS: Chronic | ICD-10-CM

## 2022-11-17 DIAGNOSIS — M62.838 OTHER MUSCLE SPASM: ICD-10-CM

## 2022-11-17 DIAGNOSIS — C53.9 MALIGNANT NEOPLASM OF CERVIX UTERI, UNSPECIFIED: ICD-10-CM

## 2022-11-17 PROCEDURE — 99213 OFFICE O/P EST LOW 20 MIN: CPT

## 2022-11-18 ENCOUNTER — APPOINTMENT (OUTPATIENT)
Dept: UROLOGY | Facility: CLINIC | Age: 47
End: 2022-11-18

## 2022-11-18 PROBLEM — M62.838 MUSCLE SPASM: Status: ACTIVE | Noted: 2022-03-08

## 2022-11-18 PROCEDURE — 99214 OFFICE O/P EST MOD 30 MIN: CPT

## 2022-11-18 NOTE — ASSESSMENT
[FreeTextEntry1] : 47yoF with:\par \par # Cervical Cancer - s/p weekly Cisplatin + EBRT, completed 11/2021. s/p R ureteral stent placement 6/2022. Follow up with Med Onc, Rad Onc. PET CT scheduled for early October. \par \par # Low back pain/ b/l LE pain with radiculopathy, L>R - Appreciate Neuro-Oncology input on this challenging case with running dx of platinum induced neuropathy with possible radiation toxicity causing her weakness. \par  Appreciate the input of additional consultants - Physiatry, Neurosurgery and Neurology. \par - Increase MS ER 15mg to TID, c/w PRN Oxycodone IR 10mg, Gabapentin 600mg BID, Methocarbamol 500mg TID. \par \par # Anxiety - C/w PRN alprazolam .25mg for anxiety attacks.  Provided extensive emotional support and validation of her worries. Appreciate behavioral health input.  c/w behavioral health follow up.\par \par # Lower extremity edema - On furosemide 40mg with potassium supplementation per PCP.\par \par # Encounter for Palliative Care - Emotional support provided.\par \par Follow up in 4 weeks, call sooner with questions or issues.

## 2022-11-18 NOTE — PHYSICAL EXAM
[General Appearance - Alert] : alert [General Appearance - In No Acute Distress] : in no acute distress [Sensation] : the sensory exam was normal to light touch and pinprick [Oriented To Time, Place, And Person] : oriented to person, place, and time [Sclera] : the sclera and conjunctiva were normal [Outer Ear] : the ears and nose were normal in appearance [Hearing Threshold Finger Rub Not [place]] : hearing was normal [Examination Of The Oral Cavity] : the lips and gums were normal [Neck Appearance] : the appearance of the neck was normal [] : no respiratory distress [Respiration, Rhythm And Depth] : normal respiratory rhythm and effort [Auscultation Breath Sounds / Voice Sounds] : lungs were clear to auscultation bilaterally [Heart Rate And Rhythm] : heart rate was normal and rhythm regular [Heart Sounds] : normal S1 and S2 [FreeTextEntry1] : Tearful at times

## 2022-11-18 NOTE — ASSESSMENT
[FreeTextEntry1] : Do BMP, CBC as ordered- can do today here\par \par ? recent uti through PCP- started cipro, currently on\par \par Diet modification reviewed at length- increasing fluids (primarily water), citrus is good, and decreasing/moderating salt, animal flesh protein, oxalate containing foods, and moderation of calcium intake (1000 mg/day is USRDA).\par \par I reviewed with the patient the risks of metabolic stone disease given their underlying risk parameters (all of which include large stones, multiple stones, bilateral stones, family history, and young age), and the indications for 24 hour urine metabolic assessment.\par \par DASH diet a good overall diet plan to consider and review for general health and stone health.\par \par We also discussed benefits of regular exercise and weight loss as independent risk reducers for stones.\par \par 1. Diet modification as discussed\par 2. 24 hour urine collection x 2 in the next few weeks\par 3. RTC with renal US in 8 approx weeks\par 4. BMP and CBC as ordered

## 2022-11-18 NOTE — HISTORY OF PRESENT ILLNESS
[FreeTextEntry1] : Pt returns for metabolic evaluation and prevention of future stone disease following recent surgery for stone.  At issue today is review of the stone analysis, risk factors for future stone episodes and establishing whether they have pure dietary, metabolic, or mixed causes for their stone risks which is unrelated to the surgical management of their stone disease.\par \par They underwent bilateral URS with removal of stone on left, removal of right stent as ureter was patent on 10/24/22\par \par Stent status: out on both sides, including previously stented right side for hydro/obstruction.\par \par Stone analysis: 80% Calcium phosphate (apatite)\par 10% Calcium phosphate (brushite)\par 10% Calcium oxalate dihydrate\par \par creat was up 10/29/22- not yet repeated\par pain left lower groin area acute, mild left lower back\par \par  [Currently Experiencing ___] :  [unfilled]

## 2022-11-18 NOTE — PHYSICAL EXAM
[General Appearance - Well Developed] : well developed [General Appearance - Well Nourished] : well nourished [Normal Appearance] : normal appearance [Well Groomed] : well groomed [General Appearance - In No Acute Distress] : no acute distress [Abdomen Soft] : soft [Costovertebral Angle Tenderness] : no ~M costovertebral angle tenderness [Urinary Bladder Findings] : the bladder was normal on palpation [Edema] : no peripheral edema [] : no respiratory distress [Respiration, Rhythm And Depth] : normal respiratory rhythm and effort [Exaggerated Use Of Accessory Muscles For Inspiration] : no accessory muscle use [Oriented To Time, Place, And Person] : oriented to person, place, and time [Affect] : the affect was normal [Mood] : the mood was normal [Not Anxious] : not anxious [Normal Station and Gait] : the gait and station were normal for the patient's age [FreeTextEntry1] : tender lower left paraspinal [No Focal Deficits] : no focal deficits

## 2022-11-18 NOTE — HISTORY OF PRESENT ILLNESS
[FreeTextEntry1] : 47yoF with h/o cervical adenocarcinoma presents for follow-up palliative care visit, referred by Oncology.  \horacio alves Patient initially diagnosed with cervical cancer 5/2021. Underwent weekly cisplatin and pelvic RT 4500cgy 25 fx plus parametrial boost 540cgy 3 fx and 4 brachytherapy procedures (hybrid therapy) 700cgy x 4.\horacio alves Patient was recently hospitalized at [facility] (1/26 - 2/1/22) after presenting with 3 weeks of worsening b/l hip/lower back pain and feet numbness. Pain is sharp in nature, radiating down the lateral thighs, with mild numbness on dorsal sides of feet. Symptoms initially improved with Aleve, however noted blood tinged vaginal discharge and blood clots with urination not related to menstrual periods so stopped taking Aleve. Hip pain and feet numbness progressively worsened, to the point that she cannot walk or lie supine. Pain is mildly alleviated when lying prone, and better with activity. Pt presented to ER in [place] 2 weeks prior to admission due to these symptoms, CT spine non-con showed multiple non-obstructing nephrolithiasis in L kidney but no fractures, deformities, subluxation were noted. She was discharged with gabapentin 300mg BID, Methocarbomol 500mg TID, and Meloxicam 15mg. She is referred today for continuation of pain management. \horacio alves Patient states that she developed pain in her L buttock approximately 6 weeks ago. It started as a pinching pain in her L buttock. It worsened to the point that the pain felt as if someone where punching her in the area.  Standing up and walking helped to relieve the pain a bit.  She found that she was pacing a lot in her home to help the pain. \horacio alves She was advised to use Acetaminophen 1000mg, was using it about twice daily.   This initially helped a bit. The pain progressed, eventually affecting both left and right. Associated with tingling in her toes. Was advised to use Aleve BID. Was advised to increase to 2 pills BID which caused her vaginal bleeding, she stopped. long alves Was in [place] at the time and was sent to the hospital where she states she received a steroid shot to her back. This helped her very much for a while. She was also prescribed a lidocaine 5% patch there but this was not approved by her insurance. Pain came back and was intense.  She returned to [place] on 1/24 and presented to [facility] on 1/26 at which time she was admitted to [facility]. \par \par Pt states that numbness is worse on L side, from dorsal foot to lateral mid-calf. Feels like it is "freezing" and must keep socks on. Also notes cramping intermittently in the toes. Pt also noticed that not all of her urine will come out, and must put pressure to completely relieve herself\par \par When she walks her low back pain gets better but her legs/feet feel tight and crampy. \par She feels weakness in her legs, lifting her legs is difficult, moving her toes is difficult.  She describes a sensation of coldness of her toes. \par She started home PT yesterday, has [facility] home care in place. Is using a rolling walker for assistance with ambulation. \par \par Interval Hx (11/17/22): \par Patient is seen for follow-up. \par Pain continues to her lower extremities. The neuropathy to her feet causes sharp pain and sensation of muscle contraction. Pain is controlled on current pain regimen listed below. Recently she has not followed the schedule for her medications and experienced an increase in discomfort. She has been doing PT twice a week which has been helpful, completed yesterday. Continues on furosemide 40mg and daily potassium for chronic lower extremity edema.  Anxiety controlled with PRN alprazolam. She is planning to go to [place] again for a few weeks.\par \par Current pain regimen:\par MS ER 15mg BID\par Oxycodone IR 5-10mg PRN  (1-3 times daily)\par Gabapentin 600mg BID\par Methocarbamol 500mg TID\par Tylenol 1000mg PRN (takes before PT)\par \par medical cannabis not helping\par \par ROS:\par +tearful, anxious about her pain - improved; Has established care with psychiatrist Dr. Lang.\par +constipation - uses senna daily, PRN Miralax\par +some nausea, no vomiting - improved\par +appetite has been OK. \par Denies urinary issues.\par \par Patient is single, lives with her sister, parents, daughter. \par She has a 28yo son and a 26yo daughter.\par She is unemployed. \par \par PMD: Dr. Valentino Comer ([phone])\par \par I-Stop Ref#:  [id number]

## 2022-11-20 LAB
BASOPHILS # BLD AUTO: 0.01 K/UL
BASOPHILS NFR BLD AUTO: 0.3 %
EOSINOPHIL # BLD AUTO: 0.09 K/UL
EOSINOPHIL NFR BLD AUTO: 2.8 %
HCT VFR BLD CALC: 31.6 %
HGB BLD-MCNC: 10 G/DL
IMM GRANULOCYTES NFR BLD AUTO: 0.3 %
LYMPHOCYTES # BLD AUTO: 0.61 K/UL
LYMPHOCYTES NFR BLD AUTO: 19 %
MAN DIFF?: NORMAL
MCHC RBC-ENTMCNC: 27.7 PG
MCHC RBC-ENTMCNC: 31.6 GM/DL
MCV RBC AUTO: 87.5 FL
MONOCYTES # BLD AUTO: 0.38 K/UL
MONOCYTES NFR BLD AUTO: 11.8 %
NEUTROPHILS # BLD AUTO: 2.11 K/UL
NEUTROPHILS NFR BLD AUTO: 65.8 %
PLATELET # BLD AUTO: 281 K/UL
RBC # BLD: 3.61 M/UL
RBC # FLD: 15.6 %
WBC # FLD AUTO: 3.21 K/UL

## 2022-12-14 ENCOUNTER — APPOINTMENT (OUTPATIENT)
Dept: HEMATOLOGY ONCOLOGY | Facility: CLINIC | Age: 47
End: 2022-12-14

## 2022-12-14 PROCEDURE — 90832 PSYTX W PT 30 MINUTES: CPT | Mod: 95

## 2022-12-15 ENCOUNTER — APPOINTMENT (OUTPATIENT)
Dept: GERIATRICS | Facility: CLINIC | Age: 47
End: 2022-12-15

## 2022-12-15 PROCEDURE — 99213 OFFICE O/P EST LOW 20 MIN: CPT | Mod: 95

## 2022-12-15 NOTE — HISTORY OF PRESENT ILLNESS
[Home] : at home, [unfilled] , at the time of the visit. [Medical Office: ([facility])___] : at the medical office located in  [FreeTextEntry1] : 47yoF with h/o cervical adenocarcinoma presents for follow-up palliative care visit, referred by Oncology.  \horacio alves Patient initially diagnosed with cervical cancer 5/2021. Underwent weekly cisplatin and pelvic RT 4500cgy 25 fx plus parametrial boost 540cgy 3 fx and 4 brachytherapy procedures (hybrid therapy) 700cgy x 4.\horacio alves Patient was recently hospitalized at [facility] (1/26 - 2/1/22) after presenting with 3 weeks of worsening b/l hip/lower back pain and feet numbness. Pain is sharp in nature, radiating down the lateral thighs, with mild numbness on dorsal sides of feet. Symptoms initially improved with Aleve, however noted blood tinged vaginal discharge and blood clots with urination not related to menstrual periods so stopped taking Aleve. Hip pain and feet numbness progressively worsened, to the point that she cannot walk or lie supine. Pain is mildly alleviated when lying prone, and better with activity. Pt presented to ER in [place] 2 weeks prior to admission due to these symptoms, CT spine non-con showed multiple non-obstructing nephrolithiasis in L kidney but no fractures, deformities, subluxation were noted. She was discharged with gabapentin 300mg BID, Methocarbomol 500mg TID, and Meloxicam 15mg. She is referred today for continuation of pain management. \horacio alves Patient states that she developed pain in her L buttock approximately 6 weeks ago. It started as a pinching pain in her L buttock. It worsened to the point that the pain felt as if someone where punching her in the area.  Standing up and walking helped to relieve the pain a bit.  She found that she was pacing a lot in her home to help the pain. \horacio alves She was advised to use Acetaminophen 1000mg, was using it about twice daily.   This initially helped a bit. The pain progressed, eventually affecting both left and right. Associated with tingling in her toes. Was advised to use Aleve BID. Was advised to increase to 2 pills BID which caused her vaginal bleeding, she stopped. long alves Was in [place] at the time and was sent to the hospital where she states she received a steroid shot to her back. This helped her very much for a while. She was also prescribed a lidocaine 5% patch there but this was not approved by her insurance. Pain came back and was intense.  She returned to [place] on 1/24 and presented to [facility] on 1/26 at which time she was admitted to [facility]. \par \par Pt states that numbness is worse on L side, from dorsal foot to lateral mid-calf. Feels like it is "freezing" and must keep socks on. Also notes cramping intermittently in the toes. Pt also noticed that not all of her urine will come out, and must put pressure to completely relieve herself\par \par When she walks her low back pain gets better but her legs/feet feel tight and crampy. \par She feels weakness in her legs, lifting her legs is difficult, moving her toes is difficult.  She describes a sensation of coldness of her toes. \par \par \par Interval Hx (12/15/22): \par Patient is seen for follow-up via telemedicine. \par She is in [place] and reports that for the first four days after arriving there she had generalized body pains that made it hard for her to even get up out of bed. She is feeling better now, is using her usual regimen, with oxycodone IR 10mg twice daily. \par The neuropathy to her feet causes sharp pain and sensation of muscle contraction.  Continues on furosemide 40mg and daily potassium for chronic lower extremity edema.  Anxiety controlled with PRN alprazolam. \par Is using a rolling walker for assistance with ambulation, will be transitioning to a cane soon. \par \par Current pain regimen:\par MS ER 15mg TID\par Oxycodone IR 5-10mg PRN  (~2 times daily)\par Gabapentin 600mg BID\par Methocarbamol 500mg TID\par Tylenol 1000mg PRN (takes before PT)\par \par ROS:\par +constipation - uses senna daily, PRN Miralax\par +some nausea, no vomiting - improved\par +appetite has been OK. \par Remainder of ROS non-contributory\par \par Patient is single, lives with her sister, parents, daughter. \par She has a 26yo son and a 24yo daughter.\par She is unemployed. \par \par PMD: Dr. Valentino Comer ([phone])\par \par I-Stop Ref#:  [id number]

## 2022-12-15 NOTE — ASSESSMENT
[FreeTextEntry1] : 47yoF with:\par \par # Cervical Cancer - s/p weekly Cisplatin + EBRT, completed 11/2021. s/p R ureteral stent placement 6/2022. Follow up with Med Onc, Rad Onc. \par \par # Low back pain/ b/l LE pain with radiculopathy, L>R - Appreciate Neuro-Oncology input on this challenging case with running dx of platinum induced neuropathy with possible radiation toxicity causing her weakness. \par  Appreciate the input of additional consultants - Physiatry, Neurosurgery and Neurology. \par - C/w MS ER 15mg TID, c/w PRN Oxycodone IR 10mg, Gabapentin 600mg BID, Methocarbamol 500mg TID. \par \par # Anxiety - C/w PRN alprazolam .25mg for anxiety attacks.  Provided extensive emotional support and validation of her worries. Appreciate behavioral health input.  c/w behavioral health follow up.\par \par # Lower extremity edema - On furosemide 40mg with potassium supplementation per PCP.\par \par # Encounter for Palliative Care - Emotional support provided.\par \par Follow up in 4 weeks, call sooner with questions or issues.

## 2022-12-15 NOTE — PHYSICAL EXAM
[General Appearance - Alert] : alert [General Appearance - In No Acute Distress] : in no acute distress [Hearing Threshold Finger Rub Not [place]] : hearing was normal [Examination Of The Oral Cavity] : the lips and gums were normal [Sensation] : the sensory exam was normal to light touch and pinprick [Oriented To Time, Place, And Person] : oriented to person, place, and time [FreeTextEntry1] : Tearful at times

## 2022-12-28 NOTE — ASU PREOP CHECKLIST - BLOOD AVAILABLE
Spoke to primary [employer] for brief SBAR out. Pt sent to Care unit to recover for one hour. Pt to lay flat until 1715. no

## 2023-01-06 ENCOUNTER — NON-APPOINTMENT (OUTPATIENT)
Age: 48
End: 2023-01-06

## 2023-01-17 ENCOUNTER — NON-APPOINTMENT (OUTPATIENT)
Age: 48
End: 2023-01-17

## 2023-01-20 ENCOUNTER — APPOINTMENT (OUTPATIENT)
Dept: GYNECOLOGIC ONCOLOGY | Facility: CLINIC | Age: 48
End: 2023-01-20
Payer: MEDICAID

## 2023-01-20 ENCOUNTER — RESULT REVIEW (OUTPATIENT)
Age: 48
End: 2023-01-20

## 2023-01-20 ENCOUNTER — APPOINTMENT (OUTPATIENT)
Dept: GERIATRICS | Facility: CLINIC | Age: 48
End: 2023-01-20
Payer: MEDICAID

## 2023-01-20 ENCOUNTER — LABORATORY RESULT (OUTPATIENT)
Age: 48
End: 2023-01-20

## 2023-01-20 ENCOUNTER — APPOINTMENT (OUTPATIENT)
Dept: RADIATION ONCOLOGY | Facility: CLINIC | Age: 48
End: 2023-01-20
Payer: MEDICAID

## 2023-01-20 ENCOUNTER — OUTPATIENT (OUTPATIENT)
Dept: OUTPATIENT SERVICES | Facility: HOSPITAL | Age: 48
LOS: 1 days | Discharge: HOME | End: 2023-01-20

## 2023-01-20 VITALS
DIASTOLIC BLOOD PRESSURE: 73 MMHG | HEIGHT: 63 IN | SYSTOLIC BLOOD PRESSURE: 113 MMHG | WEIGHT: 171.96 LBS | HEART RATE: 101 BPM | BODY MASS INDEX: 30.47 KG/M2

## 2023-01-20 DIAGNOSIS — C53.9 MALIGNANT NEOPLASM OF CERVIX UTERI, UNSPECIFIED: ICD-10-CM

## 2023-01-20 DIAGNOSIS — Z96.0 PRESENCE OF UROGENITAL IMPLANTS: Chronic | ICD-10-CM

## 2023-01-20 DIAGNOSIS — M79.10 MYALGIA, UNSPECIFIED SITE: ICD-10-CM

## 2023-01-20 LAB
BASOPHILS # BLD AUTO: 0.01 K/UL — SIGNIFICANT CHANGE UP (ref 0–0.2)
BASOPHILS NFR BLD AUTO: 0.3 % — SIGNIFICANT CHANGE UP (ref 0–2)
EOSINOPHIL # BLD AUTO: 0.13 K/UL — SIGNIFICANT CHANGE UP (ref 0–0.5)
EOSINOPHIL NFR BLD AUTO: 4.1 % — SIGNIFICANT CHANGE UP (ref 0–6)
HCT VFR BLD CALC: 28.7 % — LOW (ref 34.5–45)
HGB BLD-MCNC: 9.5 G/DL — LOW (ref 11.5–15.5)
IMM GRANULOCYTES NFR BLD AUTO: 0.6 % — SIGNIFICANT CHANGE UP (ref 0–0.9)
LYMPHOCYTES # BLD AUTO: 0.56 K/UL — LOW (ref 1–3.3)
LYMPHOCYTES # BLD AUTO: 17.6 % — SIGNIFICANT CHANGE UP (ref 13–44)
MCHC RBC-ENTMCNC: 28.7 PG — SIGNIFICANT CHANGE UP (ref 27–34)
MCHC RBC-ENTMCNC: 33.1 G/DL — SIGNIFICANT CHANGE UP (ref 32–36)
MCV RBC AUTO: 86.7 FL — SIGNIFICANT CHANGE UP (ref 80–100)
MONOCYTES # BLD AUTO: 0.36 K/UL — SIGNIFICANT CHANGE UP (ref 0–0.9)
MONOCYTES NFR BLD AUTO: 11.3 % — SIGNIFICANT CHANGE UP (ref 2–14)
NEUTROPHILS # BLD AUTO: 2.11 K/UL — SIGNIFICANT CHANGE UP (ref 1.8–7.4)
NEUTROPHILS NFR BLD AUTO: 66.1 % — SIGNIFICANT CHANGE UP (ref 43–77)
NRBC # BLD: 0 /100 WBCS — SIGNIFICANT CHANGE UP (ref 0–0)
PLATELET # BLD AUTO: 266 K/UL — SIGNIFICANT CHANGE UP (ref 150–400)
RBC # BLD: 3.31 M/UL — LOW (ref 3.8–5.2)
RBC # FLD: 14.6 % — HIGH (ref 10.3–14.5)
WBC # BLD: 3.19 K/UL — LOW (ref 3.8–10.5)
WBC # FLD AUTO: 3.19 K/UL — LOW (ref 3.8–10.5)

## 2023-01-20 PROCEDURE — 99213 OFFICE O/P EST LOW 20 MIN: CPT

## 2023-01-20 PROCEDURE — 99214 OFFICE O/P EST MOD 30 MIN: CPT

## 2023-01-20 RX ORDER — ALPRAZOLAM 0.25 MG/1
0.25 TABLET ORAL
Qty: 40 | Refills: 0 | Status: ACTIVE | COMMUNITY
Start: 2022-09-12 | End: 1900-01-01

## 2023-01-20 RX ORDER — DICLOFENAC SODIUM 1% 10 MG/G
1 GEL TOPICAL
Qty: 1 | Refills: 2 | Status: ACTIVE | COMMUNITY
Start: 2023-01-20 | End: 1900-01-01

## 2023-01-20 NOTE — ASSESSMENT
[FreeTextEntry1] : 47yoF with:\par \par # Cervical Cancer - s/p weekly Cisplatin + EBRT, completed 11/2021. s/p R ureteral stent placement 6/2022. Follow up with Med Onc, Rad Onc. \par \par # Hydroureter - Patient to follow up with Maki Bill and Marcellus today; she recently had placement of b/l ureteral stents in [place], per her report. \par \par # Low back pain/ b/l LE pain with radiculopathy, L>R - Appreciate Neuro-Oncology input on this challenging case with running dx of platinum induced neuropathy with possible radiation toxicity causing her weakness. \par  Appreciate the input of additional consultants - Physiatry, Neurosurgery and Neurology. \par - C/w MS ER 15mg TID, c/w PRN Oxycodone IR 10mg, Gabapentin 600mg BID, Methocarbamol 500mg TID. \par \par # Anxiety - C/w PRN alprazolam .25mg for anxiety attacks.  Provided extensive emotional support and validation of her worries. Appreciate behavioral health input.  c/w behavioral health follow up.\par \par # Lower extremity edema - On furosemide 40mg with potassium supplementation per PCP.\par \par # Encounter for Palliative Care - Emotional support provided.\par \par Follow up in 4 weeks, call sooner with questions or issues.

## 2023-01-20 NOTE — PHYSICAL EXAM
[General Appearance - Alert] : alert [General Appearance - In No Acute Distress] : in no acute distress [Sclera] : the sclera and conjunctiva were normal [Normal Oral Mucosa] : normal oral mucosa [Examination Of The Oral Cavity] : the lips and gums were normal [Sensation] : the sensory exam was normal to light touch and pinprick [Oriented To Time, Place, And Person] : oriented to person, place, and time [FreeTextEntry1] : intermittently tearful [Neck Appearance] : the appearance of the neck was normal [] : no respiratory distress [Auscultation Breath Sounds / Voice Sounds] : lungs were clear to auscultation bilaterally [Heart Rate And Rhythm] : heart rate was normal and rhythm regular [Heart Sounds] : normal S1 and S2 [Edema] : there was no peripheral edema [Bowel Sounds] : normal bowel sounds [Abdomen Soft] : soft [Abdomen Tenderness] : non-tender

## 2023-01-20 NOTE — HISTORY OF PRESENT ILLNESS
[FreeTextEntry1] : 47yoF with h/o cervical adenocarcinoma presents for follow-up palliative care visit, referred by Oncology.  \horacio alves Patient initially diagnosed with cervical cancer 5/2021. Underwent weekly cisplatin and pelvic RT 4500cgy 25 fx plus parametrial boost 540cgy 3 fx and 4 brachytherapy procedures (hybrid therapy) 700cgy x 4.\horacio alves Patient was recently hospitalized at [facility] (1/26 - 2/1/22) after presenting with 3 weeks of worsening b/l hip/lower back pain and feet numbness. Pain is sharp in nature, radiating down the lateral thighs, with mild numbness on dorsal sides of feet. Symptoms initially improved with Aleve, however noted blood tinged vaginal discharge and blood clots with urination not related to menstrual periods so stopped taking Aleve. Hip pain and feet numbness progressively worsened, to the point that she cannot walk or lie supine. Pain is mildly alleviated when lying prone, and better with activity. Pt presented to ER in [place] 2 weeks prior to admission due to these symptoms, CT spine non-con showed multiple non-obstructing nephrolithiasis in L kidney but no fractures, deformities, subluxation were noted. She was discharged with gabapentin 300mg BID, Methocarbomol 500mg TID, and Meloxicam 15mg. She is referred today for continuation of pain management. \horacio alves Patient states that she developed pain in her L buttock approximately 6 weeks ago. It started as a pinching pain in her L buttock. It worsened to the point that the pain felt as if someone where punching her in the area.  Standing up and walking helped to relieve the pain a bit.  She found that she was pacing a lot in her home to help the pain. \horacio alves She was advised to use Acetaminophen 1000mg, was using it about twice daily.   This initially helped a bit. The pain progressed, eventually affecting both left and right. Associated with tingling in her toes. Was advised to use Aleve BID. Was advised to increase to 2 pills BID which caused her vaginal bleeding, she stopped. long alves Was in [place] at the time and was sent to the hospital where she states she received a steroid shot to her back. This helped her very much for a while. She was also prescribed a lidocaine 5% patch there but this was not approved by her insurance. Pain came back and was intense.  She returned to [place] on 1/24 and presented to [facility] on 1/26 at which time she was admitted to [facility]. \par \par Pt states that numbness is worse on L side, from dorsal foot to lateral mid-calf. Feels like it is "freezing" and must keep socks on. Also notes cramping intermittently in the toes. Pt also noticed that not all of her urine will come out, and must put pressure to completely relieve herself\par \par When she walks her low back pain gets better but her legs/feet feel tight and crampy. \par She feels weakness in her legs, lifting her legs is difficult, moving her toes is difficult.  She describes a sensation of coldness of her toes. \par \par \par Interval Hx (1/20/23): \par Pt returned to [place] from [place] yesterday. Reports that she was hospitalized in [place] just before [place], after developing acute L flank pain and was found to have b/l hydroureter, she had b/l ureteral stents placed.  She reports completing a 2 week course of IV antibiotics (via midline). \par She had an episode of hematuria two days ago. This was preceded by her having twitching of her arms and hands. \par For the past few days she has been having pain along the medial aspect of her R scapula. Worse with movement. \par The neuropathy in her feet continues to cause intermittent sharp pain and sensation of muscle contraction.  Continues on furosemide 40mg and daily potassium for chronic lower extremity edema.  Anxiety controlled with PRN alprazolam. \par Is using a rolling walker for assistance with ambulation, will be transitioning to a cane soon. Is showing slow improvements. \par \par Current pain regimen:\par MS ER 15mg TID\par Oxycodone IR 5-10mg PRN  (~2 times daily)\par Gabapentin 600mg BID\par Methocarbamol 500mg TID\par Tylenol 1000mg PRN (takes before PT)\par \par ROS:\par +constipation - uses senna daily, PRN Miralax\par +some nausea, no vomiting - improved\par +appetite has been OK. \par Remainder of ROS non-contributory\par \par Patient is single, lives with her sister, parents, daughter. \par She has a 28yo son and a 26yo daughter.\par She is unemployed. \par \par PMD: Dr. Valentino Comer ([phone])\par \par I-Stop Ref#:  [id number]

## 2023-01-23 ENCOUNTER — NON-APPOINTMENT (OUTPATIENT)
Age: 48
End: 2023-01-23

## 2023-01-23 LAB
ANION GAP SERPL CALC-SCNC: 14 MMOL/L
APPEARANCE: CLEAR
BILIRUBIN URINE: NEGATIVE
BLOOD URINE: ABNORMAL
BUN SERPL-MCNC: 19 MG/DL
CALCIUM SERPL-MCNC: 9.4 MG/DL
CHLORIDE SERPL-SCNC: 97 MMOL/L
CO2 SERPL-SCNC: 24 MMOL/L
COLOR: COLORLESS
CREAT SERPL-MCNC: 1.12 MG/DL
EGFR: 60 ML/MIN/1.73M2
GLUCOSE QUALITATIVE U: NEGATIVE
GLUCOSE SERPL-MCNC: 252 MG/DL
KETONES URINE: NEGATIVE
LEUKOCYTE ESTERASE URINE: ABNORMAL
NITRITE URINE: POSITIVE
PH URINE: 6
POTASSIUM SERPL-SCNC: 4.2 MMOL/L
PROTEIN URINE: NEGATIVE
SODIUM SERPL-SCNC: 135 MMOL/L
SPECIFIC GRAVITY URINE: 1
UROBILINOGEN URINE: NORMAL

## 2023-01-24 ENCOUNTER — APPOINTMENT (OUTPATIENT)
Dept: HEMATOLOGY ONCOLOGY | Facility: CLINIC | Age: 48
End: 2023-01-24
Payer: MEDICAID

## 2023-01-24 PROCEDURE — 90832 PSYTX W PT 30 MINUTES: CPT | Mod: 95

## 2023-01-25 ENCOUNTER — OUTPATIENT (OUTPATIENT)
Dept: OUTPATIENT SERVICES | Facility: HOSPITAL | Age: 48
LOS: 1 days | End: 2023-01-25
Payer: MEDICAID

## 2023-01-25 ENCOUNTER — APPOINTMENT (OUTPATIENT)
Dept: UROLOGY | Facility: CLINIC | Age: 48
End: 2023-01-25
Payer: MEDICAID

## 2023-01-25 DIAGNOSIS — Z96.0 PRESENCE OF UROGENITAL IMPLANTS: Chronic | ICD-10-CM

## 2023-01-25 DIAGNOSIS — R35.0 FREQUENCY OF MICTURITION: ICD-10-CM

## 2023-01-25 PROCEDURE — 76775 US EXAM ABDO BACK WALL LIM: CPT | Mod: 26

## 2023-01-25 PROCEDURE — 76775 US EXAM ABDO BACK WALL LIM: CPT

## 2023-01-25 PROCEDURE — 99214 OFFICE O/P EST MOD 30 MIN: CPT | Mod: 25

## 2023-01-27 NOTE — REVIEW OF SYSTEMS
[Urinary Tract Pain: Grade 0] : Urinary Tract Pain: Grade 0 [Vaginal Stricture: Grade 2 - Vaginal narrowing and/or shortening not interfering with physical examination] : Vaginal Stricture: Grade 2 - Vaginal narrowing and/or shortening not interfering with physical examination [Peripheral Motor Neuropathy: Grade 2 - Moderate symptoms; limiting instrumental ADL] : Peripheral Motor Neuropathy: Grade 2 - Moderate symptoms; limiting instrumental ADL [Peripheral Sensory Neuropathy: Grade 2 - Moderate symptoms; limiting instrumental ADL] : Peripheral Sensory Neuropathy: Grade 2 - Moderate symptoms; limiting instrumental ADL

## 2023-01-27 NOTE — HISTORY OF PRESENT ILLNESS
[FreeTextEntry1] : ETN HERNANDEZ is a 47 year old premenopausal female with stage IIB locally advanced cervical adenocarcinoma. She received concurrent chemo/RT. She received pelvic RT to a total dose of 4500 cGy to the pelvis followed by parametrial boost of 540 cGy and 4 brachytherapy procedures to a total dose of 2800 cGy. She completed 11/2021.\par When seen for posttreatment evaluation on December 17, 2021 she reported continued but lessening vaginal discharge.  On examination performed together with Dr. Malcolm RT changes were noted with some necrosis at the apex into the canal with apparent residual cervical tissue nearly flush with the vagina.\par January 2022 while in [place] Ms. Flor had significant back pain in the buttock and radiating as well as UTIs she.  She was evaluated in an ER in [place] on 2 occasions.  She was given medication including muscle relaxant initially with response.  She was referred back to the ER when this pain persisted.  No MR I was done during that evaluation in [place] she returned to [place] and was seen on January 26.  At that time she continued to have lower back pain in the hip and buttock radiating down both legs she was sent to the ER for for evaluation and to rule out cord compression.  MRI imaging was without evidence of cord compression.  He did however continue to have pain and weakness she was seen by Dr. Ney Cantrell of palliative care as well as by Dr. Donovan Lucero for neuro oncologic evaluation\par Posttreatment PET done on February 7, 2022 revealed decreased hypermetabolism in the uterine cervix which may be related to persistent or recurrent disease versus posttreatment inflammation resolution of endometrial hypermetabolism and unchanged nonspecific pancolonic and rectal hypermetabolism.\par She continued to have weakness and apin inlower extremities.  \par 4/23/22 - PET/CT: Persistent peripheral hypermetabolism and central photopenia in the uterine cervix, increased in size and metabolism, is concerning for residual disease. New moderate right hydroureteronephrosis. Right ureter is dilated down to the level of the cervix. Unchanged nonspecific pancolonic and rectal hypermetabolism may be related to metformin. \par Results reviewed and plan for EUA with biopsy and cysto at same time.\par This was done on 6/21/22.  A right ureteral structure was stricture was seen secondary to external compression a tandem right ureteral stent was placed.  This was done by Dr. David Hoenig When this procedure was completed, Dr. Malcolm performed an examination under anesthesia vaginal biopsy and cervical biopsies.  Exam revealed normal vulva distal vagina was without lesions there was a small area of white epithelium on the right mid vaginal wall which was biopsied the apex of the vagina and upper third of the vagina had circumferential fibrosis there was a nodular area at the apex that was biopsied on bimanual examination the left parametria was smooth with soft fibrosis the right parametria parametria had denser fibrosis without apparent nodularity.   The cervical biopsy revealed inflamed squamous mucosa with reactive change and parakeratosis a second cervical biopsy revealed inflamed and necrotic stromal tissue with focal cellular atypia consistent with treatment effect on the mid right vaginal biopsy revealed inflamed squamous mucosa and stromal tissue with reactive changes and focal stromal cellular atypia consistent with treatment effect.\par On  August 1, 2022 Ms. Flor underwent an MRI of the pelvis with and without contrast under IV sedation.  The findings are summarized as follows cervix is small in size and demonstrates low T2 signal compatible with posttreatment change no abnormal cervical enhancement the endometrium was within normal limits as was the junctional zone right ovary left ovary and adnexa were within normal limits bladder within normal limits note is made of a right ureteral stent no pelvic adenopathy was seen.\par she continued with supportive care. as well as uro, gyn onc, med onc and rad onc f/u.she also developed kidney stones now managed by Dr. Hoenig\par 10/2022: PET/CT: interval resolution of central photopenia with peripheral hypermetabolism in the uterine cervix. Interval resolution of moderate right and mild left hydroureteronephrosis s/p placement of b/l ureteral stents. Pancolonic hypermetabolism less extensive however rectal hypermetabolism is not significantly changed. This is nonspecific and may be related to metformin. \par Nov18,2022 visit to urology with documentation of both stents out\par 1/20/23: Here for routine follow up in [facility]. She reports that she had a recent admission in [place] she understands that she had renal stones as diagnosed on cystoscopy and underwent stent placement bilaterally.  She has an appointment scheduled with Dr. Freire for next week.  We have asked her to obtain the records of her hospitalization in [place].  She does report that she was treated with intravenous antibiotics while in [place].\par \par Overall she is feeling a bit better she has gained some weight.  She continues to have challenges with ambulation but this appears to be getting better swelling in the legs is better with the use of diuretics.  More recently she does notice some limitations in and cramping of the upper extremities and hands.

## 2023-01-27 NOTE — VITALS
[Least Pain Intensity: 2/10] : 2/10 [60: Requires occasional assistance, but is able to care for most of his/her needs] : 60: Requires occasional assistance, but is able to care for most of his/her needs

## 2023-01-27 NOTE — PHYSICAL EXAM
[General Appearance - In No Acute Distress] : in no acute distress [] : no respiratory distress [Abdomen Soft] : soft [Normal External Genitalia] : normal external genitalia  [de-identified] : shortened vaginal canal cervix flush with vaginal apex

## 2023-01-31 NOTE — PHYSICAL EXAM
[General Appearance - Well Developed] : well developed [General Appearance - Well Nourished] : well nourished [Normal Appearance] : normal appearance [Well Groomed] : well groomed [General Appearance - In No Acute Distress] : no acute distress [Abdomen Soft] : soft [Abdomen Tenderness] : non-tender [Costovertebral Angle Tenderness] : no ~M costovertebral angle tenderness [Edema] : no peripheral edema [] : no respiratory distress [Respiration, Rhythm And Depth] : normal respiratory rhythm and effort [Exaggerated Use Of Accessory Muscles For Inspiration] : no accessory muscle use [Oriented To Time, Place, And Person] : oriented to person, place, and time [Affect] : the affect was normal [Mood] : the mood was normal [Not Anxious] : not anxious [FreeTextEntry1] : walks with assist

## 2023-01-31 NOTE — HISTORY OF PRESENT ILLNESS
[FreeTextEntry1] : Pt returns from [place], reporting that she had cystoscopy and stents placed bilaterally on emergent basis for recurrent infection/obstruction/stone on left.\par \par Pt with h/o right ureteral obstruction, previously tandem stents managed. She is now 46 yo, and underwent bilateral URS with removal of stone on left, removal of right stent as ureter was patent on 10/24/22\par \par Stone analysis: 80% Calcium phosphate (apatite)\par 10% Calcium phosphate (brushite)\par 10% Calcium oxalate dihydrate\par \par Now reporting with bilateral stents, single- recurrent obstruction on right, and new left stone migration.

## 2023-01-31 NOTE — ASSESSMENT
[FreeTextEntry1] : Renal US reviewed: Mild to moderate hydronephrosis visualized bilaterally.Proximal ureter visualized bilaterally. Possible stent coils visualized. Both kidneys are normal in size and echogenicity without solid masses present. \par \par D/w pt and - would like to request imaging from [place], any op report.\par \par Will plan on definitive procedure for any left side stone- left URS with laser/stone removal, including flexible URS. Will plan also simultaneous exchange of right stent for tandem stents\par \par Will schedule to proceed, as pt and  prefer to proceed soon

## 2023-02-01 DIAGNOSIS — N20.0 CALCULUS OF KIDNEY: ICD-10-CM

## 2023-02-01 DIAGNOSIS — N13.5 CROSSING VESSEL AND STRICTURE OF URETER WITHOUT HYDRONEPHROSIS: ICD-10-CM

## 2023-02-01 DIAGNOSIS — N39.0 URINARY TRACT INFECTION, SITE NOT SPECIFIED: ICD-10-CM

## 2023-02-20 NOTE — H&P PST ADULT - PROBLEM/PLAN-1
[FreeTextEntry1] :  Clinically patient is doing better with mild discomfort medially.  Physical therapy prescription was provided.  She is no longer wearing the Cam walker boot, she can be weight-bearing as tolerated.  She will return to activities as tolerated.  Follow-up in 6 weeks.\par \par This patient was seen under the supervision of Dr. Nuno.\par  DISPLAY PLAN FREE TEXT

## 2023-02-21 ENCOUNTER — OUTPATIENT (OUTPATIENT)
Dept: OUTPATIENT SERVICES | Facility: HOSPITAL | Age: 48
LOS: 1 days | End: 2023-02-21
Payer: MEDICAID

## 2023-02-21 VITALS
RESPIRATION RATE: 16 BRPM | DIASTOLIC BLOOD PRESSURE: 82 MMHG | TEMPERATURE: 98 F | OXYGEN SATURATION: 96 % | HEART RATE: 98 BPM | WEIGHT: 177.91 LBS | SYSTOLIC BLOOD PRESSURE: 123 MMHG | HEIGHT: 64 IN

## 2023-02-21 DIAGNOSIS — Z96.0 PRESENCE OF UROGENITAL IMPLANTS: Chronic | ICD-10-CM

## 2023-02-21 DIAGNOSIS — N20.0 CALCULUS OF KIDNEY: ICD-10-CM

## 2023-02-21 DIAGNOSIS — N13.5 CROSSING VESSEL AND STRICTURE OF URETER WITHOUT HYDRONEPHROSIS: ICD-10-CM

## 2023-02-21 DIAGNOSIS — Z01.818 ENCOUNTER FOR OTHER PREPROCEDURAL EXAMINATION: ICD-10-CM

## 2023-02-21 DIAGNOSIS — E11.9 TYPE 2 DIABETES MELLITUS WITHOUT COMPLICATIONS: ICD-10-CM

## 2023-02-21 LAB
ANION GAP SERPL CALC-SCNC: 14 MMOL/L — SIGNIFICANT CHANGE UP (ref 5–17)
BUN SERPL-MCNC: 15 MG/DL — SIGNIFICANT CHANGE UP (ref 7–23)
CALCIUM SERPL-MCNC: 10.3 MG/DL — SIGNIFICANT CHANGE UP (ref 8.4–10.5)
CHLORIDE SERPL-SCNC: 99 MMOL/L — SIGNIFICANT CHANGE UP (ref 96–108)
CO2 SERPL-SCNC: 26 MMOL/L — SIGNIFICANT CHANGE UP (ref 22–31)
CREAT SERPL-MCNC: 0.94 MG/DL — SIGNIFICANT CHANGE UP (ref 0.5–1.3)
EGFR: 74 ML/MIN/1.73M2 — SIGNIFICANT CHANGE UP
GLUCOSE SERPL-MCNC: 138 MG/DL — HIGH (ref 70–99)
HCT VFR BLD CALC: 31.8 % — LOW (ref 34.5–45)
HGB BLD-MCNC: 10.4 G/DL — LOW (ref 11.5–15.5)
MCHC RBC-ENTMCNC: 28.7 PG — SIGNIFICANT CHANGE UP (ref 27–34)
MCHC RBC-ENTMCNC: 32.7 GM/DL — SIGNIFICANT CHANGE UP (ref 32–36)
MCV RBC AUTO: 87.8 FL — SIGNIFICANT CHANGE UP (ref 80–100)
NRBC # BLD: 0 /100 WBCS — SIGNIFICANT CHANGE UP (ref 0–0)
PLATELET # BLD AUTO: 280 K/UL — SIGNIFICANT CHANGE UP (ref 150–400)
POTASSIUM SERPL-MCNC: 3.5 MMOL/L — SIGNIFICANT CHANGE UP (ref 3.5–5.3)
POTASSIUM SERPL-SCNC: 3.5 MMOL/L — SIGNIFICANT CHANGE UP (ref 3.5–5.3)
RBC # BLD: 3.62 M/UL — LOW (ref 3.8–5.2)
RBC # FLD: 15.1 % — HIGH (ref 10.3–14.5)
SODIUM SERPL-SCNC: 139 MMOL/L — SIGNIFICANT CHANGE UP (ref 135–145)
WBC # BLD: 3.53 K/UL — LOW (ref 3.8–10.5)
WBC # FLD AUTO: 3.53 K/UL — LOW (ref 3.8–10.5)

## 2023-02-21 PROCEDURE — 87186 SC STD MICRODIL/AGAR DIL: CPT

## 2023-02-21 PROCEDURE — 87086 URINE CULTURE/COLONY COUNT: CPT

## 2023-02-21 PROCEDURE — 85027 COMPLETE CBC AUTOMATED: CPT

## 2023-02-21 PROCEDURE — 80048 BASIC METABOLIC PNL TOTAL CA: CPT

## 2023-02-21 PROCEDURE — G0463: CPT

## 2023-02-21 PROCEDURE — 83036 HEMOGLOBIN GLYCOSYLATED A1C: CPT

## 2023-02-21 RX ORDER — SODIUM CHLORIDE 9 MG/ML
1000 INJECTION, SOLUTION INTRAVENOUS
Refills: 0 | Status: DISCONTINUED | OUTPATIENT
Start: 2023-02-27 | End: 2023-03-14

## 2023-02-21 RX ORDER — LIDOCAINE HCL 20 MG/ML
0.2 VIAL (ML) INJECTION ONCE
Refills: 0 | Status: DISCONTINUED | OUTPATIENT
Start: 2023-02-27 | End: 2023-03-14

## 2023-02-21 RX ORDER — SODIUM CHLORIDE 9 MG/ML
3 INJECTION INTRAMUSCULAR; INTRAVENOUS; SUBCUTANEOUS EVERY 8 HOURS
Refills: 0 | Status: DISCONTINUED | OUTPATIENT
Start: 2023-02-27 | End: 2023-03-14

## 2023-02-21 NOTE — H&P PST ADULT - OTHER CARE PROVIDERS
Dr. Clark (Pain Management ) [phone] - Last visit 1 month ago for routine F/U; Dr. Clark (Pain Management ) [phone] - Last visit 1 month ago for routine F/U

## 2023-02-21 NOTE — H&P PST ADULT - HISTORY OF PRESENT ILLNESS
47 yr old female with PMH DMT2, HTN, Adenocarcinoma of Cervix (Dx 5/2021) s/p Chemo and Radiation (completed 11/2021), Right hydronephrosis s/p Right ureteral stent placement (6/2022), Low back pain (followed by pain mgmt) and Chemo-induced neuropathy (Uses walker) presented with Urosepsis in [place] 9/15/2022- 9/18/2022 and was found to have left ureteral/kidney stones s/p ureteral stent placement of left side at that time. Now coming in for Cystoscopy, Right tandem ureteral stent exchange, Right ureteroscopy, Possible laser endoureterotomy on 10/24/2022.     Covid PCR test scheduled for 2/24/23 at [facility]  Did not receive the Covid vaccine  Denies recent travel, exposure or Covid symptoms  Covid + 2/2022 with symptoms of nasal congestion x few days - No SOB/No Hospitalizations   47 yr old female with PMH DMT2, HTN, Adenocarcinoma of Cervix (Dx 5/2021) s/p Chemo and Radiation (completed 11/2021), Right hydronephrosis s/p Right ureteral stent placement (6/2022), Low back pain (followed by pain mgmt) and Chemo-induced neuropathy (Uses walker) presented with Urosepsis in [place] 9/15/2022- 9/18/2022 and was found to have left ureteral/kidney stones s/p ureteral stent placement of left side at that time. Pt returned to [place] s/p Ureteroscopy with Laser lithotripsy on 10/24/23. Pt now with bilateral ureteral stents with a single recurrent obstruction on right and new left stone migration. Pt denies any fever, chills, hematuria, flank pain or dysuria. Pt now presents to [facility] for scheduled Cystoscopy, Right Stent to Tandem Stent Exchange, Left Ureteroscopy with Laser and Stone Removal with Dr. Hoenig on [facility]    Covid PCR test scheduled for 2/24/23 at [facility]  Did not receive the Covid vaccine  Denies recent travel, exposure or Covid symptoms  Covid + 2/2022 with symptoms of nasal congestion x few days - No SOB/No Hospitalizations   47 yr old female with PMH DMT2, HTN, Adenocarcinoma of Cervix (Dx 5/2021) s/p Chemo and Radiation (completed 11/2021), Right hydronephrosis s/p Right ureteral stent placement (6/2022), Low back pain (followed by pain mgmt) and Chemo-induced neuropathy (Uses walker) presented with Urosepsis in [place] 9/15/2022- 9/18/2022 and was found to have left ureteral/kidney stones s/p ureteral stent placement of left side at that time. Pt returned to [place] s/p Ureteroscopy with Laser lithotripsy on 10/24/22. Pt now with bilateral ureteral stents with a single recurrent obstruction on right and new left stone migration. Pt denies any fever, chills, hematuria, flank pain or dysuria. Pt now presents to [facility] for scheduled Cystoscopy, Right Stent to Tandem Stent Exchange, Left Ureteroscopy with Laser and Stone Removal with Dr. Hoenig on [facility]/27/23 [facility].    Covid PCR test scheduled for 2/24/23 at [facility]  Did not receive the Covid vaccine  Denies recent travel, exposure or Covid symptoms  Covid + 2/2022 with symptoms of nasal congestion x few days - No SOB/No Hospitalizations   47 yr old female with PMH DMT2, HTN, Adenocarcinoma of Cervix (Dx 5/2021) s/p Chemo and Radiation (completed 11/2021), Right hydronephrosis s/p Right ureteral stent placement (6/2022), Low back pain (followed by pain mgmt) and Chemo-induced neuropathy (Uses walker) presented with Urosepsis in [place] 9/15/2022- 9/18/2022 and was found to have left ureteral/kidney stones s/p ureteral stent placement of left side at that time. Pt returned to [place] s/p Ureteroscopy with Laser lithotripsy on 10/24/22. Pt now with bilateral ureteral stents with a single recurrent obstruction on right and new left stone migration. Pt denies any fever, chills, hematuria, flank pain or dysuria. Pt now presents to [facility] for scheduled Cystoscopy, Right Stent to Tandem Stent Exchange, Left Ureteroscopy with Laser and Stone Removal with Dr. Hoenig on 2/27/23 at the [facility].    Covid PCR test scheduled for 2/24/23 at [facility]  Did not receive the Covid vaccine  Denies recent travel, exposure or Covid symptoms  Covid + 2/2022 with symptoms of nasal congestion x few days - No SOB/No Hospitalizations    2/24/2023 positive urine culture >100,000 enterococcus species . Surgeon office notified. Spoke with Forrest Mcclellan   47 yr old female with PMH DMT2, HTN, Adenocarcinoma of Cervix (Dx 5/2021) s/p Chemo and Radiation (completed 11/2021), Right hydronephrosis s/p Right ureteral stent placement (6/2022), Low back pain (followed by pain mgmt) and Chemo-induced neuropathy (Uses walker) presented with Urosepsis in [place] 9/15/2022- 9/18/2022 and was found to have left ureteral/kidney stones s/p ureteral stent placement of left side at that time. Pt returned to [place] s/p Ureteroscopy with Laser lithotripsy on 10/24/22. Pt now with bilateral ureteral stents with a single recurrent obstruction on right and new left stone migration. Pt denies any fever, chills, hematuria, flank pain or dysuria. Pt now presents to [facility] for scheduled Cystoscopy, Right Stent to Tandem Stent Exchange, Left Ureteroscopy with Laser and Stone Removal with Dr. Hoenig on 2/27/23 at the [facility] [place].    Covid PCR test scheduled for 2/24/23 at [facility]  Did not receive the Covid vaccine  Denies recent travel, exposure or Covid symptoms  Covid + 2/2022 with symptoms of nasal congestion x few days - No SOB/No Hospitalizations    2/24/2023 positive urine culture >100,000 enterococcus species- rx'ed

## 2023-02-21 NOTE — H&P PST ADULT - PRIMARY CARE PROVIDER
Dr. Valentino Comer [phone] - Last visit 1 month ago for routine F/U Dr. Valentino Comer [phone] - Pending M/E

## 2023-02-21 NOTE — H&P PST ADULT - ASSESSMENT
DASI score: 4.64  DASI activity: Uses walker for ambulation due to chemo-induced neuropathy; Able to walk 2-3 blocks, ADLs, 1 Flight of Stairs (Restricted due to leg/back pain)  Loose teeth or denture: Denies

## 2023-02-21 NOTE — H&P PST ADULT - PROBLEM SELECTOR PLAN 1
Pt is scheduled for a Cystoscopy, Right Stent to Tandem Stent Exchange, Left Ureteroscopy with Laser and Stone Removal with Dr. Hoenig on 2/27/23 at the [facility]  Covid PCR test scheduled for 2/24/23 at [facility]  CBC, BMP and Urine Culture ordered and obtained at [facility]  Urine HCG on Admit  Pending M/E

## 2023-02-21 NOTE — H&P PST ADULT - ATTENDING COMMENTS
seen, reviewed exam, films  for right stent exchange to tandem, left URS with laser/stone removal, left stent removal or exchange    Covid testing positive.  no current sx.  for treatment

## 2023-02-21 NOTE — H&P PST ADULT - NEGATIVE GENERAL GENITOURINARY SYMPTOMS
no hematuria/no renal colic/no flank pain L/no flank pain R/no dysuria/no urinary hesitancy/normal urinary frequency/no nocturia

## 2023-02-21 NOTE — H&P PST ADULT - NSICDXPASTMEDICALHX_GEN_ALL_CORE_FT
PAST MEDICAL HISTORY:  2019 novel coronavirus disease (COVID-19) 2021, 6/2022- mild symptoms    Anxiety disorder     Bilateral lumbar radiculopathy     Calculus of kidney     Cervical ca 5/2021- chemo & radiation - last 11/2021    Chemotherapy-induced neuropathy     GERD (gastroesophageal reflux disease)     H/O hemorrhoids     H/O hydronephrosis     H/O sepsis 9/15/-9/18/2022- treated with antibiotics    History of panic attacks     Hypertension     Low back pain radiating to both legs     Pedal edema     T2DM (type 2 diabetes mellitus)      PAST MEDICAL HISTORY:  2019 novel coronavirus disease (COVID-19) 2021, 6/2022- mild symptoms    Anxiety disorder     Bilateral lumbar radiculopathy     Calculus of kidney     Cervical adenocarcinoma     Chemotherapy-induced neuropathy     GERD (gastroesophageal reflux disease)     H/O hemorrhoids     H/O hydronephrosis     Hypertension     Low back pain radiating to both legs     Pedal edema     T2DM (type 2 diabetes mellitus)

## 2023-02-21 NOTE — H&P PST ADULT - ATTENDING PHYSICIAN (PRINT):______________________________ DATE:_______ TIME:_______
decreased ability to use arms for pushing/pulling/decreased ability to use legs for bridging/pushing
Statement Selected

## 2023-02-21 NOTE — H&P PST ADULT - NSICDXPASTSURGICALHX_GEN_ALL_CORE_FT
PAST SURGICAL HISTORY:  S/P cystoscopy with ureteral stent placement     S/P ureteral stent placement 9/2022- left

## 2023-02-22 LAB
A1C WITH ESTIMATED AVERAGE GLUCOSE RESULT: 5.7 % — HIGH (ref 4–5.6)
ESTIMATED AVERAGE GLUCOSE: 117 MG/DL — HIGH (ref 68–114)

## 2023-02-23 PROBLEM — N20.0 CALCULUS OF KIDNEY: Chronic | Status: ACTIVE | Noted: 2023-02-21

## 2023-02-23 PROBLEM — C53.9 MALIGNANT NEOPLASM OF CERVIX UTERI, UNSPECIFIED: Chronic | Status: ACTIVE | Noted: 2023-02-21

## 2023-02-24 ENCOUNTER — OUTPATIENT (OUTPATIENT)
Dept: OUTPATIENT SERVICES | Facility: HOSPITAL | Age: 48
LOS: 1 days | End: 2023-02-24
Payer: MEDICAID

## 2023-02-24 DIAGNOSIS — Z96.0 PRESENCE OF UROGENITAL IMPLANTS: Chronic | ICD-10-CM

## 2023-02-24 DIAGNOSIS — Z11.52 ENCOUNTER FOR SCREENING FOR COVID-19: ICD-10-CM

## 2023-02-24 LAB
-  AMPICILLIN: SIGNIFICANT CHANGE UP
-  CIPROFLOXACIN: SIGNIFICANT CHANGE UP
-  LEVOFLOXACIN: SIGNIFICANT CHANGE UP
-  NITROFURANTOIN: SIGNIFICANT CHANGE UP
-  TETRACYCLINE: SIGNIFICANT CHANGE UP
-  VANCOMYCIN: SIGNIFICANT CHANGE UP
CULTURE RESULTS: SIGNIFICANT CHANGE UP
METHOD TYPE: SIGNIFICANT CHANGE UP
ORGANISM # SPEC MICROSCOPIC CNT: SIGNIFICANT CHANGE UP
ORGANISM # SPEC MICROSCOPIC CNT: SIGNIFICANT CHANGE UP
SARS-COV-2 RNA SPEC QL NAA+PROBE: DETECTED
SPECIMEN SOURCE: SIGNIFICANT CHANGE UP

## 2023-02-26 ENCOUNTER — TRANSCRIPTION ENCOUNTER (OUTPATIENT)
Age: 48
End: 2023-02-26

## 2023-02-27 ENCOUNTER — TRANSCRIPTION ENCOUNTER (OUTPATIENT)
Age: 48
End: 2023-02-27

## 2023-02-27 ENCOUNTER — RESULT REVIEW (OUTPATIENT)
Age: 48
End: 2023-02-27

## 2023-02-27 ENCOUNTER — APPOINTMENT (OUTPATIENT)
Dept: GERIATRICS | Facility: CLINIC | Age: 48
End: 2023-02-27

## 2023-02-27 ENCOUNTER — OUTPATIENT (OUTPATIENT)
Dept: OUTPATIENT SERVICES | Facility: HOSPITAL | Age: 48
LOS: 1 days | End: 2023-02-27
Payer: MEDICAID

## 2023-02-27 ENCOUNTER — APPOINTMENT (OUTPATIENT)
Dept: UROLOGY | Facility: HOSPITAL | Age: 48
End: 2023-02-27

## 2023-02-27 VITALS
OXYGEN SATURATION: 97 % | HEART RATE: 81 BPM | SYSTOLIC BLOOD PRESSURE: 110 MMHG | DIASTOLIC BLOOD PRESSURE: 81 MMHG | RESPIRATION RATE: 18 BRPM | TEMPERATURE: 97 F | WEIGHT: 177.91 LBS | HEIGHT: 64 IN

## 2023-02-27 DIAGNOSIS — N13.5 CROSSING VESSEL AND STRICTURE OF URETER WITHOUT HYDRONEPHROSIS: ICD-10-CM

## 2023-02-27 DIAGNOSIS — Z96.0 PRESENCE OF UROGENITAL IMPLANTS: Chronic | ICD-10-CM

## 2023-02-27 DIAGNOSIS — N20.0 CALCULUS OF KIDNEY: ICD-10-CM

## 2023-02-27 LAB
GLUCOSE BLDC GLUCOMTR-MCNC: 111 MG/DL — HIGH (ref 70–99)
GLUCOSE BLDC GLUCOMTR-MCNC: 113 MG/DL — HIGH (ref 70–99)
HCG UR QL: NEGATIVE — SIGNIFICANT CHANGE UP

## 2023-02-27 PROCEDURE — 52352 CYSTOURETERO W/STONE REMOVE: CPT | Mod: LT

## 2023-02-27 PROCEDURE — 82365 CALCULUS SPECTROSCOPY: CPT

## 2023-02-27 PROCEDURE — 88300 SURGICAL PATH GROSS: CPT

## 2023-02-27 PROCEDURE — C1769: CPT

## 2023-02-27 PROCEDURE — 52332 CYSTOSCOPY AND TREATMENT: CPT | Mod: 50

## 2023-02-27 PROCEDURE — 74420 UROGRAPHY RTRGR +-KUB: CPT | Mod: 26

## 2023-02-27 PROCEDURE — C1889: CPT

## 2023-02-27 PROCEDURE — 81025 URINE PREGNANCY TEST: CPT

## 2023-02-27 PROCEDURE — 88300 SURGICAL PATH GROSS: CPT | Mod: 26

## 2023-02-27 PROCEDURE — 82962 GLUCOSE BLOOD TEST: CPT

## 2023-02-27 PROCEDURE — 52332 CYSTOSCOPY AND TREATMENT: CPT | Mod: 50,59

## 2023-02-27 PROCEDURE — U0005: CPT

## 2023-02-27 PROCEDURE — C2625: CPT

## 2023-02-27 PROCEDURE — 76000 FLUOROSCOPY <1 HR PHYS/QHP: CPT

## 2023-02-27 PROCEDURE — C9803: CPT

## 2023-02-27 PROCEDURE — U0003: CPT

## 2023-02-27 DEVICE — STENT URET 7FR 24CM: Type: IMPLANTABLE DEVICE | Status: FUNCTIONAL

## 2023-02-27 DEVICE — GUIDEWIRE SENSOR DUAL-FLEX NITINOL STRAIGHT .035" X 150CM: Type: IMPLANTABLE DEVICE | Status: FUNCTIONAL

## 2023-02-27 DEVICE — STONE BASKET ZEROTIP NITINOL 4-WIRE 1.9FR 120CM X 12MM: Type: IMPLANTABLE DEVICE | Status: FUNCTIONAL

## 2023-02-27 RX ORDER — CEFAZOLIN SODIUM 1 G
2000 VIAL (EA) INJECTION ONCE
Refills: 0 | Status: COMPLETED | OUTPATIENT
Start: 2023-02-27 | End: 2023-02-27

## 2023-02-27 RX ADMIN — SODIUM CHLORIDE 100 MILLILITER(S): 9 INJECTION, SOLUTION INTRAVENOUS at 21:14

## 2023-02-27 RX ADMIN — SODIUM CHLORIDE 3 MILLILITER(S): 9 INJECTION INTRAMUSCULAR; INTRAVENOUS; SUBCUTANEOUS at 22:00

## 2023-02-27 NOTE — BRIEF OPERATIVE NOTE - NSICDXBRIEFPROCEDURE_GEN_ALL_CORE_FT
PROCEDURES:  Cystoscopic replacement of ureteral stent 27-Feb-2023 21:48:20 right Ignacia Puga  Ureteroscopy with lithotripsy, removal of calculus, and insertion of stent 27-Feb-2023 21:48:30 left Ignacia Puga

## 2023-02-27 NOTE — BRIEF OPERATIVE NOTE - OPERATION/FINDINGS
Right tandem stent exchange, [id number] ureteral stents (x2).  Left semi-rigid and flexible ureteroscopy performed. Multiple calculi extracted via basket and sent for analysis. [id number] ureteral stent inserted.

## 2023-02-27 NOTE — ASU PATIENT PROFILE, ADULT - NS PRO PT RIGHT SUPPORT PERSON
ED Attending Attestation Note     Date of evaluation: 12/4/2021    This patient was seen by the resident. I have seen and examined the patient, agree with the workup, evaluation, management and diagnosis. The care plan has been discussed. My assessment reveals 67-year-old female presenting with chief complaint of a ring stuck on her left ring finger. Patient presented with jeweler who is able to take 1 ring off however states he was unable to take the splint off due to concern for infection. Physical exam demonstrating ring unable to be removed with gentle pressure lubrication. Some skin breakdown. Ring successfully removed, likely discharge with updated tetanus, antibiotics and wound care.      Merlin Johann, MD  12/04/21 1632 same name as above

## 2023-02-27 NOTE — ASU PATIENT PROFILE, ADULT - FALL HARM RISK - FALL HARM RISK
SUBJECTIVE:   Claudia Ellison is a 55 year old female who presents to clinic today for the following health issues:      Hypertension Follow-up      Outpatient blood pressures are being checked at home.  Results are good.    Low Salt Diet: low salt        Amount of exercise or physical activity: 6-7 days/week for an average of 15-30 minutes    Problems taking medications regularly: No    Medication side effects: heart racing , pumping harder, anxiety, just not feeling right after starting new med, constipation    Diet: low salt        Heart palpitations      Duration: Past few months    Description (location/character/radiation): Intermittently feels like her heart is racing or skips beats    Intensity:  mild    Accompanying signs and symptoms: No chest pain or SOB    History (similar episodes/previous evaluation): None    Precipitating or alleviating factors: None.  Able to exercise and shovel snow with no symptoms    Therapies tried and outcome: None     Hypothyroidism Follow-up      Since last visit, patient describes the following symptoms: Weight stable, no hair loss, no skin changes, no constipation, no loose stools    Problem list and histories reviewed & adjusted, as indicated.  Additional history: as documented    Patient Active Problem List   Diagnosis     Lateral epicondylitis     Female climacteric     CARDIOVASCULAR SCREENING; LDL GOAL LESS THAN 160     Tubular adenoma of colon     Microalbuminuria     Essential hypertension, benign     Acquired hypothyroidism     Past Surgical History:   Procedure Laterality Date     BIOPSY      Mother     CARDIAC SURGERY      Mother     CHOLECYSTECTOMY       COLONOSCOPY      Myself     SOFT TISSUE SURGERY      Myself       Social History   Substance Use Topics     Smoking status: Never Smoker     Smokeless tobacco: Never Used     Alcohol use Yes      Comment: Once a week or less     Family History   Problem Relation Age of Onset     Respiratory Mother      Asthma  "    Eye Disorder Mother      macular degeneration     Lipids Mother      Hypertension Mother      Other Cancer Mother      Lung     Asthma Mother      OSTEOPOROSIS Mother      Musculoskeletal Disorder Father      ALS;      Depression Son      Lipids Brother      hypercholestermia     Hypertension Maternal Half-Brother      Hyperlipidemia Maternal Half-Brother      CEREBROVASCULAR DISEASE Maternal Half-Brother              Reviewed and updated as needed this visit by clinical staffTobacco  Meds  Med Hx  Surg Hx  Fam Hx  Soc Hx      Reviewed and updated as needed this visit by Provider         ROS:  Constitutional, HEENT, cardiovascular, pulmonary, gi and gu systems are negative, except as otherwise noted.      OBJECTIVE:   /80 (BP Location: Right arm, Patient Position: Chair, Cuff Size: Adult Regular)  Pulse 98  Temp 99.5  F (37.5  C) (Oral)  Resp 14  Ht 5' 5\" (1.651 m)  Wt 155 lb (70.3 kg)  SpO2 97%  BMI 25.79 kg/m2  Body mass index is 25.79 kg/(m^2).  GENERAL: healthy, alert and no distress  NECK: no adenopathy, no asymmetry, masses, or scars and thyroid normal to palpation  RESP: lungs clear to auscultation - no rales, rhonchi or wheezes  CV: regular rates and rhythm, normal S1 S2, no S3 or S4 and no murmur, click or rub    ASSESSMENT/PLAN:     1. Essential hypertension, benign  - Well controlled  - Continue losartan-HCTZ at current dose  - losartan-hydrochlorothiazide (HYZAAR) 50-12.5 MG per tablet; Take 1 tablet by mouth daily  Dispense: 90 tablet; Refill: 0    2. Acquired hypothyroidism  - Thyroid US normal in October  - TSH was slightly high, but kept Synthroid dose the same  - Could be the cause of her heart palpitations  - Will check labs   - TSH  - T4 free    3. Heart palpitations  - Most likely PVCs  - Consider Holter monitor if thyroid studies come back normal    Follow up after labs are back     Ana Bell, DO  [facility]" weakness of legs

## 2023-02-27 NOTE — ASU PATIENT PROFILE, ADULT - FALL HARM RISK - HARM RISK INTERVENTIONS
Communicate Risk of Fall with Harm to all staff/Reinforce activity limits and safety measures with patient and family/Tailored Fall Risk Interventions/Use of alarms - bed, chair and/or voice tab/Visual Cue: Yellow wristband and red socks/Bed in lowest position, wheels locked, appropriate side rails in place/Call bell, personal items and telephone in reach/Instruct patient to call for assistance before getting out of bed or chair/Non-slip footwear when patient is out of bed/[place] to call system/Physically safe environment - no spills, clutter or unnecessary equipment/Purposeful Proactive Rounding/Room/bathroom lighting operational, light cord in reach Communicate Risk of Fall with Harm to all staff/Reinforce activity limits and safety measures with patient and family/Tailored Fall Risk Interventions/Visual Cue: Yellow wristband and red socks/Bed in lowest position, wheels locked, appropriate side rails in place/Call bell, personal items and telephone in reach/Instruct patient to call for assistance before getting out of bed or chair/Non-slip footwear when patient is out of bed/[place] to call system/Physically safe environment - no spills, clutter or unnecessary equipment/Purposeful Proactive Rounding/Room/bathroom lighting operational, light cord in reach

## 2023-02-27 NOTE — ASU DISCHARGE PLAN (ADULT/PEDIATRIC) - ASU DC SPECIAL INSTRUCTIONSFT
STENT: You have an internal stent (a hollow tube that runs from the kidney to your bladder) after your procedure, which helps urine drain from the kidney to your bladder. Some patients experience urinary frequency, burning, or even back pain (especially with urination). These sensations will gradually get better. Increasing your fluid intake can also improve these symptoms. While the stent is in place, your urine may continue to be bloody. This stent is temporary and must be removed by your urologist as an outpatient with in 3 months unless otherwise specified.  GENERAL: It is common to have blood in your urine after your procedure. It may be pink or even red; inform your doctor if you have a significant amount of clot in the urine or if you are unable to void at all. The urine may clear and then become bloody again especially as you are more physically active.  BATHING: You may shower or bathe.  DIET: You may resume your regular diet and regular medication regimen.  PAIN: You may take Tylenol (acetaminophen) 650-975mg and/or Motrin (ibuprofen) 400-600mg, both available over the counter, for pain every 6 hours as needed. Do not exceed 4000mg of Tylenol (acetaminophen) daily. You may alternate these medications such that you take one or the other every 3 hours for around the clock pain coverage.  ANTIBIOTICS: You may be given a prescription for an antibiotic, please take this medication as instructed and be sure to complete the entire course.  STOOL SOFTENERS: Do not allow yourself to become constipated as straining may cause bleeding. Take stool softeners or a laxative (ex. Miralax, Colace, Senokot, ExLax, etc), available over the counter, if needed.  ACTIVITY: No heavy lifting or strenuous exercise until you are evaluated at your post-operative appointment. Otherwise, you may return to your usual level of physical activity.  ANTICOAGULATION: If you are taking any blood thinning medications, please discuss with your urologist prior to restarting these medications unless otherwise specified.  FOLLOW-UP: If you did not already schedule your post-operative appointment, please call your urologist to schedule a follow-up appointment.  CALL YOUR UROLOGIST IF: You have any bleeding that does not stop, inability to void >8 hours, fever over 100.4 F, chills, persistent nausea/vomiting, changes in your incision concerning for infection, or if your pain is not controlled on your discharge pain medications.

## 2023-02-27 NOTE — ASU DISCHARGE PLAN (ADULT/PEDIATRIC) - NS MD DC FALL RISK RISK
For information on Fall & Injury Prevention, visit: [email or web address] OR  [email or web address] OR  https://www.cdc.gov/steadi/patient.html

## 2023-02-28 VITALS
TEMPERATURE: 98 F | SYSTOLIC BLOOD PRESSURE: 111 MMHG | HEART RATE: 95 BPM | OXYGEN SATURATION: 97 % | RESPIRATION RATE: 16 BRPM | DIASTOLIC BLOOD PRESSURE: 71 MMHG

## 2023-03-01 ENCOUNTER — RX RENEWAL (OUTPATIENT)
Age: 48
End: 2023-03-01

## 2023-03-08 LAB — NIDUS STONE QN: SIGNIFICANT CHANGE UP

## 2023-03-15 LAB — SURGICAL PATHOLOGY STUDY: SIGNIFICANT CHANGE UP

## 2023-04-03 ENCOUNTER — APPOINTMENT (OUTPATIENT)
Dept: GERIATRICS | Facility: CLINIC | Age: 48
End: 2023-04-03
Payer: MEDICAID

## 2023-04-03 DIAGNOSIS — F43.21 ADJUSTMENT DISORDER WITH DEPRESSED MOOD: ICD-10-CM

## 2023-04-03 PROCEDURE — 99214 OFFICE O/P EST MOD 30 MIN: CPT | Mod: 95

## 2023-04-03 RX ORDER — AMOXICILLIN AND CLAVULANATE POTASSIUM 875; 125 MG/1; MG/1
875-125 TABLET, COATED ORAL
Qty: 10 | Refills: 0 | Status: DISCONTINUED | COMMUNITY
Start: 2022-10-18 | End: 2023-04-03

## 2023-04-03 RX ORDER — AMOXICILLIN AND CLAVULANATE POTASSIUM 875; 125 MG/1; MG/1
875-125 TABLET, COATED ORAL
Qty: 10 | Refills: 0 | Status: DISCONTINUED | COMMUNITY
Start: 2023-01-25 | End: 2023-04-03

## 2023-04-03 RX ORDER — CIPROFLOXACIN HYDROCHLORIDE 500 MG/1
500 TABLET, FILM COATED ORAL TWICE DAILY
Qty: 10 | Refills: 0 | Status: DISCONTINUED | COMMUNITY
Start: 2023-01-25 | End: 2023-04-03

## 2023-04-03 RX ORDER — FUROSEMIDE 40 MG/1
40 TABLET ORAL TWICE DAILY
Qty: 14 | Refills: 0 | Status: DISCONTINUED | COMMUNITY
Start: 2022-08-05 | End: 2023-04-03

## 2023-04-03 RX ORDER — SENNOSIDES 8.6 MG/1
8.6 TABLET ORAL
Qty: 60 | Refills: 2 | Status: ACTIVE | COMMUNITY
Start: 2022-03-02 | End: 1900-01-01

## 2023-04-06 ENCOUNTER — RX RENEWAL (OUTPATIENT)
Age: 48
End: 2023-04-06

## 2023-04-22 ENCOUNTER — OUTPATIENT (OUTPATIENT)
Dept: OUTPATIENT SERVICES | Facility: HOSPITAL | Age: 48
LOS: 1 days | Discharge: HOME | End: 2023-04-22

## 2023-04-22 DIAGNOSIS — Z96.0 PRESENCE OF UROGENITAL IMPLANTS: Chronic | ICD-10-CM

## 2023-04-22 DIAGNOSIS — C53.9 MALIGNANT NEOPLASM OF CERVIX UTERI, UNSPECIFIED: ICD-10-CM

## 2023-04-26 ENCOUNTER — APPOINTMENT (OUTPATIENT)
Dept: GERIATRICS | Facility: CLINIC | Age: 48
End: 2023-04-26
Payer: MEDICAID

## 2023-04-26 ENCOUNTER — APPOINTMENT (OUTPATIENT)
Dept: UROLOGY | Facility: CLINIC | Age: 48
End: 2023-04-26
Payer: MEDICAID

## 2023-04-26 ENCOUNTER — OUTPATIENT (OUTPATIENT)
Dept: OUTPATIENT SERVICES | Facility: HOSPITAL | Age: 48
LOS: 1 days | End: 2023-04-26
Payer: MEDICAID

## 2023-04-26 VITALS
TEMPERATURE: 98 F | DIASTOLIC BLOOD PRESSURE: 86 MMHG | HEART RATE: 92 BPM | OXYGEN SATURATION: 97 % | SYSTOLIC BLOOD PRESSURE: 130 MMHG

## 2023-04-26 VITALS
WEIGHT: 180.56 LBS | HEART RATE: 88 BPM | DIASTOLIC BLOOD PRESSURE: 74 MMHG | TEMPERATURE: 97 F | SYSTOLIC BLOOD PRESSURE: 101 MMHG | RESPIRATION RATE: 16 BRPM | OXYGEN SATURATION: 97 % | BODY MASS INDEX: 31.98 KG/M2

## 2023-04-26 DIAGNOSIS — R35.0 FREQUENCY OF MICTURITION: ICD-10-CM

## 2023-04-26 DIAGNOSIS — R60.0 LOCALIZED EDEMA: ICD-10-CM

## 2023-04-26 PROCEDURE — 99214 OFFICE O/P EST MOD 30 MIN: CPT

## 2023-04-26 PROCEDURE — 52315 CYSTOSCOPY AND TREATMENT: CPT

## 2023-04-26 NOTE — PHYSICAL EXAM
[General Appearance - Alert] : alert [General Appearance - In No Acute Distress] : in no acute distress [Oriented To Time, Place, And Person] : oriented to person, place, and time [FreeTextEntry1] : intermittently tearful

## 2023-04-26 NOTE — HISTORY OF PRESENT ILLNESS
[FreeTextEntry1] : 47yoF with h/o cervical adenocarcinoma presents for follow-up palliative care visit, referred by Oncology.  \horacio alves Patient initially diagnosed with cervical cancer 5/2021. Underwent weekly cisplatin and pelvic RT 4500cgy 25 fx plus parametrial boost 540cgy 3 fx and 4 brachytherapy procedures (hybrid therapy) 700cgy x 4.\horacio alevs Patient was recently hospitalized at [facility] (1/26 - 2/1/22) after presenting with 3 weeks of worsening b/l hip/lower back pain and feet numbness. Pain is sharp in nature, radiating down the lateral thighs, with mild numbness on dorsal sides of feet. Symptoms initially improved with Aleve, however noted blood tinged vaginal discharge and blood clots with urination not related to menstrual periods so stopped taking Aleve. Hip pain and feet numbness progressively worsened, to the point that she cannot walk or lie supine. Pain is mildly alleviated when lying prone, and better with activity. Pt presented to ER in [place] 2 weeks prior to admission due to these symptoms, CT spine non-con showed multiple non-obstructing nephrolithiasis in L kidney but no fractures, deformities, subluxation were noted. She was discharged with gabapentin 300mg BID, Methocarbamol 500mg TID, and Meloxicam 15mg. She is referred today for continuation of pain management. \horacio alves Patient states that she developed pain in her L buttock approximately 6 weeks ago. It started as a pinching pain in her L buttock. It worsened to the point that the pain felt as if someone where punching her in the area.  Standing up and walking helped to relieve the pain a bit.  She found that she was pacing a lot in her home to help the pain. \horacio alves She was advised to use Acetaminophen 1000mg, was using it about twice daily.   This initially helped a bit. The pain progressed, eventually affecting both left and right. Associated with tingling in her toes. Was advised to use Aleve BID. Was advised to increase to 2 pills BID which caused her vaginal bleeding, she stopped. long alves Was in [place] at the time and was sent to the hospital where she states she received a steroid shot to her back. This helped her very much for a while. She was also prescribed a lidocaine 5% patch there but this was not approved by her insurance. Pain came back and was intense.  She returned to [place] on 1/24 and presented to [facility] on 1/26 at which time she was admitted to [facility]. \par \par Pt states that numbness is worse on L side, from dorsal foot to lateral mid-calf. Feels like it is "freezing" and must keep socks on. Also notes cramping intermittently in the toes. Pt also noticed that not all of her urine will come out, and must put pressure to completely relieve herself\par \par When she walks her low back pain gets better but her legs/feet feel tight and crampy. \par She feels weakness in her legs, lifting her legs is difficult, moving her toes is difficult.  She describes a sensation of coldness of her toes. \par \par \par Interval Hx (4/26/23): \par Patient seen in office today. \par She had ureteral stent removed today. \par She started using sertraline 25mg about three weeks ago and feels some slight changes, feels more motivated to get up in the morning as opposed to previously. \par She does continue to experience cramping and stiffness in her hands and feet. Additionally she reports constant pain along the balls of her feet. She uses a cane for assistance with ambulation. Cannot tolerate cold, walks around in wool socks/slippers. LE edema persists but is managed with furosemide 40mg and daily potassium.  Anxiety controlled with PRN alprazolam. \par \par Current pain regimen:\par MS ER 15mg TID\par Oxycodone IR 5-10mg PRN  (~2 times daily)\par Gabapentin 600mg BID\par Methocarbamol 500mg TID\par Tylenol 1000mg PRN \par \par ROS:\par +constipation - uses senna daily, PRN Miralax\par +some nausea, no vomiting - improved\par +appetite has been OK. \par +mood is low, she feels unmotivated to leave her bedroom. Having marital issues as well.\par +nocturnal leg cramping- massaging helps\par Remainder of ROS non-contributory\par \par Patient is single, lives with her sister, parents, daughter. \par She has a 28yo son and a 24yo daughter.\par She is unemployed. \par \par PMD: Dr. Valentino Comer ([phone])\par \par [employer] Ref#: [id number]

## 2023-04-26 NOTE — ASSESSMENT
[FreeTextEntry1] : 47yoF with:\par \par # Cervical Cancer - s/p weekly Cisplatin + EBRT, completed 11/2021. s/p R ureteral stent placement 6/2022. Follow up with Med Onc, Rad Onc. \par \par # Low back pain/ b/l LE pain with radiculopathy, L>R - Appreciate Neuro-Oncology input on this challenging case with running dx of platinum induced neuropathy with possible radiation toxicity causing her weakness. \par  Appreciate the input of additional consultants - Physiatry, Neurosurgery and Neurology. \par - C/w MS ER 15mg TID, c/w PRN Oxycodone IR 10mg, Methocarbamol 500mg TID.  Attempt to lower gabapentin to 400mg BID. \par \par # Depression/Anxiety - Increase Sertraline to 50mg QD.   C/w PRN alprazolam .25mg for anxiety attacks.  Provided extensive emotional support and validation of her worries. c/w behavioral health follow up.\par \par # Lower extremity edema - On furosemide 40mg with potassium supplementation per PCP.\par \par # Sexual Health - Pt to see Dr. Paramjit Carver on 5/23. \par \par # Encounter for Palliative Care - Emotional support provided.\par \par Follow up in 1 month, call sooner with questions or issues.

## 2023-04-27 ENCOUNTER — APPOINTMENT (OUTPATIENT)
Dept: HEMATOLOGY ONCOLOGY | Facility: CLINIC | Age: 48
End: 2023-04-27
Payer: MEDICAID

## 2023-04-27 PROCEDURE — 90832 PSYTX W PT 30 MINUTES: CPT | Mod: 95

## 2023-04-28 ENCOUNTER — APPOINTMENT (OUTPATIENT)
Dept: GYNECOLOGIC ONCOLOGY | Facility: CLINIC | Age: 48
End: 2023-04-28
Payer: MEDICAID

## 2023-04-28 ENCOUNTER — APPOINTMENT (OUTPATIENT)
Dept: RADIATION ONCOLOGY | Facility: CLINIC | Age: 48
End: 2023-04-28

## 2023-04-28 ENCOUNTER — RESULT REVIEW (OUTPATIENT)
Age: 48
End: 2023-04-28

## 2023-04-28 ENCOUNTER — APPOINTMENT (OUTPATIENT)
Dept: HEMATOLOGY ONCOLOGY | Facility: CLINIC | Age: 48
End: 2023-04-28
Payer: MEDICAID

## 2023-04-28 VITALS
SYSTOLIC BLOOD PRESSURE: 107 MMHG | HEIGHT: 64 IN | DIASTOLIC BLOOD PRESSURE: 75 MMHG | HEART RATE: 82 BPM | WEIGHT: 181.63 LBS | BODY MASS INDEX: 31.01 KG/M2

## 2023-04-28 DIAGNOSIS — N20.0 CALCULUS OF KIDNEY: ICD-10-CM

## 2023-04-28 DIAGNOSIS — N13.5 CROSSING VESSEL AND STRICTURE OF URETER WITHOUT HYDRONEPHROSIS: ICD-10-CM

## 2023-04-28 LAB
BASOPHILS # BLD AUTO: 0.01 K/UL — SIGNIFICANT CHANGE UP (ref 0–0.2)
BASOPHILS NFR BLD AUTO: 0.2 % — SIGNIFICANT CHANGE UP (ref 0–2)
EOSINOPHIL # BLD AUTO: 0.18 K/UL — SIGNIFICANT CHANGE UP (ref 0–0.5)
EOSINOPHIL NFR BLD AUTO: 3.7 % — SIGNIFICANT CHANGE UP (ref 0–6)
HCT VFR BLD CALC: 29.7 % — LOW (ref 34.5–45)
HGB BLD-MCNC: 9.8 G/DL — LOW (ref 11.5–15.5)
IMM GRANULOCYTES NFR BLD AUTO: 0.2 % — SIGNIFICANT CHANGE UP (ref 0–0.9)
LYMPHOCYTES # BLD AUTO: 0.88 K/UL — LOW (ref 1–3.3)
LYMPHOCYTES # BLD AUTO: 18 % — SIGNIFICANT CHANGE UP (ref 13–44)
MCHC RBC-ENTMCNC: 28.8 PG — SIGNIFICANT CHANGE UP (ref 27–34)
MCHC RBC-ENTMCNC: 33 G/DL — SIGNIFICANT CHANGE UP (ref 32–36)
MCV RBC AUTO: 87.4 FL — SIGNIFICANT CHANGE UP (ref 80–100)
MONOCYTES # BLD AUTO: 0.4 K/UL — SIGNIFICANT CHANGE UP (ref 0–0.9)
MONOCYTES NFR BLD AUTO: 8.2 % — SIGNIFICANT CHANGE UP (ref 2–14)
NEUTROPHILS # BLD AUTO: 3.4 K/UL — SIGNIFICANT CHANGE UP (ref 1.8–7.4)
NEUTROPHILS NFR BLD AUTO: 69.7 % — SIGNIFICANT CHANGE UP (ref 43–77)
NRBC # BLD: 0 /100 WBCS — SIGNIFICANT CHANGE UP (ref 0–0)
PLATELET # BLD AUTO: 232 K/UL — SIGNIFICANT CHANGE UP (ref 150–400)
RBC # BLD: 3.4 M/UL — LOW (ref 3.8–5.2)
RBC # FLD: 13.6 % — SIGNIFICANT CHANGE UP (ref 10.3–14.5)
WBC # BLD: 4.88 K/UL — SIGNIFICANT CHANGE UP (ref 3.8–10.5)
WBC # FLD AUTO: 4.88 K/UL — SIGNIFICANT CHANGE UP (ref 3.8–10.5)

## 2023-04-28 PROCEDURE — 99213 OFFICE O/P EST LOW 20 MIN: CPT | Mod: NC

## 2023-04-28 PROCEDURE — 99213 OFFICE O/P EST LOW 20 MIN: CPT

## 2023-04-28 RX ORDER — HYDROCORTISONE 2.5% 25 MG/G
2.5 CREAM TOPICAL DAILY
Qty: 1 | Refills: 1 | Status: DISCONTINUED | COMMUNITY
Start: 2022-08-11 | End: 2023-04-28

## 2023-04-30 LAB
ALBUMIN SERPL ELPH-MCNC: 4.1 G/DL
ALP BLD-CCNC: 135 U/L
ALT SERPL-CCNC: 16 U/L
ANION GAP SERPL CALC-SCNC: 11 MMOL/L
AST SERPL-CCNC: 16 U/L
BILIRUB SERPL-MCNC: 0.2 MG/DL
BUN SERPL-MCNC: 19 MG/DL
CALCIUM SERPL-MCNC: 9.6 MG/DL
CHLORIDE SERPL-SCNC: 97 MMOL/L
CO2 SERPL-SCNC: 28 MMOL/L
CREAT SERPL-MCNC: 1.3 MG/DL
EGFR: 50 ML/MIN/1.73M2
GLUCOSE SERPL-MCNC: 129 MG/DL
POTASSIUM SERPL-SCNC: 4.8 MMOL/L
PROT SERPL-MCNC: 7.4 G/DL
SODIUM SERPL-SCNC: 136 MMOL/L

## 2023-05-01 ENCOUNTER — RX RENEWAL (OUTPATIENT)
Age: 48
End: 2023-05-01

## 2023-05-01 ENCOUNTER — LABORATORY RESULT (OUTPATIENT)
Age: 48
End: 2023-05-01

## 2023-05-01 ENCOUNTER — APPOINTMENT (OUTPATIENT)
Dept: RADIATION ONCOLOGY | Facility: CLINIC | Age: 48
End: 2023-05-01
Payer: MEDICAID

## 2023-05-01 VITALS
TEMPERATURE: 97.7 F | HEART RATE: 92 BPM | HEIGHT: 64 IN | DIASTOLIC BLOOD PRESSURE: 76 MMHG | WEIGHT: 179.01 LBS | OXYGEN SATURATION: 95 % | SYSTOLIC BLOOD PRESSURE: 114 MMHG | RESPIRATION RATE: 17 BRPM | BODY MASS INDEX: 30.56 KG/M2

## 2023-05-01 DIAGNOSIS — G62.9 POLYNEUROPATHY, UNSPECIFIED: ICD-10-CM

## 2023-05-01 DIAGNOSIS — R30.0 DYSURIA: ICD-10-CM

## 2023-05-01 LAB
BACTERIA UR CULT: NORMAL
HPV HIGH+LOW RISK DNA PNL CVX: NOT DETECTED

## 2023-05-01 PROCEDURE — 99212 OFFICE O/P EST SF 10 MIN: CPT

## 2023-05-01 NOTE — HISTORY OF PRESENT ILLNESS
[Disease: _____________________] : Disease: [unfilled] [AJCC Stage: ____] : AJCC Stage: [unfilled] [de-identified] : 46 y.o female with adenocarcinoma most likely  of cervical origin.\par \par 46 year old premenopausal female with cervical adenocarcinoma, with disease involved in the endometrium presenting for consideration of chemotherapy. Initially seen by Dr. Bill on 7/16/21 given concern for primary vs secondary gynecologic malignancy. \par \par Presented to [facility] on 5/28/21 given pelvic pain and retained tampon with a 5 day history of malodorous discharge with fever and tachycardia. Initially assumed to have PID and started on antibiotics, however cervix appeared irregular, dark and distorted. Subsequently taken to the OR for EUA, and cervical biospy done, showing adenocarcinoma, unclear at time if primary or secondary. \par Pathology is as below: \par  PATHOLOGY:\par 1) EMBx, 5/26/21, Dr. James\par  a) atypical endometrial glands exhibiting complex, cribiform architexture are present as a few scattered glands, further workup is necessary to R/O CAH or adenocarcinoma of the endometrium\par 2) EMBx, 5/28/21\par  a) minute polypoid fragment of endometrial tissue with partial infarction and focal glandular crowding without atypia \par 3) EUA, D&C, ECC, cervical biopsy, 5/30/21, [facility]\par  a) cervical/endocervix - adenocarcinoma in a background of necrosis and acute inflammation (primary vs. secondary)\par \par PET/CT done on 7/5/21 is as below: \par trophic left left kidney. Lobulated bilateral renal cortex with cortical scarring bilaterally. Multiple non obstruction stones on the left, largest 7mm in the lower pole, stable since 5/29/21. Difficult to delineate increased FDG activity in the uterine cervix corresponding to known malignancy SUV 7.3. There is a separate focus of increased FDG activity in the endometrium which may be physiologic SUV 8.8. No enlarged FDG avid nodes. Pan colonic rectal hypermetabolism which may be related to Metformin. \par \par Other imaging: \par \par TVUS on 5/28/21: uterus 9.0 x 4.9 x 5.2cm. Retained tampon in vagina. No collection or fluid in the endometrial canal. EML 6.3mm. \par RTO- a simple cyst 3.7 x 3.4 x 3.4cm\par LTO - Small follicles are seen. \par No FF. \par \par \par CT A/P on 5/29/21: lobulated bilateral renal cortex with cortical scarring bilaterally. Multiple nonobstructing stones on the left. no hydro.\par The endometrial cavity is slightly distended with fluid. An air containing structure is identified within the upper vaginal cavity (likely retained tampon). Mild fatty stranding in the pelvis. Likely a physiological cyst in the right ovary and a small corpus luteal or hemorrhagic cyst in the left ovary. Small free fluid. Appendix normal. No ascites. No LAD. \par \par Initially it was thought to be a GI primary and patient underwent GI work up with EGD and colonoscopy, all WNL.\par Patient still has some vaginal discharge, malodorous.\par \par  [de-identified] : \par She reports walking a bit better and more upright with a cane rather than a walker. She also notes fxn of her arms is a bit better. No VB or VD. She has not had relations she notes, and she confirms that she has not used the vag dilator. She notes her bowels are slow but use of OTC remedies helps. She reports having the sent out and notes some voiding challenges, which she feels may relate in part to the toilet she is using (the height/position); she has f/u with DH of

## 2023-05-02 LAB
APPEARANCE: CLEAR
BILIRUBIN URINE: NEGATIVE
BLOOD URINE: ABNORMAL
COLOR: YELLOW
GLUCOSE QUALITATIVE U: NEGATIVE MG/DL
KETONES URINE: NEGATIVE MG/DL
LEUKOCYTE ESTERASE URINE: ABNORMAL
NITRITE URINE: POSITIVE
PH URINE: 6.5
PROTEIN URINE: 100 MG/DL
SPECIFIC GRAVITY URINE: 1.01
UROBILINOGEN URINE: 0.2 MG/DL

## 2023-05-02 NOTE — HISTORY OF PRESENT ILLNESS
[Home] : at home, [unfilled] , at the time of the visit. [Medical Office: ([facility])___] : at the medical office located in  [Verbal consent obtained from patient] : the patient, [unfilled] [FreeTextEntry1] : 47yoF with h/o cervical adenocarcinoma presents for follow-up palliative care visit, referred by Oncology.  \horacio alves Patient initially diagnosed with cervical cancer 5/2021. Underwent weekly cisplatin and pelvic RT 4500cgy 25 fx plus parametrial boost 540cgy 3 fx and 4 brachytherapy procedures (hybrid therapy) 700cgy x 4.\horacio alves Patient was recently hospitalized at [facility] (1/26 - 2/1/22) after presenting with 3 weeks of worsening b/l hip/lower back pain and feet numbness. Pain is sharp in nature, radiating down the lateral thighs, with mild numbness on dorsal sides of feet. Symptoms initially improved with Aleve, however noted blood tinged vaginal discharge and blood clots with urination not related to menstrual periods so stopped taking Aleve. Hip pain and feet numbness progressively worsened, to the point that she cannot walk or lie supine. Pain is mildly alleviated when lying prone, and better with activity. Pt presented to ER in [place] 2 weeks prior to admission due to these symptoms, CT spine non-con showed multiple non-obstructing nephrolithiasis in L kidney but no fractures, deformities, subluxation were noted. She was discharged with gabapentin 300mg BID, Methocarbamol 500mg TID, and Meloxicam 15mg. She is referred today for continuation of pain management. \horacio alves Patient states that she developed pain in her L buttock approximately 6 weeks ago. It started as a pinching pain in her L buttock. It worsened to the point that the pain felt as if someone where punching her in the area.  Standing up and walking helped to relieve the pain a bit.  She found that she was pacing a lot in her home to help the pain. \horacio alves She was advised to use Acetaminophen 1000mg, was using it about twice daily.   This initially helped a bit. The pain progressed, eventually affecting both left and right. Associated with tingling in her toes. Was advised to use Aleve BID. Was advised to increase to 2 pills BID which caused her vaginal bleeding, she stopped. long alves Was in [place] at the time and was sent to the hospital where she states she received a steroid shot to her back. This helped her very much for a while. She was also prescribed a lidocaine 5% patch there but this was not approved by her insurance. Pain came back and was intense.  She returned to [place] on 1/24 and presented to [facility] on 1/26 at which time she was admitted to [facility]. \par \par Pt states that numbness is worse on L side, from dorsal foot to lateral mid-calf. Feels like it is "freezing" and must keep socks on. Also notes cramping intermittently in the toes. Pt also noticed that not all of her urine will come out, and must put pressure to completely relieve herself\par \par When she walks her low back pain gets better but her legs/feet feel tight and crampy. \par She feels weakness in her legs, lifting her legs is difficult, moving her toes is difficult.  She describes a sensation of coldness of her toes. \par \par \par Interval Hx (4/3/23): \par Pt seen via telemedicine. \par She is in [place], returning to [place] next week.  Pain has been overall controlled with her pain regiment lately. She does continue to experience cramping and stiffness in her hands and feet. Additionally she reports constant pain along the balls of her feet. She uses a cane for assistance with ambulation. Cannot tolerate cold, walks around in wool socks/slippers. LE edema persists but is managed with furosemide 40mg and daily potassium.  Anxiety controlled with PRN alprazolam. \par \par Current pain regimen:\par MS ER 15mg TID\par Oxycodone IR 5-10mg PRN  (~2 times daily)\par Gabapentin 600mg BID\par Methocarbamol 500mg TID\par Tylenol 1000mg PRN \par \par ROS:\par +constipation - uses senna daily, PRN Miralax\par +some nausea, no vomiting - improved\par +appetite has been OK. \par +mood is low, she feels unmotivated to leave her bedroom. Having marital issues as well.\par Remainder of ROS non-contributory\par \par Patient is single, lives with her sister, parents, daughter. \par She has a 26yo son and a 24yo daughter.\par She is unemployed. \par \par PMD: Dr. Valentino Comer ([phone])\par \par I-Stop Ref#: [id number]

## 2023-05-02 NOTE — ASSESSMENT
[FreeTextEntry1] : 47yoF with:\par \par # Cervical Cancer - s/p weekly Cisplatin + EBRT, completed 11/2021. s/p R ureteral stent placement 6/2022. Follow up with Med Onc, Rad Onc. \par \par # Low back pain/ b/l LE pain with radiculopathy, L>R - Appreciate Neuro-Oncology input on this challenging case with running dx of platinum induced neuropathy with possible radiation toxicity causing her weakness. \par  Appreciate the input of additional consultants - Physiatry, Neurosurgery and Neurology. \par - C/w MS ER 15mg TID, c/w PRN Oxycodone IR 10mg, Gabapentin 600mg BID, Methocarbamol 500mg TID. \par \par # Depression/Anxiety - Recommend initiation of SSRI to which patient is amenable.  Start Sertraline 25mg QD.  Explained what to expect upon initiation of SSRI, including potential AEs and that it may take 2-3 weeks to appreciate the therapeutic benefit.  C/w PRN alprazolam .25mg for anxiety attacks.  Provided extensive emotional support and validation of her worries. Appreciate behavioral health input.  c/w behavioral health follow up.\par \par # Lower extremity edema - On furosemide 40mg with potassium supplementation per PCP.\par \par # Sexual Health - Recommend referral to Dr. Paramjit Carver; will contact Dr. Carver to facilitate the referral.\par \par # Encounter for Palliative Care - Emotional support provided.\par \par Follow up in 3 weeks, call sooner with questions or issues.

## 2023-05-03 ENCOUNTER — NON-APPOINTMENT (OUTPATIENT)
Age: 48
End: 2023-05-03

## 2023-05-04 PROBLEM — G62.9 NEUROPATHY: Status: ACTIVE | Noted: 2023-05-04

## 2023-05-04 NOTE — REVIEW OF SYSTEMS
[Urinary Tract Pain: Grade 0] : Urinary Tract Pain: Grade 0 [Peripheral Motor Neuropathy: Grade 2 - Moderate symptoms; limiting instrumental ADL] : Peripheral Motor Neuropathy: Grade 2 - Moderate symptoms; limiting instrumental ADL [Peripheral Sensory Neuropathy: Grade 2 - Moderate symptoms; limiting instrumental ADL] : Peripheral Sensory Neuropathy: Grade 2 - Moderate symptoms; limiting instrumental ADL [Constipation: Grade 1 - Occasional or intermittent symptoms; occasional use of stool softeners, laxatives, dietary modification, or enema] : Constipation: Grade 1 - Occasional or intermittent symptoms; occasional use of stool softeners, laxatives, dietary modification, or enema [Diarrhea: Grade 0] : Diarrhea: Grade 0 [Nausea: Grade 0] : Nausea: Grade 0 [Vomiting: Grade 0] : Vomiting: Grade 0 [Hematuria: Grade 0] : Hematuria: Grade 0 [Urinary Incontinence: Grade 1 - Occasional (e.g., with coughing, sneezing, etc.), pads not indicated] : Urinary Incontinence: Grade 1 - Occasional (e.g., with coughing, sneezing, etc.), pads not indicated [Urinary Retention: Grade 1 - Urinary, suprapubic or intermittent catheter placement not indicated; able to void with some residual] : Urinary Retention: Grade 1 - Urinary, suprapubic or intermittent catheter placement not indicated; able to void with some residual [Vaginal Stricture: Grade 2 - Vaginal narrowing and/or shortening not interfering with physical examination] : Vaginal Stricture: Grade 2 - Vaginal narrowing and/or shortening not interfering with physical examination [Vaginal Infection: Grade 0] : Vaginal Infection: Grade 0  [Urinary Urgency: Grade 0] : Urinary Urgency: Grade 0 [FreeTextEntry2] : baseline [FreeTextEntry3] : intermittent

## 2023-05-04 NOTE — HISTORY OF PRESENT ILLNESS
[FreeTextEntry1] : TEN HERNANDEZ is a 47 year old premenopausal female with stage IIB locally advanced cervical adenocarcinoma. She received concurrent chemo/RT. She received pelvic RT to a total dose of 4500 cGy to the pelvis followed by parametrial boost of 540 cGy and 4 brachytherapy procedures to a total dose of 2800 cGy. She completed 11/2021.\par When seen for posttreatment evaluation on December 17, 2021 she reported continued but lessening vaginal discharge.  On examination performed together with Dr. Malcolm RT changes were noted with some necrosis at the apex into the canal with apparent residual cervical tissue nearly flush with the vagina.\par January 2022 while in [place] Ms. Flor had significant back pain in the buttock and radiating as well as UTIs she.  She was evaluated in an ER in [place] on 2 occasions.  She was given medication including muscle relaxant initially with response.  She was referred back to the ER when this pain persisted.  No MR I was done during that evaluation in [place] she returned to [place] and was seen on January 26.  At that time she continued to have lower back pain in the hip and buttock radiating down both legs she was sent to the ER for for evaluation and to rule out cord compression.  MRI imaging was without evidence of cord compression.  He did however continue to have pain and weakness she was seen by Dr. Ney Cantrell of palliative care as well as by Dr. Donovan Lucero for neuro oncologic evaluation\par Posttreatment PET done on February 7, 2022 revealed decreased hypermetabolism in the uterine cervix which may be related to persistent or recurrent disease versus posttreatment inflammation resolution of endometrial hypermetabolism and unchanged nonspecific pancolonic and rectal hypermetabolism.\par She continued to have weakness and apin inlower extremities.  \par 4/23/22 - PET/CT: Persistent peripheral hypermetabolism and central photopenia in the uterine cervix, increased in size and metabolism, is concerning for residual disease. New moderate right hydroureteronephrosis. Right ureter is dilated down to the level of the cervix. Unchanged nonspecific pancolonic and rectal hypermetabolism may be related to metformin. \par Results reviewed and plan for EUA with biopsy and cysto at same time.\par This was done on 6/21/22.  A right ureteral structure was stricture was seen secondary to external compression a tandem right ureteral stent was placed.  This was done by Dr. David Hoenig When this procedure was completed, Dr. Malcolm performed an examination under anesthesia vaginal biopsy and cervical biopsies.  Exam revealed normal vulva distal vagina was without lesions there was a small area of white epithelium on the right mid vaginal wall which was biopsied the apex of the vagina and upper third of the vagina had circumferential fibrosis there was a nodular area at the apex that was biopsied on bimanual examination the left parametria was smooth with soft fibrosis the right parametria parametria had denser fibrosis without apparent nodularity.   The cervical biopsy revealed inflamed squamous mucosa with reactive change and parakeratosis a second cervical biopsy revealed inflamed and necrotic stromal tissue with focal cellular atypia consistent with treatment effect on the mid right vaginal biopsy revealed inflamed squamous mucosa and stromal tissue with reactive changes and focal stromal cellular atypia consistent with treatment effect.\par On  August 1, 2022 Ms. Flor underwent an MRI of the pelvis with and without contrast under IV sedation.  The findings are summarized as follows cervix is small in size and demonstrates low T2 signal compatible with posttreatment change no abnormal cervical enhancement the endometrium was within normal limits as was the junctional zone right ovary left ovary and adnexa were within normal limits bladder within normal limits note is made of a right ureteral stent no pelvic adenopathy was seen.\par she continued with supportive care. as well as uro, gyn onc, med onc and rad onc f/u.she also developed kidney stones now managed by Dr. Hoenig\par 10/2022: PET/CT: interval resolution of central photopenia with peripheral hypermetabolism in the uterine cervix. Interval resolution of moderate right and mild left hydroureteronephrosis s/p placement of b/l ureteral stents. Pancolonic hypermetabolism less extensive however rectal hypermetabolism is not significantly changed. This is nonspecific and may be related to metformin. \par Nov18,2022 visit to urology with documentation of both stents out\par 1/20/23: Here for routine follow up in [facility]. She reports that she had a recent admission in [place] she understands that she had renal stones as diagnosed on cystoscopy and underwent stent placement bilaterally.  She has an appointment scheduled with Dr. Freire for next week.  We have asked her to obtain the records of her hospitalization in [place].  She does report that she was treated with intravenous antibiotics while in [place].\par \par Overall she is feeling a bit better she has gained some weight.  She continues to have challenges with ambulation but this appears to be getting better swelling in the legs is better with the use of diuretics.  More recently she does notice some limitations in and cramping of the upper extremities and hands.\par \par 5/1/2023: Patient presents to clinic for follow-up. Patient reports feeling well overall, improved. pain reported is for chronic condition on lower legs. Denies vaginal discharge or infection. Intermittent urinary retention,urinary incontinence. Ambulating better,using rollator or cane.\par

## 2023-05-04 NOTE — VITALS
[Maximal Pain Intensity: 6/10] : 6/10 [Pain Duration: ___] : Pain duration: [unfilled] [Pain Location: ___] : Pain Location: [unfilled] [Opioid] : opioid [60: Requires occasional assistance, but is able to care for most of his/her needs] : 60: Requires occasional assistance, but is able to care for most of his/her needs

## 2023-05-04 NOTE — PHYSICAL EXAM
[General Appearance - In No Acute Distress] : in no acute distress [] : no respiratory distress [FreeTextEntry1] : see exam as docimented by dr. [email or web address] [de-[email or web address]] : See exam as documented by dr. [email or web address]

## 2023-05-07 LAB — CYTOLOGY CVX/VAG DOC THIN PREP: NORMAL

## 2023-05-09 RX ORDER — POTASSIUM CHLORIDE 1500 MG/1
20 TABLET, FILM COATED, EXTENDED RELEASE ORAL
Qty: 60 | Refills: 0 | Status: ACTIVE | COMMUNITY
Start: 2022-08-05 | End: 1900-01-01

## 2023-05-17 ENCOUNTER — APPOINTMENT (OUTPATIENT)
Dept: UROLOGY | Facility: CLINIC | Age: 48
End: 2023-05-17

## 2023-05-23 ENCOUNTER — APPOINTMENT (OUTPATIENT)
Dept: OBGYN | Facility: CLINIC | Age: 48
End: 2023-05-23
Payer: MEDICAID

## 2023-05-23 DIAGNOSIS — N94.10 UNSPECIFIED DYSPAREUNIA: ICD-10-CM

## 2023-05-23 DIAGNOSIS — Z83.3 FAMILY HISTORY OF DIABETES MELLITUS: ICD-10-CM

## 2023-05-23 DIAGNOSIS — Z92.21 PERSONAL HISTORY OF IRRADIATION: ICD-10-CM

## 2023-05-23 DIAGNOSIS — N91.2 AMENORRHEA, UNSPECIFIED: ICD-10-CM

## 2023-05-23 DIAGNOSIS — Z83.2 FAMILY HISTORY OF DISEASES OF THE BLOOD AND BLOOD-FORMING ORGANS AND CERTAIN DISORDERS INVOLVING THE IMMUNE MECHANISM: ICD-10-CM

## 2023-05-23 DIAGNOSIS — Z92.3 PERSONAL HISTORY OF IRRADIATION: ICD-10-CM

## 2023-05-23 DIAGNOSIS — N95.1 MENOPAUSAL AND FEMALE CLIMACTERIC STATES: ICD-10-CM

## 2023-05-23 PROCEDURE — 99203 OFFICE O/P NEW LOW 30 MIN: CPT | Mod: 95

## 2023-05-23 RX ORDER — METFORMIN ER 500 MG 500 MG/1
500 TABLET ORAL
Refills: 0 | Status: ACTIVE | COMMUNITY
Start: 2021-08-07

## 2023-05-23 RX ORDER — NALOXEGOL OXALATE 25 MG/1
25 TABLET, FILM COATED ORAL
Qty: 30 | Refills: 0 | Status: DISCONTINUED | COMMUNITY
Start: 2022-08-11 | End: 2023-05-23

## 2023-05-23 RX ORDER — ESTRADIOL 0.1 MG/G
0.1 CREAM VAGINAL
Qty: 3 | Refills: 3 | Status: ACTIVE | COMMUNITY
Start: 2023-05-23 | End: 1900-01-01

## 2023-05-23 NOTE — REASON FOR VISIT
[Home] : at home, [unfilled] , at the time of the visit. [Other Location: e.g. Home (Enter Location, City,State)___] : at [unfilled] [Patient] : the patient [Initial] : an initial consultation for

## 2023-05-24 PROBLEM — N91.2 AMENORRHEA: Status: ACTIVE | Noted: 2023-05-24

## 2023-05-24 PROBLEM — Z83.2 FAMILY HISTORY OF AUTOIMMUNE DISORDER: Status: ACTIVE | Noted: 2023-05-24

## 2023-05-24 PROBLEM — Z83.3 FAMILY HISTORY OF DIABETES MELLITUS: Status: ACTIVE | Noted: 2023-05-24

## 2023-05-24 PROBLEM — N94.10 PAIN IN FEMALE GENITALIA ON INTERCOURSE: Status: ACTIVE | Noted: 2023-05-24

## 2023-05-24 PROBLEM — N95.1 CLIMACTERIC SYNDROME: Status: ACTIVE | Noted: 2023-05-24

## 2023-05-24 NOTE — HISTORY OF PRESENT ILLNESS
[Mammogramdate] : 11/2O22 [TextBox_19] : [facility], NOT ON CHART, F/U NOW AND 6 MONTHS [BreastSonogramDate] : 11/2O22 [PapSmeardate] : 10/2022 [TextBox_31] : NEGATIVE [Yes] : Patient has concerns regarding sex [Previously active] : previously active [FreeTextEntry1] : DRYNESS, PAIN [FreeTextEntry2] :

## 2023-05-24 NOTE — PLAN
[FreeTextEntry1] : PELVIC AND VAGINAL PAIN S/P SURGERY, CHEMO, RT FOR CERVICAL CANCER.  VAGINAL E2, DILATORS AND PHYSICAL THERAPY RECOMMENDED.  RX TO PHARMACY.\par \par FHX MALE BREAST CANCER, CONSIDER GENETIC SCREENING.\par \par BREAST SCREENING INTERVALS REVIEWED, BASED ON EVIDENCE BASED RECOMMENDATIONS FOR BREAST CANCER IN THE [place] FOR Patient's AGE, PERSONAL AND FAMILY CANCER HISTORIES.  DISCUSSED UTILITY OF BREAST MAMMOGRAM, SONOGRAM AND MRI.   RECOMMENDED APPROPRIATE BREAST TESTING.\par

## 2023-06-01 ENCOUNTER — APPOINTMENT (OUTPATIENT)
Dept: GERIATRICS | Facility: CLINIC | Age: 48
End: 2023-06-01
Payer: MEDICAID

## 2023-06-01 PROCEDURE — 99213 OFFICE O/P EST LOW 20 MIN: CPT | Mod: 95

## 2023-06-01 NOTE — ASSESSMENT
[FreeTextEntry1] : 48yoF with:\par \par # Cervical Cancer - s/p weekly Cisplatin + EBRT, completed 11/2021. s/p R ureteral stent placement 6/2022. Follow up with Med Onc, Rad Onc. \par \par # Low back pain/ b/l LE pain with radiculopathy, L>R - Appreciate Neuro-Oncology input on this challenging case with running dx of platinum induced neuropathy with possible radiation toxicity causing her weakness. \par  Appreciate the input of additional consultants - Physiatry, Neurosurgery and Neurology. \par - C/w MS ER 15mg TID, c/w PRN Oxycodone IR 10mg, Methocarbamol 500mg TID.  C/w gabapentin 400mg BID. \par \par # Depression/Anxiety - C/w Sertraline 50mg QD.   C/w PRN alprazolam .25mg for anxiety attacks.  Provided extensive emotional support and validation of her worries. c/w behavioral health follow up.\par \par # Lower extremity edema - On furosemide 40mg PRN with potassium supplementation per PCP.\par \par # Sexual Health - Pt seeing Dr. Paramjit Carver. Consultation appreciated.\par \par # Encounter for Palliative Care - Emotional support provided.\par \par Follow up in 6 weeks, call sooner with questions or issues.

## 2023-06-01 NOTE — HISTORY OF PRESENT ILLNESS
[FreeTextEntry1] : 48yoF with h/o cervical adenocarcinoma presents for follow-up palliative care visit, referred by Oncology.  \horacio alves Patient initially diagnosed with cervical cancer 5/2021. Underwent weekly cisplatin and pelvic RT 4500cgy 25 fx plus parametrial boost 540cgy 3 fx and 4 brachytherapy procedures (hybrid therapy) 700cgy x 4.\horacio alves Patient was recently hospitalized at [facility] (1/26 - 2/1/22) after presenting with 3 weeks of worsening b/l hip/lower back pain and feet numbness. Pain is sharp in nature, radiating down the lateral thighs, with mild numbness on dorsal sides of feet. Symptoms initially improved with Aleve, however noted blood tinged vaginal discharge and blood clots with urination not related to menstrual periods so stopped taking Aleve. Hip pain and feet numbness progressively worsened, to the point that she cannot walk or lie supine. Pain is mildly alleviated when lying prone, and better with activity. Pt presented to ER in [place] 2 weeks prior to admission due to these symptoms, CT spine non-con showed multiple non-obstructing nephrolithiasis in L kidney but no fractures, deformities, subluxation were noted. She was discharged with gabapentin 300mg BID, Methocarbamol 500mg TID, and Meloxicam 15mg. She is referred today for continuation of pain management. \horacio alves Patient states that she developed pain in her L buttock approximately 6 weeks ago. It started as a pinching pain in her L buttock. It worsened to the point that the pain felt as if someone where punching her in the area.  Standing up and walking helped to relieve the pain a bit.  She found that she was pacing a lot in her home to help the pain. \horacio alves She was advised to use Acetaminophen 1000mg, was using it about twice daily.   This initially helped a bit. The pain progressed, eventually affecting both left and right. Associated with tingling in her toes. Was advised to use Aleve BID. Was advised to increase to 2 pills BID which caused her vaginal bleeding, she stopped. long alves Was in [place] at the time and was sent to the hospital where she states she received a steroid shot to her back. This helped her very much for a while. She was also prescribed a lidocaine 5% patch there but this was not approved by her insurance. Pain came back and was intense.  She returned to [place] on 1/24 and presented to [facility] on 1/26 at which time she was admitted to [facility]. \par \par Pt states that numbness is worse on L side, from dorsal foot to lateral mid-calf. Feels like it is "freezing" and must keep socks on. Also notes cramping intermittently in the toes. Pt also noticed that not all of her urine will come out, and must put pressure to completely relieve herself\par \par When she walks her low back pain gets better but her legs/feet feel tight and crampy. \par She feels weakness in her legs, lifting her legs is difficult, moving her toes is difficult.  She describes a sensation of coldness of her toes. \par \par \par Interval Hx (6/1/23): \par Patient seen via telemedicine for palliative medicine follow up. \par Recently tried lowering methocarbamol but her feet cramped up painfully and she went back to the 500mg TID regimen. \par She started using sertraline 25mg about three weeks ago and feels some slight changes, feels more motivated to get up in the morning as opposed to previously. \par She does continue to experience cramping and stiffness in her hands and feet. Additionally she reports constant pain along the balls of her feet. She uses a cane for assistance with ambulation. Cannot tolerate cold, walks around in wool socks/slippers. LE edema persists but is managed with furosemide 40mg and daily potassium.  Anxiety controlled with PRN alprazolam. \par \par Current pain regimen:\par MS ER 15mg TID\par Oxycodone IR 5-10mg PRN  (~2 times daily)\par Gabapentin 600mg BID\par Methocarbamol 500mg TID\par Tylenol 1000mg PRN \par \par ROS:\par +constipation - uses senna daily, PRN Miralax\par +some nausea, no vomiting - improved\par +appetite has been OK. \par +mood is low, she feels unmotivated to leave her bedroom. Having marital issues as well.\par +nocturnal leg cramping- massaging helps\par Remainder of ROS non-contributory\par \par Patient is single, lives with her sister, parents, daughter. \par She has a 28yo son and a 26yo daughter.\par She is unemployed. \par \par PMD: Dr. Valentino Comer ([phone])\par \par I-Stop Ref#: [id number]

## 2023-06-28 NOTE — ASU DISCHARGE PLAN (ADULT/PEDIATRIC) - ***IN THE EVENT THAT YOU DEVELOP A COMPLICATION AND YOU ARE UNABLE TO REACH YOUR OWN PHYSICIAN, YOU MAY CONTACT:
Statement Selected Eucrisa Counseling: Patient may experience a mild burning sensation during topical application. Eucrisa is not approved in children less than 3 months of age.

## 2023-07-08 ENCOUNTER — APPOINTMENT (OUTPATIENT)
Dept: NUCLEAR MEDICINE | Facility: IMAGING CENTER | Age: 48
End: 2023-07-08

## 2023-07-08 ENCOUNTER — OUTPATIENT (OUTPATIENT)
Dept: OUTPATIENT SERVICES | Facility: HOSPITAL | Age: 48
LOS: 1 days | End: 2023-07-08
Payer: MEDICAID

## 2023-07-08 ENCOUNTER — APPOINTMENT (OUTPATIENT)
Dept: NUCLEAR MEDICINE | Facility: IMAGING CENTER | Age: 48
End: 2023-07-08
Payer: MEDICAID

## 2023-07-08 DIAGNOSIS — Z96.0 PRESENCE OF UROGENITAL IMPLANTS: Chronic | ICD-10-CM

## 2023-07-08 DIAGNOSIS — C53.9 MALIGNANT NEOPLASM OF CERVIX UTERI, UNSPECIFIED: ICD-10-CM

## 2023-07-08 PROCEDURE — 78815 PET IMAGE W/CT SKULL-THIGH: CPT | Mod: 26,PS

## 2023-07-08 PROCEDURE — A9552: CPT

## 2023-07-08 PROCEDURE — 78815 PET IMAGE W/CT SKULL-THIGH: CPT

## 2023-07-12 ENCOUNTER — OUTPATIENT (OUTPATIENT)
Dept: OUTPATIENT SERVICES | Facility: HOSPITAL | Age: 48
LOS: 1 days | End: 2023-07-12
Payer: MEDICAID

## 2023-07-12 ENCOUNTER — APPOINTMENT (OUTPATIENT)
Dept: UROLOGY | Facility: CLINIC | Age: 48
End: 2023-07-12
Payer: MEDICAID

## 2023-07-12 ENCOUNTER — APPOINTMENT (OUTPATIENT)
Dept: GERIATRICS | Facility: CLINIC | Age: 48
End: 2023-07-12
Payer: MEDICAID

## 2023-07-12 VITALS
BODY MASS INDEX: 30.9 KG/M2 | SYSTOLIC BLOOD PRESSURE: 113 MMHG | HEART RATE: 90 BPM | RESPIRATION RATE: 16 BRPM | WEIGHT: 179.99 LBS | TEMPERATURE: 97 F | OXYGEN SATURATION: 96 % | DIASTOLIC BLOOD PRESSURE: 78 MMHG

## 2023-07-12 DIAGNOSIS — R35.0 FREQUENCY OF MICTURITION: ICD-10-CM

## 2023-07-12 DIAGNOSIS — Z96.0 PRESENCE OF UROGENITAL IMPLANTS: Chronic | ICD-10-CM

## 2023-07-12 PROCEDURE — 76775 US EXAM ABDO BACK WALL LIM: CPT | Mod: 26

## 2023-07-12 PROCEDURE — 99213 OFFICE O/P EST LOW 20 MIN: CPT | Mod: 25

## 2023-07-12 PROCEDURE — 99214 OFFICE O/P EST MOD 30 MIN: CPT

## 2023-07-12 PROCEDURE — 76775 US EXAM ABDO BACK WALL LIM: CPT

## 2023-07-12 RX ORDER — FUROSEMIDE 20 MG/1
20 TABLET ORAL DAILY
Qty: 30 | Refills: 0 | Status: DISCONTINUED | COMMUNITY
Start: 2022-08-02 | End: 2023-07-12

## 2023-07-12 NOTE — ASSESSMENT
[FreeTextEntry1] : 48yoF with:\par \par # Cervical Cancer - s/p weekly Cisplatin + EBRT, completed 11/2021. s/p R ureteral stent placement 6/2022. Follow up with Med Onc, Rad Onc. \par \par # Low back pain/ b/l LE pain with radiculopathy, L>R - Appreciate Neuro-Oncology input on this challenging case with running dx of platinum induced neuropathy with possible radiation toxicity causing her weakness. \par  Appreciate the input of additional consultants - Physiatry, Neurosurgery and Neurology. \par - C/w MS ER 15mg TID, c/w PRN Oxycodone IR 10mg, Methocarbamol 500mg TID.  C/w gabapentin 400mg BID. \par \par # Depression/Anxiety - C/w Sertraline 50mg QD.   C/w PRN alprazolam .25mg for anxiety attacks.  Provided extensive emotional support and validation of her worries. c/w behavioral health follow up.\par \par # Sexual Health - Pt seeing Dr. Paramjit Carver. Consultation appreciated.\par \par # Encounter for Palliative Care - Emotional support provided.\par \par Follow up in 6 weeks, call sooner with questions or issues.

## 2023-07-12 NOTE — HISTORY OF PRESENT ILLNESS
[FreeTextEntry1] : 48yoF with h/o cervical adenocarcinoma presents for follow-up palliative care visit, referred by Oncology.  \horacio alves Patient initially diagnosed with cervical cancer 5/2021. Underwent weekly cisplatin and pelvic RT 4500cgy 25 fx plus parametrial boost 540cgy 3 fx and 4 brachytherapy procedures (hybrid therapy) 700cgy x 4.\horacio alves Patient was recently hospitalized at [facility] (1/26 - 2/1/22) after presenting with 3 weeks of worsening b/l hip/lower back pain and feet numbness. Pain is sharp in nature, radiating down the lateral thighs, with mild numbness on dorsal sides of feet. Symptoms initially improved with Aleve, however noted blood tinged vaginal discharge and blood clots with urination not related to menstrual periods so stopped taking Aleve. Hip pain and feet numbness progressively worsened, to the point that she cannot walk or lie supine. Pain is mildly alleviated when lying prone, and better with activity. Pt presented to ER in [place] 2 weeks prior to admission due to these symptoms, CT spine non-con showed multiple non-obstructing nephrolithiasis in L kidney but no fractures, deformities, subluxation were noted. She was discharged with gabapentin 300mg BID, Methocarbamol 500mg TID, and Meloxicam 15mg. She is referred today for continuation of pain management. \horacio alves Patient states that she developed pain in her L buttock approximately 6 weeks ago. It started as a pinching pain in her L buttock. It worsened to the point that the pain felt as if someone where punching her in the area.  Standing up and walking helped to relieve the pain a bit.  She found that she was pacing a lot in her home to help the pain. \horacio alves She was advised to use Acetaminophen 1000mg, was using it about twice daily.   This initially helped a bit. The pain progressed, eventually affecting both left and right. Associated with tingling in her toes. Was advised to use Aleve BID. Was advised to increase to 2 pills BID which caused her vaginal bleeding, she stopped. long alves Was in [place] at the time and was sent to the hospital where she states she received a steroid shot to her back. This helped her very much for a while. She was also prescribed a lidocaine 5% patch there but this was not approved by her insurance. Pain came back and was intense.  She returned to [place] on 1/24 and presented to [facility] on 1/26 at which time she was admitted to [facility]. \par \par Pt states that numbness is worse on L side, from dorsal foot to lateral mid-calf. Feels like it is "freezing" and must keep socks on. Also notes cramping intermittently in the toes. Pt also noticed that not all of her urine will come out, and must put pressure to completely relieve herself\par \par When she walks her low back pain gets better but her legs/feet feel tight and crampy. \par She feels weakness in her legs, lifting her legs is difficult, moving her toes is difficult.  She describes a sensation of coldness of her toes. \par \par \par Interval Hx (7/12/23): \par Patient seen via telemedicine for palliative medicine follow up. \par Pain levels have remained overall stable. \par Mood has been better on sertraline 50mg daily. \par She does continue to experience cramping and stiffness in her hands and feet. Additionally she reports constant pain along the balls of her feet. She uses a cane for assistance with ambulation.  Has been off of furosemide for about a month and the swelling has not come back. Anxiety is controlled with PRN alprazolam which she uses sparingly. \par \par Current pain regimen:\par MS ER 15mg TID\par Oxycodone IR 5-10mg PRN \par Gabapentin 400mg BID\par Methocarbamol 500mg TID\par Tylenol 1000mg PRN \par \par ROS:\par +constipation - uses senna daily, PRN Miralax\par +some nausea, no vomiting - improved\par +appetite has been OK. \par +mood is low, she feels unmotivated to leave her bedroom. Having marital issues as well.\par +nocturnal leg cramping- massaging helps\par Remainder of ROS non-contributory\par \par Patient is single, lives with her sister, parents, daughter. \par She has a 28yo son and a 26yo daughter.\par She is unemployed. \par \par PMD: Dr. Valentino Comer ([phone])\par \par I-Stop Ref#: [id number]

## 2023-07-12 NOTE — PHYSICAL EXAM
[General Appearance - Alert] : alert [General Appearance - In No Acute Distress] : in no acute distress [Oriented To Time, Place, And Person] : oriented to person, place, and time [FreeTextEntry1] : intermittently tearful [Sclera] : the sclera and conjunctiva were normal [Normal Oral Mucosa] : normal oral mucosa [Neck Appearance] : the appearance of the neck was normal [] : no respiratory distress [Auscultation Breath Sounds / Voice Sounds] : lungs were clear to auscultation bilaterally [Heart Rate And Rhythm] : heart rate was normal and rhythm regular [Heart Sounds] : normal S1 and S2 [Edema] : there was no peripheral edema [Bowel Sounds] : normal bowel sounds [Abdomen Soft] : soft [Skin Color & Pigmentation] : normal skin color and pigmentation

## 2023-07-13 ENCOUNTER — APPOINTMENT (OUTPATIENT)
Dept: HEMATOLOGY ONCOLOGY | Facility: CLINIC | Age: 48
End: 2023-07-13
Payer: MEDICAID

## 2023-07-13 PROCEDURE — 90832 PSYTX W PT 30 MINUTES: CPT | Mod: 95

## 2023-07-14 LAB — BACTERIA UR CULT: NORMAL

## 2023-07-18 NOTE — HISTORY OF PRESENT ILLNESS
[None] : None [FreeTextEntry1] : 48 yr old female presents to follow up with kidney stones and hydronephrosis. \par \par Pt with h/o right ureteral obstruction, previously tandem stents managed. \par \par s/p underwent bilateral URS with removal of stone on left, removal of right stent as ureter was patent on 02/27/23\par \par Stone analysis: 80% Calcium phosphate (apatite)\par 10% Calcium phosphate (brushite)\par 10% Calcium oxalate dihydrate\par \par Doing well at this time.

## 2023-07-18 NOTE — ASSESSMENT
[FreeTextEntry1] : Renal US reviewed: Mild to moderate hydronephrosis visualized bilaterally. Right kidney stents visualized.  Both kidneys are normal in size and echogenicity without solid masses present. No definite stone on left- compared to PET scan which shows parenchymal calcification left.\par \par Diet modification reviewed at length- increasing fluids (primarily water), citrus is good, and decreasing/moderating salt, animal flesh protein, oxalate containing foods, and moderation of calcium intake (1000 mg/day is USRDA).\par \par I reviewed with the patient the risks of metabolic stone disease given their underlying risk parameters (all of which include large stones, multiple stones, bilateral stones, family history, and young age), and the indications for 24 hour urine metabolic assessment.\par \par We also discussed benefits of regular exercise and weight loss as independent risk reducers for stones.\par \par plan \par 1) right kidney stent exchange in OR\par 2) 24 hr urine- review by Telehealth  to assess for stone risks\par \par

## 2023-07-18 NOTE — PHYSICAL EXAM
[General Appearance - Well Developed] : well developed [Skin Color & Pigmentation] : normal skin color and pigmentation [] : no respiratory distress [Oriented To Time, Place, And Person] : oriented to person, place, and time [Normal Station and Gait] : the gait and station were normal for the patient's age [Abdomen Soft] : soft [Abdomen Tenderness] : non-tender [Costovertebral Angle Tenderness] : no ~M costovertebral angle tenderness [Edema] : no peripheral edema [No Focal Deficits] : no focal deficits

## 2023-07-19 DIAGNOSIS — N13.5 CROSSING VESSEL AND STRICTURE OF URETER WITHOUT HYDRONEPHROSIS: ICD-10-CM

## 2023-07-19 DIAGNOSIS — N13.30 UNSPECIFIED HYDRONEPHROSIS: ICD-10-CM

## 2023-07-19 DIAGNOSIS — N20.0 CALCULUS OF KIDNEY: ICD-10-CM

## 2023-07-26 ENCOUNTER — NON-APPOINTMENT (OUTPATIENT)
Age: 48
End: 2023-07-26

## 2023-08-01 ENCOUNTER — RX RENEWAL (OUTPATIENT)
Age: 48
End: 2023-08-01

## 2023-08-08 ENCOUNTER — OUTPATIENT (OUTPATIENT)
Dept: OUTPATIENT SERVICES | Facility: HOSPITAL | Age: 48
LOS: 1 days | End: 2023-08-08
Payer: MEDICAID

## 2023-08-08 VITALS
HEIGHT: 64 IN | SYSTOLIC BLOOD PRESSURE: 124 MMHG | HEART RATE: 80 BPM | RESPIRATION RATE: 20 BRPM | OXYGEN SATURATION: 98 % | WEIGHT: 177.91 LBS | TEMPERATURE: 98 F | DIASTOLIC BLOOD PRESSURE: 82 MMHG

## 2023-08-08 DIAGNOSIS — N13.5 CROSSING VESSEL AND STRICTURE OF URETER WITHOUT HYDRONEPHROSIS: ICD-10-CM

## 2023-08-08 DIAGNOSIS — Z96.0 PRESENCE OF UROGENITAL IMPLANTS: Chronic | ICD-10-CM

## 2023-08-08 DIAGNOSIS — Z01.818 ENCOUNTER FOR OTHER PREPROCEDURAL EXAMINATION: ICD-10-CM

## 2023-08-08 DIAGNOSIS — N13.30 UNSPECIFIED HYDRONEPHROSIS: ICD-10-CM

## 2023-08-08 DIAGNOSIS — I10 ESSENTIAL (PRIMARY) HYPERTENSION: ICD-10-CM

## 2023-08-08 DIAGNOSIS — E11.9 TYPE 2 DIABETES MELLITUS WITHOUT COMPLICATIONS: ICD-10-CM

## 2023-08-08 LAB
A1C WITH ESTIMATED AVERAGE GLUCOSE RESULT: 6.9 % — HIGH (ref 4–5.6)
ANION GAP SERPL CALC-SCNC: 13 MMOL/L — SIGNIFICANT CHANGE UP (ref 5–17)
BUN SERPL-MCNC: 28 MG/DL — HIGH (ref 7–23)
CALCIUM SERPL-MCNC: 8.8 MG/DL — SIGNIFICANT CHANGE UP (ref 8.4–10.5)
CHLORIDE SERPL-SCNC: 101 MMOL/L — SIGNIFICANT CHANGE UP (ref 96–108)
CO2 SERPL-SCNC: 23 MMOL/L — SIGNIFICANT CHANGE UP (ref 22–31)
CREAT SERPL-MCNC: 1.7 MG/DL — HIGH (ref 0.5–1.3)
EGFR: 36 ML/MIN/1.73M2 — LOW
ESTIMATED AVERAGE GLUCOSE: 151 MG/DL — HIGH (ref 68–114)
GLUCOSE SERPL-MCNC: 89 MG/DL — SIGNIFICANT CHANGE UP (ref 70–99)
HCT VFR BLD CALC: 30.4 % — LOW (ref 34.5–45)
HGB BLD-MCNC: 9.5 G/DL — LOW (ref 11.5–15.5)
MCHC RBC-ENTMCNC: 27.3 PG — SIGNIFICANT CHANGE UP (ref 27–34)
MCHC RBC-ENTMCNC: 31.3 GM/DL — LOW (ref 32–36)
MCV RBC AUTO: 87.4 FL — SIGNIFICANT CHANGE UP (ref 80–100)
NRBC # BLD: 0 /100 WBCS — SIGNIFICANT CHANGE UP (ref 0–0)
PLATELET # BLD AUTO: 419 K/UL — HIGH (ref 150–400)
POTASSIUM SERPL-MCNC: 4.2 MMOL/L — SIGNIFICANT CHANGE UP (ref 3.5–5.3)
POTASSIUM SERPL-SCNC: 4.2 MMOL/L — SIGNIFICANT CHANGE UP (ref 3.5–5.3)
RBC # BLD: 3.48 M/UL — LOW (ref 3.8–5.2)
RBC # FLD: 13.9 % — SIGNIFICANT CHANGE UP (ref 10.3–14.5)
SODIUM SERPL-SCNC: 137 MMOL/L — SIGNIFICANT CHANGE UP (ref 135–145)
WBC # BLD: 7.79 K/UL — SIGNIFICANT CHANGE UP (ref 3.8–10.5)
WBC # FLD AUTO: 7.79 K/UL — SIGNIFICANT CHANGE UP (ref 3.8–10.5)

## 2023-08-08 RX ORDER — FUROSEMIDE 40 MG
1 TABLET ORAL
Qty: 0 | Refills: 0 | DISCHARGE

## 2023-08-08 RX ORDER — LIDOCAINE HCL 20 MG/ML
0.2 VIAL (ML) INJECTION ONCE
Refills: 0 | Status: DISCONTINUED | OUTPATIENT
Start: 2023-08-14 | End: 2023-08-28

## 2023-08-08 RX ORDER — POTASSIUM CHLORIDE 20 MEQ
1 PACKET (EA) ORAL
Qty: 0 | Refills: 0 | DISCHARGE

## 2023-08-08 NOTE — H&P PST ADULT - NSICDXPASTSURGICALHX_GEN_ALL_CORE_FT
PAST SURGICAL HISTORY:  S/P cystoscopy with ureteral stent placement     S/P cystoscopy with ureteral stent placement     S/P ureteral stent placement 9/2022- left

## 2023-08-08 NOTE — H&P PST ADULT - ASSESSMENT
Airway:  normal    Mallampati-    3   Dental: Patient denies loose teeth    Activity : Walking with cane, ADL. House work , walk - 10 mts daily - limited due to neuropathy   dasi mets :4 dasi mets

## 2023-08-08 NOTE — H&P PST ADULT - NSICDXPASTMEDICALHX_GEN_ALL_CORE_FT
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

## 2023-08-08 NOTE — H&P PST ADULT - OTHER CARE PROVIDERS
Dr. Clark (Pain Management ) [phone] - Last visit 1 month ago for routine F/U Dr. Clark (Pain Management ) [phone]

## 2023-08-08 NOTE — H&P PST ADULT - ATTENDING COMMENTS
Patient seen and examined   No change in H&P   Imaging reviewed   To OR for Right ureteral stent exchange    Patient was marked and consent was signed

## 2023-08-08 NOTE — H&P PST ADULT - HISTORY OF PRESENT ILLNESS
48 yr old female with PMH DMT2, HTN, Adenocarcinoma of Cervix (Dx 5/2021) s/p Chemo and Radiation (completed 11/2021), Right hydronephrosis s/p Right ureteral stent placement (6/2022), Low back pain (followed by pain mgmt) and Chemo-induced neuropathy , presented with Urosepsis in [place] 9/15/2022- 9/18/2022 and was found to have left ureteral/kidney stones s/p ureteral stent placement of left side at that time. Pt returned to [place] s/p Ureteroscopy with Laser lithotripsy on 10/24/22, s/p Cystoscopy, Right Stent to Tandem Stent Exchange, Left Ureteroscopy with Laser and Stone Removal on 2/27/23 . Now coming in for Cystoscopy, Right tandem stent exchange on 8/14/2023.    Denies Recent travel, Exposure or Covid symptoms

## 2023-08-10 LAB
CULTURE RESULTS: SIGNIFICANT CHANGE UP
SPECIMEN SOURCE: SIGNIFICANT CHANGE UP

## 2023-08-13 ENCOUNTER — TRANSCRIPTION ENCOUNTER (OUTPATIENT)
Age: 48
End: 2023-08-13

## 2023-08-14 ENCOUNTER — OUTPATIENT (OUTPATIENT)
Dept: OUTPATIENT SERVICES | Facility: HOSPITAL | Age: 48
LOS: 1 days | End: 2023-08-14
Payer: MEDICAID

## 2023-08-14 ENCOUNTER — TRANSCRIPTION ENCOUNTER (OUTPATIENT)
Age: 48
End: 2023-08-14

## 2023-08-14 ENCOUNTER — APPOINTMENT (OUTPATIENT)
Dept: UROLOGY | Facility: HOSPITAL | Age: 48
End: 2023-08-14

## 2023-08-14 VITALS
WEIGHT: 177.91 LBS | OXYGEN SATURATION: 99 % | SYSTOLIC BLOOD PRESSURE: 122 MMHG | HEART RATE: 86 BPM | TEMPERATURE: 98 F | RESPIRATION RATE: 15 BRPM | HEIGHT: 64.02 IN | DIASTOLIC BLOOD PRESSURE: 83 MMHG

## 2023-08-14 VITALS
DIASTOLIC BLOOD PRESSURE: 68 MMHG | OXYGEN SATURATION: 95 % | HEART RATE: 64 BPM | SYSTOLIC BLOOD PRESSURE: 102 MMHG | TEMPERATURE: 98 F

## 2023-08-14 DIAGNOSIS — Z96.0 PRESENCE OF UROGENITAL IMPLANTS: Chronic | ICD-10-CM

## 2023-08-14 DIAGNOSIS — N13.5 CROSSING VESSEL AND STRICTURE OF URETER WITHOUT HYDRONEPHROSIS: ICD-10-CM

## 2023-08-14 LAB
GLUCOSE BLDC GLUCOMTR-MCNC: 111 MG/DL — HIGH (ref 70–99)
GLUCOSE BLDC GLUCOMTR-MCNC: 151 MG/DL — HIGH (ref 70–99)

## 2023-08-14 PROCEDURE — 80048 BASIC METABOLIC PNL TOTAL CA: CPT

## 2023-08-14 PROCEDURE — 85027 COMPLETE CBC AUTOMATED: CPT

## 2023-08-14 PROCEDURE — 87086 URINE CULTURE/COLONY COUNT: CPT

## 2023-08-14 PROCEDURE — 52332 CYSTOSCOPY AND TREATMENT: CPT | Mod: RT

## 2023-08-14 PROCEDURE — 83036 HEMOGLOBIN GLYCOSYLATED A1C: CPT

## 2023-08-14 PROCEDURE — G0463: CPT

## 2023-08-14 PROCEDURE — 74420 UROGRAPHY RTRGR +-KUB: CPT | Mod: 26

## 2023-08-14 PROCEDURE — C1769: CPT

## 2023-08-14 PROCEDURE — C1758: CPT

## 2023-08-14 PROCEDURE — 82962 GLUCOSE BLOOD TEST: CPT

## 2023-08-14 PROCEDURE — C2625: CPT

## 2023-08-14 PROCEDURE — 76000 FLUOROSCOPY <1 HR PHYS/QHP: CPT

## 2023-08-14 DEVICE — STENT URET 7FR 24CM: Type: IMPLANTABLE DEVICE | Site: RIGHT | Status: FUNCTIONAL

## 2023-08-14 DEVICE — URETERAL CATH OPEN END 5FR 70CM: Type: IMPLANTABLE DEVICE | Site: RIGHT | Status: FUNCTIONAL

## 2023-08-14 DEVICE — GUIDEWIRE SENSOR DUAL-FLEX NITINOL STRAIGHT .035" X 150CM: Type: IMPLANTABLE DEVICE | Site: RIGHT | Status: FUNCTIONAL

## 2023-08-14 RX ORDER — FENTANYL CITRATE 50 UG/ML
25 INJECTION INTRAVENOUS
Refills: 0 | Status: DISCONTINUED | OUTPATIENT
Start: 2023-08-14 | End: 2023-08-14

## 2023-08-14 RX ORDER — SODIUM CHLORIDE 9 MG/ML
3 INJECTION INTRAMUSCULAR; INTRAVENOUS; SUBCUTANEOUS EVERY 8 HOURS
Refills: 0 | Status: DISCONTINUED | OUTPATIENT
Start: 2023-08-14 | End: 2023-08-14

## 2023-08-14 RX ORDER — FENTANYL CITRATE 50 UG/ML
50 INJECTION INTRAVENOUS ONCE
Refills: 0 | Status: DISCONTINUED | OUTPATIENT
Start: 2023-08-14 | End: 2023-08-14

## 2023-08-14 RX ORDER — CEFAZOLIN SODIUM 1 G
2000 VIAL (EA) INJECTION ONCE
Refills: 0 | Status: COMPLETED | OUTPATIENT
Start: 2023-08-14 | End: 2023-08-14

## 2023-08-14 RX ORDER — SODIUM CHLORIDE 9 MG/ML
1000 INJECTION, SOLUTION INTRAVENOUS
Refills: 0 | Status: DISCONTINUED | OUTPATIENT
Start: 2023-08-14 | End: 2023-08-28

## 2023-08-14 RX ORDER — ONDANSETRON 8 MG/1
4 TABLET, FILM COATED ORAL ONCE
Refills: 0 | Status: DISCONTINUED | OUTPATIENT
Start: 2023-08-14 | End: 2023-08-14

## 2023-08-14 RX ORDER — GENTAMICIN SULFATE 40 MG/ML
320 VIAL (ML) INJECTION ONCE
Refills: 0 | Status: COMPLETED | OUTPATIENT
Start: 2023-08-14 | End: 2023-08-14

## 2023-08-14 RX ADMIN — Medication 316 MILLIGRAM(S): at 11:28

## 2023-08-14 NOTE — PRE-ANESTHESIA EVALUATION ADULT - NSANTHAIRWAYFT_ENT_ALL_CORE
normal oral opening  full rom neck  TMD 3 FB normal oral opening  full rom neck  TMD 3 FB  front lower tooth noted to be slightly loose by patient, risks of possible increased looseness, loss of tooth, aspiration d/w patient patient agrees to proceed

## 2023-08-14 NOTE — ASU DISCHARGE PLAN (ADULT/PEDIATRIC) - NS MD DC FALL RISK RISK
For information on Fall & Injury Prevention, visit: [email or web address] OR  [email or web address] OR  https://www.cdc.gov/steadi/patient.html

## 2023-08-14 NOTE — ASU PATIENT PROFILE, ADULT - FALL HARM RISK - HARM RISK INTERVENTIONS

## 2023-08-14 NOTE — BRIEF OPERATIVE NOTE - NSICDXBRIEFPROCEDURE_GEN_ALL_CORE_FT
PROCEDURES:  Cystoscopy with replacement of ureteral stent 14-Aug-2023 11:06:13 tandem stent exchange Roland Singh

## 2023-08-14 NOTE — ASU DISCHARGE PLAN (ADULT/PEDIATRIC) - ASU DC SPECIAL INSTRUCTIONSFT
It is common to have blood in the urine after your procedure.  It may be pink or even red; inform your doctor if you have a significant amount of clots in the urine or if you are unable to void at all.  -It is not uncommon to have some burning when you urinate, this will improve over the next few days.  -You have an internal stent (a hollow tube that runs from the kidney to your bladder) after your procedure, helping your kidney drain down to your bladder after your surgery.  Some patients do not notice that they have a stent, while others complain of the sensation of needing to urinate frequently, burning on urination, or even some back pain (especially when they go to urinate). These sensations usually improve gradually, some faster than others. This is not uncommon, but may initially warrant the use of the pain medication which you were prescribed.  While the stent is in place, your urine may continue to be bloody. This stent is temporary and must be removed/exchanged by your urologist.  -Provided that you are not restricted with fluids by your physician, you should drink 6-8 (8 oz.) glasses of fluid per day.  -You may resume your regular diet and regular medication regimen.    -You may shower or bathe.    -You may take over the counter pain medications such as Motrin and Tylenol as needed for pain.  Do not exceed 4 grams of Tylenol daily.  Each medication may be taken every 6 hours.  You may alternate these medications such that you take either one every 3 hours.  If you have severe pain that does not improve with the pain medication or you have persistent vomiting, call your doctor.  -Please complete the course of the antibiotic you were prescribed prior to surgery   -As you have just underwent general anesthesia, you should refrain from driving, heavy lifting, smoking, alcohol consumption, or important decision making for the next 24 hours.  You may climb stairs and you may resume sexual activity.  -Call your physician if you have a fever over 101F.  -Make a follow up appointment for with your urologist when you arrive home (or the next business day).  -Call your urologist during normal business hours with any other routine questions.

## 2023-08-14 NOTE — ASU PREOP CHECKLIST - BP NONINVASIVE DIASTOLIC (MM HG)
83 Retention Suture Text: Retention sutures were placed to support the closure and prevent dehiscence.

## 2023-08-21 PROBLEM — N13.30 UNSPECIFIED HYDRONEPHROSIS: Chronic | Status: ACTIVE | Noted: 2023-08-08

## 2023-08-21 PROBLEM — M79.2 NEURALGIA AND NEURITIS, UNSPECIFIED: Chronic | Status: ACTIVE | Noted: 2023-08-08

## 2023-08-23 ENCOUNTER — RX RENEWAL (OUTPATIENT)
Age: 48
End: 2023-08-23

## 2023-09-17 ENCOUNTER — TRANSCRIPTION ENCOUNTER (OUTPATIENT)
Age: 48
End: 2023-09-17

## 2023-09-18 ENCOUNTER — INPATIENT (INPATIENT)
Facility: HOSPITAL | Age: 48
LOS: 0 days | Discharge: HOME | End: 2023-09-19
Attending: STUDENT IN AN ORGANIZED HEALTH CARE EDUCATION/TRAINING PROGRAM | Admitting: STUDENT IN AN ORGANIZED HEALTH CARE EDUCATION/TRAINING PROGRAM
Payer: MEDICAID

## 2023-09-18 VITALS
SYSTOLIC BLOOD PRESSURE: 156 MMHG | DIASTOLIC BLOOD PRESSURE: 97 MMHG | RESPIRATION RATE: 17 BRPM | WEIGHT: 179.9 LBS | HEIGHT: 64 IN | TEMPERATURE: 99 F | OXYGEN SATURATION: 100 % | HEART RATE: 87 BPM

## 2023-09-18 DIAGNOSIS — N13.30 UNSPECIFIED HYDRONEPHROSIS: ICD-10-CM

## 2023-09-18 DIAGNOSIS — M79.606 PAIN IN LEG, UNSPECIFIED: ICD-10-CM

## 2023-09-18 DIAGNOSIS — E11.8 TYPE 2 DIABETES MELLITUS WITH UNSPECIFIED COMPLICATIONS: ICD-10-CM

## 2023-09-18 DIAGNOSIS — R93.5 ABNORMAL FINDINGS ON DIAGNOSTIC IMAGING OF OTHER ABDOMINAL REGIONS, INCLUDING RETROPERITONEUM: ICD-10-CM

## 2023-09-18 DIAGNOSIS — N17.9 ACUTE KIDNEY FAILURE, UNSPECIFIED: ICD-10-CM

## 2023-09-18 DIAGNOSIS — Z96.0 PRESENCE OF UROGENITAL IMPLANTS: Chronic | ICD-10-CM

## 2023-09-18 DIAGNOSIS — I10 ESSENTIAL (PRIMARY) HYPERTENSION: ICD-10-CM

## 2023-09-18 DIAGNOSIS — Z85.41 PERSONAL HISTORY OF MALIGNANT NEOPLASM OF CERVIX UTERI: ICD-10-CM

## 2023-09-18 LAB
ALBUMIN SERPL ELPH-MCNC: 3.4 G/DL — SIGNIFICANT CHANGE UP (ref 3.3–5)
ALP SERPL-CCNC: 191 U/L — HIGH (ref 40–120)
ALT FLD-CCNC: 23 U/L — SIGNIFICANT CHANGE UP (ref 12–78)
ANION GAP SERPL CALC-SCNC: 8 MMOL/L — SIGNIFICANT CHANGE UP (ref 5–17)
APPEARANCE UR: ABNORMAL
APTT BLD: 29 SEC — SIGNIFICANT CHANGE UP (ref 24.5–35.6)
AST SERPL-CCNC: 12 U/L — LOW (ref 15–37)
BACTERIA # UR AUTO: ABNORMAL
BASOPHILS # BLD AUTO: 0.02 K/UL — SIGNIFICANT CHANGE UP (ref 0–0.2)
BASOPHILS NFR BLD AUTO: 0.2 % — SIGNIFICANT CHANGE UP (ref 0–2)
BILIRUB SERPL-MCNC: 0.2 MG/DL — SIGNIFICANT CHANGE UP (ref 0.2–1.2)
BILIRUB UR-MCNC: NEGATIVE — SIGNIFICANT CHANGE UP
BLD GP AB SCN SERPL QL: SIGNIFICANT CHANGE UP
BUN SERPL-MCNC: 19 MG/DL — SIGNIFICANT CHANGE UP (ref 7–23)
CALCIUM SERPL-MCNC: 9.4 MG/DL — SIGNIFICANT CHANGE UP (ref 8.5–10.1)
CHLORIDE SERPL-SCNC: 105 MMOL/L — SIGNIFICANT CHANGE UP (ref 96–108)
CO2 SERPL-SCNC: 25 MMOL/L — SIGNIFICANT CHANGE UP (ref 22–31)
COLOR SPEC: YELLOW — SIGNIFICANT CHANGE UP
CREAT SERPL-MCNC: 1.35 MG/DL — HIGH (ref 0.5–1.3)
DIFF PNL FLD: ABNORMAL
EGFR: 48 ML/MIN/1.73M2 — LOW
EOSINOPHIL # BLD AUTO: 0.17 K/UL — SIGNIFICANT CHANGE UP (ref 0–0.5)
EOSINOPHIL NFR BLD AUTO: 1.6 % — SIGNIFICANT CHANGE UP (ref 0–6)
EPI CELLS # UR: ABNORMAL
GLUCOSE BLDC GLUCOMTR-MCNC: 146 MG/DL — HIGH (ref 70–99)
GLUCOSE BLDC GLUCOMTR-MCNC: 174 MG/DL — HIGH (ref 70–99)
GLUCOSE SERPL-MCNC: 202 MG/DL — HIGH (ref 70–99)
GLUCOSE UR QL: 50 MG/DL
HCG SERPL-ACNC: 2 MIU/ML — SIGNIFICANT CHANGE UP
HCT VFR BLD CALC: 34 % — LOW (ref 34.5–45)
HGB BLD-MCNC: 11.6 G/DL — SIGNIFICANT CHANGE UP (ref 11.5–15.5)
IMM GRANULOCYTES NFR BLD AUTO: 0.5 % — SIGNIFICANT CHANGE UP (ref 0–0.9)
INR BLD: 0.88 RATIO — SIGNIFICANT CHANGE UP (ref 0.85–1.18)
KETONES UR-MCNC: NEGATIVE — SIGNIFICANT CHANGE UP
LEUKOCYTE ESTERASE UR-ACNC: ABNORMAL
LYMPHOCYTES # BLD AUTO: 1.53 K/UL — SIGNIFICANT CHANGE UP (ref 1–3.3)
LYMPHOCYTES # BLD AUTO: 14.5 % — SIGNIFICANT CHANGE UP (ref 13–44)
MCHC RBC-ENTMCNC: 28.7 PG — SIGNIFICANT CHANGE UP (ref 27–34)
MCHC RBC-ENTMCNC: 34.1 G/DL — SIGNIFICANT CHANGE UP (ref 32–36)
MCV RBC AUTO: 84.2 FL — SIGNIFICANT CHANGE UP (ref 80–100)
MONOCYTES # BLD AUTO: 0.55 K/UL — SIGNIFICANT CHANGE UP (ref 0–0.9)
MONOCYTES NFR BLD AUTO: 5.2 % — SIGNIFICANT CHANGE UP (ref 2–14)
NEUTROPHILS # BLD AUTO: 8.24 K/UL — HIGH (ref 1.8–7.4)
NEUTROPHILS NFR BLD AUTO: 78 % — HIGH (ref 43–77)
NITRITE UR-MCNC: POSITIVE
NRBC # BLD: 0 /100 WBCS — SIGNIFICANT CHANGE UP (ref 0–0)
PH UR: 6.5 — SIGNIFICANT CHANGE UP (ref 5–8)
PLATELET # BLD AUTO: 306 K/UL — SIGNIFICANT CHANGE UP (ref 150–400)
POTASSIUM SERPL-MCNC: 3.2 MMOL/L — LOW (ref 3.5–5.3)
POTASSIUM SERPL-SCNC: 3.2 MMOL/L — LOW (ref 3.5–5.3)
PROT SERPL-MCNC: 8.7 GM/DL — HIGH (ref 6–8.3)
PROT UR-MCNC: 500 MG/DL
PROTHROM AB SERPL-ACNC: 10.5 SEC — SIGNIFICANT CHANGE UP (ref 9.5–13)
RBC # BLD: 4.04 M/UL — SIGNIFICANT CHANGE UP (ref 3.8–5.2)
RBC # FLD: 15.1 % — HIGH (ref 10.3–14.5)
RBC CASTS # UR COMP ASSIST: ABNORMAL /HPF (ref 0–4)
SODIUM SERPL-SCNC: 138 MMOL/L — SIGNIFICANT CHANGE UP (ref 135–145)
SP GR SPEC: 1.01 — SIGNIFICANT CHANGE UP (ref 1.01–1.02)
UROBILINOGEN FLD QL: NEGATIVE MG/DL — SIGNIFICANT CHANGE UP
WBC # BLD: 10.56 K/UL — HIGH (ref 3.8–10.5)
WBC # FLD AUTO: 10.56 K/UL — HIGH (ref 3.8–10.5)
WBC UR QL: >50

## 2023-09-18 PROCEDURE — G1004: CPT

## 2023-09-18 PROCEDURE — 52344 CYSTO/URETERO STRICTURE TX: CPT | Mod: LT

## 2023-09-18 PROCEDURE — 52332 CYSTOSCOPY AND TREATMENT: CPT | Mod: LT

## 2023-09-18 PROCEDURE — 93010 ELECTROCARDIOGRAM REPORT: CPT

## 2023-09-18 PROCEDURE — 74420 UROGRAPHY RTRGR +-KUB: CPT | Mod: 26,59

## 2023-09-18 PROCEDURE — 99285 EMERGENCY DEPT VISIT HI MDM: CPT

## 2023-09-18 PROCEDURE — 74176 CT ABD & PELVIS W/O CONTRAST: CPT | Mod: 26,ME

## 2023-09-18 PROCEDURE — 99223 1ST HOSP IP/OBS HIGH 75: CPT

## 2023-09-18 PROCEDURE — 99223 1ST HOSP IP/OBS HIGH 75: CPT | Mod: 25

## 2023-09-18 DEVICE — URETERAL CATH OPEN END FLEXI-TIP 5FR .038" X 70CM: Type: IMPLANTABLE DEVICE | Status: FUNCTIONAL

## 2023-09-18 DEVICE — STONE BASKET ESCAPE NITINOL 4-WIRE 1.9FR X 90CM: Type: IMPLANTABLE DEVICE | Status: FUNCTIONAL

## 2023-09-18 DEVICE — GUIDEWIRE STANDARD STRAIGHT .035" X 180CM: Type: IMPLANTABLE DEVICE | Status: FUNCTIONAL

## 2023-09-18 DEVICE — URETERAL STENT PERCUFLEX PLUS 7FR 26CM: Type: IMPLANTABLE DEVICE | Status: FUNCTIONAL

## 2023-09-18 DEVICE — BALLOON CATH UROMAX ULTRA 15FR X 6CM: Type: IMPLANTABLE DEVICE | Status: FUNCTIONAL

## 2023-09-18 RX ORDER — SODIUM CHLORIDE 9 MG/ML
1000 INJECTION INTRAMUSCULAR; INTRAVENOUS; SUBCUTANEOUS ONCE
Refills: 0 | Status: COMPLETED | OUTPATIENT
Start: 2023-09-18 | End: 2023-09-18

## 2023-09-18 RX ORDER — DEXTROSE 50 % IN WATER 50 %
25 SYRINGE (ML) INTRAVENOUS ONCE
Refills: 0 | Status: DISCONTINUED | OUTPATIENT
Start: 2023-09-18 | End: 2023-09-19

## 2023-09-18 RX ORDER — SODIUM CHLORIDE 9 MG/ML
1000 INJECTION, SOLUTION INTRAVENOUS
Refills: 0 | Status: DISCONTINUED | OUTPATIENT
Start: 2023-09-18 | End: 2023-09-19

## 2023-09-18 RX ORDER — ONDANSETRON 8 MG/1
4 TABLET, FILM COATED ORAL ONCE
Refills: 0 | Status: COMPLETED | OUTPATIENT
Start: 2023-09-18 | End: 2023-09-18

## 2023-09-18 RX ORDER — KETOROLAC TROMETHAMINE 30 MG/ML
30 SYRINGE (ML) INJECTION ONCE
Refills: 0 | Status: DISCONTINUED | OUTPATIENT
Start: 2023-09-18 | End: 2023-09-18

## 2023-09-18 RX ORDER — OXYCODONE HYDROCHLORIDE 5 MG/1
10 TABLET ORAL EVERY 4 HOURS
Refills: 0 | Status: DISCONTINUED | OUTPATIENT
Start: 2023-09-18 | End: 2023-09-19

## 2023-09-18 RX ORDER — MORPHINE SULFATE 50 MG/1
4 CAPSULE, EXTENDED RELEASE ORAL EVERY 4 HOURS
Refills: 0 | Status: DISCONTINUED | OUTPATIENT
Start: 2023-09-18 | End: 2023-09-19

## 2023-09-18 RX ORDER — ONDANSETRON 8 MG/1
4 TABLET, FILM COATED ORAL ONCE
Refills: 0 | Status: DISCONTINUED | OUTPATIENT
Start: 2023-09-18 | End: 2023-09-18

## 2023-09-18 RX ORDER — SENNA PLUS 8.6 MG/1
2 TABLET ORAL AT BEDTIME
Refills: 0 | Status: DISCONTINUED | OUTPATIENT
Start: 2023-09-18 | End: 2023-09-19

## 2023-09-18 RX ORDER — SERTRALINE 25 MG/1
50 TABLET, FILM COATED ORAL DAILY
Refills: 0 | Status: DISCONTINUED | OUTPATIENT
Start: 2023-09-18 | End: 2023-09-19

## 2023-09-18 RX ORDER — MORPHINE SULFATE 50 MG/1
15 CAPSULE, EXTENDED RELEASE ORAL
Refills: 0 | Status: DISCONTINUED | OUTPATIENT
Start: 2023-09-18 | End: 2023-09-19

## 2023-09-18 RX ORDER — LANOLIN ALCOHOL/MO/W.PET/CERES
3 CREAM (GRAM) TOPICAL AT BEDTIME
Refills: 0 | Status: DISCONTINUED | OUTPATIENT
Start: 2023-09-18 | End: 2023-09-19

## 2023-09-18 RX ORDER — DEXTROSE 50 % IN WATER 50 %
15 SYRINGE (ML) INTRAVENOUS ONCE
Refills: 0 | Status: DISCONTINUED | OUTPATIENT
Start: 2023-09-18 | End: 2023-09-19

## 2023-09-18 RX ORDER — GABAPENTIN 400 MG/1
400 CAPSULE ORAL
Refills: 0 | Status: DISCONTINUED | OUTPATIENT
Start: 2023-09-18 | End: 2023-09-19

## 2023-09-18 RX ORDER — FENTANYL CITRATE 50 UG/ML
50 INJECTION INTRAVENOUS
Refills: 0 | Status: DISCONTINUED | OUTPATIENT
Start: 2023-09-18 | End: 2023-09-18

## 2023-09-18 RX ORDER — AMLODIPINE BESYLATE 2.5 MG/1
5 TABLET ORAL DAILY
Refills: 0 | Status: DISCONTINUED | OUTPATIENT
Start: 2023-09-18 | End: 2023-09-19

## 2023-09-18 RX ORDER — INSULIN LISPRO 100/ML
VIAL (ML) SUBCUTANEOUS AT BEDTIME
Refills: 0 | Status: DISCONTINUED | OUTPATIENT
Start: 2023-09-18 | End: 2023-09-19

## 2023-09-18 RX ORDER — FUROSEMIDE 40 MG
10 TABLET ORAL ONCE
Refills: 0 | Status: COMPLETED | OUTPATIENT
Start: 2023-09-18 | End: 2023-09-18

## 2023-09-18 RX ORDER — CEFTRIAXONE 500 MG/1
1000 INJECTION, POWDER, FOR SOLUTION INTRAMUSCULAR; INTRAVENOUS ONCE
Refills: 0 | Status: COMPLETED | OUTPATIENT
Start: 2023-09-18 | End: 2023-09-18

## 2023-09-18 RX ORDER — ACETAMINOPHEN 500 MG
2 TABLET ORAL
Qty: 0 | Refills: 0 | DISCHARGE

## 2023-09-18 RX ORDER — POTASSIUM CHLORIDE 20 MEQ
10 PACKET (EA) ORAL ONCE
Refills: 0 | Status: COMPLETED | OUTPATIENT
Start: 2023-09-18 | End: 2023-09-18

## 2023-09-18 RX ORDER — MORPHINE SULFATE 50 MG/1
4 CAPSULE, EXTENDED RELEASE ORAL ONCE
Refills: 0 | Status: DISCONTINUED | OUTPATIENT
Start: 2023-09-18 | End: 2023-09-18

## 2023-09-18 RX ORDER — CEFTRIAXONE 500 MG/1
1000 INJECTION, POWDER, FOR SOLUTION INTRAMUSCULAR; INTRAVENOUS EVERY 24 HOURS
Refills: 0 | Status: DISCONTINUED | OUTPATIENT
Start: 2023-09-19 | End: 2023-09-19

## 2023-09-18 RX ORDER — ACETAMINOPHEN 500 MG
650 TABLET ORAL EVERY 6 HOURS
Refills: 0 | Status: DISCONTINUED | OUTPATIENT
Start: 2023-09-18 | End: 2023-09-19

## 2023-09-18 RX ORDER — GLUCAGON INJECTION, SOLUTION 0.5 MG/.1ML
1 INJECTION, SOLUTION SUBCUTANEOUS ONCE
Refills: 0 | Status: DISCONTINUED | OUTPATIENT
Start: 2023-09-18 | End: 2023-09-19

## 2023-09-18 RX ORDER — SODIUM CHLORIDE 9 MG/ML
1000 INJECTION, SOLUTION INTRAVENOUS
Refills: 0 | Status: DISCONTINUED | OUTPATIENT
Start: 2023-09-18 | End: 2023-09-18

## 2023-09-18 RX ORDER — INSULIN LISPRO 100/ML
VIAL (ML) SUBCUTANEOUS
Refills: 0 | Status: DISCONTINUED | OUTPATIENT
Start: 2023-09-18 | End: 2023-09-19

## 2023-09-18 RX ORDER — DEXTROSE 50 % IN WATER 50 %
12.5 SYRINGE (ML) INTRAVENOUS ONCE
Refills: 0 | Status: DISCONTINUED | OUTPATIENT
Start: 2023-09-18 | End: 2023-09-19

## 2023-09-18 RX ADMIN — SODIUM CHLORIDE 100 MILLILITER(S): 9 INJECTION, SOLUTION INTRAVENOUS at 19:14

## 2023-09-18 RX ADMIN — SODIUM CHLORIDE 1000 MILLILITER(S): 9 INJECTION INTRAMUSCULAR; INTRAVENOUS; SUBCUTANEOUS at 15:43

## 2023-09-18 RX ADMIN — Medication 30 MILLIGRAM(S): at 09:54

## 2023-09-18 RX ADMIN — SODIUM CHLORIDE 125 MILLILITER(S): 9 INJECTION, SOLUTION INTRAVENOUS at 15:57

## 2023-09-18 RX ADMIN — SODIUM CHLORIDE 100 MILLILITER(S): 9 INJECTION, SOLUTION INTRAVENOUS at 18:30

## 2023-09-18 RX ADMIN — ONDANSETRON 4 MILLIGRAM(S): 8 TABLET, FILM COATED ORAL at 08:59

## 2023-09-18 RX ADMIN — Medication 100 MILLIEQUIVALENT(S): at 15:56

## 2023-09-18 RX ADMIN — SODIUM CHLORIDE 125 MILLILITER(S): 9 INJECTION, SOLUTION INTRAVENOUS at 22:35

## 2023-09-18 RX ADMIN — CEFTRIAXONE 100 MILLIGRAM(S): 500 INJECTION, POWDER, FOR SOLUTION INTRAMUSCULAR; INTRAVENOUS at 09:33

## 2023-09-18 RX ADMIN — MORPHINE SULFATE 4 MILLIGRAM(S): 50 CAPSULE, EXTENDED RELEASE ORAL at 09:59

## 2023-09-18 RX ADMIN — FENTANYL CITRATE 50 MICROGRAM(S): 50 INJECTION INTRAVENOUS at 19:14

## 2023-09-18 RX ADMIN — Medication 30 MILLIGRAM(S): at 15:42

## 2023-09-18 RX ADMIN — FENTANYL CITRATE 50 MICROGRAM(S): 50 INJECTION INTRAVENOUS at 19:29

## 2023-09-18 RX ADMIN — MORPHINE SULFATE 4 MILLIGRAM(S): 50 CAPSULE, EXTENDED RELEASE ORAL at 08:58

## 2023-09-18 RX ADMIN — SODIUM CHLORIDE 1000 MILLILITER(S): 9 INJECTION INTRAMUSCULAR; INTRAVENOUS; SUBCUTANEOUS at 08:58

## 2023-09-18 RX ADMIN — Medication 10 MILLIGRAM(S): at 18:31

## 2023-09-18 NOTE — PROGRESS NOTE ADULT - SUBJECTIVE AND OBJECTIVE BOX
Patient seen and  examined at bedside resting comfortably s/p insertion of left ureteral stent.   Patient sleeping, offering no complaints. No acute events or complaints per RN.   Schuster catheter in place.   Denies fever, chills, N/V/D, CP, SOB.     Vital Signs Last 24 Hrs  T(F): 97.9 (09-18-23 @ 20:09), Max: 98.8 (09-18-23 @ 08:14)  HR: 77 (09-18-23 @ 20:09)  BP: 134/84 (09-18-23 @ 20:09)  RR: 16 (09-18-23 @ 20:09)  SpO2: 100% (09-18-23 @ 20:09)  Wt(kg): --   CAPILLARY BLOOD GLUCOSE      POCT Blood Glucose.: 146 mg/dL (18 Sep 2023 15:31)      PHYSICAL EXAM:  GENERAL: Alert, NAD  CHEST/LUNG: Clear to auscultation bilaterally, respirations nonlabored  HEART: Regular rate and rhythm; S1 & S2 appreciated  ABDOMEN: + Bowel sounds, soft, Nontender, Nondistended  : no suprapubic tenderness or distention, schuster catheter with clear, yellow urine in tubing   EXTREMITIES:  no calf tenderness, No edema    I&O's Detail    18 Sep 2023 07:01  -  18 Sep 2023 21:19  --------------------------------------------------------  IN:    Lactated Ringers: 100 mL  Total IN: 100 mL    OUT:    Indwelling Catheter - Urethral (mL): 1100 mL  Total OUT: 1100 mL    Total NET: -1000 mL          LABS:                        11.6   10.56 )-----------( 306      ( 18 Sep 2023 08:40 )             34.0     09-18    138  |  105  |  19  ----------------------------<  202<H>  3.2<L>   |  25  |  1.35<H>    Ca    9.4      18 Sep 2023 08:40    TPro  8.7<H>  /  Alb  3.4  /  TBili  0.2  /  DBili  x   /  AST  12<L>  /  ALT  23  /  AlkPhos  191<H>  09-18    PT/INR - ( 18 Sep 2023 08:40 )   PT: 10.5 sec;   INR: 0.88 ratio         PTT - ( 18 Sep 2023 08:40 )  PTT:29.0 sec    RADIOLOGY & ADDITIONAL STUDIES:    A/P  49 yo F pmhx of cervical adenocarcinoma s/p chemo/RT, with ureteral strictures with current right ureteral stent, exchanged approx 2-3 weeks ago, and prior hx of left ureteral stent removed earlier this year with Dr Hoenig here for left flank pain.  Found to have moderate high grade left obstructive uropathy without identifiable cause on CT renal hunt.   Found with UTI, VINITA, leukocytosis. Now s/p cystoscopy, balloon left ureteroplasty, and left ureteral stent placement     - continue abx  - regular diet  - analgesia prn  - continue schuster, monitor output   - IVF, trend Cr  - f/u AM labs  - continue care per primary team  - will d/w urology attending

## 2023-09-18 NOTE — ED PROVIDER NOTE - NS ED ROS FT
General: Denies fever, chills  HEENT: Denies sensory changes, sore throat  Neck: Denies neck pain, neck stiffness  Resp: Denies coughing, SOB  Cardiovascular: Denies CP, palpitations, LE edema  GI: Denies nausea, vomiting, abdominal pain, diarrhea, constipation, blood in stool  : +dysuria, Denies hematuria, frequency, incontinence  MSK: Denies back pain  Neuro: Denies HA, dizziness, numbness, weakness  Skin: Denies rashes.

## 2023-09-18 NOTE — H&P ADULT - ASSESSMENT
48 years old female with h/o HTN, HLD, DM, cervical adenocarcinoma diagnosed in 5/2021 (Stage 2B, s/p cisplatin and RT completed in 11/2021), nephrolithiasis ( multiple urology procedure including laser lithotripsy, stent/removal of stent, most recent right ureteral stent exchange on 8/14/2023) present to ED with complain of severe left flank pain for one day. Patient reported severe left flank pain which started 6:15AM today AM, radiating to left lower back, associated with nausea. Patient denied any dysuria or hematuria to me. No fever, chills.   Hemodynamically stable, afebrile, sat well at RA. WBC 10.56, plt 306, K 3.2, Cr 1.35, glucose 202. AU with LE mod, nitrite positive, WBC > 50, mod bacteria. CT with Two parallel double-J RIGHT nephroureteral stents in situ. No RIGHT renal stones or Steinstrasse. Moderate/severe -> LEFT <- hydroureteronephrosis with perinephric/periureteral edema, developed. -> LEFT <- ureter dilated to pelvic inlet without obstructing calculus or etiology of obstruction identified. RIGHT cortical atrophy, unchanged. Millimeter size mid LEFT intrarenal calculus or calcification. Tubular left adnexal prominence      RCRI 0, 3.9% of 30-day risk of death, MI, or cardiac arrest. Patient is medically optimized for planned procedure. No further cardiac testing necessary. Continue with routine perioperative hemodynamic monitoring

## 2023-09-18 NOTE — H&P ADULT - HISTORY OF PRESENT ILLNESS
48 years old female with h/o HTN, HLD, DM, cervical adenocarcinoma diagnosed in 5/2021 (Stage 2B, s/p cisplatin and RT completed in 11/2021), nephrolithiasis ( multiple urology procedure including laser lithotripsy, stent/removal of stent, most recent right ureteral stent exchange on 8/14/2023) present to ED with complain of severe left flank pain for one day. Patient reported severe left flank pain which started 6:15AM today AM, radiating to left lower back, associated with nausea. Patient denied any dysuria or hematuria to me. No fever, chills.   Hemodynamically stable, afebrile, sat well at RA. WBC 10.56, plt 306, K 3.2, Cr 1.35, glucose 202. AU with LE mod, nitrite positive, WBC > 50, mod bacteria. CT with Two parallel double-J RIGHT nephroureteral stents in situ. No RIGHT renal stones or Steinstrasse. Moderate/severe -> LEFT <- hydroureteronephrosis with perinephric/periureteral edema, developed. -> LEFT <- ureter dilated to pelvic inlet without obstructing calculus or etiology of obstruction identified. RIGHT cortical atrophy, unchanged. Millimeter size mid LEFT intrarenal calculus or calcification. Tubular left adnexal prominence    SH: no toxic habis  FH: DM, HTN, CAD in age 60s ( father) 48 years old female with h/o HTN, HLD, DM, cervical adenocarcinoma diagnosed in 5/2021 (Stage 2B, s/p cisplatin and RT completed in 11/2021), nephrolithiasis ( multiple urology procedure including laser lithotripsy, stent/removal of stent, most recent right ureteral stent exchange on 8/14/2023) present to ED with complain of severe left flank pain for one day. Patient reported severe left flank pain which started 6:15AM today AM, radiating to left lower back, associated with nausea. Patient denied any dysuria or hematuria to me. No fever, chills.   Hemodynamically stable, afebrile, sat well at RA. WBC 10.56, plt 306, K 3.2, Cr 1.35, glucose 202. AU with LE mod, nitrite positive, WBC > 50, mod bacteria. CT with Two parallel double-J RIGHT nephroureteral stents in situ. No RIGHT renal stones or Steinstrasse. Moderate/severe -> LEFT <- hydroureteronephrosis with perinephric/periureteral edema, developed. -> LEFT <- ureter dilated to pelvic inlet without obstructing calculus or etiology of obstruction identified. RIGHT cortical atrophy, unchanged. Millimeter size mid LEFT intrarenal calculus or calcification. Tubular left adnexal prominence    SH: no toxic habits  FH: DM, HTN, CAD in age 60s ( father)

## 2023-09-18 NOTE — H&P ADULT - PROBLEM SELECTOR PLAN 2
Cr 1.35  likely due to obstructive uropathy  Monitor renal function  IV fluid  Urology on board, plan for cystoscopy with ureteral stent

## 2023-09-18 NOTE — ED ADULT NURSE NOTE - NSICDXPASTMEDICALHX_GEN_ALL_CORE_FT
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

## 2023-09-18 NOTE — BRIEF OPERATIVE NOTE - NSICDXBRIEFPROCEDURE_GEN_ALL_CORE_FT
PROCEDURES:  Cystoureteroscopy, with ureteral stricture treatment 18-Sep-2023 22:09:52  Ottoniel Snyder  Insertion, stent, double-J, ureter 18-Sep-2023 22:11:26  Ottoniel Snyder

## 2023-09-18 NOTE — H&P ADULT - NSHPPHYSICALEXAM_GEN_ALL_CORE
CONSTITUTIONAL: alert and cooperative, no acute distress  EYES: PERRL,  no scleral icterus  ENT: Mucosa moist, tongue normal.  NECK: Neck supple, trachea midline, non-tender  CARDIAC: Normal S1 and S2. Regular rate and rhythms. No murmurs. No Pedal edema. Peripheral pulses intact  LUNGS: Equal air entry both lungs. No rales, rhonchi, wheezing. Normal respiratory effort.   ABDOMEN: Soft, nondistended, nontender. No guarding or rebound tenderness. No hepatomegaly or splenomegaly. Bowel sound normal.  MUSCULOSKELETAL: Normocephalic, atraumatic. Slight left CVA tenderness+. No significant deformity or joint abnormality  NEUROLOGICAL: No gross motor or sensory deficits  SKIN: no lesions or eruptions. Normal turgor  PSYCHIATRIC: A&O x 3, appropriate mood and affect.

## 2023-09-18 NOTE — ED ADULT NURSE NOTE - NSFALLLASTSIX_ED_ALL_ED
Simponi Pregnancy And Lactation Text: The risk during pregnancy and breastfeeding is uncertain with this medication. No.

## 2023-09-18 NOTE — ED PROVIDER NOTE - OBJECTIVE STATEMENT
47 y/o F hx of ureteral stents R side, HTN, HLD, DM presents w/ L flank pain for the past 1 day. tried taking oxycodone at 615 AM today w/ minimal improvement. endorsing nausea. endorsing dysuria. denies trauma/falls. denies abdominal pain. denies chest pain/sob. states she has had kidney stone in the L side in the past w/ more severe pain but pain is less compared to that today.

## 2023-09-18 NOTE — CONSULT NOTE ADULT - SUBJECTIVE AND OBJECTIVE BOX
[facility] NEPHROLOGY SERVICES, [facility]  NEPHROLOGY AND HYPERTENSION  [address]  [address]  [place] [address]  [phone]    MD CLARE CARRERA MD YELENA ROSENBERG, MD BINNY KOSHY, MD CHRISTOPHER CAPUTO, MD EDWARD BOVER, MD      Information from chart:  "Patient is a 48y old  Female who presents with a chief complaint of severe left hydronephrosis (18 Sep 2023 15:04)    HPI:  48 years old female with h/o HTN, HLD, DM, cervical adenocarcinoma diagnosed in 2021 (Stage 2B, s/p cisplatin and RT completed in 2021), nephrolithiasis ( multiple urology procedure including laser lithotripsy, stent/removal of stent, most recent right ureteral stent exchange on 2023) present to ED with complain of severe left flank pain for one day. Patient reported severe left flank pain which started 6:15AM today AM, radiating to left lower back, associated with nausea. Patient denied any dysuria or hematuria to me. No fever, chills.   Hemodynamically stable, afebrile, sat well at RA. WBC 10.56, plt 306, K 3.2, Cr 1.35, glucose 202. AU with LE mod, nitrite positive, WBC > 50, mod bacteria. CT with Two parallel double-J RIGHT nephroureteral stents in situ. No RIGHT renal stones or Steinstrasse. Moderate/severe -> LEFT <- hydroureteronephrosis with perinephric/periureteral edema, developed. -> LEFT <- ureter dilated to pelvic inlet without obstructing calculus or etiology of obstruction identified. RIGHT cortical atrophy, unchanged. Millimeter size mid LEFT intrarenal calculus or calcification. Tubular left adnexal prominence    SH: no toxic habits  FH: DM, HTN, CAD in age 60s ( father) (18 Sep 2023 15:04)   "    PAST MEDICAL & SURGICAL HISTORY:  Hypertension      T2DM (type 2 diabetes mellitus)      H/O hemorrhoids      Anxiety disorder      GERD (gastroesophageal reflux disease)      2019 novel coronavirus disease (COVID-19)  , 2022- mild symptoms      Chemotherapy-induced neuropathy      Pedal edema      Low back pain radiating to both legs      Bilateral lumbar radiculopathy      Calculus of kidney      Cervical adenocarcinoma      Neuropathic pain due to radiation      Hydronephrosis, right      S/P ureteral stent placement  2022- left      S/P cystoscopy with ureteral stent placement      S/P cystoscopy with ureteral stent placement        FAMILY HISTORY:  FH: breast cancer      Allergies    No Known Allergies    Intolerances      Home Medications:  amLODIPine 10 mg oral tablet: 1 tab(s) orally once a day (08 Aug 2023 08:44)  gabapentin 400 mg oral capsule: 1 cap(s) orally 2 times a day (08 Aug 2023 08:44)  metFORMIN 500 mg oral tablet: 1 tab(s) orally 2 times a day (08 Aug 2023 08:44)  methocarbamol 500 mg oral tablet: 1 tab(s) orally 3 times a day (08 Aug 2023 08:44)  MS Contin 30 mg oral tablet, extended release: 0.5 tab(s) orally 3 times a day (08 Aug 2023 08:44)  nitrofurantoin macrocrystals 100 mg oral capsule: 1 cap(s) orally once a day (08 Aug 2023 08:46)  oxyCODONE 5 mg oral tablet: 2 tab(s) orally every 4 hours, As Needed (08 Aug 2023 08:44)  sertraline 50 mg oral tablet: 1 tab(s) orally once a day (08 Aug 2023 08:46)  Xanax 0.25 mg oral tablet: 1 tab(s) orally every 8 hours, As Needed - for anxiety (08 Aug 2023 08:44)    MEDICATIONS  (STANDING):  amLODIPine   Tablet 5 milliGRAM(s) Oral daily  dextrose 5%. 1000 milliLiter(s) (100 mL/Hr) IV Continuous <Continuous>  dextrose 5%. 1000 milliLiter(s) (50 mL/Hr) IV Continuous <Continuous>  dextrose 50% Injectable 12.5 Gram(s) IV Push once  dextrose 50% Injectable 25 Gram(s) IV Push once  dextrose 50% Injectable 25 Gram(s) IV Push once  gabapentin 400 milliGRAM(s) Oral two times a day  glucagon  Injectable 1 milliGRAM(s) IntraMuscular once  insulin lispro (ADMELOG) corrective regimen sliding scale   SubCutaneous three times a day before meals  insulin lispro (ADMELOG) corrective regimen sliding scale   SubCutaneous at bedtime  lactated ringers. 1000 milliLiter(s) (125 mL/Hr) IV Continuous <Continuous>  morphine ER Tablet 15 milliGRAM(s) Oral two times a day  senna 2 Tablet(s) Oral at bedtime  sertraline 50 milliGRAM(s) Oral daily    MEDICATIONS  (PRN):  acetaminophen     Tablet .. 650 milliGRAM(s) Oral every 6 hours PRN Mild Pain (1 - 3), Moderate Pain (4 - 6)  dextrose Oral Gel 15 Gram(s) Oral once PRN Blood Glucose LESS THAN 70 milliGRAM(s)/deciliter  melatonin 3 milliGRAM(s) Oral at bedtime PRN Insomnia  morphine  - Injectable 4 milliGRAM(s) IV Push every 4 hours PRN breakthrough pain  oxyCODONE    IR 10 milliGRAM(s) Oral every 4 hours PRN Severe Pain (7 - 10)    Vital Signs Last 24 Hrs  T(C): 36.6 (18 Sep 2023 15:40), Max: 37.1 (18 Sep 2023 08:14)  T(F): 97.8 (18 Sep 2023 15:40), Max: 98.8 (18 Sep 2023 08:14)  HR: 86 (18 Sep 2023 15:40) (86 - 97)  BP: 111/78 (18 Sep 2023 15:40) (111/78 - 156/97)  BP(mean): --  RR: 18 (18 Sep 2023 15:40) (17 - 18)  SpO2: 98% (18 Sep 2023 15:40) (98% - 100%)    Parameters below as of 18 Sep 2023 15:40  Patient On (Oxygen Delivery Method): room air        Daily Height in cm: 162.56 (18 Sep 2023 08:14)    Daily     CAPILLARY BLOOD GLUCOSE      POCT Blood Glucose.: 146 mg/dL (18 Sep 2023 15:31)    PHYSICAL EXAM:      T(C): 36.6 (23 @ 15:40), Max: 37.1 (23 @ 08:14)  HR: 86 (23 @ 15:40) (86 - 97)  BP: 111/78 (23 @ 15:40) (111/78 - 156/97)  RR: 18 (23 @ 15:40) (17 - 18)  SpO2: 98% (23 @ 15:40) (98% - 100%)  Wt(kg): --  Lungs clear  Heart S1S2  Abd soft NT ND  Extremities:   tr edema      Trend Bun/Cr  23 @ 08:40  BUN/CR -  19 / 1.35<H>  23 @ 10:19  BUN/CR -  28<H> / 1.70<H>  23 @ 16:51  BUN/CR -  15 / 0.94  10-11-22 @ 13:04  BUN/CR -  31<H> / 0.86  22 @ 12:29  BUN/CR -  12 / 0.90  22 @ 20:46  BUN/CR -  20 / 1.48<H>  22 @ 06:54  BUN/CR -  10 / 0.65  22 @ 07:10  BUN/CR -  11 / 0.60  22 @ 09:15  BUN/CR -  12 / 0.68  22 @ 06:35  BUN/CR -  12 / 0.72  22 @ 19:30  BUN/CR -  12 / 0.67  10-08-21 @ 17:39  BUN/CR -  16 / 0.74              138  |  105  |  19  ----------------------------<  202<H>  3.2<L>   |  25  |  1.35<H>    Ca    9.4      18 Sep 2023 08:40    TPro  8.7<H>  /  Alb  3.4  /  TBili  0.2  /  DBili  x   /  AST  12<L>  /  ALT  23  /  AlkPhos  191<H>                            11.6   10.56 )-----------( 306      ( 18 Sep 2023 08:40 )             34.0     Creatinine Trend: 1.35<--  Urinalysis Basic - ( 18 Sep 2023 08:40 )    Color: Yellow / Appearance: Slightly Turbid / S.010 / pH: x  Gluc: 202 mg/dL / Ketone: Negative  / Bili: Negative / Urobili: Negative mg/dL   Blood: x / Protein: 500 mg/dL / Nitrite: Positive   Leuk Esterase: Moderate / RBC: 6-10 /HPF / WBC >50   Sq Epi: x / Non Sq Epi: x / Bacteria: Moderate      < from: CT Renal Stone Hunt (23 @ 09:20) >  IMPRESSION:  1.  Moderate/high-grade LEFT obstructive uropathy without identifiable   cause, developed from prior exams. Consider contrast CT w/delayed   imaging/CT urogram  2.  Tubular LEFT adnexal prominence. Ultrasound correlation recommended      < end of copied text >          Assessment   VINITA suspected related to left hydronephrosis;  Acute on chronic, hx of left ureteral stent, removed  Right hydronephrosis post stent placement     Plan  Urology evaluation appreciated;   Empiric IV abx  For OR,, stent placement   Will follow course    Carlyle Sharma MD

## 2023-09-18 NOTE — CONSULT NOTE ADULT - SUBJECTIVE AND OBJECTIVE BOX
HPI:  49 yo F pmhx htn, dm, stage 2 cervical adenocarcinoma s/p radiation and chemo , in remission, with ureteral strictures with hx of bilateral stents placement since  presented to er for left flank pain since 530 am today. + nausea, denies any vomiting fevers, chills, dysuria, hematuria. has chronic urinary frequency, not acutely worse. pt is on macrobid qd for infection ppx, ran out of medications 3 days ago. pt follows with Uro-oncology thru [facility] Dr Hoenig. right ureteral stent exchanged 2-3 weeks ago, left ureteral stent placed and removed earlier this year. no other urological procedures besides cystoscopy with stents. no abd surgeries      PAST MEDICAL & SURGICAL HISTORY:  Hypertension  T2DM (type 2 diabetes mellitus)  H/O hemorrhoids  Anxiety disorder  GERD (gastroesophageal reflux disease)  stage 2 cervical cancer s/p chemo and radiation, remission since 2021  Chemotherapy-induced neuropathy  Low back pain radiating to both legs  Bilateral lumbar radiculopathy  Calculus of kidney  Cervical adenocarcinoma  Neuropathic pain due to radiation  Hydronephrosis, right  S/P ureteral stent placement  2022- left  S/P cystoscopy with ureteral stent placement  S/P cystoscopy with ureteral stent placement        Review of Systems:  Negative except  as above in HPI    MEDICATIONS  (STANDING):    MEDICATIONS  (PRN):      Allergies  No Known Allergies      SOCIAL HISTORY: never smoker, no etoh use, unemployed, lives with family           FAMILY HISTORY:  father hx of CAD with PCI  mother hx of CVA         Vital Signs Last 24 Hrs  T(C): 36.6 (18 Sep 2023 11:00), Max: 37.1 (18 Sep 2023 08:14)  T(F): 97.8 (18 Sep 2023 11:00), Max: 98.8 (18 Sep 2023 08:14)  HR: 97 (18 Sep 2023 11:00) (87 - 97)  BP: 118/73 (18 Sep 2023 11:00) (118/73 - 156/97)  BP(mean): --  RR: 18 (18 Sep 2023 11:00) (17 - 18)  SpO2: 98% (18 Sep 2023 11:00) (98% - 100%)    Parameters below as of 18 Sep 2023 11:00  Patient On (Oxygen Delivery Method): room air        Physical Exam:  General:  Appears stated age, well-groomed, well-nourished, no distress  Eyes: EOMI, conjunctiva clear  HENT:  WNL, no JVD  Chest:  no respiratory distress, no accessory muscle use  Abdomen:  nondistended, nontender, no CVAT bilateral. no guarding or rebound   Extremities:  no pedal edema or calf tenderness noted  Skin:  No rash  Musculoskeletal:  normal strength  Neuro/Psych:  Alert, oriented to time, place and person     LABS:                        11.6   10.56 )-----------( 306      ( 18 Sep 2023 08:40 )             34.0         138  |  105  |  19  ----------------------------<  202<H>  3.2<L>   |  25  |  1.35<H>    Ca    9.4      18 Sep 2023 08:40    TPro  8.7<H>  /  Alb  3.4  /  TBili  0.2  /  DBili  x   /  AST  12<L>  /  ALT  23  /  AlkPhos  191<H>        Urinalysis Basic - ( 18 Sep 2023 08:40 )    Color: Yellow / Appearance: Slightly Turbid / S.010 / pH: x  Gluc: 202 mg/dL / Ketone: Negative  / Bili: Negative / Urobili: Negative mg/dL   Blood: x / Protein: 500 mg/dL / Nitrite: Positive   Leuk Esterase: Moderate / RBC: 6-10 /HPF / WBC >50   Sq Epi: x / Non Sq Epi: x / Bacteria: Moderate      RADIOLOGY & ADDITIONAL STUDIES:  Urinalysis + Microscopic Examination (23 @ 08:40)   pH Urine: 6.5  Urine Appearance: Slightly Turbid  Color: Yellow  Specific Gravity: 1.010  Protein, Urine: 500 mg/dL  Glucose Qualitative, Urine: 50 mg/dL  Ketone - Urine: Negative  Blood, Urine: Moderate  Bilirubin: Negative  Urobilinogen: Negative mg/dL  Leukocyte Esterase Concentration: Moderate  Nitrite: Positive  White Blood Cell - Urine: >50  Red Blood Cell - Urine: 6-10 /HPF  Bacteria: Moderate  Squamous Epithelial Cells: ModerateComprehensive Metabolic Panel (23 @ 08:40)   Sodium: 138 mmol/L  Potassium: 3.2 mmol/L  Chloride: 105 mmol/L  Carbon Dioxide: 25 mmol/L  Anion Gap: 8 mmol/L  Blood Urea Nitrogen: 19 mg/dL  Creatinine: 1.35 mg/dL  Glucose: 202 mg/dL  Calcium: 9.4 mg/dL  Protein Total: 8.7 gm/dL  Albumin: 3.4 g/dL  Bilirubin Total: 0.2 mg/dL  Alkaline Phosphatase: 191 U/L  Aspartate Aminotransferase (AST/SGOT): 12 U/L  Alanine Aminotransferase (ALT/SGPT): 23 U/L  eGFR: 48: The estimated glomerular filtration rate (eGFR) is calculated using the < from: CT Renal Stone Hunt (23 @ 09:20) >    IMPRESSION:  1.  Moderate/high-grade LEFT obstructive uropathy without identifiable   cause, developed from prior exams. Consider contrast CT w/delayed   imaging/CT urogram  2.  Tubular LEFT adnexal prominence. Ultrasound correlation recommended    < end of copied text >      A/P: 49 yo F pmhx of cervical adenocarcinoma s/p chemo/RT, with ureteral strictures with current right ureteral stent, exchanged approx 2-3 weeks ago, and prior hx of left ureteral stent removed earlier this year with Dr Hoenig here for left flank pain.  Found to have moderate high grade left obstructive uropathy without identifiable cause on CT renal hunt.   UTI s/p one dose rocephin in er, VINITA, leukocytosis.  -advise obtaining a CT urogram   -c/w antibiotics for UTI   -c/w IVFH, trend Cr   -will discuss with Urology attending Dr Snyder                HPI:  47 yo F pmhx htn, dm, stage 2 cervical adenocarcinoma s/p radiation and chemo , in remission, with ureteral strictures with hx of bilateral stents placement since  presented to er for left flank pain since 530 am today. + nausea, denies any vomiting fevers, chills, dysuria, hematuria. has chronic urinary frequency, not acutely worse. pt is on macrobid qd for infection ppx, ran out of medications 3 days ago. pt follows with Uro-oncology thru [facility] Dr Hoenig. right ureteral stent exchanged 2-3 weeks ago, left ureteral stent placed and removed earlier this year. no other urological procedures besides cystoscopy with stents. no abd surgeries      PAST MEDICAL & SURGICAL HISTORY:  Hypertension  T2DM (type 2 diabetes mellitus)  H/O hemorrhoids  Anxiety disorder  GERD (gastroesophageal reflux disease)  stage 2 cervical cancer s/p chemo and radiation, remission since 2021  Chemotherapy-induced neuropathy  Low back pain radiating to both legs  Bilateral lumbar radiculopathy  Calculus of kidney  Cervical adenocarcinoma  Neuropathic pain due to radiation  Hydronephrosis, right  S/P ureteral stent placement  2022- left  S/P cystoscopy with ureteral stent placement  S/P cystoscopy with ureteral stent placement        Review of Systems:  Negative except  as above in HPI    MEDICATIONS  (STANDING):    MEDICATIONS  (PRN):      Allergies  No Known Allergies      SOCIAL HISTORY: never smoker, no etoh use, unemployed, lives with family           FAMILY HISTORY:  father hx of CAD with PCI  mother hx of CVA         Vital Signs Last 24 Hrs  T(C): 36.6 (18 Sep 2023 11:00), Max: 37.1 (18 Sep 2023 08:14)  T(F): 97.8 (18 Sep 2023 11:00), Max: 98.8 (18 Sep 2023 08:14)  HR: 97 (18 Sep 2023 11:00) (87 - 97)  BP: 118/73 (18 Sep 2023 11:00) (118/73 - 156/97)  BP(mean): --  RR: 18 (18 Sep 2023 11:00) (17 - 18)  SpO2: 98% (18 Sep 2023 11:00) (98% - 100%)    Parameters below as of 18 Sep 2023 11:00  Patient On (Oxygen Delivery Method): room air        Physical Exam:  General:  Appears stated age, well-groomed, well-nourished, no distress  Eyes: EOMI, conjunctiva clear  HENT:  WNL, no JVD  Chest:  no respiratory distress, no accessory muscle use  Abdomen:  nondistended, nontender, no CVAT bilateral. no guarding or rebound   Extremities:  no pedal edema or calf tenderness noted  Skin:  No rash  Musculoskeletal:  normal strength  Neuro/Psych:  Alert, oriented to time, place and person     LABS:                        11.6   10.56 )-----------( 306      ( 18 Sep 2023 08:40 )             34.0         138  |  105  |  19  ----------------------------<  202<H>  3.2<L>   |  25  |  1.35<H>    Ca    9.4      18 Sep 2023 08:40    TPro  8.7<H>  /  Alb  3.4  /  TBili  0.2  /  DBili  x   /  AST  12<L>  /  ALT  23  /  AlkPhos  191<H>        Urinalysis Basic - ( 18 Sep 2023 08:40 )    Color: Yellow / Appearance: Slightly Turbid / S.010 / pH: x  Gluc: 202 mg/dL / Ketone: Negative  / Bili: Negative / Urobili: Negative mg/dL   Blood: x / Protein: 500 mg/dL / Nitrite: Positive   Leuk Esterase: Moderate / RBC: 6-10 /HPF / WBC >50   Sq Epi: x / Non Sq Epi: x / Bacteria: Moderate      RADIOLOGY & ADDITIONAL STUDIES:  Urinalysis + Microscopic Examination (23 @ 08:40)   pH Urine: 6.5  Urine Appearance: Slightly Turbid  Color: Yellow  Specific Gravity: 1.010  Protein, Urine: 500 mg/dL  Glucose Qualitative, Urine: 50 mg/dL  Ketone - Urine: Negative  Blood, Urine: Moderate  Bilirubin: Negative  Urobilinogen: Negative mg/dL  Leukocyte Esterase Concentration: Moderate  Nitrite: Positive  White Blood Cell - Urine: >50  Red Blood Cell - Urine: 6-10 /HPF  Bacteria: Moderate  Squamous Epithelial Cells: ModerateComprehensive Metabolic Panel (23 @ 08:40)   Sodium: 138 mmol/L  Potassium: 3.2 mmol/L  Chloride: 105 mmol/L  Carbon Dioxide: 25 mmol/L  Anion Gap: 8 mmol/L  Blood Urea Nitrogen: 19 mg/dL  Creatinine: 1.35 mg/dL  Glucose: 202 mg/dL  Calcium: 9.4 mg/dL  Protein Total: 8.7 gm/dL  Albumin: 3.4 g/dL  Bilirubin Total: 0.2 mg/dL  Alkaline Phosphatase: 191 U/L  Aspartate Aminotransferase (AST/SGOT): 12 U/L  Alanine Aminotransferase (ALT/SGPT): 23 U/L  eGFR: 48: The estimated glomerular filtration rate (eGFR) is calculated using the < from: CT Renal Stone Hunt (23 @ 09:20) >    IMPRESSION:  1.  Moderate/high-grade LEFT obstructive uropathy without identifiable   cause, developed from prior exams. Consider contrast CT w/delayed   imaging/CT urogram  2.  Tubular LEFT adnexal prominence. Ultrasound correlation recommended    < end of copied text >      A/P: 47 yo F pmhx of cervical adenocarcinoma s/p chemo/RT, with ureteral strictures with current right ureteral stent, exchanged approx 2-3 weeks ago, and prior hx of left ureteral stent removed earlier this year with Dr Hoenig here for left flank pain.  Found to have moderate high grade left obstructive uropathy without identifiable cause on CT renal hunt.   UTI s/p one dose rocephin in er, VINITA, leukocytosis.  -OR planning for cystoscopy with ureteral stent placement today with Dr Snyder, will obtain consent   -NPO, preop, medically optimize for the OR  -c/w antibiotics for UTI   -c/w IVFH, trend Cr   -case discussed with Urology attending Dr Snyder

## 2023-09-18 NOTE — CHART NOTE - NSCHARTNOTEFT_GEN_A_CORE
Tay López | Reference #: [id number]    Practitioner Count: 2  Pharmacy Count: 1  Current Opioid Prescriptions: 0  Current Benzodiazepine Prescriptions: 0  Current Stimulant Prescriptions: 0      Patient Demographic Information (PDI)       PDI	First Name	Last Name	Birth Date	Gender	Street Address	[facility]	Zip Code  MELODY Wilson	1975	Female	[id number]	[place]	[address]    Prescription Information      PDI Filter:    PDI	My Rx	Current Rx	Drug Type	Rx Written	Rx Dispensed	Drug	Quantity	Days Supply	Prescriber Name	Prescriber ADWOA #	Payment Method	Dispenser  A	N	N	O	08/07/2023	08/07/2023	morphine sulf er 15 mg tablet	90	30	McaLloyd nevarezine	[id number]	Medicaid	[facility]  A	N	N	O	08/07/2023	08/07/2023	oxycodone hcl (ir) 10 mg tab	120	20	McaLloyd nevarezine	[id number]	Medicaid	[facility]  A	N	N	O	07/12/2023	07/14/2023	oxycodone hcl (ir) 10 mg tab	120	20	Olimpia Gonsalves)	[id number]	Medicaid	[facility]  A	N	N	O	06/27/2023	06/29/2023	morphine sulf er 15 mg tablet	90	30	Mcageri, Corina	[id number]	Medicaid	[facility]  A	N	N	O	05/23/2023	05/25/2023	oxycodone hcl (ir) 10 mg tab	120	20	McaLloyd nevarezine	[id number]	Medicaid	[facility]  A	N	N	O	05/23/2023	05/25/2023	morphine sulf er 15 mg tablet	90	30	McaCorina nevarez	[id number]	Medicaid	[facility]  A	N	N	O	04/13/2023	04/17/2023	morphine sulf er 15 mg tablet	90	30	Mcalesloane, Corina	[id number]	Medicaid	[facility]  A	N	N	O	03/07/2023	03/10/2023	morphine sulf er 15 mg tablet	90	30	Olimpia Gonsalves)	[id number]	[facility]  A	N	N	O	01/20/2023	01/25/2023	oxycodone hcl (ir) 10 mg tab	120	20	Olimpia Gonsalves)	[id number]	[facility]  A	N	N	O	01/20/2023	01/23/2023	morphine sulf er 15 mg tablet	90	30	Olimpia Gonsalves)	[id number]	Insurance	[facility]  A	N	N	B	01/20/2023	01/23/2023	alprazolam 0.25 mg tablet	40	10	Olimpia Gonsalves)	[id number]	Insurance	[facility]  A	N	N	O	12/15/2022	12/29/2022	morphine sulf er 15 mg tablet	90	30	Olimpia Gonsalves)	[id number]	Insurance	[facility]  A	N	N	O	11/23/2022	11/30/2022	oxycodone hcl (ir) 10 mg tab	120	20	Olimpia Gonsalves)	[id number]	Insurance	[facility]  A	N	N	O	11/17/2022	11/18/2022	oxycodone hcl (ir) 10 mg tab	42	7	Corina Sue	[id number]	Insurance	[facility]  A	N	N	O	11/17/2022	11/18/2022	morphine sulf er 15 mg tablet	90	30	Corina Sue	[id number]	Insurance	[facility]  A	N	N	O	10/13/2022	10/13/2022	morphine sulf er 15 mg tablet	60	30	Olimpia Gonsalves)	[id number]	Insurance	[facility]  A	N	N	B	10/13/2022	10/13/2022	alprazolam 0.25 mg tablet	40	10	Olimpia Gonsalves)	[id number]	Insurance	[facility]    * - Details of Drug Type : O = Opioid, B = Benzodiazepine, S = Stimulant

## 2023-09-18 NOTE — ED ADULT NURSE NOTE - OBJECTIVE STATEMENT
Patient alert and verbally responsive, came in due to LLQ pain radiating to left lower back sudden onset this morning, as per emt, patient was recently told that she was retaining fluids on her left kidney, hx of bilateral kidney stents, cervical cancer, took oxycodone 10mg around 0615 without and relief.

## 2023-09-18 NOTE — ED PROVIDER NOTE - PHYSICAL EXAMINATION
General: Well appearing female in no acute distress  HEENT: Normocephalic, atraumatic. Moist mucous membranes. Oropharynx clear. No lymphadenopathy.  Eyes: No scleral icterus. EOMI. BRISA.  Neck:. Soft and supple. Full ROM without pain. No midline tenderness  Cardiac: Regular rate and regular rhythm. No murmurs, rubs, gallops. Peripheral pulses 2+ and symmetric. No LE edema.  Resp: Lungs CTAB. Speaking in full sentences. No wheezes, rales or rhonchi.  Abd: Soft, non-tender, non-distended. No guarding or rebound. No scars, masses, or lesions.  Back: Spine midline and non-tender. +L CVA tenderness.    Skin: No rashes, abrasions, or lacerations.  Neuro: AO x 3. Moves all extremities symmetrically. Motor strength and sensation grossly intact.

## 2023-09-18 NOTE — BRIEF OPERATIVE NOTE - NSICDXBRIEFPREOP_GEN_ALL_CORE_FT
PRE-OP DIAGNOSIS:  Hydronephrosis, left 18-Sep-2023 22:13:53  Ottoniel Snyder  Left flank pain 18-Sep-2023 22:14:04  Ottoniel Snyder

## 2023-09-18 NOTE — ED PROVIDER NOTE - CLINICAL SUMMARY MEDICAL DECISION MAKING FREE TEXT BOX
spoke to urology service, admit to medicine. patient going for ureteral stent at 3PM today as an add on. pain controlled, patient resting comfortably in the ER. pre-op labs ordered. spoke to dr. cardona, admit to medicine per urology request. 47 y/o F hx of ureteral stents R side, HTN, HLD, DM presents w/ L flank pain for the past 1 day. +L CVA tenderness.   will r/o kidney stones given presentation w/ prior hx of kidney stones on the L side per patient. CT renal stone hunt, check ua/uc. check labs.   spoke to urology service, admit to medicine. patient going for ureteral stent procedure at 3PM today as an add on per urology service. pain controlled, patient resting comfortably in the ER. pre-op labs ordered by urology request. spoke to dr. cardona, admit to medicine per urology request.

## 2023-09-18 NOTE — ED ADULT NURSE NOTE - NSFALLUNIVINTERV_ED_ALL_ED
Bed/Stretcher in lowest position, wheels locked, appropriate side rails in place/Call bell, personal items and telephone in reach/Instruct patient to call for assistance before getting out of bed/chair/stretcher/Non-slip footwear applied when patient is off stretcher/[place] to call system/Physically safe environment - no spills, clutter or unnecessary equipment/Purposeful proactive rounding/Room/bathroom lighting operational, light cord in reach

## 2023-09-18 NOTE — H&P ADULT - PROBLEM SELECTOR PLAN 1
present with severe left flank pain  CT  ( I personally review) with Two parallel double-J RIGHT nephroureteral stents in situ. No RIGHT renal stones or Steinstrasse. Moderate/severe -> LEFT <- hydroureteronephrosis with perinephric/periureteral edema, developed. -> LEFT <- ureter dilated to pelvic inlet without obstructing calculus or etiology of obstruction identified. RIGHT cortical atrophy, unchanged. Millimeter size mid LEFT intrarenal calculus or calcification  ? due to fibrosis   urology consulted, plan for cystoscopy with ureteral stent placement  IV fluid, pain control- continue with home dose morphine IR 15mg tid, oxy 10mg q4hr prn for severe pain, IV morphine for breakthrough pain  Continue IV ceftriaxone pending clinical course and culture data, follow up urine culture. UA appear dirty, have left CVA tenderness thought could be due to severe obstructive uropathy. Denied any dysuria or hematuria to me

## 2023-09-18 NOTE — ED ADULT TRIAGE NOTE - CHIEF COMPLAINT QUOTE
BIBA from home for LLQ pain radiating to left lower back sudden onset this morning, as per emt, patient was recently told that she was retaining fluids on her left kidney, hx of bilateral kidney stents, cervical cancer, took oxycodone 10mg around 0615, patient appears to be in severe pain in triage

## 2023-09-19 ENCOUNTER — TRANSCRIPTION ENCOUNTER (OUTPATIENT)
Age: 48
End: 2023-09-19

## 2023-09-19 VITALS
DIASTOLIC BLOOD PRESSURE: 88 MMHG | RESPIRATION RATE: 18 BRPM | TEMPERATURE: 99 F | SYSTOLIC BLOOD PRESSURE: 141 MMHG | HEART RATE: 91 BPM | OXYGEN SATURATION: 97 %

## 2023-09-19 LAB
A1C WITH ESTIMATED AVERAGE GLUCOSE RESULT: 6.7 % — HIGH (ref 4–5.6)
ALBUMIN SERPL ELPH-MCNC: 3 G/DL — LOW (ref 3.3–5)
ALP SERPL-CCNC: 159 U/L — HIGH (ref 40–120)
ALT FLD-CCNC: 20 U/L — SIGNIFICANT CHANGE UP (ref 12–78)
ANION GAP SERPL CALC-SCNC: 6 MMOL/L — SIGNIFICANT CHANGE UP (ref 5–17)
AST SERPL-CCNC: 11 U/L — LOW (ref 15–37)
BILIRUB SERPL-MCNC: 0.2 MG/DL — SIGNIFICANT CHANGE UP (ref 0.2–1.2)
BUN SERPL-MCNC: 16 MG/DL — SIGNIFICANT CHANGE UP (ref 7–23)
CALCIUM SERPL-MCNC: 8.9 MG/DL — SIGNIFICANT CHANGE UP (ref 8.5–10.1)
CHLORIDE SERPL-SCNC: 105 MMOL/L — SIGNIFICANT CHANGE UP (ref 96–108)
CO2 SERPL-SCNC: 27 MMOL/L — SIGNIFICANT CHANGE UP (ref 22–31)
CREAT SERPL-MCNC: 1.15 MG/DL — SIGNIFICANT CHANGE UP (ref 0.5–1.3)
EGFR: 58 ML/MIN/1.73M2 — LOW
ESTIMATED AVERAGE GLUCOSE: 146 MG/DL — HIGH (ref 68–114)
GLUCOSE BLDC GLUCOMTR-MCNC: 153 MG/DL — HIGH (ref 70–99)
GLUCOSE BLDC GLUCOMTR-MCNC: 159 MG/DL — HIGH (ref 70–99)
GLUCOSE BLDC GLUCOMTR-MCNC: 178 MG/DL — HIGH (ref 70–99)
GLUCOSE SERPL-MCNC: 175 MG/DL — HIGH (ref 70–99)
HCT VFR BLD CALC: 30.9 % — LOW (ref 34.5–45)
HGB BLD-MCNC: 10.4 G/DL — LOW (ref 11.5–15.5)
MCHC RBC-ENTMCNC: 28.4 PG — SIGNIFICANT CHANGE UP (ref 27–34)
MCHC RBC-ENTMCNC: 33.7 G/DL — SIGNIFICANT CHANGE UP (ref 32–36)
MCV RBC AUTO: 84.4 FL — SIGNIFICANT CHANGE UP (ref 80–100)
NRBC # BLD: 0 /100 WBCS — SIGNIFICANT CHANGE UP (ref 0–0)
PHOSPHATE SERPL-MCNC: 3.8 MG/DL — SIGNIFICANT CHANGE UP (ref 2.5–4.5)
PLATELET # BLD AUTO: 283 K/UL — SIGNIFICANT CHANGE UP (ref 150–400)
POTASSIUM SERPL-MCNC: 3.9 MMOL/L — SIGNIFICANT CHANGE UP (ref 3.5–5.3)
POTASSIUM SERPL-SCNC: 3.9 MMOL/L — SIGNIFICANT CHANGE UP (ref 3.5–5.3)
PROT SERPL-MCNC: 7.7 GM/DL — SIGNIFICANT CHANGE UP (ref 6–8.3)
RBC # BLD: 3.66 M/UL — LOW (ref 3.8–5.2)
RBC # FLD: 15.4 % — HIGH (ref 10.3–14.5)
SODIUM SERPL-SCNC: 138 MMOL/L — SIGNIFICANT CHANGE UP (ref 135–145)
WBC # BLD: 7.47 K/UL — SIGNIFICANT CHANGE UP (ref 3.8–10.5)
WBC # FLD AUTO: 7.47 K/UL — SIGNIFICANT CHANGE UP (ref 3.8–10.5)

## 2023-09-19 PROCEDURE — 99233 SBSQ HOSP IP/OBS HIGH 50: CPT

## 2023-09-19 PROCEDURE — 99239 HOSP IP/OBS DSCHRG MGMT >30: CPT

## 2023-09-19 RX ORDER — METHOCARBAMOL 500 MG/1
500 TABLET, FILM COATED ORAL
Refills: 0 | Status: DISCONTINUED | OUTPATIENT
Start: 2023-09-19 | End: 2023-09-19

## 2023-09-19 RX ORDER — CEFPODOXIME PROXETIL 100 MG
1 TABLET ORAL
Qty: 6 | Refills: 0
Start: 2023-09-19 | End: 2023-09-21

## 2023-09-19 RX ORDER — METHOCARBAMOL 500 MG/1
1 TABLET, FILM COATED ORAL
Refills: 0 | DISCHARGE
Start: 2023-09-19

## 2023-09-19 RX ORDER — METHOCARBAMOL 500 MG/1
1 TABLET, FILM COATED ORAL
Qty: 0 | Refills: 0 | DISCHARGE

## 2023-09-19 RX ORDER — NITROFURANTOIN MACROCRYSTAL 50 MG
1 CAPSULE ORAL
Refills: 0 | DISCHARGE

## 2023-09-19 RX ORDER — INFLUENZA VIRUS VACCINE 15; 15; 15; 15 UG/.5ML; UG/.5ML; UG/.5ML; UG/.5ML
0.5 SUSPENSION INTRAMUSCULAR ONCE
Refills: 0 | Status: DISCONTINUED | OUTPATIENT
Start: 2023-09-19 | End: 2023-09-19

## 2023-09-19 RX ADMIN — GABAPENTIN 400 MILLIGRAM(S): 400 CAPSULE ORAL at 18:25

## 2023-09-19 RX ADMIN — GABAPENTIN 400 MILLIGRAM(S): 400 CAPSULE ORAL at 05:50

## 2023-09-19 RX ADMIN — SERTRALINE 50 MILLIGRAM(S): 25 TABLET, FILM COATED ORAL at 12:13

## 2023-09-19 RX ADMIN — Medication 1: at 08:18

## 2023-09-19 RX ADMIN — MORPHINE SULFATE 15 MILLIGRAM(S): 50 CAPSULE, EXTENDED RELEASE ORAL at 18:25

## 2023-09-19 RX ADMIN — CEFTRIAXONE 100 MILLIGRAM(S): 500 INJECTION, POWDER, FOR SOLUTION INTRAMUSCULAR; INTRAVENOUS at 09:30

## 2023-09-19 RX ADMIN — METHOCARBAMOL 500 MILLIGRAM(S): 500 TABLET, FILM COATED ORAL at 15:38

## 2023-09-19 RX ADMIN — Medication 1: at 11:31

## 2023-09-19 RX ADMIN — SODIUM CHLORIDE 125 MILLILITER(S): 9 INJECTION, SOLUTION INTRAVENOUS at 04:47

## 2023-09-19 RX ADMIN — AMLODIPINE BESYLATE 5 MILLIGRAM(S): 2.5 TABLET ORAL at 05:50

## 2023-09-19 RX ADMIN — Medication 1: at 16:36

## 2023-09-19 RX ADMIN — MORPHINE SULFATE 4 MILLIGRAM(S): 50 CAPSULE, EXTENDED RELEASE ORAL at 04:52

## 2023-09-19 RX ADMIN — MORPHINE SULFATE 4 MILLIGRAM(S): 50 CAPSULE, EXTENDED RELEASE ORAL at 15:39

## 2023-09-19 NOTE — DISCHARGE NOTE PROVIDER - ATTENDING DISCHARGE PHYSICAL EXAMINATION:
GENERAL: young woman NAD  HEENT: EOMI, MMM, no oropharyngeal lesions or erythema appreciated  Pulm: normal work of breathing, CTABL  CV: RRR, S1&S2+, no m/r/g appreciated  ABDOMEN: soft, nt, nd, no hepatosplenomegaly  MSK: nl ROM  EXTREMITIES:  no appreciable edema in b/l LE  Neuro: A&Ox3, no focal deficits  SKIN: warm and dry, no visible rash

## 2023-09-19 NOTE — PATIENT PROFILE ADULT - FALL HARM RISK - HARM RISK INTERVENTIONS
Assistance with ambulation/Assistance OOB with selected safe patient handling equipment/Communicate Risk of Fall with Harm to all staff/Discuss with provider need for PT consult/Monitor gait and stability/Provide patient with walking aids - walker, cane, crutches/Reinforce activity limits and safety measures with patient and family/Review medications for side effects contributing to fall risk/Sit up slowly, dangle for a short time, stand at bedside before walking/Tailored Fall Risk Interventions/Toileting schedule using arm’s reach rule for commode and bathroom/Visual Cue: Yellow wristband and red socks/Bed in lowest position, wheels locked, appropriate side rails in place/Call bell, personal items and telephone in reach/Instruct patient to call for assistance before getting out of bed or chair/Non-slip footwear when patient is out of bed/[place] to call system/Physically safe environment - no spills, clutter or unnecessary equipment/Purposeful Proactive Rounding/Room/bathroom lighting operational, light cord in reach

## 2023-09-19 NOTE — DISCHARGE NOTE PROVIDER - NSDCFUSCHEDAPPT_GEN_ALL_CORE_FT
Hoenig, David  [facility]  UROLOGY [address] [place] [address] R  Scheduled Appointment: 11/14/2023    [facility]  UROLOGY [address] [place] [address] R  Scheduled Appointment: 12/06/2023    Hoenig, David  [facility]  UROLOGY [address] [place] [address] R  Scheduled Appointment: 12/06/2023

## 2023-09-19 NOTE — DISCHARGE NOTE PROVIDER - HOSPITAL COURSE
48 years old female with h/o HTN, HLD, DM, cervical adenocarcinoma diagnosed in 5/2021 (Stage 2B, s/p cisplatin and RT completed in 11/2021), nephrolithiasis ( multiple urology procedure including laser lithotripsy, stent/removal of stent, most recent right ureteral stent exchange on 8/14/2023) present to ED with complain of severe left flank pain for one day. Patient reported severe left flank pain which started 6:15AM today AM, radiating to left lower back, associated with nausea. Patient denied any dysuria or hematuria to me. No fever, chills.   Hemodynamically stable, afebrile, sat well at RA. WBC 10.56, plt 306, K 3.2, Cr 1.35, glucose 202. AU with LE mod, nitrite positive, WBC > 50, mod bacteria. CT with Two parallel double-J RIGHT nephroureteral stents in situ. No RIGHT renal stones or Steinstrasse. Moderate/severe -> LEFT <- hydroureteronephrosis with perinephric/periureteral edema, developed. -> LEFT <- ureter dilated to pelvic inlet without obstructing calculus or etiology of obstruction identified. RIGHT cortical atrophy, unchanged. Millimeter size mid LEFT intrarenal calculus or calcification. Tubular left adnexal prominence      RCRI 0, 3.9% of 30-day risk of death, MI, or cardiac arrest. Patient is medically optimized for planned procedure. No further cardiac testing necessary. Continue with routine perioperative hemodynamic monitoring       Problem/Plan - 1:  ·  Problem: Hydronephrosis, left.   ·  Plan: present with severe left flank pain  CT  ( I personally review) with Two parallel double-J RIGHT nephroureteral stents in situ. No RIGHT renal stones or Steinstrasse. Moderate/severe -> LEFT <- hydroureteronephrosis with perinephric/periureteral edema, developed. -> LEFT <- ureter dilated to pelvic inlet without obstructing calculus or etiology of obstruction identified. RIGHT cortical atrophy, unchanged. Millimeter size mid LEFT intrarenal calculus or calcification  ? due to fibrosis   urology consulted, plan for cystoscopy with ureteral stent placement  IV fluid, pain control- continue with home dose morphine IR 15mg tid, oxy 10mg q4hr prn for severe pain, IV morphine for breakthrough pain  Continue IV ceftriaxone pending clinical course and culture data, follow up urine culture. UA appear dirty, have left CVA tenderness thought could be due to severe obstructive uropathy. Denied any dysuria or hematuria to me.     Problem/Plan - 2:  ·  Problem: VINITA (acute kidney injury).  ·  Plan: Cr 1.35  likely due to obstructive uropathy  Monitor renal function  IV fluid  Urology on board, plan for cystoscopy with ureteral stent.     Problem/Plan - 3:  ·  Problem: Benign essential HTN.  ·  Plan: monitor BP and titrate BP med.     Problem/Plan - 4:  ·  Problem: Abnormal abdominal CT scan.  ·  Plan: CT noted  Tubular left adnexal prominence  Check pelvis ultrasound.     Problem/Plan - 5:  ·  Problem: Type 2 diabetes mellitus with unspecified complications.  ·  Plan: ISS and hypoglycemia protocol   A1c.     Problem/Plan - 6:  ·  Problem: Chronic pain of lower extremity.   ·  Plan: on chronic narcotic med at home  ISTOP checked, continue home med  continue bowel regimens.     Problem/Plan - 7:  ·  Problem: History of cervical cancer.   ·  Plan: cervical adenocarcinoma diagnosed in 5/2021 (Stage 2B, s/p cisplatin and RT completed in 11/2021).

## 2023-09-19 NOTE — PROGRESS NOTE ADULT - SUBJECTIVE AND OBJECTIVE BOX
Pt seen and examined at bedside, no acute issues overnight, Rhodes draining well at bedside. Pt states left back pain has resolved. Afebrile, denies n/v, no CP/SOB.     T(F): 98.6 (09-19-23 @ 09:10), Max: 98.6 (09-19-23 @ 09:10)  HR: 89 (09-19-23 @ 09:10) (74 - 108)  BP: 123/82 (09-19-23 @ 09:10) (111/78 - 146/88)  RR: 18 (09-19-23 @ 09:10) (14 - 18)  SpO2: 97% (09-19-23 @ 09:10) (95% - 100%)  Wt(kg): --  CAPILLARY BLOOD GLUCOSE      POCT Blood Glucose.: 153 mg/dL (19 Sep 2023 11:04)  POCT Blood Glucose.: 159 mg/dL (19 Sep 2023 07:46)  POCT Blood Glucose.: 174 mg/dL (18 Sep 2023 21:34)  POCT Blood Glucose.: 146 mg/dL (18 Sep 2023 15:31)      PHYSICAL EXAM:  General: NAD, WDWN  Neuro:  Alert & oriented x 3  CV: +S1S2 regular rate and rhythm  Lung: respirations nonlabored, good inspiratory effort  Abdomen: soft, NT/ND  Extremities: no pedal edema or calf tenderness noted   : Rhodes indwelling, 2600cc/24hrs, catheter removed, no bladder tenderness, no cva tenderness    LABS:                        10.4   7.47  )-----------( 283      ( 19 Sep 2023 05:35 )             30.9   09-19    138  |  105  |  16  ----------------------------<  175<H>  3.9   |  27  |  1.15    Ca    8.9      19 Sep 2023 05:35  Phos  3.8     09-19    TPro  7.7  /  Alb  3.0<L>  /  TBili  0.2  /  DBili  x   /  AST  11<L>  /  ALT  20  /  AlkPhos  159<H>  09-19  PT/INR - ( 18 Sep 2023 08:40 )   PT: 10.5 sec;   INR: 0.88 ratio         PTT - ( 18 Sep 2023 08:40 )  PTT:29.0 sec  I&O's Detail    18 Sep 2023 07:01  -  19 Sep 2023 07:00  --------------------------------------------------------  IN:    Lactated Ringers: 100 mL  Total IN: 100 mL    OUT:    Indwelling Catheter - Urethral (mL): 2600 mL  Total OUT: 2600 mL    Total NET: -2500 mL      19 Sep 2023 07:01  -  19 Sep 2023 11:27  --------------------------------------------------------  IN:    Oral Fluid: 360 mL  Total IN: 360 mL    OUT:    Indwelling Catheter - Urethral (mL): 700 mL  Total OUT: 700 mL    Total NET: -340 mL          Impression:    Cervical adenocarcinoma    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 Snyder

## 2023-09-19 NOTE — DISCHARGE NOTE PROVIDER - NSDCCPCAREPLAN_GEN_ALL_CORE_FT
PRINCIPAL DISCHARGE DIAGNOSIS  Diagnosis: Obstructed, uropathy  Assessment and Plan of Treatment: URology saw you. a ureteral stent was placed. Please continue antibitiocs for 3 more days. Follow up with urology

## 2023-09-19 NOTE — DISCHARGE NOTE NURSING/CASE MANAGEMENT/SOCIAL WORK - PATIENT PORTAL LINK FT
You can access the FollowMyHealth Patient Portal offered by [facility] by registering at the following website: [email or web address]. By joining [facility]’s FollowMyHealth portal, you will also be able to view your health information using other applications (apps) compatible with our system.

## 2023-09-19 NOTE — DISCHARGE NOTE PROVIDER - NSDCMRMEDTOKEN_GEN_ALL_CORE_FT
amLODIPine 10 mg oral tablet: 1 tab(s) orally once a day  cefpodoxime 100 mg oral tablet: 1 tab(s) orally 2 times a day  gabapentin 400 mg oral capsule: 1 cap(s) orally 2 times a day  metFORMIN 500 mg oral tablet: 1 tab(s) orally 2 times a day  methocarbamol 500 mg oral tablet: 1 tab(s) orally 3 times a day  MS Contin 30 mg oral tablet, extended release: 0.5 tab(s) orally 3 times a day  oxyCODONE 5 mg oral tablet: 2 tab(s) orally every 4 hours, As Needed  senna oral tablet: 2 tab(s) orally once a day (at bedtime)  sertraline 50 mg oral tablet: 1 tab(s) orally once a day  Xanax 0.25 mg oral tablet: 1 tab(s) orally every 8 hours, As Needed - for anxiety

## 2023-09-19 NOTE — DISCHARGE NOTE NURSING/CASE MANAGEMENT/SOCIAL WORK - NSDCPEFALRISK_GEN_ALL_CORE
For information on Fall & Injury Prevention, visit: [email or web address] OR  [email or web address] OR  https://www.cdc.gov/steadi/patient.html
None

## 2023-09-22 LAB
-  AMPICILLIN: SIGNIFICANT CHANGE UP
-  CIPROFLOXACIN: SIGNIFICANT CHANGE UP
-  LEVOFLOXACIN: SIGNIFICANT CHANGE UP
-  TETRACYCLINE: SIGNIFICANT CHANGE UP
-  VANCOMYCIN: SIGNIFICANT CHANGE UP
METHOD TYPE: SIGNIFICANT CHANGE UP

## 2023-09-23 LAB
-  AMIKACIN: SIGNIFICANT CHANGE UP
-  AMOXICILLIN/CLAVULANIC ACID: SIGNIFICANT CHANGE UP
-  AMPICILLIN/SULBACTAM: SIGNIFICANT CHANGE UP
-  AMPICILLIN: SIGNIFICANT CHANGE UP
-  AZTREONAM: SIGNIFICANT CHANGE UP
-  CEFAZOLIN: SIGNIFICANT CHANGE UP
-  CEFEPIME: SIGNIFICANT CHANGE UP
-  CEFOXITIN: SIGNIFICANT CHANGE UP
-  CEFTRIAXONE: SIGNIFICANT CHANGE UP
-  CEFUROXIME: SIGNIFICANT CHANGE UP
-  CIPROFLOXACIN: SIGNIFICANT CHANGE UP
-  ERTAPENEM: SIGNIFICANT CHANGE UP
-  GENTAMICIN: SIGNIFICANT CHANGE UP
-  IMIPENEM: SIGNIFICANT CHANGE UP
-  LEVOFLOXACIN: SIGNIFICANT CHANGE UP
-  MEROPENEM: SIGNIFICANT CHANGE UP
-  PIPERACILLIN/TAZOBACTAM: SIGNIFICANT CHANGE UP
-  TOBRAMYCIN: SIGNIFICANT CHANGE UP
-  TRIMETHOPRIM/SULFAMETHOXAZOLE: SIGNIFICANT CHANGE UP
CULTURE RESULTS: SIGNIFICANT CHANGE UP
METHOD TYPE: SIGNIFICANT CHANGE UP
ORGANISM # SPEC MICROSCOPIC CNT: SIGNIFICANT CHANGE UP
SPECIMEN SOURCE: SIGNIFICANT CHANGE UP

## 2023-09-26 NOTE — ED ADULT NURSE NOTE - NS ED NURSE LEVEL OF CONSCIOUSNESS SPEECH
Patient would like an order for his annual labs to sent to [facility]

## 2023-09-27 DIAGNOSIS — N13.5 CROSSING VESSEL AND STRICTURE OF URETER WITHOUT HYDRONEPHROSIS: ICD-10-CM

## 2023-09-27 DIAGNOSIS — R93.5 ABNORMAL FINDINGS ON DIAGNOSTIC IMAGING OF OTHER ABDOMINAL REGIONS, INCLUDING RETROPERITONEUM: ICD-10-CM

## 2023-09-27 DIAGNOSIS — N13.30 UNSPECIFIED HYDRONEPHROSIS: ICD-10-CM

## 2023-09-27 DIAGNOSIS — N13.1 HYDRONEPHROSIS WITH URETERAL STRICTURE, NOT ELSEWHERE CLASSIFIED: ICD-10-CM

## 2023-09-27 DIAGNOSIS — N17.9 ACUTE KIDNEY FAILURE, UNSPECIFIED: ICD-10-CM

## 2023-09-27 DIAGNOSIS — G89.29 OTHER CHRONIC PAIN: ICD-10-CM

## 2023-09-27 DIAGNOSIS — I10 ESSENTIAL (PRIMARY) HYPERTENSION: ICD-10-CM

## 2023-09-27 DIAGNOSIS — E11.9 TYPE 2 DIABETES MELLITUS WITHOUT COMPLICATIONS: ICD-10-CM

## 2023-09-27 DIAGNOSIS — M79.609 PAIN IN UNSPECIFIED LIMB: ICD-10-CM

## 2023-09-27 DIAGNOSIS — Z79.84 LONG TERM (CURRENT) USE OF ORAL HYPOGLYCEMIC DRUGS: ICD-10-CM

## 2023-10-04 ENCOUNTER — RESULT REVIEW (OUTPATIENT)
Age: 48
End: 2023-10-04

## 2023-10-04 ENCOUNTER — APPOINTMENT (OUTPATIENT)
Dept: GERIATRICS | Facility: CLINIC | Age: 48
End: 2023-10-04
Payer: MEDICAID

## 2023-10-04 ENCOUNTER — LABORATORY RESULT (OUTPATIENT)
Age: 48
End: 2023-10-04

## 2023-10-04 ENCOUNTER — OUTPATIENT (OUTPATIENT)
Dept: OUTPATIENT SERVICES | Facility: HOSPITAL | Age: 48
LOS: 1 days | Discharge: HOME | End: 2023-10-04

## 2023-10-04 VITALS
SYSTOLIC BLOOD PRESSURE: 117 MMHG | OXYGEN SATURATION: 95 % | RESPIRATION RATE: 61 BRPM | BODY MASS INDEX: 31.56 KG/M2 | HEART RATE: 90 BPM | WEIGHT: 183.84 LBS | DIASTOLIC BLOOD PRESSURE: 77 MMHG | TEMPERATURE: 96.8 F

## 2023-10-04 DIAGNOSIS — Z96.0 PRESENCE OF UROGENITAL IMPLANTS: Chronic | ICD-10-CM

## 2023-10-04 DIAGNOSIS — F41.9 ANXIETY DISORDER, UNSPECIFIED: ICD-10-CM

## 2023-10-04 DIAGNOSIS — C53.9 MALIGNANT NEOPLASM OF CERVIX UTERI, UNSPECIFIED: ICD-10-CM

## 2023-10-04 LAB
BASOPHILS # BLD AUTO: 0.02 K/UL — SIGNIFICANT CHANGE UP (ref 0–0.2)
BASOPHILS NFR BLD AUTO: 0.6 % — SIGNIFICANT CHANGE UP (ref 0–2)
EOSINOPHIL # BLD AUTO: 0.16 K/UL — SIGNIFICANT CHANGE UP (ref 0–0.5)
EOSINOPHIL NFR BLD AUTO: 4.8 % — SIGNIFICANT CHANGE UP (ref 0–6)
HCT VFR BLD CALC: 31.8 % — LOW (ref 34.5–45)
HGB BLD-MCNC: 10.7 G/DL — LOW (ref 11.5–15.5)
IMM GRANULOCYTES NFR BLD AUTO: 0.3 % — SIGNIFICANT CHANGE UP (ref 0–0.9)
LYMPHOCYTES # BLD AUTO: 0.91 K/UL — LOW (ref 1–3.3)
LYMPHOCYTES # BLD AUTO: 27.6 % — SIGNIFICANT CHANGE UP (ref 13–44)
MCHC RBC-ENTMCNC: 28.8 PG — SIGNIFICANT CHANGE UP (ref 27–34)
MCHC RBC-ENTMCNC: 33.6 G/DL — SIGNIFICANT CHANGE UP (ref 32–36)
MCV RBC AUTO: 85.7 FL — SIGNIFICANT CHANGE UP (ref 80–100)
MONOCYTES # BLD AUTO: 0.31 K/UL — SIGNIFICANT CHANGE UP (ref 0–0.9)
MONOCYTES NFR BLD AUTO: 9.4 % — SIGNIFICANT CHANGE UP (ref 2–14)
NEUTROPHILS # BLD AUTO: 1.89 K/UL — SIGNIFICANT CHANGE UP (ref 1.8–7.4)
NEUTROPHILS NFR BLD AUTO: 57.3 % — SIGNIFICANT CHANGE UP (ref 43–77)
NRBC # BLD: 0 /100 WBCS — SIGNIFICANT CHANGE UP (ref 0–0)
PLATELET # BLD AUTO: 246 K/UL — SIGNIFICANT CHANGE UP (ref 150–400)
RBC # BLD: 3.71 M/UL — LOW (ref 3.8–5.2)
RBC # FLD: 14.9 % — HIGH (ref 10.3–14.5)
WBC # BLD: 3.3 K/UL — LOW (ref 3.8–10.5)
WBC # FLD AUTO: 3.3 K/UL — LOW (ref 3.8–10.5)

## 2023-10-04 PROCEDURE — 99214 OFFICE O/P EST MOD 30 MIN: CPT

## 2023-10-05 LAB
APPEARANCE: ABNORMAL
BILIRUBIN URINE: NEGATIVE
BLOOD URINE: ABNORMAL
COLOR: YELLOW
GLUCOSE QUALITATIVE U: NEGATIVE MG/DL
KETONES URINE: NEGATIVE MG/DL
LEUKOCYTE ESTERASE URINE: ABNORMAL
NITRITE URINE: NEGATIVE
PH URINE: 6.5
PROTEIN URINE: 100 MG/DL
SPECIFIC GRAVITY URINE: 1.01
UROBILINOGEN URINE: 0.2 MG/DL

## 2023-10-12 PROBLEM — F41.9 ANXIETY: Status: ACTIVE | Noted: 2021-11-15

## 2023-10-12 LAB
ALBUMIN SERPL ELPH-MCNC: 4.3 G/DL
ALP BLD-CCNC: 162 U/L
ALT SERPL-CCNC: 13 U/L
ANION GAP SERPL CALC-SCNC: 12 MMOL/L
AST SERPL-CCNC: 15 U/L
BILIRUB SERPL-MCNC: 0.2 MG/DL
BUN SERPL-MCNC: 16 MG/DL
CALCIUM SERPL-MCNC: 9.5 MG/DL
CHLORIDE SERPL-SCNC: 103 MMOL/L
CO2 SERPL-SCNC: 25 MMOL/L
CREAT SERPL-MCNC: 1.01 MG/DL
EGFR: 68 ML/MIN/1.73M2
GLUCOSE SERPL-MCNC: 113 MG/DL
POTASSIUM SERPL-SCNC: 3.6 MMOL/L
PROT SERPL-MCNC: 7.5 G/DL
SODIUM SERPL-SCNC: 141 MMOL/L

## 2023-10-16 NOTE — ASU PATIENT PROFILE, ADULT - NS PRO INFO GIVEN TO
Anesthesia Post-op Note    Patient: Marlen Meza [facility]  Procedure(s) Performed: EGD  Anesthesia type: MAC    Vitals Value Taken Time   Temp 36.6 °C (97.9 °F) 10/13/23 1425   Pulse 73 10/13/23 1455   Resp 18 10/13/23 1455   SpO2 100 % 10/13/23 1455   /84 10/13/23 1455         Patient Location: Phase II  Post-op Vital Signs:stable  Level of Consciousness: awake and alert  Respiratory Status: spontaneous ventilation  Cardiovascular stable  Hydration: euvolemic  Pain Management: adequately controlled  Handoff: Handoff to receiving clinician was performed and questions were answered  Vomiting: none  Nausea: None  Airway Patency:patent  Post-op Assessment: no complications and patient tolerated procedure well      No notable events documented.  
patient

## 2023-11-14 ENCOUNTER — APPOINTMENT (OUTPATIENT)
Dept: UROLOGY | Facility: CLINIC | Age: 48
End: 2023-11-14
Payer: MEDICAID

## 2023-11-14 PROCEDURE — 99442: CPT

## 2023-12-05 ENCOUNTER — OUTPATIENT (OUTPATIENT)
Dept: OUTPATIENT SERVICES | Facility: HOSPITAL | Age: 48
LOS: 1 days | End: 2023-12-05
Payer: MEDICAID

## 2023-12-05 VITALS
SYSTOLIC BLOOD PRESSURE: 113 MMHG | HEART RATE: 87 BPM | HEIGHT: 66 IN | OXYGEN SATURATION: 100 % | DIASTOLIC BLOOD PRESSURE: 74 MMHG | WEIGHT: 180.34 LBS | TEMPERATURE: 98 F | RESPIRATION RATE: 16 BRPM

## 2023-12-05 DIAGNOSIS — N13.5 CROSSING VESSEL AND STRICTURE OF URETER WITHOUT HYDRONEPHROSIS: ICD-10-CM

## 2023-12-05 DIAGNOSIS — Z96.0 PRESENCE OF UROGENITAL IMPLANTS: Chronic | ICD-10-CM

## 2023-12-05 DIAGNOSIS — Z01.818 ENCOUNTER FOR OTHER PREPROCEDURAL EXAMINATION: ICD-10-CM

## 2023-12-05 LAB
ANION GAP SERPL CALC-SCNC: 14 MMOL/L — SIGNIFICANT CHANGE UP (ref 5–17)
ANION GAP SERPL CALC-SCNC: 14 MMOL/L — SIGNIFICANT CHANGE UP (ref 5–17)
BUN SERPL-MCNC: 26 MG/DL — HIGH (ref 7–23)
BUN SERPL-MCNC: 26 MG/DL — HIGH (ref 7–23)
CALCIUM SERPL-MCNC: 10.1 MG/DL — SIGNIFICANT CHANGE UP (ref 8.4–10.5)
CALCIUM SERPL-MCNC: 10.1 MG/DL — SIGNIFICANT CHANGE UP (ref 8.4–10.5)
CHLORIDE SERPL-SCNC: 99 MMOL/L — SIGNIFICANT CHANGE UP (ref 96–108)
CHLORIDE SERPL-SCNC: 99 MMOL/L — SIGNIFICANT CHANGE UP (ref 96–108)
CO2 SERPL-SCNC: 25 MMOL/L — SIGNIFICANT CHANGE UP (ref 22–31)
CO2 SERPL-SCNC: 25 MMOL/L — SIGNIFICANT CHANGE UP (ref 22–31)
CREAT SERPL-MCNC: 1.82 MG/DL — HIGH (ref 0.5–1.3)
CREAT SERPL-MCNC: 1.82 MG/DL — HIGH (ref 0.5–1.3)
EGFR: 33 ML/MIN/1.73M2 — LOW
EGFR: 33 ML/MIN/1.73M2 — LOW
GLUCOSE SERPL-MCNC: 139 MG/DL — HIGH (ref 70–99)
GLUCOSE SERPL-MCNC: 139 MG/DL — HIGH (ref 70–99)
HCT VFR BLD CALC: 32.7 % — LOW (ref 34.5–45)
HCT VFR BLD CALC: 32.7 % — LOW (ref 34.5–45)
HGB BLD-MCNC: 10.5 G/DL — LOW (ref 11.5–15.5)
HGB BLD-MCNC: 10.5 G/DL — LOW (ref 11.5–15.5)
MCHC RBC-ENTMCNC: 27.6 PG — SIGNIFICANT CHANGE UP (ref 27–34)
MCHC RBC-ENTMCNC: 27.6 PG — SIGNIFICANT CHANGE UP (ref 27–34)
MCHC RBC-ENTMCNC: 32.1 GM/DL — SIGNIFICANT CHANGE UP (ref 32–36)
MCHC RBC-ENTMCNC: 32.1 GM/DL — SIGNIFICANT CHANGE UP (ref 32–36)
MCV RBC AUTO: 85.8 FL — SIGNIFICANT CHANGE UP (ref 80–100)
MCV RBC AUTO: 85.8 FL — SIGNIFICANT CHANGE UP (ref 80–100)
NRBC # BLD: 0 /100 WBCS — SIGNIFICANT CHANGE UP (ref 0–0)
NRBC # BLD: 0 /100 WBCS — SIGNIFICANT CHANGE UP (ref 0–0)
PLATELET # BLD AUTO: 422 K/UL — HIGH (ref 150–400)
PLATELET # BLD AUTO: 422 K/UL — HIGH (ref 150–400)
POTASSIUM SERPL-MCNC: 4.5 MMOL/L — SIGNIFICANT CHANGE UP (ref 3.5–5.3)
POTASSIUM SERPL-MCNC: 4.5 MMOL/L — SIGNIFICANT CHANGE UP (ref 3.5–5.3)
POTASSIUM SERPL-SCNC: 4.5 MMOL/L — SIGNIFICANT CHANGE UP (ref 3.5–5.3)
POTASSIUM SERPL-SCNC: 4.5 MMOL/L — SIGNIFICANT CHANGE UP (ref 3.5–5.3)
RBC # BLD: 3.81 M/UL — SIGNIFICANT CHANGE UP (ref 3.8–5.2)
RBC # BLD: 3.81 M/UL — SIGNIFICANT CHANGE UP (ref 3.8–5.2)
RBC # FLD: 13.5 % — SIGNIFICANT CHANGE UP (ref 10.3–14.5)
RBC # FLD: 13.5 % — SIGNIFICANT CHANGE UP (ref 10.3–14.5)
SODIUM SERPL-SCNC: 138 MMOL/L — SIGNIFICANT CHANGE UP (ref 135–145)
SODIUM SERPL-SCNC: 138 MMOL/L — SIGNIFICANT CHANGE UP (ref 135–145)
WBC # BLD: 8.52 K/UL — SIGNIFICANT CHANGE UP (ref 3.8–10.5)
WBC # BLD: 8.52 K/UL — SIGNIFICANT CHANGE UP (ref 3.8–10.5)
WBC # FLD AUTO: 8.52 K/UL — SIGNIFICANT CHANGE UP (ref 3.8–10.5)
WBC # FLD AUTO: 8.52 K/UL — SIGNIFICANT CHANGE UP (ref 3.8–10.5)

## 2023-12-05 PROCEDURE — 80048 BASIC METABOLIC PNL TOTAL CA: CPT

## 2023-12-05 PROCEDURE — 83036 HEMOGLOBIN GLYCOSYLATED A1C: CPT

## 2023-12-05 PROCEDURE — 87086 URINE CULTURE/COLONY COUNT: CPT

## 2023-12-05 PROCEDURE — G0463: CPT

## 2023-12-05 PROCEDURE — 85027 COMPLETE CBC AUTOMATED: CPT

## 2023-12-05 RX ORDER — LIDOCAINE HCL 20 MG/ML
0.2 VIAL (ML) INJECTION ONCE
Refills: 0 | Status: DISCONTINUED | OUTPATIENT
Start: 2023-12-12 | End: 2023-12-26

## 2023-12-05 RX ORDER — SODIUM CHLORIDE 9 MG/ML
3 INJECTION INTRAMUSCULAR; INTRAVENOUS; SUBCUTANEOUS EVERY 8 HOURS
Refills: 0 | Status: DISCONTINUED | OUTPATIENT
Start: 2023-12-12 | End: 2023-12-26

## 2023-12-05 RX ORDER — SODIUM CHLORIDE 9 MG/ML
1000 INJECTION, SOLUTION INTRAVENOUS
Refills: 0 | Status: DISCONTINUED | OUTPATIENT
Start: 2023-12-12 | End: 2023-12-26

## 2023-12-05 NOTE — H&P PST ADULT - PROBLEM SELECTOR PLAN 1
CYSTOSCOPY  BILATERAL TANDEM STENT EXCHANGE  Pre-op education provided - all questions answered   Per guideline CBC, BMP, Ha1c, Ucx sent in PST  Last dose metformin 12/11/23

## 2023-12-05 NOTE — H&P PST ADULT - ASSESSMENT
DASI score:   DASI activity: ADLs, walks with cane, climbs 4 steps into home  Loose teeth or denture: denies DASI score: 4.95 mets  DASI activity: ADLs, walks short distances with cane, climbs 4 steps into home, arrived to [facility] in wheelchair  Loose teeth or denture: denies DASI score: 4.95 mets  DASI activity: ADLs, walks short distances with cane, climbs 4 steps into home, arrived to [facility] in wheelchair  Loose teeth or denture: denies

## 2023-12-05 NOTE — H&P PST ADULT - OTHER CARE PROVIDERS
Dr. Clark (Pain Management ) [phone] - appt 12/7/2023 Dr. Clark (Pain Management ) [phone] - appt 12/7/2023

## 2023-12-05 NOTE — H&P PST ADULT - NSICDXPASTMEDICALHX_GEN_ALL_CORE_FT
PAST MEDICAL HISTORY:  2019 novel coronavirus disease (COVID-19) 2021, 6/2022- mild symptoms    Anxiety disorder     Bilateral lumbar radiculopathy     Calculus of kidney     Cervical adenocarcinoma     Chemotherapy-induced neuropathy     GERD (gastroesophageal reflux disease)     H/O hemorrhoids     History of foot drop     Hydronephrosis, right     Hypertension     Low back pain radiating to both legs     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

## 2023-12-05 NOTE — H&P PST ADULT - ATTENDING COMMENTS
pt seen and examined  films reviewed  for bilat tandem stent exchange  consent signed ,marked both side

## 2023-12-05 NOTE — H&P PST ADULT - HISTORY OF PRESENT ILLNESS
48 yr old female with PMH DMT2, HTN, Adenocarcinoma of Cervix (Dx 5/2021) s/p Chemo and Radiation (completed 11/2021), Right hydronephrosis s/p Right ureteral stent placement (6/2022), Low back pain (followed by pain mgmt) and Chemo-induced neuropathy , presented with Urosepsis in [place] 9/15/2022- 9/18/2022 and was found to have left ureteral/kidney stones s/p ureteral stent placement of left side at that time. Pt returned to [place] s/p Ureteroscopy with Laser lithotripsy on 10/24/22, s/p Cystoscopy, Right Stent to Tandem Stent Exchange, Left Ureteroscopy with Laser and Stone Removal on 2/27/23 . Now coming in for Cystoscopy, Right tandem stent exchange on 8/14/2023.    Denies Recent travel, Exposure or Covid symptoms   48 yr old female PMH DMT2, HTN, Adenocarcinoma of Cervix (Dx 5/2021) s/p Chemo and Radiation (completed 11/2021), Right hydronephrosis s/p Right ureteral stent placement (6/2022), Low back pain (followed by pain mgmt) and Chemo-induced neuropathy , presented with Urosepsis in [place] 9/15/2022- 9/18/2022 and was found to have left ureteral/kidney stones s/p ureteral stent placement of left side at that time. Pt returned to [place] s/p Ureteroscopy with Laser lithotripsy on 10/24/22, s/p Cystoscopy, Right Stent to Tandem Stent Exchange, Left Ureteroscopy with Laser and Stone Removal on 2/27/23 . Cystoscopy, Right tandem stent exchange on 8/14/2023. ED visit 9/2023 with complaints of severe left flank pain s/p left stent placement. Presents to [facility] for CYSTOSCOPY, BILATERAL TANDEM STENT EXCHANGE 12/12/2023. Denies any palpitations, SOB, N/V, fever or chills.        48 yr old female PMH DMT2, HTN, Adenocarcinoma of Cervix (Dx 5/2021) s/p Chemo and Radiation (completed 11/2021), Right hydronephrosis s/p Right ureteral stent placement (6/2022), Low back pain (followed by pain mgmt) and Chemo-induced neuropathy , presented with Urosepsis in [place] 9/15/2022- 9/18/2022 and was found to have left ureteral/kidney stones s/p ureteral stent placement of left side at that time. Pt returned to [place] s/p Ureteroscopy with Laser lithotripsy on 10/24/22, s/p Cystoscopy, Right Stent to Tandem Stent Exchange, Left Ureteroscopy with Laser and Stone Removal on 2/27/23 . Cystoscopy, Right tandem stent exchange on 8/14/2023. ED visit 9/2023 with complaints of severe left flank pain s/p left stent placement. Presents to [facility] for CYSTOSCOPY, BILATERAL TANDEM STENT EXCHANGE 12/12/2023. Denies any palpitations, SOB, N/V, fever or chills.        48 yr old female PMH DMT2, HTN, Adenocarcinoma of Cervix (Dx 5/2021) s/p Chemo and Radiation (completed 11/2021), Right hydronephrosis s/p Right ureteral stent placement (6/2022), Low back pain (followed by pain mgmt) and Chemo-induced neuropathy , presented with Urosepsis in [place] 9/15/2022- 9/18/2022 and was found to have left ureteral/kidney stones s/p ureteral stent placement of left side at that time. Pt returned to [place] s/p Ureteroscopy with Laser lithotripsy on 10/24/22, s/p Cystoscopy, Right Stent to Tandem Stent Exchange, Left Ureteroscopy with Laser and Stone Removal on 2/27/23 . Cystoscopy, Right tandem stent exchange on 8/14/2023. ED visit 9/2023 with complaints of severe left flank pain s/p left stent placement. Presents to [facility] for CYSTOSCOPY, BILATERAL TANDEM STENT EXCHANGE 12/12/2023. Denies any palpitations, SOB, N/V, fever or chills.         Addendum 12/7: Preop Urine Culture >3 Organisms, Probable collection contamination on 12/5/23. Surgeon made aware via email. DAVID Yanez   48 yr old female PMH DMT2, HTN, Adenocarcinoma of Cervix (Dx 5/2021) s/p Chemo and Radiation (completed 11/2021), Right hydronephrosis s/p Right ureteral stent placement (6/2022), Low back pain (followed by pain mgmt) and Chemo-induced neuropathy , presented with Urosepsis in [place] 9/15/2022- 9/18/2022 and was found to have left ureteral/kidney stones s/p ureteral stent placement of left side at that time. Pt returned to [place] s/p Ureteroscopy with Laser lithotripsy on 10/24/22, s/p Cystoscopy, Right Stent to Tandem Stent Exchange, Left Ureteroscopy with Laser and Stone Removal on 2/27/23 . Cystoscopy, Right tandem stent exchange on 8/14/2023. ED visit 9/2023 with complaints of severe left flank pain s/p left stent placement. Presents to [facility] for CYSTOSCOPY, BILATERAL TANDEM STENT EXCHANGE 12/12/2023. Denies any palpitations, SOB, N/V, fever or chills.         Addendum 12/7: Preop Urine Culture >3 Organisms, Probable collection contamination on 12/5/23. Surgeon made aware via email. DAVID Yanez

## 2023-12-05 NOTE — H&P PST ADULT - PRIMARY CARE PROVIDER
Dr. Valentino Comer [phone] last visit 10/2023 Dr. Valentino Comer [phone] last visit 10/2023 Dr. Valentino Comer [phone] appt 12/7/2023 Dr. Valentino Comer [phone] appt 12/7/2023

## 2023-12-06 ENCOUNTER — APPOINTMENT (OUTPATIENT)
Dept: UROLOGY | Facility: CLINIC | Age: 48
End: 2023-12-06

## 2023-12-06 LAB
A1C WITH ESTIMATED AVERAGE GLUCOSE RESULT: 7.4 % — HIGH (ref 4–5.6)
A1C WITH ESTIMATED AVERAGE GLUCOSE RESULT: 7.4 % — HIGH (ref 4–5.6)
ESTIMATED AVERAGE GLUCOSE: 166 MG/DL — HIGH (ref 68–114)
ESTIMATED AVERAGE GLUCOSE: 166 MG/DL — HIGH (ref 68–114)

## 2023-12-07 ENCOUNTER — APPOINTMENT (OUTPATIENT)
Dept: GERIATRICS | Facility: CLINIC | Age: 48
End: 2023-12-07
Payer: MEDICAID

## 2023-12-07 VITALS
OXYGEN SATURATION: 97 % | SYSTOLIC BLOOD PRESSURE: 104 MMHG | RESPIRATION RATE: 16 BRPM | TEMPERATURE: 97 F | HEART RATE: 77 BPM | BODY MASS INDEX: 30.9 KG/M2 | WEIGHT: 180 LBS | DIASTOLIC BLOOD PRESSURE: 71 MMHG

## 2023-12-07 LAB
CULTURE RESULTS: SIGNIFICANT CHANGE UP
CULTURE RESULTS: SIGNIFICANT CHANGE UP
SPECIMEN SOURCE: SIGNIFICANT CHANGE UP
SPECIMEN SOURCE: SIGNIFICANT CHANGE UP

## 2023-12-07 PROCEDURE — 99214 OFFICE O/P EST MOD 30 MIN: CPT

## 2023-12-11 ENCOUNTER — TRANSCRIPTION ENCOUNTER (OUTPATIENT)
Age: 48
End: 2023-12-11

## 2023-12-12 ENCOUNTER — TRANSCRIPTION ENCOUNTER (OUTPATIENT)
Age: 48
End: 2023-12-12

## 2023-12-12 ENCOUNTER — APPOINTMENT (OUTPATIENT)
Dept: UROLOGY | Facility: HOSPITAL | Age: 48
End: 2023-12-12

## 2023-12-12 ENCOUNTER — OUTPATIENT (OUTPATIENT)
Dept: OUTPATIENT SERVICES | Facility: HOSPITAL | Age: 48
LOS: 1 days | End: 2023-12-12
Payer: MEDICAID

## 2023-12-12 VITALS
HEART RATE: 82 BPM | RESPIRATION RATE: 18 BRPM | TEMPERATURE: 97 F | HEIGHT: 66 IN | OXYGEN SATURATION: 99 % | WEIGHT: 180.34 LBS | SYSTOLIC BLOOD PRESSURE: 113 MMHG | DIASTOLIC BLOOD PRESSURE: 72 MMHG

## 2023-12-12 VITALS
HEART RATE: 74 BPM | RESPIRATION RATE: 14 BRPM | SYSTOLIC BLOOD PRESSURE: 101 MMHG | DIASTOLIC BLOOD PRESSURE: 62 MMHG | TEMPERATURE: 97 F | OXYGEN SATURATION: 97 %

## 2023-12-12 DIAGNOSIS — Z96.0 PRESENCE OF UROGENITAL IMPLANTS: Chronic | ICD-10-CM

## 2023-12-12 DIAGNOSIS — N13.5 CROSSING VESSEL AND STRICTURE OF URETER WITHOUT HYDRONEPHROSIS: ICD-10-CM

## 2023-12-12 PROBLEM — Z87.39 PERSONAL HISTORY OF OTHER DISEASES OF THE MUSCULOSKELETAL SYSTEM AND CONNECTIVE TISSUE: Chronic | Status: ACTIVE | Noted: 2023-12-05

## 2023-12-12 LAB
GLUCOSE BLDC GLUCOMTR-MCNC: 133 MG/DL — HIGH (ref 70–99)
GLUCOSE BLDC GLUCOMTR-MCNC: 133 MG/DL — HIGH (ref 70–99)

## 2023-12-12 PROCEDURE — 52332 CYSTOSCOPY AND TREATMENT: CPT | Mod: 50

## 2023-12-12 PROCEDURE — 82962 GLUCOSE BLOOD TEST: CPT

## 2023-12-12 PROCEDURE — C1758: CPT

## 2023-12-12 PROCEDURE — C1769: CPT

## 2023-12-12 PROCEDURE — 76000 FLUOROSCOPY <1 HR PHYS/QHP: CPT

## 2023-12-12 PROCEDURE — C2625: CPT

## 2023-12-12 PROCEDURE — 74420 UROGRAPHY RTRGR +-KUB: CPT | Mod: 26

## 2023-12-12 DEVICE — URETERAL CATH OPEN END 5FR 70CM: Type: IMPLANTABLE DEVICE | Status: FUNCTIONAL

## 2023-12-12 DEVICE — URETERAL CATH DUAL LUMEN 10FR 54CM: Type: IMPLANTABLE DEVICE | Status: FUNCTIONAL

## 2023-12-12 DEVICE — GUIDEWIRE SENSOR DUAL-FLEX NITINOL STRAIGHT .035" X 150CM: Type: IMPLANTABLE DEVICE | Status: FUNCTIONAL

## 2023-12-12 DEVICE — STENT URET 7FR 26CM: Type: IMPLANTABLE DEVICE | Status: FUNCTIONAL

## 2023-12-12 RX ORDER — HYDROMORPHONE HYDROCHLORIDE 2 MG/ML
0.5 INJECTION INTRAMUSCULAR; INTRAVENOUS; SUBCUTANEOUS
Refills: 0 | Status: DISCONTINUED | OUTPATIENT
Start: 2023-12-12 | End: 2023-12-12

## 2023-12-12 RX ORDER — SODIUM CHLORIDE 9 MG/ML
1000 INJECTION, SOLUTION INTRAVENOUS
Refills: 0 | Status: DISCONTINUED | OUTPATIENT
Start: 2023-12-12 | End: 2023-12-26

## 2023-12-12 RX ORDER — OXYCODONE HYDROCHLORIDE 5 MG/1
10 TABLET ORAL ONCE
Refills: 0 | Status: DISCONTINUED | OUTPATIENT
Start: 2023-12-12 | End: 2023-12-12

## 2023-12-12 RX ORDER — CEFAZOLIN SODIUM 1 G
2000 VIAL (EA) INJECTION ONCE
Refills: 0 | Status: COMPLETED | OUTPATIENT
Start: 2023-12-12 | End: 2023-12-12

## 2023-12-12 RX ORDER — OXYCODONE HYDROCHLORIDE 5 MG/1
5 TABLET ORAL ONCE
Refills: 0 | Status: DISCONTINUED | OUTPATIENT
Start: 2023-12-12 | End: 2023-12-12

## 2023-12-12 NOTE — ASU DISCHARGE PLAN (ADULT/PEDIATRIC) - NURSING INSTRUCTIONS
OK to take Tylenol/Acetaminophen at 7:00 PM TODAY for pain and every 6 hours after as needed. OK to take Motrin/Ibuprofen at 7:15 PM TODAY for pain and every 6 hours after as needed.

## 2023-12-12 NOTE — ASU DISCHARGE PLAN (ADULT/PEDIATRIC) - NS MD DC FALL RISK RISK
For information on Fall & Injury Prevention, visit: [email or web address] OR  [email or web address]-injury OR  https://www.cdc.gov/steadi/patient.html For information on Fall & Injury Prevention, visit: [email or web address]-[email or web address] OR  [email or web address]-[email or web address]-injury OR  https://www.cdc.gov/steadi/patient.html

## 2023-12-12 NOTE — BRIEF OPERATIVE NOTE - OPERATION/FINDINGS
Bilateral tandem ureteral stent exchange. Patient now has two 7x26cm ureteral stents in the left ureter and two 7x26cm ureteral stents in the right ureter. No strings. EBL minimal.

## 2023-12-12 NOTE — ASU PREOP CHECKLIST - SIDE RAILS UP
[Home] : at home, [unfilled] , at the time of the visit. [Verbal consent obtained from patient] : the patient, [unfilled] [Medical Office: ([facility])___] : at the medical office located in  [de-identified] : Patient is an 85-year-old female that was recently admitted to [facility] for abdominal pain found to have cholecystitis.  Patient had percutaneous drainage and catheter placed by interventional radiology.  Patient was also followed by surgical team and noted to not be a candidate for gallbladder removal.  Patient referred for evaluation as percutaneous tube is no longer draining.  Family does not have established follow-up with interventional radiology and when trying to contact them was told that they need a prescription.  Patient feels well with no abdominal pain, jaundice, fever, chills.  Last admission patient did not have LFT elevation nor ductal abnormality to suggest choledocholithiasis. n/a

## 2023-12-12 NOTE — ASU PATIENT PROFILE, ADULT - HEALTHCARE INFORMATION NEEDED, PROFILE
*/[id number] [id number]

## 2023-12-12 NOTE — BRIEF OPERATIVE NOTE - NSICDXBRIEFPROCEDURE_GEN_ALL_CORE_FT
PROCEDURES:  Cystoscopy with percutaneous insertion of ureteral stent 12-Dec-2023 13:44:25  Ronald Barrientos

## 2023-12-12 NOTE — ASU PATIENT PROFILE, ADULT - FALL HARM RISK - UNIVERSAL INTERVENTIONS
Bed in lowest position, wheels locked, appropriate side rails in place/Call bell, personal items and telephone in reach/Instruct patient to call for assistance before getting out of bed or chair/Non-slip footwear when patient is out of bed/[place] to call system/Physically safe environment - no spills, clutter or unnecessary equipment/Purposeful Proactive Rounding/Room/bathroom lighting operational, light cord in reach Bed in lowest position, wheels locked, appropriate side rails in place/Call bell, personal items and telephone in reach/Instruct patient to call for assistance before getting out of bed or chair/Non-slip footwear when patient is out of bed/[place] to call system/Physically safe environment - no spills, clutter or unnecessary equipment/Purposeful Proactive Rounding/Room/bathroom lighting operational, light cord in reach

## 2023-12-20 ENCOUNTER — NON-APPOINTMENT (OUTPATIENT)
Age: 48
End: 2023-12-20

## 2023-12-29 ENCOUNTER — NON-APPOINTMENT (OUTPATIENT)
Age: 48
End: 2023-12-29

## 2024-02-07 NOTE — ASU PREOP CHECKLIST - BP NONINVASIVE DIASTOLIC (MM HG)
80

## 2024-02-26 ENCOUNTER — APPOINTMENT (OUTPATIENT)
Dept: GERIATRICS | Facility: CLINIC | Age: 49
End: 2024-02-26
Payer: MEDICARE

## 2024-02-26 PROCEDURE — 99213 OFFICE O/P EST LOW 20 MIN: CPT

## 2024-02-26 NOTE — DATA REVIEWED
[FreeTextEntry1] : PETCT [facility] FDG SUBS   (07/08/2023):   COMPARISON:  FDG PET/CT 10/10/2022..  OTHER STUDIES USED FOR CORRELATION: None.  FINDINGS:  HEAD/NECK: Physiologic FDG activity in visualized brain, head, and neck.  CHEST: No abnormal FDG activity. No lymphadenopathy.  LUNGS: No abnormal FDG activity. 1.4 cm right upper lobe groundglass opacity, increased in size from prior 10/10/2022, favor inflammatory and can be evaluated on follow-up.  PLEURA/PERICARDIUM: No abnormal FDG activity. No effusion.  HEPATOBILIARY/PANCREAS:  Physiologic FDG activity. Liver SUV mean is 2.5, previously 2.0.  SPLEEN: Physiologic FDG activity. Normal in size. Interval removal of left nephroureteral stent with moderate left hydroureteronephrosis. A right nephroureteral stent in place without right hydronephrosis.  ADRENAL GLANDS: No abnormal FDG activity. No nodule.  KIDNEYS/URINARY BLADDER: Physiologic excreted FDG activity.  REPRODUCTIVE ORGANS: No abnormal FDG activity. No abnormal activity in the endometrium.  ABDOMINOPELVIC NODES: No enlarged or FDG-avid lymph node.  BOWEL/PERITONEUM/MESENTERY: Physiological bowel activity.  BONES/SOFT TISSUES: No abnormal FDG activity.  IMPRESSION: Compared to FDG-PET/CT scan dated 10/10/2022.  1.  No evidence of FDG-avid residual/recurrent disease, lymphadenopathy or distant metastases. 2.   Interval removal of left nephroureteral stent with moderate left hydroureteronephrosis. Right nephroureteral stent in place without right hydronephrosis .

## 2024-02-26 NOTE — HISTORY OF PRESENT ILLNESS
[Home] : at home, [unfilled] , at the time of the visit. [Medical Office: ([facility])___] : at the medical office located in  [Verbal consent obtained from patient] : the patient, [unfilled] [FreeTextEntry1] : 48yoF with h/o cervical adenocarcinoma presents for follow-up palliative care visit, referred by Oncology.    Patient initially diagnosed with cervical cancer 5/2021. Underwent weekly cisplatin and pelvic RT 4500cgy 25 fx plus parametrial boost 540cgy 3 fx and 4 brachytherapy procedures (hybrid therapy) 700cgy x 4.  Patient was recently hospitalized at [facility] (1/26 - 2/1/22) after presenting with 3 weeks of worsening b/l hip/lower back pain and feet numbness. Pain is sharp in nature, radiating down the lateral thighs, with mild numbness on dorsal sides of feet. Symptoms initially improved with Aleve, however noted blood tinged vaginal discharge and blood clots with urination not related to menstrual periods so stopped taking Aleve. Hip pain and feet numbness progressively worsened, to the point that she cannot walk or lie supine. Pain is mildly alleviated when lying prone, and better with activity. Pt presented to ER in [place] 2 weeks prior to admission due to these symptoms, CT spine non-con showed multiple non-obstructing nephrolithiasis in L kidney but no fractures, deformities, subluxation were noted. She was discharged with gabapentin 300mg BID, Methocarbamol 500mg TID, and Meloxicam 15mg. She is referred today for continuation of pain management.   Patient states that she developed pain in her L buttock approximately 6 weeks ago. It started as a pinching pain in her L buttock. It worsened to the point that the pain felt as if someone where punching her in the area.  Standing up and walking helped to relieve the pain a bit.  She found that she was pacing a lot in her home to help the pain.   She was advised to use Acetaminophen 1000mg, was using it about twice daily.   This initially helped a bit. The pain progressed, eventually affecting both left and right. Associated with tingling in her toes. Was advised to use Aleve BID. Was advised to increase to 2 pills BID which caused her vaginal bleeding, she stopped.   Was in [place] at the time and was sent to the hospital where she states she received a steroid shot to her back. This helped her very much for a while. She was also prescribed a lidocaine 5% patch there but this was not approved by her insurance. Pain came back and was intense.  She returned to [place] on 1/24 and presented to [facility] on 1/26 at which time she was admitted to [facility].   Pt states that numbness is worse on L side, from dorsal foot to lateral mid-calf. Feels like it is "freezing" and must keep socks on. Also notes cramping intermittently in the toes. Pt also noticed that not all of her urine will come out, and must put pressure to completely relieve herself  When she walks her low back pain gets better but her legs/feet feel tight and crampy.  She feels weakness in her legs, lifting her legs is difficult, moving her toes is difficult.  She describes a sensation of coldness of her toes.    Interval Hx (2/26/24):  Patient seen via telemedicine for palliative medicine follow up.  She gets a stent exchange every 3-4 months.  Her feet and toes are more uncomfortable overnight with the pressure pulling downward.  She is having more pain to her midline lower back which began a few days ago. She describes this as a deep pressure. Pain flares when she is more active. She continues to suffer with intermittent cramping of her feet and calf muscle.  She tried to lower her gabapentin dose to 300mg BID (cutting a 600mg in half) but found the cramping to be a bit worse.  Mood has been pretty good on sertraline 50mg daily. Anxiety is controlled with PRN alprazolam which she uses sparingly.  She does continue to experience cramping and stiffness in her hands and feet. Additionally she reports constant pain along the balls of her feet. She uses a cane for assistance with ambulation.  Has been off of furosemide for about a month and the swelling has not come back.    Current pain regimen: MS ER 15mg TID Oxycodone IR 5-10mg PRN (typically takes a dose twice daily) Gabapentin 400mg BID Methocarbamol 500mg TID Tylenol 1000mg PRN, no recent need  ROS: +constipation - uses senna daily, PRN Miralax +appetite has been OK.  +mood has improved on sertraline +nocturnal leg cramping- massaging helps Remainder of ROS non-contributory  Patient is single, lives with her sister, parents, daughter.  She has a 26yo son and a 26yo daughter. She is unemployed.   PMD: Dr. Valentino Comer ([phone])  [employer] Ref#: [id number]

## 2024-02-26 NOTE — ASSESSMENT
[FreeTextEntry1] : 48yoF with:  # Cervical Cancer - s/p weekly Cisplatin + EBRT, completed 11/2021. s/p R ureteral stent placement 6/2022. Follow up with Med Onc, Rad Onc.  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  Appreciate the input of additional consultants - Physiatry, Neurosurgery and Neurology. - C/w MS ER 15mg TID, c/w PRN Oxycodone IR 10mg, Methocarbamol 500mg TID. C/w gabapentin 400mg BID. - c/w PM&R follow up.  # Depression/Anxiety - C/w Sertraline 50mg QD. C/w PRN alprazolam.25mg for anxiety attacks. Provided extensive emotional support and validation of her worries. c/w behavioral health follow up.  #  Encounter for Palliative Care - Emotional support provided.  Follow up in 6 weeks, call sooner with questions or issues.

## 2024-02-27 ENCOUNTER — OUTPATIENT (OUTPATIENT)
Dept: OUTPATIENT SERVICES | Facility: HOSPITAL | Age: 49
LOS: 1 days | Discharge: HOME | End: 2024-02-27

## 2024-02-27 DIAGNOSIS — Z96.0 PRESENCE OF UROGENITAL IMPLANTS: Chronic | ICD-10-CM

## 2024-02-27 DIAGNOSIS — C53.9 MALIGNANT NEOPLASM OF CERVIX UTERI, UNSPECIFIED: ICD-10-CM

## 2024-03-01 ENCOUNTER — APPOINTMENT (OUTPATIENT)
Dept: PHYSICAL MEDICINE AND REHAB | Facility: CLINIC | Age: 49
End: 2024-03-01
Payer: MEDICARE

## 2024-03-01 DIAGNOSIS — N39.3 STRESS INCONTINENCE (FEMALE) (MALE): ICD-10-CM

## 2024-03-01 PROCEDURE — 99214 OFFICE O/P EST MOD 30 MIN: CPT

## 2024-03-01 NOTE — PHYSICAL EXAM
[FreeTextEntry1] : GEN: AAOx3, NAD. PSYCH: Normal mood and affect. Responds appropriately to commands. EYES: Sclerae Anicteric. No discharge. EOMI. RESP: Breathing unlabored. CV: DP pulses 2+ and equal. No varicosities noted. EXT: No C/C/E. SKIN: No lesions noted. STRENGTH: 4+/5 bilateral LE except for limited dorsiflexion weakness b/l (able to reach neutral position)  SENS: Grossly intact to light touch bilateral lower extremities. INSP: Spine alignment is midline, with no evidence of scoliosis.  GAIT: ambulating with quad cane, limited dorsiflexion b/l

## 2024-03-01 NOTE — HISTORY OF PRESENT ILLNESS
[FreeTextEntry1] : Freda Wilson is a 47 yo f pmh cervical ca s/p cisplatin, EBRT 11/2021, also with ureteral stents, presenting with b/l dorsiflexion weakness.  Last seen 4/18/2022, at which time she was evaluated for chemo induced peripheral neuropathy and weakness.   States she still has cramping in the toes and calves, and tightness in ankles.  EMG done in 2022. Patient also reports stress incontinence with laughter, and has been fearful of intercourse since radiation.  She has radicular back pain which is improved since last visit. Worse on the left.  Pain is overall improved 4/10. described as shooting, stabbing, needles, cramping and numbness.  Pain medications per Dr. Ney Clark  Now ambulates with quad cane. States she does not tolerate MRIs, but agreeable to repeat EMG.

## 2024-03-01 NOTE — ASSESSMENT
[FreeTextEntry1] : recommended spine MRI to evaluate radicular back pain/possible epidural injection patient is leaving town, unable to attend PT for back pain or weakness at this time. referral given for b/l AFO would also benefit from pelvic floor PT for stress incontinence (from before her cancer treatment), and likely radiation side effect recommend repeat EMG, weakness likely from CIPN, however has not had evaluation of paraspinals, reports history of disc herniation. follow up in 8 weeks

## 2024-03-06 ENCOUNTER — APPOINTMENT (OUTPATIENT)
Dept: UROLOGY | Facility: CLINIC | Age: 49
End: 2024-03-06
Payer: MEDICARE

## 2024-03-06 DIAGNOSIS — N20.0 CALCULUS OF KIDNEY: ICD-10-CM

## 2024-03-06 PROCEDURE — 99213 OFFICE O/P EST LOW 20 MIN: CPT

## 2024-03-06 NOTE — ASSESSMENT
[FreeTextEntry1] : We had long discussion about options for managing hydronephrosis due to obstruction over long term.  Risks to kidney function from obstruction are high, and patient may benefit from drainage either by internal stenting or external drainage/nephrostomy.   In setting of extrinsic obstruction, tandem stents have longer/better longevity compared to solitary single stents, but will require anesthesia and periodic change under anesthesia.  Nephrostomy has other advantages for drainage, and can involve changes periodically in the office without anesthesia.  Last alternative is no drainage for the kidney, but this will almost certainly lead to further loss of function to the right kidney, which may be of significance for future chemotherapy considerations.  Continue to proceed with: bilateral tandem stent exchange will move to [id number]- next change in April 2024 urine culture today- will f/u by phone once available

## 2024-03-06 NOTE — HISTORY OF PRESENT ILLNESS
[FreeTextEntry1] : TEN HERNANDEZ is a 48 year F who presents for f/u- now dx with bilateral ureteral stricture; ureteral dilation (incomplete dilation due to density of stricture reported) and left single stent 9/2023. Has right tandem stents already. Post radiation for cervical ca o/w no specific complaints at this time, other than 'odor' to the urine  Last stent exchange 12/2023

## 2024-03-06 NOTE — PHYSICAL EXAM
[Normal Appearance] : normal appearance [Well Groomed] : well groomed [General Appearance - In No Acute Distress] : no acute distress [Edema] : no peripheral edema [Respiration, Rhythm And Depth] : normal respiratory rhythm and effort [Exaggerated Use Of Accessory Muscles For Inspiration] : no accessory muscle use [Normal Station and Gait] : the gait and station were normal for the patient's age [No Focal Deficits] : no focal deficits [] : no rash [Oriented To Time, Place, And Person] : oriented to person, place, and time [Affect] : the affect was normal [Mood] : the mood was normal

## 2024-03-08 ENCOUNTER — APPOINTMENT (OUTPATIENT)
Dept: HEMATOLOGY ONCOLOGY | Facility: CLINIC | Age: 49
End: 2024-03-08
Payer: MEDICARE

## 2024-03-08 ENCOUNTER — RESULT REVIEW (OUTPATIENT)
Age: 49
End: 2024-03-08

## 2024-03-08 ENCOUNTER — APPOINTMENT (OUTPATIENT)
Dept: RADIATION ONCOLOGY | Facility: CLINIC | Age: 49
End: 2024-03-08
Payer: MEDICARE

## 2024-03-08 ENCOUNTER — APPOINTMENT (OUTPATIENT)
Dept: PHYSICAL MEDICINE AND REHAB | Facility: CLINIC | Age: 49
End: 2024-03-08
Payer: MEDICARE

## 2024-03-08 VITALS
OXYGEN SATURATION: 97 % | BODY MASS INDEX: 31.03 KG/M2 | HEART RATE: 82 BPM | TEMPERATURE: 98.4 F | WEIGHT: 180.78 LBS | SYSTOLIC BLOOD PRESSURE: 124 MMHG | RESPIRATION RATE: 16 BRPM | DIASTOLIC BLOOD PRESSURE: 81 MMHG

## 2024-03-08 VITALS
OXYGEN SATURATION: 96 % | HEIGHT: 64 IN | RESPIRATION RATE: 16 BRPM | BODY MASS INDEX: 30.73 KG/M2 | HEART RATE: 9 BPM | TEMPERATURE: 97.8 F | SYSTOLIC BLOOD PRESSURE: 122 MMHG | DIASTOLIC BLOOD PRESSURE: 82 MMHG | WEIGHT: 180 LBS

## 2024-03-08 VITALS
DIASTOLIC BLOOD PRESSURE: 71 MMHG | SYSTOLIC BLOOD PRESSURE: 110 MMHG | OXYGEN SATURATION: 96 % | HEART RATE: 98 BPM | RESPIRATION RATE: 14 BRPM | TEMPERATURE: 97.8 F

## 2024-03-08 DIAGNOSIS — N13.30 UNSPECIFIED HYDRONEPHROSIS: ICD-10-CM

## 2024-03-08 DIAGNOSIS — Z85.41 PERSONAL HISTORY OF MALIGNANT NEOPLASM OF CERVIX UTERI: ICD-10-CM

## 2024-03-08 DIAGNOSIS — N95.2 POSTMENOPAUSAL ATROPHIC VAGINITIS: ICD-10-CM

## 2024-03-08 DIAGNOSIS — T45.1X5A DRUG-INDUCED POLYNEUROPATHY: ICD-10-CM

## 2024-03-08 DIAGNOSIS — G62.0 DRUG-INDUCED POLYNEUROPATHY: ICD-10-CM

## 2024-03-08 LAB
BACTERIA UR CULT: NORMAL
BASOPHILS # BLD AUTO: 0.01 K/UL — SIGNIFICANT CHANGE UP (ref 0–0.2)
BASOPHILS NFR BLD AUTO: 0.2 % — SIGNIFICANT CHANGE UP (ref 0–2)
EOSINOPHIL # BLD AUTO: 0.18 K/UL — SIGNIFICANT CHANGE UP (ref 0–0.5)
EOSINOPHIL NFR BLD AUTO: 2.8 % — SIGNIFICANT CHANGE UP (ref 0–6)
HCT VFR BLD CALC: 32 % — LOW (ref 34.5–45)
HGB BLD-MCNC: 10.4 G/DL — LOW (ref 11.5–15.5)
IMM GRANULOCYTES NFR BLD AUTO: 0.2 % — SIGNIFICANT CHANGE UP (ref 0–0.9)
LYMPHOCYTES # BLD AUTO: 1.18 K/UL — SIGNIFICANT CHANGE UP (ref 1–3.3)
LYMPHOCYTES # BLD AUTO: 18.2 % — SIGNIFICANT CHANGE UP (ref 13–44)
MCHC RBC-ENTMCNC: 27.8 PG — SIGNIFICANT CHANGE UP (ref 27–34)
MCHC RBC-ENTMCNC: 32.5 G/DL — SIGNIFICANT CHANGE UP (ref 32–36)
MCV RBC AUTO: 85.6 FL — SIGNIFICANT CHANGE UP (ref 80–100)
MONOCYTES # BLD AUTO: 0.38 K/UL — SIGNIFICANT CHANGE UP (ref 0–0.9)
MONOCYTES NFR BLD AUTO: 5.9 % — SIGNIFICANT CHANGE UP (ref 2–14)
NEUTROPHILS # BLD AUTO: 4.72 K/UL — SIGNIFICANT CHANGE UP (ref 1.8–7.4)
NEUTROPHILS NFR BLD AUTO: 72.7 % — SIGNIFICANT CHANGE UP (ref 43–77)
NRBC # BLD: 0 /100 WBCS — SIGNIFICANT CHANGE UP (ref 0–0)
PLATELET # BLD AUTO: 331 K/UL — SIGNIFICANT CHANGE UP (ref 150–400)
RBC # BLD: 3.74 M/UL — LOW (ref 3.8–5.2)
RBC # FLD: 14.9 % — HIGH (ref 10.3–14.5)
WBC # BLD: 6.48 K/UL — SIGNIFICANT CHANGE UP (ref 3.8–10.5)
WBC # FLD AUTO: 6.48 K/UL — SIGNIFICANT CHANGE UP (ref 3.8–10.5)

## 2024-03-08 PROCEDURE — 95886 MUSC TEST DONE W/N TEST COMP: CPT

## 2024-03-08 PROCEDURE — 95911 NRV CNDJ TEST 9-10 STUDIES: CPT

## 2024-03-08 PROCEDURE — 99213 OFFICE O/P EST LOW 20 MIN: CPT

## 2024-03-08 PROCEDURE — 99212 OFFICE O/P EST SF 10 MIN: CPT

## 2024-03-08 NOTE — HISTORY OF PRESENT ILLNESS
[Disease: _____________________] : Disease: [unfilled] [AJCC Stage: ____] : AJCC Stage: [unfilled] [de-identified] : Patient has abdominal pain and pelvic pain. She had a USG yesterday. She also had extensive cardiac work up for abnormal EKG. As per patient she has approval for PET, not sure who ordered it. [de-identified] : 46 y.o female with adenocarcinoma most likely  of cervical origin.\par  \par  46 year old premenopausal female with cervical adenocarcinoma, with disease involved in the endometrium presenting for consideration of chemotherapy. Initially seen by Dr. Bill on 7/16/21 given concern for primary vs secondary gynecologic malignancy. \par  \par  Presented to [facility] on 5/28/21 given pelvic pain and retained tampon with a 5 day history of malodorous discharge with fever and tachycardia. Initially assumed to have PID and started on antibiotics, however cervix appeared irregular, dark and distorted. Subsequently taken to the OR for EUA, and cervical biospy done, showing adenocarcinoma, unclear at time if primary or secondary. \par  Pathology is as below: \par   PATHOLOGY:\par  1) EMBx, 5/26/21, Dr. James\par   a) atypical endometrial glands exhibiting complex, cribiform architexture are present as a few scattered glands, further workup is necessary to R/O CAH or adenocarcinoma of the endometrium\par  2) EMBx, 5/28/21\par   a) minute polypoid fragment of endometrial tissue with partial infarction and focal glandular crowding without atypia \par  3) EUA, D&C, ECC, cervical biopsy, 5/30/21, [facility]\par   a) cervical/endocervix - adenocarcinoma in a background of necrosis and acute inflammation (primary vs. secondary)\par  \par  PET/CT done on 7/5/21 is as below: \par  trophic left left kidney. Lobulated bilateral renal cortex with cortical scarring bilaterally. Multiple non obstruction stones on the left, largest 7mm in the lower pole, stable since 5/29/21. Difficult to delineate increased FDG activity in the uterine cervix corresponding to known malignancy SUV 7.3. There is a separate focus of increased FDG activity in the endometrium which may be physiologic SUV 8.8. No enlarged FDG avid nodes. Pan colonic rectal hypermetabolism which may be related to Metformin. \par  \par  Other imaging: \par  \par  TVUS on 5/28/21: uterus 9.0 x 4.9 x 5.2cm. Retained tampon in vagina. No collection or fluid in the endometrial canal. EML 6.3mm. \par  RTO- a simple cyst 3.7 x 3.4 x 3.4cm\par  LTO - Small follicles are seen. \par  No FF. \par  \par  \par  CT A/P on 5/29/21: lobulated bilateral renal cortex with cortical scarring bilaterally. Multiple nonobstructing stones on the left. no hydro.\par  The endometrial cavity is slightly distended with fluid. An air containing structure is identified within the upper vaginal cavity (likely retained tampon). Mild fatty stranding in the pelvis. Likely a physiological cyst in the right ovary and a small corpus luteal or hemorrhagic cyst in the left ovary. Small free fluid. Appendix normal. No ascites. No LAD. \par  \par  Initially it was thought to be a GI primary and patient underwent GI work up with EGD and colonoscopy, all WNL.\par  Patient still has some vaginal discharge, malodorous.\par  \par

## 2024-03-08 NOTE — ASSESSMENT
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 Hernandez

## 2024-03-08 NOTE — VITALS
[Maximal Pain Intensity: 10/10] : 10/10 [Least Pain Intensity: 5/10] : 5/10 [Pain Description/Quality: ___] : Pain description/quality: [unfilled] [Pain Location: ___] : Pain Location: [unfilled] [Pain Duration: ___] : Pain duration: [unfilled]

## 2024-03-11 PROBLEM — N13.30 HYDRONEPHROSIS OF RIGHT KIDNEY: Status: ACTIVE | Noted: 2022-04-27

## 2024-03-11 PROBLEM — Z85.41 HISTORY OF CERVICAL CANCER: Status: ACTIVE | Noted: 2023-05-04

## 2024-03-11 PROBLEM — N95.2 VAGINAL ATROPHY: Status: ACTIVE | Noted: 2023-05-23

## 2024-03-11 NOTE — HISTORY OF PRESENT ILLNESS
[FreeTextEntry1] : TEN HERNANDEZ is a 48 year old premenopausal female with stage IIB locally advanced cervical adenocarcinoma. She received concurrent chemo/RT. She received pelvic RT to a total dose of 4500 cGy to the pelvis followed by parametrial boost of 540 cGy and 4 brachytherapy procedures to a total dose of 2800 cGy. She completed 11/2021.  When seen for posttreatment evaluation on December 17, 2021 she reported continued but lessening vaginal discharge. On examination performed together with Dr. Malcolm RT changes were noted with some necrosis at the apex into the canal with apparent residual cervical tissue nearly flush with the vagina.  Posttreatment PET done on February 7, 2022 revealed decreased hypermetabolism in the uterine cervix which may be related to persistent or recurrent disease versus posttreatment inflammation resolution of endometrial hypermetabolism and unchanged nonspecific pancolonic and rectal hypermetabolism.  4/23/22 - PET/CT: Persistent peripheral hypermetabolism and central photopenia in the uterine cervix, increased in size and metabolism, is concerning for residual disease. New moderate right hydroureteronephrosis. Right ureter is dilated down to the level of the cervix. Unchanged nonspecific pancolonic and rectal hypermetabolism may be related to metformin.  10/2022: PET/CT: interval resolution of central photopenia with peripheral hypermetabolism in the uterine cervix. Interval resolution of moderate right and mild left hydroureteronephrosis s/p placement of b/l ureteral stents. Pancolonic hypermetabolism less extensive however rectal hypermetabolism is not significantly changed. This is nonspecific and may be related to metformin.  Last seen May 2023 for a follow up. 5/1/2023: Patient presents to clinic for follow-up. Patient reports feeling well overall, improved. pain reported is for chronic condition on lower legs. Denies vaginal discharge or infection. Intermittent urinary retention, urinary incontinence. Ambulating better, using rollator or cane. 7/2023: PET No evidence of residual disease 3/9/24Today, she reports lower abdominal pain and lower back pain x 5 days. She believes it is from being constipated. Pain has improved slightly, last BM was yesterday but small. She is afraid of straining due to her hemorrhoids. She took Miralax.  Occasional nausea. Is not using vaginal dilators. Not sexually active. Reports stress incontinence. Referred to [facility].  Continues to follow with urology for stent exchange. +neuropathy, will go for EMG Ambulating with a cane.

## 2024-03-11 NOTE — PHYSICAL EXAM
[General Appearance - In No Acute Distress] : in no acute distress [] : no respiratory distress [Abdomen Soft] : soft [No Rectal Mass] : no rectal mass [Normal External Genitalia] : normal external genitalia  [FreeTextEntry1] : pelvic fullness [de-identified] : vaginal canal shortened with no obvious os

## 2024-03-11 NOTE — REVIEW OF SYSTEMS
[Constipation: Grade 1 - Occasional or intermittent symptoms; occasional use of stool softeners, laxatives, dietary modification, or enema] : Constipation: Grade 1 - Occasional or intermittent symptoms; occasional use of stool softeners, laxatives, dietary modification, or enema [Diarrhea: Grade 0] : Diarrhea: Grade 0 [Fatigue: Grade 0] : Fatigue: Grade 0 [Constipation: Grade 2 - Persistent symptoms with regular use of laxatives or enemas; limiting instrumental ADL] : Constipation: Grade 2 - Persistent symptoms with regular use of laxatives or enemas; limiting instrumental ADL [Vaginal Stricture: Grade 2 - Vaginal narrowing and/or shortening not interfering with physical examination] : Vaginal Stricture: Grade 2 - Vaginal narrowing and/or shortening not interfering with physical examination [FreeTextEntry8] : not active

## 2024-03-12 ENCOUNTER — APPOINTMENT (OUTPATIENT)
Dept: PHYSICAL MEDICINE AND REHAB | Facility: CLINIC | Age: 49
End: 2024-03-12
Payer: MEDICARE

## 2024-03-12 PROCEDURE — 99446 NTRPROF PH1/NTRNET/EHR 5-10: CPT

## 2024-03-12 NOTE — HISTORY OF PRESENT ILLNESS
[FreeTextEntry1] : Freda Wilson is a 47 yo f pmh cervical ca s/p cisplatin, EBRT 11/2021, also with ureteral stents, with b/l dorsiflexion weakness. Last seen 3/1/24, at which time she was evaluated for chemo induced peripheral neuropathy and weakness. States she still has cramping in the toes and calves, and tightness in ankles. EMG done in 2022. Repeat EMG 3/8/24 revealed b/l sensorimotor axonal neuropathy, no lumbar radiculopathy. Reviewed with patient today   She has radicular back pain which is improved since last visit. Worse on the left.  she attributes to her stent.   Pain is 4/10. described as shooting, stabbing, needles, cramping and numbness. having ureteral stents changed on the 18th. Then planning to be in [place] and would like to start pelvic floor PT for stress incontinence when she returns.     She ambulates with quad cane.  At last visit she was given prescription for b/l AFO. She was planning to go out of town so was not able to start therapy for her back pain or weakness, also discussed pelvic floor therapy for the future.  Telehealth visit today changed to telephone visit. 10 minutes spent with patient by phone reviewing EMG results, upcoming stent procedure, discussing symptoms and plan for when she returns to [place].

## 2024-03-13 ENCOUNTER — OUTPATIENT (OUTPATIENT)
Dept: OUTPATIENT SERVICES | Facility: HOSPITAL | Age: 49
LOS: 1 days | End: 2024-03-13
Payer: COMMERCIAL

## 2024-03-13 VITALS
RESPIRATION RATE: 14 BRPM | WEIGHT: 182.1 LBS | HEART RATE: 89 BPM | DIASTOLIC BLOOD PRESSURE: 90 MMHG | TEMPERATURE: 98 F | SYSTOLIC BLOOD PRESSURE: 132 MMHG | OXYGEN SATURATION: 97 % | HEIGHT: 64 IN

## 2024-03-13 DIAGNOSIS — E11.9 TYPE 2 DIABETES MELLITUS WITHOUT COMPLICATIONS: ICD-10-CM

## 2024-03-13 DIAGNOSIS — N13.5 CROSSING VESSEL AND STRICTURE OF URETER WITHOUT HYDRONEPHROSIS: ICD-10-CM

## 2024-03-13 DIAGNOSIS — Z96.0 PRESENCE OF UROGENITAL IMPLANTS: Chronic | ICD-10-CM

## 2024-03-13 DIAGNOSIS — Z01.818 ENCOUNTER FOR OTHER PREPROCEDURAL EXAMINATION: ICD-10-CM

## 2024-03-13 DIAGNOSIS — I10 ESSENTIAL (PRIMARY) HYPERTENSION: ICD-10-CM

## 2024-03-13 LAB
ANION GAP SERPL CALC-SCNC: 13 MMOL/L — SIGNIFICANT CHANGE UP (ref 5–17)
BUN SERPL-MCNC: 27 MG/DL — HIGH (ref 7–23)
CALCIUM SERPL-MCNC: 9.6 MG/DL — SIGNIFICANT CHANGE UP (ref 8.4–10.5)
CHLORIDE SERPL-SCNC: 97 MMOL/L — SIGNIFICANT CHANGE UP (ref 96–108)
CO2 SERPL-SCNC: 23 MMOL/L — SIGNIFICANT CHANGE UP (ref 22–31)
CREAT SERPL-MCNC: 1.83 MG/DL — HIGH (ref 0.5–1.3)
EGFR: 33 ML/MIN/1.73M2 — LOW
GLUCOSE SERPL-MCNC: 189 MG/DL — HIGH (ref 70–99)
HCT VFR BLD CALC: 30.3 % — LOW (ref 34.5–45)
HGB BLD-MCNC: 9.7 G/DL — LOW (ref 11.5–15.5)
MCHC RBC-ENTMCNC: 27.3 PG — SIGNIFICANT CHANGE UP (ref 27–34)
MCHC RBC-ENTMCNC: 32 GM/DL — SIGNIFICANT CHANGE UP (ref 32–36)
MCV RBC AUTO: 85.4 FL — SIGNIFICANT CHANGE UP (ref 80–100)
NRBC # BLD: 0 /100 WBCS — SIGNIFICANT CHANGE UP (ref 0–0)
PLATELET # BLD AUTO: 333 K/UL — SIGNIFICANT CHANGE UP (ref 150–400)
POTASSIUM SERPL-MCNC: 4.3 MMOL/L — SIGNIFICANT CHANGE UP (ref 3.5–5.3)
POTASSIUM SERPL-SCNC: 4.3 MMOL/L — SIGNIFICANT CHANGE UP (ref 3.5–5.3)
RBC # BLD: 3.55 M/UL — LOW (ref 3.8–5.2)
RBC # FLD: 14.6 % — HIGH (ref 10.3–14.5)
SODIUM SERPL-SCNC: 133 MMOL/L — LOW (ref 135–145)
WBC # BLD: 6.8 K/UL — SIGNIFICANT CHANGE UP (ref 3.8–10.5)
WBC # FLD AUTO: 6.8 K/UL — SIGNIFICANT CHANGE UP (ref 3.8–10.5)

## 2024-03-13 NOTE — H&P PST ADULT - OTHER CARE PROVIDERS
Dr. Zoë Clark (Pain Management ) [phone] (2/26/2024) ; Cardiologist Dr. Remigio Whitaker [phone] (3/5/2024)

## 2024-03-13 NOTE — H&P PST ADULT - HISTORY OF PRESENT ILLNESS
49 y/o F, PMHx DM type II, HTN, Adenocarcinoma of Cervix (Dx 5/2021) s/p Chemo and Radiation (completed 11/2021), Right hydronephrosis s/p Right ureteral stent placement (6/2022), Low back pain (followed by pain mgmt) and Chemo-induced neuropathy , had Urosepsis in [place] 9/15/2022- 9/18/2022 and had left ureteral/kidney stones s/p ureteral stent placement of left side at that time.  Pt returned to [place] s/p Ureteroscopy with Laser lithotripsy on (10/24/22), s/p Cystoscopy,  Right Stent to Tandem Stent Exchange, Left Ureteroscopy with Laser and Stone Removal on (2/27/23) . Cystoscopy, Right tandem stent exchange on (8/14/2023).  PT went to the ED 9/2023 with complaints of severe left flank pain s/p left stent placement.  PT had cystoscopy with bilateral  tandem stent exchange and retrograde pyelogram(12/12/2023).   PT presents for PST for cystoscopy, bilateral stent exchange with Dr. David Hoenig on 3/18/2024.  PT denies any fever, chills, N/V/D, SOB, CP, palpitations, dizziness, HA, urinary or BM issues.    ***Of note, Pt had urine culture 3/6/2024 >=3 organisms. Probable collection contamination. NP just called while pt at [facility] and will treat with abx Augmentin for 7 days.

## 2024-03-13 NOTE — H&P PST ADULT - BP NONINVASIVE DIASTOLIC (MM HG)
90 Render Risk Assessment In Note?: no Comment: Patient reports flares with current treatment. Pt is currently taking Spironolactone 100mg PO QD, applying tretinoin 0.025% cream QHS and Winlevi QAM. Will have pt continue topicals and increase Spironolactone to 150mg PO QD. If no improvement by next appointment, will consider oral abx or Accutane. Discussed side effects of each. Pt reports she started oral birth control (Cherelle) 3 months ago. FU 2 months. Detail Level: Simple

## 2024-03-13 NOTE — H&P PST ADULT - ATTENDING COMMENTS
pt seen and examined  films reviewed  on augmentin  consent signed, bilateral abd marked  for cysto, bilateral tandem stent exchange

## 2024-03-13 NOTE — H&P PST ADULT - NSICDXPASTMEDICALHX_GEN_ALL_CORE_FT
PAST MEDICAL HISTORY:  Anxiety disorder     Bilateral lumbar radiculopathy     Calculus of kidney     Cervical adenocarcinoma     Chemotherapy-induced neuropathy     GERD (gastroesophageal reflux disease)     H/O hemorrhoids     History of foot drop     Hydronephrosis, right     Hypertension     Low back pain radiating to both legs     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

## 2024-03-13 NOTE — H&P PST ADULT - NS PRO AD PATIENT TYPE
Infant Health Care Proxy (HCP)

## 2024-03-13 NOTE — H&P PST ADULT - PROBLEM SELECTOR PLAN 2
PT on Metformin.  She may take morning dose on 3/17/2024 and hold dose at night (3/17/2024) and day of procedure 3/18/2024.

## 2024-03-13 NOTE — H&P PST ADULT - PROBLEM SELECTOR PLAN 1
PT scheduled cystoscopy bilateral stent exchange Dr. David Hoenig in 3/18/2024.  -Pre op instructions provided.  LABS: CBC, BMP, HgbA1c, done at PST.  Urine culture was done 3/6/2024 at Dr. Hoenig's office.  NP called pt during PST and treating with Abx Augmentin for 7 Days.

## 2024-03-13 NOTE — H&P PST ADULT - ASSESSMENT
DASI score: 4.95   DASI activity: able to walks short distances with cane, climbs 4 steps into home, self care without SOB, CP.  Loose teeth or denture: denies

## 2024-03-14 LAB
A1C WITH ESTIMATED AVERAGE GLUCOSE RESULT: 8.1 % — HIGH (ref 4–5.6)
ESTIMATED AVERAGE GLUCOSE: 186 MG/DL — HIGH (ref 68–114)

## 2024-03-17 ENCOUNTER — TRANSCRIPTION ENCOUNTER (OUTPATIENT)
Age: 49
End: 2024-03-17

## 2024-03-17 LAB
ALBUMIN SERPL ELPH-MCNC: 4.1 G/DL
ALP BLD-CCNC: 144 U/L
ALT SERPL-CCNC: 11 U/L
ANION GAP SERPL CALC-SCNC: 15 MMOL/L
AST SERPL-CCNC: 10 U/L
BILIRUB SERPL-MCNC: 0.2 MG/DL
BUN SERPL-MCNC: 26 MG/DL
CALCIUM SERPL-MCNC: 9.4 MG/DL
CHLORIDE SERPL-SCNC: 99 MMOL/L
CO2 SERPL-SCNC: 22 MMOL/L
CREAT SERPL-MCNC: 1.75 MG/DL
EGFR: 35 ML/MIN/1.73M2
GLUCOSE SERPL-MCNC: 157 MG/DL
POTASSIUM SERPL-SCNC: 4.2 MMOL/L
PROT SERPL-MCNC: 8 G/DL
SODIUM SERPL-SCNC: 137 MMOL/L

## 2024-03-18 ENCOUNTER — APPOINTMENT (OUTPATIENT)
Dept: UROLOGY | Facility: HOSPITAL | Age: 49
End: 2024-03-18

## 2024-03-18 ENCOUNTER — OUTPATIENT (OUTPATIENT)
Dept: OUTPATIENT SERVICES | Facility: HOSPITAL | Age: 49
LOS: 1 days | End: 2024-03-18
Payer: COMMERCIAL

## 2024-03-18 ENCOUNTER — TRANSCRIPTION ENCOUNTER (OUTPATIENT)
Age: 49
End: 2024-03-18

## 2024-03-18 VITALS
HEART RATE: 92 BPM | TEMPERATURE: 98 F | HEIGHT: 64 IN | WEIGHT: 182.1 LBS | SYSTOLIC BLOOD PRESSURE: 118 MMHG | DIASTOLIC BLOOD PRESSURE: 76 MMHG | OXYGEN SATURATION: 97 % | RESPIRATION RATE: 18 BRPM

## 2024-03-18 VITALS
DIASTOLIC BLOOD PRESSURE: 68 MMHG | SYSTOLIC BLOOD PRESSURE: 102 MMHG | HEART RATE: 82 BPM | RESPIRATION RATE: 16 BRPM | OXYGEN SATURATION: 95 %

## 2024-03-18 DIAGNOSIS — Z96.0 PRESENCE OF UROGENITAL IMPLANTS: Chronic | ICD-10-CM

## 2024-03-18 DIAGNOSIS — N13.5 CROSSING VESSEL AND STRICTURE OF URETER WITHOUT HYDRONEPHROSIS: ICD-10-CM

## 2024-03-18 LAB
GLUCOSE BLDC GLUCOMTR-MCNC: 126 MG/DL — HIGH (ref 70–99)
GLUCOSE BLDC GLUCOMTR-MCNC: 134 MG/DL — HIGH (ref 70–99)
GLUCOSE BLDC GLUCOMTR-MCNC: 159 MG/DL — HIGH (ref 70–99)

## 2024-03-18 PROCEDURE — 74420 UROGRAPHY RTRGR +-KUB: CPT | Mod: 26

## 2024-03-18 PROCEDURE — C1758: CPT

## 2024-03-18 PROCEDURE — 76000 FLUOROSCOPY <1 HR PHYS/QHP: CPT

## 2024-03-18 PROCEDURE — 52332 CYSTOSCOPY AND TREATMENT: CPT | Mod: 50

## 2024-03-18 PROCEDURE — C2625: CPT

## 2024-03-18 PROCEDURE — 83036 HEMOGLOBIN GLYCOSYLATED A1C: CPT

## 2024-03-18 PROCEDURE — 85027 COMPLETE CBC AUTOMATED: CPT

## 2024-03-18 PROCEDURE — 82962 GLUCOSE BLOOD TEST: CPT

## 2024-03-18 PROCEDURE — 80048 BASIC METABOLIC PNL TOTAL CA: CPT

## 2024-03-18 PROCEDURE — G0463: CPT

## 2024-03-18 PROCEDURE — C1769: CPT

## 2024-03-18 DEVICE — IMPLANTABLE DEVICE: Type: IMPLANTABLE DEVICE | Status: FUNCTIONAL

## 2024-03-18 DEVICE — GUIDEWIRE SENSOR DUAL-FLEX NITINOL STRAIGHT .035" X 150CM: Type: IMPLANTABLE DEVICE | Status: FUNCTIONAL

## 2024-03-18 DEVICE — URETERAL CATH AXXCESS OPEN END 6FR 70CM: Type: IMPLANTABLE DEVICE | Status: FUNCTIONAL

## 2024-03-18 DEVICE — STENT URET 7FR 26CM: Type: IMPLANTABLE DEVICE | Status: FUNCTIONAL

## 2024-03-18 DEVICE — URETERAL CATH OPEN END 5FR 70CM: Type: IMPLANTABLE DEVICE | Status: FUNCTIONAL

## 2024-03-18 RX ORDER — ONDANSETRON 8 MG/1
4 TABLET, FILM COATED ORAL ONCE
Refills: 0 | Status: DISCONTINUED | OUTPATIENT
Start: 2024-03-18 | End: 2024-03-18

## 2024-03-18 RX ORDER — CEFAZOLIN SODIUM 1 G
2000 VIAL (EA) INJECTION ONCE
Refills: 0 | Status: COMPLETED | OUTPATIENT
Start: 2024-03-18 | End: 2024-03-18

## 2024-03-18 RX ORDER — SODIUM CHLORIDE 9 MG/ML
3 INJECTION INTRAMUSCULAR; INTRAVENOUS; SUBCUTANEOUS EVERY 8 HOURS
Refills: 0 | Status: DISCONTINUED | OUTPATIENT
Start: 2024-03-18 | End: 2024-03-18

## 2024-03-18 RX ORDER — HYDROMORPHONE HYDROCHLORIDE 2 MG/ML
0.5 INJECTION INTRAMUSCULAR; INTRAVENOUS; SUBCUTANEOUS
Refills: 0 | Status: DISCONTINUED | OUTPATIENT
Start: 2024-03-18 | End: 2024-03-18

## 2024-03-18 RX ORDER — DIPHENHYDRAMINE HCL 50 MG
12.5 CAPSULE ORAL ONCE
Refills: 0 | Status: DISCONTINUED | OUTPATIENT
Start: 2024-03-18 | End: 2024-04-01

## 2024-03-18 RX ORDER — LIDOCAINE HCL 20 MG/ML
0.2 VIAL (ML) INJECTION ONCE
Refills: 0 | Status: DISCONTINUED | OUTPATIENT
Start: 2024-03-18 | End: 2024-03-18

## 2024-03-18 RX ADMIN — HYDROMORPHONE HYDROCHLORIDE 0.5 MILLIGRAM(S): 2 INJECTION INTRAMUSCULAR; INTRAVENOUS; SUBCUTANEOUS at 14:00

## 2024-03-18 RX ADMIN — HYDROMORPHONE HYDROCHLORIDE 0.5 MILLIGRAM(S): 2 INJECTION INTRAMUSCULAR; INTRAVENOUS; SUBCUTANEOUS at 14:33

## 2024-03-18 NOTE — ASU PATIENT PROFILE, ADULT - FALL HARM RISK - HARM RISK INTERVENTIONS

## 2024-03-18 NOTE — ASU DISCHARGE PLAN (ADULT/PEDIATRIC) - ASU DC SPECIAL INSTRUCTIONSFT
PAIN CONTROL: You may take 650 mg of Tylenol every 4-6 hours. Do not exceed more than 4000mg or 4 grams of Tylenol daily. You may take Tylenol and Ibuprofen as needed for pain. If you were prescribed narcotic pain medication, take as directed. You should also take senna to prevent constipation.    STENT: You may have intermittent pink tinged urine and slight flank pain when you urinate.  This is normal and due to the stent in your ureter. It is not uncommon to have some burning when you urinate.  Some patients do not feel any discomfort from the stent, while others may feel the sensation of needing to urinate frequently, burning on urination, or even back pain that worsens with urination. These symptoms usually improve gradually, however it may be necessary to take pain medication until the discomfort subsides.  If you are unable to urinate or your urine becomes bright red or with clots, please call the office. Please make sure you drink plenty of fluids.    BATHING: Please do not submerge wound underwater. You may shower and/or sponge bathe.    ACTIVITY: No heavy lifting or straining. Otherwise, you may return to your usual level of physical activity. If you are taking narcotic pain medications (such as oxycodone), do NOT drive a car, operate machinery or make important decisions.    DIET: Return to your usual diet    NOTIFY YOUR SURGEON IF: You have any bleeding that does not stop, any fevers (over 100.4F) or chills, persistent nausea/vomiting, persistent diarrhea, your pain is not controlled on your discharge pain medications, heavy bleeding in the urine, inability to urinate, or other worrisome symptoms arise.    FOLLOW UP:  1. Please call your surgeon to make a follow up appointment PAIN CONTROL: You may take 650 mg of Tylenol every 4-6 hours. Do not exceed more than 4000mg or 4 grams of Tylenol daily. You may take Tylenol and Ibuprofen as needed for pain. If you were prescribed narcotic pain medication, take as directed. You should also take senna to prevent constipation.    ANTIBIOTICS: Finish antibiotics at home.    STENT: You may have intermittent pink tinged urine and slight flank pain when you urinate.  This is normal and due to the stent in your ureter. It is not uncommon to have some burning when you urinate.  Some patients do not feel any discomfort from the stent, while others may feel the sensation of needing to urinate frequently, burning on urination, or even back pain that worsens with urination. These symptoms usually improve gradually, however it may be necessary to take pain medication until the discomfort subsides.  If you are unable to urinate or your urine becomes bright red or with clots, please call the office. Please make sure you drink plenty of fluids.    BATHING: Please do not submerge wound underwater. You may shower and/or sponge bathe.    ACTIVITY: No heavy lifting or straining. Otherwise, you may return to your usual level of physical activity. If you are taking narcotic pain medications (such as oxycodone), do NOT drive a car, operate machinery or make important decisions.    DIET: Return to your usual diet    NOTIFY YOUR SURGEON IF: You have any bleeding that does not stop, any fevers (over 100.4F) or chills, persistent nausea/vomiting, persistent diarrhea, your pain is not controlled on your discharge pain medications, heavy bleeding in the urine, inability to urinate, or other worrisome symptoms arise.    FOLLOW UP:  1. Please call your surgeon to make a follow up appointment

## 2024-03-18 NOTE — ASU DISCHARGE PLAN (ADULT/PEDIATRIC) - CARE PROVIDER_API CALL
Hoenig, David Mayer  Urology  [address] [place] [address]- [place] [address]  Phone: [phone]  Fax: [phone]  Follow Up Time: Routine

## 2024-03-21 ENCOUNTER — APPOINTMENT (OUTPATIENT)
Dept: HEMATOLOGY ONCOLOGY | Facility: CLINIC | Age: 49
End: 2024-03-21

## 2024-03-22 LAB
ALBUMIN SERPL ELPH-MCNC: 4.3 G/DL
ALP BLD-CCNC: 149 U/L
ALT SERPL-CCNC: 30 U/L
ANION GAP SERPL CALC-SCNC: 16 MMOL/L
AST SERPL-CCNC: 16 U/L
BILIRUB SERPL-MCNC: <0.2 MG/DL
BUN SERPL-MCNC: 34 MG/DL
CALCIUM SERPL-MCNC: 9.6 MG/DL
CHLORIDE SERPL-SCNC: 99 MMOL/L
CO2 SERPL-SCNC: 21 MMOL/L
CREAT SERPL-MCNC: 2.21 MG/DL
EGFR: 26 ML/MIN/1.73M2
GLUCOSE SERPL-MCNC: 266 MG/DL
POTASSIUM SERPL-SCNC: 4.8 MMOL/L
PROT SERPL-MCNC: 8.6 G/DL
SODIUM SERPL-SCNC: 136 MMOL/L

## 2024-03-26 DIAGNOSIS — R79.89 OTHER SPECIFIED ABNORMAL FINDINGS OF BLOOD CHEMISTRY: ICD-10-CM

## 2024-03-27 ENCOUNTER — APPOINTMENT (OUTPATIENT)
Dept: HEMATOLOGY ONCOLOGY | Facility: CLINIC | Age: 49
End: 2024-03-27

## 2024-03-28 LAB
ALBUMIN SERPL ELPH-MCNC: 4.1 G/DL
ANION GAP SERPL CALC-SCNC: 13 MMOL/L
BUN SERPL-MCNC: 25 MG/DL
CALCIUM SERPL-MCNC: 10.2 MG/DL
CHLORIDE SERPL-SCNC: 100 MMOL/L
CO2 SERPL-SCNC: 24 MMOL/L
CREAT SERPL-MCNC: 1.74 MG/DL
EGFR: 35 ML/MIN/1.73M2
GLUCOSE SERPL-MCNC: 130 MG/DL
PHOSPHATE SERPL-MCNC: 3.7 MG/DL
POTASSIUM SERPL-SCNC: 4.3 MMOL/L
SODIUM SERPL-SCNC: 137 MMOL/L

## 2024-04-16 ENCOUNTER — APPOINTMENT (OUTPATIENT)
Dept: UROLOGY | Facility: CLINIC | Age: 49
End: 2024-04-16
Payer: MEDICARE

## 2024-04-16 DIAGNOSIS — N39.0 URINARY TRACT INFECTION, SITE NOT SPECIFIED: ICD-10-CM

## 2024-04-16 DIAGNOSIS — N13.5 CROSSING VESSEL AND STRICTURE OF URETER W/OUT HYDRONEPHROSIS: ICD-10-CM

## 2024-04-16 PROCEDURE — 99442: CPT

## 2024-04-16 RX ORDER — NITROFURANTOIN (MONOHYDRATE/MACROCRYSTALS) 25; 75 MG/1; MG/1
100 CAPSULE ORAL
Qty: 30 | Refills: 3 | Status: COMPLETED | COMMUNITY
Start: 2023-01-25 | End: 2024-04-16

## 2024-04-16 RX ORDER — CIPROFLOXACIN HYDROCHLORIDE 500 MG/1
500 TABLET, FILM COATED ORAL
Qty: 10 | Refills: 0 | Status: COMPLETED | COMMUNITY
Start: 2023-08-10 | End: 2024-04-16

## 2024-04-16 NOTE — HISTORY OF PRESENT ILLNESS
[Other Location: e.g. School (Enter Location, City,State)___] : at [unfilled], at the time of the visit. [Other Location: e.g. Home (Enter Location, City,State)___] : at [unfilled] [Verbal consent obtained from patient] : the patient, [unfilled] [FreeTextEntry1] : The patient-doctor relationship has been established in audio HIPAA compliant communication. The patient's identity has been confirmed. The patient was previously emailed a copy of the telemedicine consent. They have had a chance to review and has now given verbal consent and has requested care to be assessed and treated via telemedicine. They understand there may be limitations in this process, and that they may need further followup care in the office and/or hospital settings.  Verbal consent given on Apr 16 2024 11:20AM  TEN HERNANDEZ is a 48 year F who presents for f/u- now dx with bilateral ureteral stricture; ureteral dilation (incomplete dilation due to density of stricture reported) and left single stent 9/2023. Has right tandem stents already. Post radiation for cervical ca  She is s/p Cystoscopy, bilateral retrograde pyelogram, bilateral tandem ureteral stent exchange on 3/18/24 doing well- now on vaginal estrogens since 2023, but has had several UTI   04/16/2024   The patient denies fevers, chills, nausea and/or vomiting, and no unexplained weight loss.  All pertinent parts of the patient PFSH (past medical, family, and social histories), laboratory, radiological studies and available physician notes were reviewed prior to starting the face-to-face portion of the telemedicine visit. Questionnaire results, where appropriate, were discussed with the patient.

## 2024-04-16 NOTE — ASSESSMENT
[FreeTextEntry1] : I had long d/w pt about UTI prevention in postmenopausal women.  This generally centers around the use of vaginal estrogens to allow thickening of the vaginal mucosa and return of normal commensal bacteria such as lactobacillus, which in turn lowers the vaginal pH and acts as prophylaxis from the enteric bacteria which prefer neutral pH.  In addition, reviewing imaging needs is critical to ensure no obvious focus for colonization such as urinary stasis, foreign body.  Other options such as oral prophylaxis with methenamine/vitamin C, addition of urinary acidifiers such as cranberry juice bid, or daily suppressive abx dose discussed as well, but will pursue as needed.  I therefore recommend:  1. cont vaginal estrogens 2. next tandem stent exchange in ~ 2 months 3. pt wishes to try cranberry juice as first method to decrease uti risks

## 2024-04-17 ENCOUNTER — NON-APPOINTMENT (OUTPATIENT)
Age: 49
End: 2024-04-17

## 2024-04-24 ENCOUNTER — RX RENEWAL (OUTPATIENT)
Age: 49
End: 2024-04-24

## 2024-05-04 NOTE — PROGRESS NOTE ADULT - SUBJECTIVE AND OBJECTIVE BOX
To get better and follow your care plan as instructed. ***INCOMPLETE NOTE***  Antelmo Dixon MD PGY-3  Internal Medicine  Pager [phone] / [phone]  After 7pm please page Night Float [phone] or [phone]    Patient is a 46y old  Female who presents with a chief complaint of back pain (2022 00:32)      SUBJECTIVE / OVERNIGHT EVENTS:    MEDICATIONS  (STANDING):  amLODIPine   Tablet 10 milliGRAM(s) Oral daily  atorvastatin 20 milliGRAM(s) Oral at bedtime  dextrose 40% Gel 15 Gram(s) Oral once  dextrose 5%. 1000 milliLiter(s) (50 mL/Hr) IV Continuous <Continuous>  dextrose 5%. 1000 milliLiter(s) (100 mL/Hr) IV Continuous <Continuous>  dextrose 50% Injectable 25 Gram(s) IV Push once  dextrose 50% Injectable 12.5 Gram(s) IV Push once  dextrose 50% Injectable 25 Gram(s) IV Push once  enoxaparin Injectable 40 milliGRAM(s) SubCutaneous daily  gabapentin 300 milliGRAM(s) Oral two times a day  glucagon  Injectable 1 milliGRAM(s) IntraMuscular once  insulin lispro (ADMELOG) corrective regimen sliding scale   SubCutaneous three times a day before meals  insulin lispro (ADMELOG) corrective regimen sliding scale   SubCutaneous at bedtime  losartan 100 milliGRAM(s) Oral daily  polyethylene glycol 3350 17 Gram(s) Oral daily    MEDICATIONS  (PRN):  acetaminophen     Tablet .. 650 milliGRAM(s) Oral every 6 hours PRN Temp greater or equal to 38C (100.4F), Mild Pain (1 - 3)  LORazepam     Tablet 0.5 milliGRAM(s) Oral every 12 hours PRN Anxiety  ondansetron Injectable 4 milliGRAM(s) IV Push every 8 hours PRN Nausea and/or Vomiting  oxyCODONE    IR 5 milliGRAM(s) Oral every 6 hours PRN Moderate Pain (4 - 6)      CAPILLARY BLOOD GLUCOSE      POCT Blood Glucose.: 178 mg/dL (2022 00:33)    I&O's Summary      PHYSICAL EXAM:  Vital Signs Last 24 Hrs  T(C): 37 (22 @ 03:48)  T(F): 98.6 (22 @ 03:48), Max: 98.7 (22 @ 17:18)  HR: 90 (22 @ 03:48) (90 - 103)  BP: 144/85 (22 @ 03:48)  BP(mean): --  RR: 17 (22 @ 03:48) (16 - 17)  SpO2: 100% (22 @ 03:48) (100% - 100%)  Wt(kg): --    Constitutional: NAD, awake and alert  EYES: EOMI  ENT:  Normal Hearing, no tonsillar exudates   Neck: Soft and supple , no thyromegaly   Respiratory: Breath sounds are clear bilaterally, No wheezing, rales or rhonchi  Cardiovascular: S1 and S2, regular rate and rhythm, no Murmurs, gallops or rubs, no JVD,    Gastrointestinal: Bowel Sounds present, soft, nontender, nondistended, no guarding, no rebound  Extremities: No cyanosis or clubbing; warm to touch  Vascular: 2+ peripheral pulses lower ex  Neurological: No focal deficits, CN II-XII intact bilaterally, sensation to light touch intact in all extremities, gait intact. Pupils are equally reactive to light and symmetrical in size.   Musculoskeletal: 5/5 strength b/l upper and lower extremities; no joint swelling.  Skin: No rashes  Psych: no depression or anhedonia, AAOx3  HEME: no bruises, no nose bleeds      LABS:                        10.0   3.92  )-----------( 241      ( 2022 06:35 )             29.9         134<L>  |  100  |  12  ----------------------------<  211<H>  3.8   |  25  |  0.72    Ca    9.1      2022 06:35  Phos  4.2       Mg     1.90         TPro  7.1  /  Alb  3.6  /  TBili  0.2  /  DBili  x   /  AST  12  /  ALT  15  /  AlkPhos  117            Urinalysis Basic - ( 2022 04:12 )    Color: Colorless / Appearance: Slightly Turbid / S.004 / pH: x  Gluc: x / Ketone: Negative  / Bili: Negative / Urobili: <2 mg/dL   Blood: x / Protein: Negative / Nitrite: Negative   Leuk Esterase: Large / RBC: 0 /HPF / WBC 62 /HPF   Sq Epi: x / Non Sq Epi: 0 /HPF / Bacteria: Many        RADIOLOGY & ADDITIONAL TESTS:    Imaging Personally Reviewed:    Consultant(s) Notes Reviewed:      Care Discussed with Consultants/Other Providers:   Antelmo Dixon MD PGY-3  Internal Medicine  Pager [phone] / [phone]  After 7pm please page Night Float [phone] or [phone]    Patient is a 46y old  Female who presents with a chief complaint of back pain (2022 00:32)    SUBJECTIVE / OVERNIGHT EVENTS:  Patient admitted overnight with     MEDICATIONS  (STANDING):  amLODIPine   Tablet 10 milliGRAM(s) Oral daily  atorvastatin 20 milliGRAM(s) Oral at bedtime  dextrose 40% Gel 15 Gram(s) Oral once  dextrose 5%. 1000 milliLiter(s) (50 mL/Hr) IV Continuous <Continuous>  dextrose 5%. 1000 milliLiter(s) (100 mL/Hr) IV Continuous <Continuous>  dextrose 50% Injectable 25 Gram(s) IV Push once  dextrose 50% Injectable 12.5 Gram(s) IV Push once  dextrose 50% Injectable 25 Gram(s) IV Push once  enoxaparin Injectable 40 milliGRAM(s) SubCutaneous daily  gabapentin 300 milliGRAM(s) Oral two times a day  glucagon  Injectable 1 milliGRAM(s) IntraMuscular once  insulin lispro (ADMELOG) corrective regimen sliding scale   SubCutaneous three times a day before meals  insulin lispro (ADMELOG) corrective regimen sliding scale   SubCutaneous at bedtime  losartan 100 milliGRAM(s) Oral daily  polyethylene glycol 3350 17 Gram(s) Oral daily    MEDICATIONS  (PRN):  acetaminophen     Tablet .. 650 milliGRAM(s) Oral every 6 hours PRN Temp greater or equal to 38C (100.4F), Mild Pain (1 - 3)  LORazepam     Tablet 0.5 milliGRAM(s) Oral every 12 hours PRN Anxiety  ondansetron Injectable 4 milliGRAM(s) IV Push every 8 hours PRN Nausea and/or Vomiting  oxyCODONE    IR 5 milliGRAM(s) Oral every 6 hours PRN Moderate Pain (4 - 6)      CAPILLARY BLOOD GLUCOSE      POCT Blood Glucose.: 178 mg/dL (2022 00:33)    I&O's Summary      PHYSICAL EXAM:  Vital Signs Last 24 Hrs  T(C): 37 (22 @ 03:48)  T(F): 98.6 (22 @ 03:48), Max: 98.7 (22 @ 17:18)  HR: 90 (22 @ 03:48) (90 - 103)  BP: 144/85 (22 @ 03:48)  BP(mean): --  RR: 17 (22 @ 03:48) (16 - 17)  SpO2: 100% (22 @ 03:48) (100% - 100%)  Wt(kg): --    Constitutional: NAD, awake and alert  EYES: EOMI  ENT:  Normal Hearing, no tonsillar exudates   Neck: Soft and supple , no thyromegaly   Respiratory: Breath sounds are clear bilaterally, No wheezing, rales or rhonchi  Cardiovascular: S1 and S2, regular rate and rhythm, no Murmurs, gallops or rubs, no JVD,    Gastrointestinal: Bowel Sounds present, soft, nontender, nondistended, no guarding, no rebound  Extremities: No cyanosis or clubbing; warm to touch  Vascular: 2+ peripheral pulses lower ex  Neurological: No focal deficits, CN II-XII intact bilaterally, sensation to light touch intact in all extremities, gait intact. Pupils are equally reactive to light and symmetrical in size.   Musculoskeletal: 5/5 strength b/l upper and lower extremities; no joint swelling.  Skin: No rashes  Psych: no depression or anhedonia, AAOx3  HEME: no bruises, no nose bleeds      LABS:                        10.0   3.92  )-----------( 241      ( 2022 06:35 )             29.9         134<L>  |  100  |  12  ----------------------------<  211<H>  3.8   |  25  |  0.72    Ca    9.1      2022 06:35  Phos  4.2       Mg     1.90         TPro  7.1  /  Alb  3.6  /  TBili  0.2  /  DBili  x   /  AST  12  /  ALT  15  /  AlkPhos  117            Urinalysis Basic - ( 2022 04:12 )    Color: Colorless / Appearance: Slightly Turbid / S.004 / pH: x  Gluc: x / Ketone: Negative  / Bili: Negative / Urobili: <2 mg/dL   Blood: x / Protein: Negative / Nitrite: Negative   Leuk Esterase: Large / RBC: 0 /HPF / WBC 62 /HPF   Sq Epi: x / Non Sq Epi: 0 /HPF / Bacteria: Many        RADIOLOGY & ADDITIONAL TESTS:    Imaging Personally Reviewed:    Consultant(s) Notes Reviewed:      Care Discussed with Consultants/Other Providers:   Antelmo Dixon MD PGY-3  Internal Medicine  Pager [phone] / [phone]  After 7pm please page Night Float [phone] or [phone]    Patient is a 46y old  Female who presents with a chief complaint of back pain (2022 00:32)    SUBJECTIVE / OVERNIGHT EVENTS:  Patient admitted overnight with bilateral lower extremity pain, neuropathy, difficulty ambulating, weakness, and urinary retention. Due to concern for cord compression, MRI ordered. Despite pretreatment with Ativan, MRI was unable to be obtained due to claustrophobia and patient was sent back to room.   Consulted Neurosurgery     MEDICATIONS  (STANDING):  amLODIPine   Tablet 10 milliGRAM(s) Oral daily  atorvastatin 20 milliGRAM(s) Oral at bedtime  dextrose 40% Gel 15 Gram(s) Oral once  dextrose 5%. 1000 milliLiter(s) (50 mL/Hr) IV Continuous <Continuous>  dextrose 5%. 1000 milliLiter(s) (100 mL/Hr) IV Continuous <Continuous>  dextrose 50% Injectable 25 Gram(s) IV Push once  dextrose 50% Injectable 12.5 Gram(s) IV Push once  dextrose 50% Injectable 25 Gram(s) IV Push once  enoxaparin Injectable 40 milliGRAM(s) SubCutaneous daily  gabapentin 300 milliGRAM(s) Oral two times a day  glucagon  Injectable 1 milliGRAM(s) IntraMuscular once  insulin lispro (ADMELOG) corrective regimen sliding scale   SubCutaneous three times a day before meals  insulin lispro (ADMELOG) corrective regimen sliding scale   SubCutaneous at bedtime  losartan 100 milliGRAM(s) Oral daily  polyethylene glycol 3350 17 Gram(s) Oral daily    MEDICATIONS  (PRN):  acetaminophen     Tablet .. 650 milliGRAM(s) Oral every 6 hours PRN Temp greater or equal to 38C (100.4F), Mild Pain (1 - 3)  LORazepam     Tablet 0.5 milliGRAM(s) Oral every 12 hours PRN Anxiety  ondansetron Injectable 4 milliGRAM(s) IV Push every 8 hours PRN Nausea and/or Vomiting  oxyCODONE    IR 5 milliGRAM(s) Oral every 6 hours PRN Moderate Pain (4 - 6)      CAPILLARY BLOOD GLUCOSE      POCT Blood Glucose.: 178 mg/dL (2022 00:33)    I&O's Summary      PHYSICAL EXAM:  Vital Signs Last 24 Hrs  T(C): 37 (22 @ 03:48)  T(F): 98.6 (22 @ 03:48), Max: 98.7 (22 @ 17:18)  HR: 90 (22 @ 03:48) (90 - 103)  BP: 144/85 (22 @ 03:48)  BP(mean): --  RR: 17 (22 @ 03:48) (16 - 17)  SpO2: 100% (22 @ 03:48) (100% - 100%)  Wt(kg): --    Constitutional: NAD, awake and alert  EYES: EOMI  ENT:  Normal Hearing, no tonsillar exudates   Neck: Soft and supple , no thyromegaly   Respiratory: Breath sounds are clear bilaterally, No wheezing, rales or rhonchi  Cardiovascular: S1 and S2, regular rate and rhythm, no Murmurs, gallops or rubs, no JVD,    Gastrointestinal: Bowel Sounds present, soft, nontender, nondistended, no guarding, no rebound  Extremities: No cyanosis or clubbing; warm to touch  Vascular: 2+ peripheral pulses lower ex  Neurological: No focal deficits, CN II-XII intact bilaterally, sensation to light touch intact in all extremities, gait intact. Pupils are equally reactive to light and symmetrical in size.   Musculoskeletal: 5/5 strength b/l upper and lower extremities; no joint swelling.  Skin: No rashes  Psych: no depression or anhedonia, AAOx3  HEME: no bruises, no nose bleeds      LABS:                        10.0   3.92  )-----------( 241      ( 2022 06:35 )             29.9         134<L>  |  100  |  12  ----------------------------<  211<H>  3.8   |  25  |  0.72    Ca    9.1      2022 06:35  Phos  4.2       Mg     1.90         TPro  7.1  /  Alb  3.6  /  TBili  0.2  /  DBili  x   /  AST  12  /  ALT  15  /  AlkPhos  117            Urinalysis Basic - ( 2022 04:12 )    Color: Colorless / Appearance: Slightly Turbid / S.004 / pH: x  Gluc: x / Ketone: Negative  / Bili: Negative / Urobili: <2 mg/dL   Blood: x / Protein: Negative / Nitrite: Negative   Leuk Esterase: Large / RBC: 0 /HPF / WBC 62 /HPF   Sq Epi: x / Non Sq Epi: 0 /HPF / Bacteria: Many        RADIOLOGY & ADDITIONAL TESTS:    Imaging Personally Reviewed:    Consultant(s) Notes Reviewed:      Care Discussed with Consultants/Other Providers:   Antelmo Dixon MD PGY-3  Internal Medicine  Pager [phone] / [phone]  After 7pm please page Night Float [phone] or [phone]    Patient is a 46y old  Female who presents with a chief complaint of back pain (2022 00:32)    SUBJECTIVE / OVERNIGHT EVENTS:  Patient admitted overnight with bilateral lower extremity pain, neuropathy, difficulty ambulating, weakness, and urinary retention. Due to concern for cord compression, MRI ordered. Despite pretreatment with Ativan, MRI was unable to be obtained due to claustrophobia and patient was sent back to room.   Consulted Neurosurgery for possible cord compression.     Currently, patient has bilateral lower extremity tingling and pain as well as pain with ambulation. She reports this is moderately improved with oxycodone. She denies nausea, vomiting, constipation, diarrhea, fevers, chills, chest pain, SOB.    MEDICATIONS  (STANDING):  amLODIPine   Tablet 10 milliGRAM(s) Oral daily  atorvastatin 20 milliGRAM(s) Oral at bedtime  dextrose 40% Gel 15 Gram(s) Oral once  dextrose 5%. 1000 milliLiter(s) (50 mL/Hr) IV Continuous <Continuous>  dextrose 5%. 1000 milliLiter(s) (100 mL/Hr) IV Continuous <Continuous>  dextrose 50% Injectable 25 Gram(s) IV Push once  dextrose 50% Injectable 12.5 Gram(s) IV Push once  dextrose 50% Injectable 25 Gram(s) IV Push once  enoxaparin Injectable 40 milliGRAM(s) SubCutaneous daily  gabapentin 300 milliGRAM(s) Oral two times a day  glucagon  Injectable 1 milliGRAM(s) IntraMuscular once  insulin lispro (ADMELOG) corrective regimen sliding scale   SubCutaneous three times a day before meals  insulin lispro (ADMELOG) corrective regimen sliding scale   SubCutaneous at bedtime  losartan 100 milliGRAM(s) Oral daily  polyethylene glycol 3350 17 Gram(s) Oral daily    MEDICATIONS  (PRN):  acetaminophen     Tablet .. 650 milliGRAM(s) Oral every 6 hours PRN Temp greater or equal to 38C (100.4F), Mild Pain (1 - 3)  LORazepam     Tablet 0.5 milliGRAM(s) Oral every 12 hours PRN Anxiety  ondansetron Injectable 4 milliGRAM(s) IV Push every 8 hours PRN Nausea and/or Vomiting  oxyCODONE    IR 5 milliGRAM(s) Oral every 6 hours PRN Moderate Pain (4 - 6)      CAPILLARY BLOOD GLUCOSE      POCT Blood Glucose.: 178 mg/dL (2022 00:33)    I&O's Summary      PHYSICAL EXAM:  Vital Signs Last 24 Hrs  T(C): 37 (22 @ 03:48)  T(F): 98.6 (22 @ 03:48), Max: 98.7 (22 @ 17:18)  HR: 90 (22 @ 03:48) (90 - 103)  BP: 144/85 (22 @ 03:48)  BP(mean): --  RR: 17 (22 @ 03:48) (16 - 17)  SpO2: 100% (22 @ 03:48) (100% - 100%)  Wt(kg): --    Constitutional: NAD, awake and alert  EYES: EOMI  ENT:  Normal Hearing  Respiratory: Breath sounds are clear bilaterally, No wheezing, rales or rhonchi  Cardiovascular: S1 and S2, regular rate and rhythm, no Murmurs, gallops or rubs  Gastrointestinal: Abdomen soft, nontender, nondistended  Extremities: No cyanosis or clubbing; warm to touch, no LE edema  Vascular: 2+ peripheral pulses lower ex  Neurological: CN II-XII intact bilaterally, sensation to light touch intact in all extremities, gait unsteady. Pupils are equally reactive to light and symmetrical in size. Patellar and achilles reflexes diminished bilaterally.  Musculoskeletal: 5/5 strength b/l upper extremities; 3/5 strength RLE, 4/5 strength   Skin: No rashes  Psych: Tearful in anticipation of MRI   HEME: no bruises, no nose bleeds      LABS:                        10.0   3.92  )-----------( 241      ( 2022 06:35 )             29.9         134<L>  |  100  |  12  ----------------------------<  211<H>  3.8   |  25  |  0.72    Ca    9.1      2022 06:35  Phos  4.2       Mg     1.90         TPro  7.1  /  Alb  3.6  /  TBili  0.2  /  DBili  x   /  AST  12  /  ALT  15  /  AlkPhos  117        Urinalysis Basic - ( 2022 04:12 )    Color: Colorless / Appearance: Slightly Turbid / S.004 / pH: x  Gluc: x / Ketone: Negative  / Bili: Negative / Urobili: <2 mg/dL   Blood: x / Protein: Negative / Nitrite: Negative   Leuk Esterase: Large / RBC: 0 /HPF / WBC 62 /HPF   Sq Epi: x / Non Sq Epi: 0 /HPF / Bacteria: Many        RADIOLOGY & ADDITIONAL TESTS:    Imaging Personally Reviewed:    Consultant(s) Notes Reviewed:      Care Discussed with Consultants/Other Providers:  Discussed with Neurosurgery, as well as Anesthesia and MRI coordinator, Fabrizio Bazzi.

## 2024-05-08 RX ORDER — GABAPENTIN 600 MG/1
600 TABLET, COATED ORAL
Qty: 60 | Refills: 0 | Status: DISCONTINUED | COMMUNITY
Start: 2022-03-02 | End: 2024-05-08

## 2024-05-20 ENCOUNTER — APPOINTMENT (OUTPATIENT)
Dept: GERIATRICS | Facility: CLINIC | Age: 49
End: 2024-05-20
Payer: MEDICARE

## 2024-05-20 DIAGNOSIS — Z51.5 ENCOUNTER FOR PALLIATIVE CARE: ICD-10-CM

## 2024-05-20 DIAGNOSIS — R29.898 OTHER SYMPTOMS AND SIGNS INVOLVING THE MUSCULOSKELETAL SYSTEM: ICD-10-CM

## 2024-05-20 PROCEDURE — 99213 OFFICE O/P EST LOW 20 MIN: CPT

## 2024-05-20 RX ORDER — GABAPENTIN 400 MG/1
400 CAPSULE ORAL
Qty: 180 | Refills: 2 | Status: ACTIVE | COMMUNITY
Start: 2023-04-26 | End: 1900-01-01

## 2024-05-20 NOTE — DATA REVIEWED
[FreeTextEntry1] : PETCT [facility] FDG SUBS   (07/08/2023):   COMPARISON:  FDG PET/CT 10/10/2022..  OTHER STUDIES USED FOR CORRELATION: None.  FINDINGS:  HEAD/NECK: Physiologic FDG activity in visualized brain, head, and neck.  CHEST: No abnormal FDG activity. No lymphadenopathy.  LUNGS: No abnormal FDG activity. 1.4 cm right upper lobe groundglass opacity, increased in size from prior 10/10/2022, favor inflammatory and can be evaluated on follow-up.  PLEURA/PERICARDIUM: No abnormal FDG activity. No effusion.  HEPATOBILIARY/PANCREAS:  Physiologic FDG activity. Liver SUV mean is 2.5, previously 2.0.  SPLEEN: Physiologic FDG activity. Normal in size. Interval removal of left nephroureteral stent with moderate left hydroureteronephrosis. A right nephroureteral stent in place without right hydronephrosis.  ADRENAL GLANDS: No abnormal FDG activity. No nodule.  KIDNEYS/URINARY BLADDER: Physiologic excreted FDG activity.  REPRODUCTIVE ORGANS: No abnormal FDG activity. No abnormal activity in the endometrium.  ABDOMINOPELVIC NODES: No enlarged or FDG-avid lymph node.  BOWEL/PERITONEUM/MESENTERY: Physiological bowel activity.  BONES/SOFT TISSUES: No abnormal FDG activity.  IMPRESSION: Compared to FDG-PET/CT scan dated 10/10/2022.  1.  No evidence of FDG-avid residual/recurrent disease, lymphadenopathy or distant metastases. 2.   Interval removal of left nephroureteral stent with moderate left hydroureteronephrosis. Right nephroureteral stent in place without right hydronephrosis .

## 2024-05-20 NOTE — ASSESSMENT
[FreeTextEntry1] : 48yoF with:  # Cervical Cancer - s/p weekly Cisplatin + EBRT, completed 11/2021. s/p R ureteral stent placement 6/2022. Follow up with Med Onc, Rad Onc.  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  Appreciate the input of additional consultants - Physiatry, Neurosurgery and Neurology. - C/w MS ER 15mg BID, c/w PRN Oxycodone IR 10mg, Methocarbamol 500mg TID. C/w gabapentin 400mg BID. - c/w PM&R follow up.  # Depression/Anxiety - C/w Sertraline 50mg QD. C/w PRN alprazolam.25mg for anxiety attacks. Provided extensive emotional support and validation of her worries. c/w behavioral health follow up.  #  Encounter for Palliative Care - Emotional support provided.  Follow up in 4 weeks, call sooner with questions or issues.

## 2024-05-20 NOTE — HISTORY OF PRESENT ILLNESS
[Home] : at home, [unfilled] , at the time of the visit. [Medical Office: ([facility])___] : at the medical office located in  [Verbal consent obtained from patient] : the patient, [unfilled] [FreeTextEntry1] : 48yoF with h/o cervical adenocarcinoma presents for follow-up palliative care visit, referred by Oncology.    Patient initially diagnosed with cervical cancer 5/2021. Underwent weekly cisplatin and pelvic RT 4500cgy 25 fx plus parametrial boost 540cgy 3 fx and 4 brachytherapy procedures (hybrid therapy) 700cgy x 4.  Patient was recently hospitalized at [facility] (1/26 - 2/1/22) after presenting with 3 weeks of worsening b/l hip/lower back pain and feet numbness. Pain is sharp in nature, radiating down the lateral thighs, with mild numbness on dorsal sides of feet. Symptoms initially improved with Aleve, however noted blood tinged vaginal discharge and blood clots with urination not related to menstrual periods so stopped taking Aleve. Hip pain and feet numbness progressively worsened, to the point that she cannot walk or lie supine. Pain is mildly alleviated when lying prone, and better with activity. Pt presented to ER in [place] 2 weeks prior to admission due to these symptoms, CT spine non-con showed multiple non-obstructing nephrolithiasis in L kidney but no fractures, deformities, subluxation were noted. She was discharged with gabapentin 300mg BID, Methocarbamol 500mg TID, and Meloxicam 15mg. She is referred today for continuation of pain management.   Patient states that she developed pain in her L buttock approximately 6 weeks ago. It started as a pinching pain in her L buttock. It worsened to the point that the pain felt as if someone where punching her in the area.  Standing up and walking helped to relieve the pain a bit.  She found that she was pacing a lot in her home to help the pain.   She was advised to use Acetaminophen 1000mg, was using it about twice daily.   This initially helped a bit. The pain progressed, eventually affecting both left and right. Associated with tingling in her toes. Was advised to use Aleve BID. Was advised to increase to 2 pills BID which caused her vaginal bleeding, she stopped.   Was in [place] at the time and was sent to the hospital where she states she received a steroid shot to her back. This helped her very much for a while. She was also prescribed a lidocaine 5% patch there but this was not approved by her insurance. Pain came back and was intense.  She returned to [place] on 1/24 and presented to [facility] on 1/26 at which time she was admitted to [facility].   Pt states that numbness is worse on L side, from dorsal foot to lateral mid-calf. Feels like it is "freezing" and must keep socks on. Also notes cramping intermittently in the toes. Pt also noticed that not all of her urine will come out, and must put pressure to completely relieve herself  When she walks her low back pain gets better but her legs/feet feel tight and crampy.  She feels weakness in her legs, lifting her legs is difficult, moving her toes is difficult.  She describes a sensation of coldness of her toes.    Interval Hx (5/20/24):  Patient seen via telemedicine for palliative medicine follow up.  After running out of morphine and going through opioid withdrawal and heightened pain she restarted MS ER 15mg TID and found it to be excessively sedating and she lowered to BID. Is using oxycodone IR 5mg intermittently for pain. Pain flares when she is more active. She continues to suffer with intermittent cramping of her feet and calf muscle.  She tried to lower her gabapentin dose to 300mg BID (cutting a 600mg in half) but found the cramping to be a bit worse.  Mood has been pretty good on sertraline 50mg daily. Anxiety is controlled with PRN alprazolam which she uses sparingly.  She does continue to experience cramping and stiffness in her hands and feet. Additionally she reports constant pain along the balls of her feet. She uses a cane for assistance with ambulation.  Has been off of furosemide for about a month and the swelling has not come back.    Current pain regimen: MS ER 15mg BID Oxycodone IR 5-10mg PRN (typically takes a dose twice daily) Gabapentin 400mg BID Methocarbamol 500mg TID Tylenol 1000mg PRN, no recent need  ROS: +constipation - uses senna daily, PRN Miralax +appetite has been OK.  +mood has improved on sertraline +nocturnal leg cramping- massaging helps  At times she ambulates with the help of a cane.   Patient is single, lives with her sister, parents, daughter.  She has a 28yo son and a 24yo daughter. She is unemployed.   PMD: Dr. Valentino Comer ([phone])  I-Stop Ref#: [id number]

## 2024-06-19 ENCOUNTER — OUTPATIENT (OUTPATIENT)
Dept: OUTPATIENT SERVICES | Facility: HOSPITAL | Age: 49
LOS: 1 days | End: 2024-06-19
Payer: COMMERCIAL

## 2024-06-19 VITALS
HEIGHT: 64 IN | DIASTOLIC BLOOD PRESSURE: 74 MMHG | TEMPERATURE: 98 F | SYSTOLIC BLOOD PRESSURE: 108 MMHG | OXYGEN SATURATION: 99 % | WEIGHT: 179.9 LBS | HEART RATE: 87 BPM | RESPIRATION RATE: 18 BRPM

## 2024-06-19 DIAGNOSIS — E11.9 TYPE 2 DIABETES MELLITUS WITHOUT COMPLICATIONS: ICD-10-CM

## 2024-06-19 DIAGNOSIS — Z96.0 PRESENCE OF UROGENITAL IMPLANTS: Chronic | ICD-10-CM

## 2024-06-19 DIAGNOSIS — N13.5 CROSSING VESSEL AND STRICTURE OF URETER WITHOUT HYDRONEPHROSIS: ICD-10-CM

## 2024-06-19 DIAGNOSIS — Z01.818 ENCOUNTER FOR OTHER PREPROCEDURAL EXAMINATION: ICD-10-CM

## 2024-06-19 DIAGNOSIS — N20.0 CALCULUS OF KIDNEY: ICD-10-CM

## 2024-06-19 LAB
ANION GAP SERPL CALC-SCNC: 15 MMOL/L — SIGNIFICANT CHANGE UP (ref 5–17)
BUN SERPL-MCNC: 29 MG/DL — HIGH (ref 7–23)
CALCIUM SERPL-MCNC: 10 MG/DL — SIGNIFICANT CHANGE UP (ref 8.4–10.5)
CHLORIDE SERPL-SCNC: 100 MMOL/L — SIGNIFICANT CHANGE UP (ref 96–108)
CO2 SERPL-SCNC: 22 MMOL/L — SIGNIFICANT CHANGE UP (ref 22–31)
CREAT SERPL-MCNC: 2.29 MG/DL — HIGH (ref 0.5–1.3)
EGFR: 25 ML/MIN/1.73M2 — LOW
GLUCOSE SERPL-MCNC: 119 MG/DL — HIGH (ref 70–99)
HCT VFR BLD CALC: 27.5 % — LOW (ref 34.5–45)
HGB BLD-MCNC: 8.8 G/DL — LOW (ref 11.5–15.5)
MCHC RBC-ENTMCNC: 27.7 PG — SIGNIFICANT CHANGE UP (ref 27–34)
MCHC RBC-ENTMCNC: 32 GM/DL — SIGNIFICANT CHANGE UP (ref 32–36)
MCV RBC AUTO: 86.5 FL — SIGNIFICANT CHANGE UP (ref 80–100)
NRBC # BLD: 0 /100 WBCS — SIGNIFICANT CHANGE UP (ref 0–0)
PLATELET # BLD AUTO: 435 K/UL — HIGH (ref 150–400)
POTASSIUM SERPL-MCNC: 4.5 MMOL/L — SIGNIFICANT CHANGE UP (ref 3.5–5.3)
POTASSIUM SERPL-SCNC: 4.5 MMOL/L — SIGNIFICANT CHANGE UP (ref 3.5–5.3)
RBC # BLD: 3.18 M/UL — LOW (ref 3.8–5.2)
RBC # FLD: 15.4 % — HIGH (ref 10.3–14.5)
SODIUM SERPL-SCNC: 137 MMOL/L — SIGNIFICANT CHANGE UP (ref 135–145)
WBC # BLD: 5.85 K/UL — SIGNIFICANT CHANGE UP (ref 3.8–10.5)
WBC # FLD AUTO: 5.85 K/UL — SIGNIFICANT CHANGE UP (ref 3.8–10.5)

## 2024-06-19 PROCEDURE — 83036 HEMOGLOBIN GLYCOSYLATED A1C: CPT

## 2024-06-19 PROCEDURE — G0463: CPT

## 2024-06-19 PROCEDURE — 80048 BASIC METABOLIC PNL TOTAL CA: CPT

## 2024-06-19 PROCEDURE — 87086 URINE CULTURE/COLONY COUNT: CPT

## 2024-06-19 PROCEDURE — 85027 COMPLETE CBC AUTOMATED: CPT

## 2024-06-19 RX ORDER — MORPHINE SULFATE 100 MG/1
0 TABLET, EXTENDED RELEASE ORAL
Refills: 0 | DISCHARGE

## 2024-06-19 RX ORDER — DEXTROSE MONOHYDRATE AND SODIUM CHLORIDE 5; .3 G/100ML; G/100ML
1000 INJECTION, SOLUTION INTRAVENOUS
Refills: 0 | Status: DISCONTINUED | OUTPATIENT
Start: 2024-06-24 | End: 2024-07-08

## 2024-06-19 RX ORDER — AMLODIPINE BESYLATE 2.5 MG/1
1 TABLET ORAL
Qty: 0 | Refills: 0 | DISCHARGE

## 2024-06-19 RX ORDER — AMOXICILLIN AND CLAVULANATE POTASSIUM 875; 125 MG/1; MG/1
875-125 TABLET, COATED ORAL
Qty: 10 | Refills: 0 | Status: ACTIVE | COMMUNITY
Start: 2023-04-26 | End: 1900-01-01

## 2024-06-20 ENCOUNTER — APPOINTMENT (OUTPATIENT)
Dept: GERIATRICS | Facility: CLINIC | Age: 49
End: 2024-06-20
Payer: MEDICARE

## 2024-06-20 ENCOUNTER — NON-APPOINTMENT (OUTPATIENT)
Age: 49
End: 2024-06-20

## 2024-06-20 VITALS
BODY MASS INDEX: 31.41 KG/M2 | OXYGEN SATURATION: 97 % | HEART RATE: 83 BPM | WEIGHT: 182.98 LBS | SYSTOLIC BLOOD PRESSURE: 113 MMHG | TEMPERATURE: 98.1 F | RESPIRATION RATE: 15 BRPM | DIASTOLIC BLOOD PRESSURE: 77 MMHG

## 2024-06-20 DIAGNOSIS — M79.606 PAIN IN LEG, UNSPECIFIED: ICD-10-CM

## 2024-06-20 DIAGNOSIS — F43.23 ADJUSTMENT DISORDER WITH MIXED ANXIETY AND DEPRESSED MOOD: ICD-10-CM

## 2024-06-20 LAB
A1C WITH ESTIMATED AVERAGE GLUCOSE RESULT: 7.6 % — HIGH (ref 4–5.6)
ESTIMATED AVERAGE GLUCOSE: 171 MG/DL — HIGH (ref 68–114)

## 2024-06-20 PROCEDURE — 99213 OFFICE O/P EST LOW 20 MIN: CPT

## 2024-06-20 RX ORDER — OXYCODONE 10 MG/1
10 TABLET ORAL EVERY 4 HOURS
Qty: 120 | Refills: 0 | Status: ACTIVE | COMMUNITY
Start: 2022-03-02 | End: 1900-01-01

## 2024-06-20 RX ORDER — METHOCARBAMOL 500 MG/1
500 TABLET, FILM COATED ORAL 3 TIMES DAILY
Qty: 270 | Refills: 1 | Status: ACTIVE | COMMUNITY
Start: 2022-03-02 | End: 1900-01-01

## 2024-06-20 RX ORDER — SERTRALINE HYDROCHLORIDE 50 MG/1
50 TABLET, FILM COATED ORAL DAILY
Qty: 90 | Refills: 2 | Status: ACTIVE | COMMUNITY
Start: 2023-04-03 | End: 1900-01-01

## 2024-06-20 RX ORDER — MORPHINE SULFATE 15 MG/1
15 TABLET, FILM COATED, EXTENDED RELEASE ORAL 3 TIMES DAILY
Qty: 90 | Refills: 0 | Status: ACTIVE | COMMUNITY
Start: 2022-03-02 | End: 1900-01-01

## 2024-06-20 NOTE — ASSESSMENT
[FreeTextEntry1] : 49yoF with:  # Cervical Cancer - s/p weekly Cisplatin + EBRT, completed 11/2021. s/p R ureteral stent placement 6/2022. Follow up with Med Onc, Rad Onc.  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  Appreciate the input of additional consultants - Physiatry, Neurosurgery and Neurology. - C/w MS ER 15mg BID, c/w PRN Oxycodone IR 10mg, Methocarbamol 500mg TID. C/w gabapentin 400mg BID. - c/w PM&R follow up.  # Depression/Anxiety - C/w Sertraline 50mg QD. C/w PRN alprazolam.25mg for anxiety attacks. Provided extensive emotional support and validation of her worries. c/w behavioral health follow up.  #  Encounter for Palliative Care - Emotional support provided.  Follow up in 6 weeks, call sooner with questions or issues.

## 2024-06-20 NOTE — HISTORY OF PRESENT ILLNESS
[FreeTextEntry1] : 49yoF with h/o cervical adenocarcinoma presents for follow-up palliative care visit.   Patient initially diagnosed with cervical cancer 5/2021. Underwent weekly cisplatin and pelvic RT 4500cgy 25 fx plus parametrial boost 540cgy 3 fx and 4 brachytherapy procedures (hybrid therapy) 700cgy x 4.  Patient was recently hospitalized at [facility] (1/26 - 2/1/22) after presenting with 3 weeks of worsening b/l hip/lower back pain and feet numbness. Pain is sharp in nature, radiating down the lateral thighs, with mild numbness on dorsal sides of feet. Symptoms initially improved with Aleve, however noted blood tinged vaginal discharge and blood clots with urination not related to menstrual periods so stopped taking Aleve. Hip pain and feet numbness progressively worsened, to the point that she cannot walk or lie supine. Pain is mildly alleviated when lying prone, and better with activity. Pt presented to ER in [place] 2 weeks prior to admission due to these symptoms, CT spine non-con showed multiple non-obstructing nephrolithiasis in L kidney but no fractures, deformities, subluxation were noted. She was discharged with gabapentin 300mg BID, Methocarbamol 500mg TID, and Meloxicam 15mg. She is referred today for continuation of pain management.   Patient states that she developed pain in her L buttock approximately 6 weeks ago. It started as a pinching pain in her L buttock. It worsened to the point that the pain felt as if someone where punching her in the area.  Standing up and walking helped to relieve the pain a bit.  She found that she was pacing a lot in her home to help the pain.   She was advised to use Acetaminophen 1000mg, was using it about twice daily.   This initially helped a bit. The pain progressed, eventually affecting both left and right. Associated with tingling in her toes. Was advised to use Aleve BID. Was advised to increase to 2 pills BID which caused her vaginal bleeding, she stopped.   Was in [place] at the time and was sent to the hospital where she states she received a steroid shot to her back. This helped her very much for a while. She was also prescribed a lidocaine 5% patch there but this was not approved by her insurance. Pain came back and was intense.  She returned to [place] on 1/24 and presented to [facility] on 1/26 at which time she was admitted to [facility].   Pt states that numbness is worse on L side, from dorsal foot to lateral mid-calf. Feels like it is "freezing" and must keep socks on. Also notes cramping intermittently in the toes. Pt also noticed that not all of her urine will come out, and must put pressure to completely relieve herself  When she walks her low back pain gets better but her legs/feet feel tight and crampy.  She feels weakness in her legs, lifting her legs is difficult, moving her toes is difficult.  She describes a sensation of coldness of her toes.    Interval Hx (6/20/24):  Patient presents for follow up visit, seen in office.  She has upcoming stent exchange.  Has been ambulating independently but was advised by pre-surgical RN to use her cane to lower risk of falling. She is using a four-pronged cane today.  Pain flares when she is more active. She continues to suffer with intermittent cramping of her feet and calf muscle.    Mood has been pretty good on sertraline 50mg daily. Anxiety is controlled with PRN alprazolam which she uses sparingly.   Current pain regimen: MS ER 15mg BID Oxycodone IR 5-10mg PRN (typically takes a dose twice daily) Gabapentin 400mg BID Methocarbamol 500mg TID Tylenol 1000mg PRN, no recent need  ROS: +constipation - uses senna daily, PRN Miralax +appetite has been OK.  +mood has improved on sertraline +nocturnal leg cramping- massaging helps +hematuria - chronic issue  At times she ambulates with the help of a cane.   Patient is single, lives with her sister, parents, daughter.  She has a 26yo son and a 24yo daughter. She is unemployed.   PMD: Dr. Valentino Comer ([phone])  I-Stop Ref#: [id number]

## 2024-06-20 NOTE — DATA REVIEWED
[FreeTextEntry1] : PETCT [facility] FDG SUBS   (07/08/2023):   COMPARISON:  FDG PET/CT 10/10/2022..  OTHER STUDIES USED FOR CORRELATION: None.  FINDINGS:  HEAD/NECK: Physiologic FDG activity in visualized brain, head, and neck.  CHEST: No abnormal FDG activity. No lymphadenopathy.  LUNGS: No abnormal FDG activity. 1.4 cm right upper lobe groundglass opacity, increased in size from prior 10/10/2022, favor inflammatory and can be evaluated on follow-up.  PLEURA/PERICARDIUM: No abnormal FDG activity. No effusion.  HEPATOBILIARY/PANCREAS:  Physiologic FDG activity. Liver SUV mean is 2.5, previously 2.0.  SPLEEN: Physiologic FDG activity. Normal in size. Interval removal of left nephroureteral stent with moderate left hydroureteronephrosis. A right nephroureteral stent in place without right hydronephrosis.  ADRENAL GLANDS: No abnormal FDG activity. No nodule.  KIDNEYS/URINARY BLADDER: Physiologic excreted FDG activity.  REPRODUCTIVE ORGANS: No abnormal FDG activity. No abnormal activity in the endometrium.  ABDOMINOPELVIC NODES: No enlarged or FDG-avid lymph node.  BOWEL/PERITONEUM/MESENTERY: Physiological bowel activity.  BONES/SOFT TISSUES: No abnormal FDG activity.  IMPRESSION: Compared to FDG-PET/CT scan dated 10/10/2022.  1.  No evidence of FDG-avid residual/recurrent disease, lymphadenopathy or distant metastases. 2.   Interval removal of left nephroureteral stent with moderate left hydroureteronephrosis. Right nephroureteral stent in place without right hydronephrosis .

## 2024-06-21 ENCOUNTER — APPOINTMENT (OUTPATIENT)
Dept: NUCLEAR MEDICINE | Facility: IMAGING CENTER | Age: 49
End: 2024-06-21
Payer: MEDICARE

## 2024-06-21 ENCOUNTER — OUTPATIENT (OUTPATIENT)
Dept: OUTPATIENT SERVICES | Facility: HOSPITAL | Age: 49
LOS: 1 days | End: 2024-06-21
Payer: COMMERCIAL

## 2024-06-21 DIAGNOSIS — Z00.8 ENCOUNTER FOR OTHER GENERAL EXAMINATION: ICD-10-CM

## 2024-06-21 DIAGNOSIS — Z96.0 PRESENCE OF UROGENITAL IMPLANTS: Chronic | ICD-10-CM

## 2024-06-21 DIAGNOSIS — C53.9 MALIGNANT NEOPLASM OF CERVIX UTERI, UNSPECIFIED: ICD-10-CM

## 2024-06-21 LAB
CULTURE RESULTS: ABNORMAL
SPECIMEN SOURCE: SIGNIFICANT CHANGE UP

## 2024-06-21 PROCEDURE — 78815 PET IMAGE W/CT SKULL-THIGH: CPT

## 2024-06-21 PROCEDURE — 78815 PET IMAGE W/CT SKULL-THIGH: CPT | Mod: 26,PS

## 2024-06-21 PROCEDURE — A9552: CPT

## 2024-06-23 ENCOUNTER — TRANSCRIPTION ENCOUNTER (OUTPATIENT)
Age: 49
End: 2024-06-23

## 2024-06-24 ENCOUNTER — APPOINTMENT (OUTPATIENT)
Dept: UROLOGY | Facility: HOSPITAL | Age: 49
End: 2024-06-24

## 2024-06-24 ENCOUNTER — OUTPATIENT (OUTPATIENT)
Dept: OUTPATIENT SERVICES | Facility: HOSPITAL | Age: 49
LOS: 1 days | End: 2024-06-24
Payer: COMMERCIAL

## 2024-06-24 ENCOUNTER — TRANSCRIPTION ENCOUNTER (OUTPATIENT)
Age: 49
End: 2024-06-24

## 2024-06-24 VITALS
DIASTOLIC BLOOD PRESSURE: 73 MMHG | HEIGHT: 64 IN | RESPIRATION RATE: 18 BRPM | HEART RATE: 87 BPM | WEIGHT: 179.9 LBS | TEMPERATURE: 97 F | SYSTOLIC BLOOD PRESSURE: 108 MMHG | OXYGEN SATURATION: 100 %

## 2024-06-24 VITALS
HEART RATE: 89 BPM | SYSTOLIC BLOOD PRESSURE: 126 MMHG | OXYGEN SATURATION: 96 % | RESPIRATION RATE: 15 BRPM | TEMPERATURE: 98 F | DIASTOLIC BLOOD PRESSURE: 73 MMHG

## 2024-06-24 DIAGNOSIS — Z96.0 PRESENCE OF UROGENITAL IMPLANTS: Chronic | ICD-10-CM

## 2024-06-24 DIAGNOSIS — N13.5 CROSSING VESSEL AND STRICTURE OF URETER WITHOUT HYDRONEPHROSIS: ICD-10-CM

## 2024-06-24 LAB — GLUCOSE BLDC GLUCOMTR-MCNC: 150 MG/DL — HIGH (ref 70–99)

## 2024-06-24 PROCEDURE — 52332 CYSTOSCOPY AND TREATMENT: CPT | Mod: 50

## 2024-06-24 PROCEDURE — 76000 FLUOROSCOPY <1 HR PHYS/QHP: CPT

## 2024-06-24 PROCEDURE — 82962 GLUCOSE BLOOD TEST: CPT

## 2024-06-24 PROCEDURE — C1758: CPT

## 2024-06-24 PROCEDURE — C2625: CPT

## 2024-06-24 PROCEDURE — 74420 UROGRAPHY RTRGR +-KUB: CPT | Mod: 26

## 2024-06-24 PROCEDURE — C1769: CPT

## 2024-06-24 DEVICE — GUIDEWIRE SENSOR DUAL-FLEX NITINOL STRAIGHT .035" X 150CM: Type: IMPLANTABLE DEVICE | Site: BILATERAL | Status: FUNCTIONAL

## 2024-06-24 DEVICE — IMPLANTABLE DEVICE: Type: IMPLANTABLE DEVICE | Site: BILATERAL | Status: FUNCTIONAL

## 2024-06-24 DEVICE — URETERAL CATH OPEN END 5FR 70CM: Type: IMPLANTABLE DEVICE | Site: BILATERAL | Status: FUNCTIONAL

## 2024-06-24 DEVICE — STENT URET 7FR 26CM: Type: IMPLANTABLE DEVICE | Site: BILATERAL | Status: FUNCTIONAL

## 2024-06-24 RX ORDER — HYDROMORPHONE HCL 0.2 MG/ML
0.5 INJECTION, SOLUTION INTRAVENOUS
Refills: 0 | Status: DISCONTINUED | OUTPATIENT
Start: 2024-06-24 | End: 2024-06-24

## 2024-06-24 RX ORDER — METFORMIN HYDROCHLORIDE 850 MG/1
1 TABLET ORAL
Qty: 0 | Refills: 0 | DISCHARGE

## 2024-06-24 RX ORDER — SERTRALINE 25 MG/1
1 TABLET, FILM COATED ORAL
Refills: 0 | DISCHARGE

## 2024-06-24 RX ORDER — AMLODIPINE BESYLATE 2.5 MG/1
1 TABLET ORAL
Refills: 0 | DISCHARGE

## 2024-06-24 RX ORDER — ONDANSETRON HYDROCHLORIDE 2 MG/ML
4 INJECTION INTRAMUSCULAR; INTRAVENOUS ONCE
Refills: 0 | Status: DISCONTINUED | OUTPATIENT
Start: 2024-06-24 | End: 2024-07-08

## 2024-06-24 RX ORDER — ALPRAZOLAM 0.25 MG
1 TABLET ORAL
Qty: 0 | Refills: 0 | DISCHARGE

## 2024-06-24 RX ORDER — PREGABALIN 225 MG/1
1 CAPSULE ORAL
Refills: 0 | DISCHARGE

## 2024-06-24 RX ORDER — LIDOCAINE HYDROCHLORIDE 20 MG/ML
0.2 INJECTION, SOLUTION EPIDURAL; INFILTRATION; INTRACAUDAL; PERINEURAL ONCE
Refills: 0 | Status: COMPLETED | OUTPATIENT
Start: 2024-06-24 | End: 2024-06-24

## 2024-06-24 RX ORDER — CEFAZOLIN 10 G/1
2000 INJECTION, POWDER, FOR SOLUTION INTRAVENOUS ONCE
Refills: 0 | Status: COMPLETED | OUTPATIENT
Start: 2024-06-24 | End: 2024-06-24

## 2024-06-24 RX ORDER — GABAPENTIN 400 MG/1
1 CAPSULE ORAL
Refills: 0 | DISCHARGE

## 2024-06-24 RX ORDER — FENTANYL CITRATE 50 UG/ML
25 INJECTION, SOLUTION INTRAMUSCULAR; INTRAVENOUS
Refills: 0 | Status: DISCONTINUED | OUTPATIENT
Start: 2024-06-24 | End: 2024-06-24

## 2024-06-24 RX ADMIN — DEXTROSE MONOHYDRATE AND SODIUM CHLORIDE 100 MILLILITER(S): 5; .3 INJECTION, SOLUTION INTRAVENOUS at 06:41

## 2024-06-28 PROBLEM — C53.9 CERVICAL CANCER, FIGO STAGE IIB: Status: ACTIVE | Noted: 2021-08-24

## 2024-07-01 ENCOUNTER — NON-APPOINTMENT (OUTPATIENT)
Age: 49
End: 2024-07-01

## 2024-07-01 ENCOUNTER — APPOINTMENT (OUTPATIENT)
Dept: RADIATION ONCOLOGY | Facility: CLINIC | Age: 49
End: 2024-07-01
Payer: MEDICARE

## 2024-07-01 ENCOUNTER — RESULT REVIEW (OUTPATIENT)
Age: 49
End: 2024-07-01

## 2024-07-01 ENCOUNTER — APPOINTMENT (OUTPATIENT)
Dept: GYNECOLOGIC ONCOLOGY | Facility: CLINIC | Age: 49
End: 2024-07-01
Payer: MEDICARE

## 2024-07-01 ENCOUNTER — APPOINTMENT (OUTPATIENT)
Dept: HEMATOLOGY ONCOLOGY | Facility: CLINIC | Age: 49
End: 2024-07-01
Payer: MEDICARE

## 2024-07-01 VITALS
BODY MASS INDEX: 31.33 KG/M2 | TEMPERATURE: 97.1 F | SYSTOLIC BLOOD PRESSURE: 142 MMHG | HEART RATE: 80 BPM | DIASTOLIC BLOOD PRESSURE: 85 MMHG | RESPIRATION RATE: 16 BRPM | OXYGEN SATURATION: 97 % | WEIGHT: 182.52 LBS

## 2024-07-01 VITALS
WEIGHT: 182 LBS | DIASTOLIC BLOOD PRESSURE: 82 MMHG | RESPIRATION RATE: 17 BRPM | HEIGHT: 64 IN | TEMPERATURE: 97.7 F | OXYGEN SATURATION: 98 % | BODY MASS INDEX: 31.07 KG/M2 | HEART RATE: 88 BPM | SYSTOLIC BLOOD PRESSURE: 124 MMHG

## 2024-07-01 VITALS
RESPIRATION RATE: 17 BRPM | TEMPERATURE: 97.5 F | DIASTOLIC BLOOD PRESSURE: 80 MMHG | WEIGHT: 182 LBS | SYSTOLIC BLOOD PRESSURE: 122 MMHG | BODY MASS INDEX: 31.24 KG/M2 | HEART RATE: 90 BPM | OXYGEN SATURATION: 95 %

## 2024-07-01 DIAGNOSIS — N89.8 OTHER SPECIFIED NONINFLAMMATORY DISORDERS OF VAGINA: ICD-10-CM

## 2024-07-01 DIAGNOSIS — Z85.41 PERSONAL HISTORY OF MALIGNANT NEOPLASM OF CERVIX UTERI: ICD-10-CM

## 2024-07-01 DIAGNOSIS — N89.5 STRICTURE AND ATRESIA OF VAGINA: ICD-10-CM

## 2024-07-01 DIAGNOSIS — R94.8 ABNORMAL RESULTS OF FUNCTION STUDIES OF OTHER ORGANS AND SYSTEMS: ICD-10-CM

## 2024-07-01 DIAGNOSIS — C53.9 MALIGNANT NEOPLASM OF CERVIX UTERI, UNSPECIFIED: ICD-10-CM

## 2024-07-01 DIAGNOSIS — Z92.3 PERSONAL HISTORY OF IRRADIATION: ICD-10-CM

## 2024-07-01 LAB
ALBUMIN SERPL ELPH-MCNC: 4.1 G/DL
ALP BLD-CCNC: 130 U/L
ALT SERPL-CCNC: 8 U/L
ANION GAP SERPL CALC-SCNC: 11 MMOL/L
AST SERPL-CCNC: 20 U/L
BILIRUB SERPL-MCNC: <0.2 MG/DL
BUN SERPL-MCNC: 25 MG/DL
CALCIUM SERPL-MCNC: 9.2 MG/DL
CHLORIDE SERPL-SCNC: 102 MMOL/L
CO2 SERPL-SCNC: 24 MMOL/L
CREAT SERPL-MCNC: 1.79 MG/DL
EGFR: 34 ML/MIN/1.73M2
GLUCOSE SERPL-MCNC: 132 MG/DL
INR PPP: 0.95 RATIO
MAGNESIUM SERPL-MCNC: 2.2 MG/DL
POTASSIUM SERPL-SCNC: 4.3 MMOL/L
PROT SERPL-MCNC: 8.4 G/DL
PT BLD: 10.7 SEC
SODIUM SERPL-SCNC: 138 MMOL/L

## 2024-07-01 PROCEDURE — 99459 PELVIC EXAMINATION: CPT

## 2024-07-01 PROCEDURE — 99212 OFFICE O/P EST SF 10 MIN: CPT

## 2024-07-01 PROCEDURE — 57100 BIOPSY VAGINAL MUCOSA SIMPLE: CPT

## 2024-07-01 PROCEDURE — 99214 OFFICE O/P EST MOD 30 MIN: CPT

## 2024-07-01 PROCEDURE — 99204 OFFICE O/P NEW MOD 45 MIN: CPT | Mod: 25

## 2024-07-02 ENCOUNTER — NON-APPOINTMENT (OUTPATIENT)
Age: 49
End: 2024-07-02

## 2024-07-02 PROBLEM — R94.8 ABNORMAL PET SCAN OF RETROPERITONEUM: Status: ACTIVE | Noted: 2024-07-02

## 2024-07-02 PROBLEM — N89.5 ACQUIRED VAGINAL ADHESIONS: Status: ACTIVE | Noted: 2023-01-21

## 2024-07-05 ENCOUNTER — RESULT REVIEW (OUTPATIENT)
Age: 49
End: 2024-07-05

## 2024-07-08 PROBLEM — Z92.3 H/O THERAPEUTIC RADIATION: Status: ACTIVE | Noted: 2024-07-08

## 2024-07-10 ENCOUNTER — APPOINTMENT (OUTPATIENT)
Dept: ULTRASOUND IMAGING | Facility: IMAGING CENTER | Age: 49
End: 2024-07-10
Payer: MEDICARE

## 2024-07-10 ENCOUNTER — OUTPATIENT (OUTPATIENT)
Dept: OUTPATIENT SERVICES | Facility: HOSPITAL | Age: 49
LOS: 1 days | End: 2024-07-10
Payer: COMMERCIAL

## 2024-07-10 ENCOUNTER — RESULT REVIEW (OUTPATIENT)
Age: 49
End: 2024-07-10

## 2024-07-10 ENCOUNTER — APPOINTMENT (OUTPATIENT)
Dept: CT IMAGING | Facility: IMAGING CENTER | Age: 49
End: 2024-07-10
Payer: MEDICARE

## 2024-07-10 DIAGNOSIS — C53.9 MALIGNANT NEOPLASM OF CERVIX UTERI, UNSPECIFIED: ICD-10-CM

## 2024-07-10 DIAGNOSIS — Z96.0 PRESENCE OF UROGENITAL IMPLANTS: Chronic | ICD-10-CM

## 2024-07-10 PROCEDURE — 10005 FNA BX W/US GDN 1ST LES: CPT

## 2024-07-10 PROCEDURE — 88173 CYTOPATH EVAL FNA REPORT: CPT

## 2024-07-10 PROCEDURE — 88172 CYTP DX EVAL FNA 1ST EA SITE: CPT

## 2024-07-10 PROCEDURE — 88305 TISSUE EXAM BY PATHOLOGIST: CPT | Mod: 26

## 2024-07-10 PROCEDURE — 51600 INJECTION FOR BLADDER X-RAY: CPT

## 2024-07-10 PROCEDURE — 72192 CT PELVIS W/O DYE: CPT

## 2024-07-10 PROCEDURE — 72192 CT PELVIS W/O DYE: CPT | Mod: 26

## 2024-07-10 PROCEDURE — 88173 CYTOPATH EVAL FNA REPORT: CPT | Mod: 26

## 2024-07-10 PROCEDURE — 88305 TISSUE EXAM BY PATHOLOGIST: CPT

## 2024-07-12 ENCOUNTER — OUTPATIENT (OUTPATIENT)
Dept: OUTPATIENT SERVICES | Facility: HOSPITAL | Age: 49
LOS: 1 days | End: 2024-07-12

## 2024-07-12 DIAGNOSIS — Z96.0 PRESENCE OF UROGENITAL IMPLANTS: Chronic | ICD-10-CM

## 2024-07-12 DIAGNOSIS — C53.9 MALIGNANT NEOPLASM OF CERVIX UTERI, UNSPECIFIED: ICD-10-CM

## 2024-07-12 LAB
CORE LAB BIOPSY: NORMAL
NON-GYNECOLOGICAL CYTOLOGY STUDY: SIGNIFICANT CHANGE UP

## 2024-07-17 ENCOUNTER — NON-APPOINTMENT (OUTPATIENT)
Age: 49
End: 2024-07-17

## 2024-07-19 ENCOUNTER — APPOINTMENT (OUTPATIENT)
Dept: HEMATOLOGY ONCOLOGY | Facility: CLINIC | Age: 49
End: 2024-07-19

## 2024-07-23 ENCOUNTER — APPOINTMENT (OUTPATIENT)
Dept: UROLOGY | Facility: CLINIC | Age: 49
End: 2024-07-23

## 2024-07-23 DIAGNOSIS — N13.5 CROSSING VESSEL AND STRICTURE OF URETER W/OUT HYDRONEPHROSIS: ICD-10-CM

## 2024-07-23 PROCEDURE — 99441: CPT | Mod: 93

## 2024-07-23 NOTE — HISTORY OF PRESENT ILLNESS
[Other Location: e.g. School (Enter Location, City,State)___] : at [unfilled], at the time of the visit. [Other Location: e.g. Home (Enter Location, City,State)___] : at [unfilled] [Verbal consent obtained from patient] : the patient, [unfilled] [FreeTextEntry1] : The patient-doctor relationship has been established in audio HIPAA compliant communication. The patient's identity has been confirmed. The patient was previously emailed a copy of the telemedicine consent. They have had a chance to review and has now given verbal consent and has requested care to be assessed and treated via telemedicine. They understand there may be limitations in this process, and that they may need further followup care in the office and/or hospital settings.  Verbal consent given on July 23 2024 12 PM  TEN HERNANDEZ is a 49 year F who presents for f/u- now dx with bilateral ureteral stricture; ureteral dilation (incomplete dilation due to density of stricture reported) and left single stent 9/2023. Has bilateral tandem stents already.  Post radiation for cervical ca  She is s/p Cystoscopy, bilateral retrograde pyelogram, bilateral tandem ureteral stent exchange on 6/24/24 (op note reviewed) doing well- now on vaginal estrogens since 2023, but has had several UTI  On 3 months tandem stent exchange.  7/23/2024   The patient denies fevers, chills, nausea and/or vomiting, and no unexplained weight loss.  All pertinent parts of the patient PFSH (past medical, family, and social histories), laboratory, radiological studies and available physician notes were reviewed prior to starting the face-to-face portion of the telemedicine visit. Questionnaire results, where appropriate, were discussed with the patient.

## 2024-08-01 ENCOUNTER — INPATIENT (INPATIENT)
Facility: HOSPITAL | Age: 49
LOS: 4 days | Discharge: HOME | DRG: 872 | End: 2024-08-06
Attending: INTERNAL MEDICINE | Admitting: STUDENT IN AN ORGANIZED HEALTH CARE EDUCATION/TRAINING PROGRAM
Payer: COMMERCIAL

## 2024-08-01 VITALS
HEART RATE: 108 BPM | DIASTOLIC BLOOD PRESSURE: 64 MMHG | TEMPERATURE: 102 F | RESPIRATION RATE: 19 BRPM | OXYGEN SATURATION: 94 % | SYSTOLIC BLOOD PRESSURE: 93 MMHG | WEIGHT: 179.02 LBS | HEIGHT: 64 IN

## 2024-08-01 DIAGNOSIS — Z96.0 PRESENCE OF UROGENITAL IMPLANTS: Chronic | ICD-10-CM

## 2024-08-01 LAB
ALBUMIN SERPL ELPH-MCNC: 3.6 G/DL — SIGNIFICANT CHANGE UP (ref 3.3–5)
ALP SERPL-CCNC: 111 U/L — SIGNIFICANT CHANGE UP (ref 40–120)
ALT FLD-CCNC: 11 U/L — SIGNIFICANT CHANGE UP (ref 10–45)
ANION GAP SERPL CALC-SCNC: 13 MMOL/L — SIGNIFICANT CHANGE UP (ref 5–17)
APPEARANCE UR: CLEAR — SIGNIFICANT CHANGE UP
APTT BLD: 35.9 SEC — HIGH (ref 24.5–35.6)
AST SERPL-CCNC: 11 U/L — SIGNIFICANT CHANGE UP (ref 10–40)
BACTERIA # UR AUTO: ABNORMAL /HPF
BASE EXCESS BLDV CALC-SCNC: -5.4 MMOL/L — LOW (ref -2–3)
BASE EXCESS BLDV CALC-SCNC: -7.7 MMOL/L — LOW (ref -2–3)
BASOPHILS # BLD AUTO: 0.01 K/UL — SIGNIFICANT CHANGE UP (ref 0–0.2)
BASOPHILS NFR BLD AUTO: 0.1 % — SIGNIFICANT CHANGE UP (ref 0–2)
BILIRUB SERPL-MCNC: 0.3 MG/DL — SIGNIFICANT CHANGE UP (ref 0.2–1.2)
BILIRUB UR-MCNC: NEGATIVE — SIGNIFICANT CHANGE UP
BUN SERPL-MCNC: 36 MG/DL — HIGH (ref 7–23)
CA-I SERPL-SCNC: 1.18 MMOL/L — SIGNIFICANT CHANGE UP (ref 1.15–1.33)
CA-I SERPL-SCNC: 1.18 MMOL/L — SIGNIFICANT CHANGE UP (ref 1.15–1.33)
CALCIUM SERPL-MCNC: 8.8 MG/DL — SIGNIFICANT CHANGE UP (ref 8.4–10.5)
CAST: 2 /LPF — SIGNIFICANT CHANGE UP (ref 0–4)
CHLORIDE BLDV-SCNC: 102 MMOL/L — SIGNIFICANT CHANGE UP (ref 96–108)
CHLORIDE BLDV-SCNC: 104 MMOL/L — SIGNIFICANT CHANGE UP (ref 96–108)
CHLORIDE SERPL-SCNC: 98 MMOL/L — SIGNIFICANT CHANGE UP (ref 96–108)
CO2 BLDV-SCNC: 21 MMOL/L — LOW (ref 22–26)
CO2 BLDV-SCNC: 24 MMOL/L — SIGNIFICANT CHANGE UP (ref 22–26)
CO2 SERPL-SCNC: 19 MMOL/L — LOW (ref 22–31)
COLOR SPEC: YELLOW — SIGNIFICANT CHANGE UP
CREAT SERPL-MCNC: 3.53 MG/DL — HIGH (ref 0.5–1.3)
DIFF PNL FLD: ABNORMAL
EGFR: 15 ML/MIN/1.73M2 — LOW
EOSINOPHIL # BLD AUTO: 0.06 K/UL — SIGNIFICANT CHANGE UP (ref 0–0.5)
EOSINOPHIL NFR BLD AUTO: 0.8 % — SIGNIFICANT CHANGE UP (ref 0–6)
FLUAV AG NPH QL: SIGNIFICANT CHANGE UP
FLUBV AG NPH QL: SIGNIFICANT CHANGE UP
GAS PNL BLDV: 131 MMOL/L — LOW (ref 136–145)
GAS PNL BLDV: 131 MMOL/L — LOW (ref 136–145)
GAS PNL BLDV: SIGNIFICANT CHANGE UP
GAS PNL BLDV: SIGNIFICANT CHANGE UP
GLUCOSE BLDV-MCNC: 101 MG/DL — HIGH (ref 70–99)
GLUCOSE BLDV-MCNC: 92 MG/DL — SIGNIFICANT CHANGE UP (ref 70–99)
GLUCOSE SERPL-MCNC: 97 MG/DL — SIGNIFICANT CHANGE UP (ref 70–99)
GLUCOSE UR QL: NEGATIVE MG/DL — SIGNIFICANT CHANGE UP
HCG SERPL-ACNC: <2 MIU/ML — SIGNIFICANT CHANGE UP
HCO3 BLDV-SCNC: 19 MMOL/L — LOW (ref 22–29)
HCO3 BLDV-SCNC: 22 MMOL/L — SIGNIFICANT CHANGE UP (ref 22–29)
HCT VFR BLD CALC: 26.3 % — LOW (ref 34.5–45)
HCT VFR BLDA CALC: 23 % — LOW (ref 34.5–46.5)
HCT VFR BLDA CALC: 26 % — LOW (ref 34.5–46.5)
HGB BLD CALC-MCNC: 7.6 G/DL — LOW (ref 11.7–16.1)
HGB BLD CALC-MCNC: 8.7 G/DL — LOW (ref 11.7–16.1)
HGB BLD-MCNC: 8.2 G/DL — LOW (ref 11.5–15.5)
IMM GRANULOCYTES NFR BLD AUTO: 0.4 % — SIGNIFICANT CHANGE UP (ref 0–0.9)
INR BLD: 1.23 RATIO — HIGH (ref 0.85–1.18)
KETONES UR-MCNC: NEGATIVE MG/DL — SIGNIFICANT CHANGE UP
LACTATE BLDV-MCNC: 0.6 MMOL/L — SIGNIFICANT CHANGE UP (ref 0.5–2)
LACTATE BLDV-MCNC: 1.3 MMOL/L — SIGNIFICANT CHANGE UP (ref 0.5–2)
LEUKOCYTE ESTERASE UR-ACNC: ABNORMAL
LYMPHOCYTES # BLD AUTO: 0.91 K/UL — LOW (ref 1–3.3)
LYMPHOCYTES # BLD AUTO: 11.9 % — LOW (ref 13–44)
MCHC RBC-ENTMCNC: 27.2 PG — SIGNIFICANT CHANGE UP (ref 27–34)
MCHC RBC-ENTMCNC: 31.2 GM/DL — LOW (ref 32–36)
MCV RBC AUTO: 87.4 FL — SIGNIFICANT CHANGE UP (ref 80–100)
MONOCYTES # BLD AUTO: 0.4 K/UL — SIGNIFICANT CHANGE UP (ref 0–0.9)
MONOCYTES NFR BLD AUTO: 5.2 % — SIGNIFICANT CHANGE UP (ref 2–14)
NEUTROPHILS # BLD AUTO: 6.23 K/UL — SIGNIFICANT CHANGE UP (ref 1.8–7.4)
NEUTROPHILS NFR BLD AUTO: 81.6 % — HIGH (ref 43–77)
NITRITE UR-MCNC: POSITIVE
NRBC # BLD: 0 /100 WBCS — SIGNIFICANT CHANGE UP (ref 0–0)
PCO2 BLDV: 46 MMHG — HIGH (ref 39–42)
PCO2 BLDV: 51 MMHG — HIGH (ref 39–42)
PH BLDV: 7.23 — LOW (ref 7.32–7.43)
PH BLDV: 7.24 — LOW (ref 7.32–7.43)
PH UR: 6 — SIGNIFICANT CHANGE UP (ref 5–8)
PLATELET # BLD AUTO: 302 K/UL — SIGNIFICANT CHANGE UP (ref 150–400)
PO2 BLDV: 25 MMHG — SIGNIFICANT CHANGE UP (ref 25–45)
PO2 BLDV: 54 MMHG — HIGH (ref 25–45)
POTASSIUM BLDV-SCNC: 3.7 MMOL/L — SIGNIFICANT CHANGE UP (ref 3.5–5.1)
POTASSIUM BLDV-SCNC: 4.5 MMOL/L — SIGNIFICANT CHANGE UP (ref 3.5–5.1)
POTASSIUM SERPL-MCNC: 4.2 MMOL/L — SIGNIFICANT CHANGE UP (ref 3.5–5.3)
POTASSIUM SERPL-SCNC: 4.2 MMOL/L — SIGNIFICANT CHANGE UP (ref 3.5–5.3)
PROT SERPL-MCNC: 8.1 G/DL — SIGNIFICANT CHANGE UP (ref 6–8.3)
PROT UR-MCNC: 100 MG/DL
PROTHROM AB SERPL-ACNC: 13.4 SEC — HIGH (ref 9.5–13)
RBC # BLD: 3.01 M/UL — LOW (ref 3.8–5.2)
RBC # FLD: 15.2 % — HIGH (ref 10.3–14.5)
RBC CASTS # UR COMP ASSIST: 0 /HPF — SIGNIFICANT CHANGE UP (ref 0–4)
REVIEW: SIGNIFICANT CHANGE UP
RSV RNA NPH QL NAA+NON-PROBE: SIGNIFICANT CHANGE UP
SAO2 % BLDV: 39.5 % — LOW (ref 67–88)
SAO2 % BLDV: 86.7 % — SIGNIFICANT CHANGE UP (ref 67–88)
SARS-COV-2 RNA SPEC QL NAA+PROBE: SIGNIFICANT CHANGE UP
SODIUM SERPL-SCNC: 130 MMOL/L — LOW (ref 135–145)
SP GR SPEC: 1.01 — SIGNIFICANT CHANGE UP (ref 1–1.03)
SQUAMOUS # UR AUTO: 0 /HPF — SIGNIFICANT CHANGE UP (ref 0–5)
UROBILINOGEN FLD QL: 0.2 MG/DL — SIGNIFICANT CHANGE UP (ref 0.2–1)
WBC # BLD: 7.64 K/UL — SIGNIFICANT CHANGE UP (ref 3.8–10.5)
WBC # FLD AUTO: 7.64 K/UL — SIGNIFICANT CHANGE UP (ref 3.8–10.5)
WBC UR QL: 92 /HPF — HIGH (ref 0–5)

## 2024-08-01 PROCEDURE — 71046 X-RAY EXAM CHEST 2 VIEWS: CPT | Mod: 26

## 2024-08-01 PROCEDURE — 93308 TTE F-UP OR LMTD: CPT | Mod: 26

## 2024-08-01 PROCEDURE — 74176 CT ABD & PELVIS W/O CONTRAST: CPT | Mod: 26,MC

## 2024-08-01 PROCEDURE — 99285 EMERGENCY DEPT VISIT HI MDM: CPT

## 2024-08-01 RX ORDER — VANCOMYCIN HYDROCHLORIDE 5 G/100ML
1000 INJECTION, POWDER, LYOPHILIZED, FOR SOLUTION INTRAVENOUS ONCE
Refills: 0 | Status: COMPLETED | OUTPATIENT
Start: 2024-08-01 | End: 2024-08-01

## 2024-08-01 RX ORDER — ACETAMINOPHEN 500 MG
1000 TABLET ORAL ONCE
Refills: 0 | Status: COMPLETED | OUTPATIENT
Start: 2024-08-01 | End: 2024-08-01

## 2024-08-01 RX ORDER — PIPERACILLIN SODIUM, TAZOBACTAM SODIUM 3; .375 G/15ML; G/15ML
3.38 INJECTION, POWDER, LYOPHILIZED, FOR SOLUTION INTRAVENOUS ONCE
Refills: 0 | Status: COMPLETED | OUTPATIENT
Start: 2024-08-01 | End: 2024-08-01

## 2024-08-01 RX ORDER — MIDODRINE HYDROCHLORIDE 2.5 MG/1
30 TABLET ORAL ONCE
Refills: 0 | Status: COMPLETED | OUTPATIENT
Start: 2024-08-01 | End: 2024-08-01

## 2024-08-01 RX ORDER — BACTERIOSTATIC SODIUM CHLORIDE 0.9 %
1000 VIAL (ML) INJECTION ONCE
Refills: 0 | Status: COMPLETED | OUTPATIENT
Start: 2024-08-01 | End: 2024-08-01

## 2024-08-01 RX ADMIN — VANCOMYCIN HYDROCHLORIDE 1000 MILLIGRAM(S): 5 INJECTION, POWDER, LYOPHILIZED, FOR SOLUTION INTRAVENOUS at 17:47

## 2024-08-01 RX ADMIN — Medication 400 MILLIGRAM(S): at 18:02

## 2024-08-01 RX ADMIN — VANCOMYCIN HYDROCHLORIDE 250 MILLIGRAM(S): 5 INJECTION, POWDER, LYOPHILIZED, FOR SOLUTION INTRAVENOUS at 16:46

## 2024-08-01 RX ADMIN — PIPERACILLIN SODIUM, TAZOBACTAM SODIUM 3.38 GRAM(S): 3; .375 INJECTION, POWDER, LYOPHILIZED, FOR SOLUTION INTRAVENOUS at 18:52

## 2024-08-01 RX ADMIN — MIDODRINE HYDROCHLORIDE 30 MILLIGRAM(S): 2.5 TABLET ORAL at 18:56

## 2024-08-01 RX ADMIN — Medication 1000 MILLIGRAM(S): at 18:06

## 2024-08-01 RX ADMIN — Medication 1000 MILLILITER(S): at 17:47

## 2024-08-01 RX ADMIN — PIPERACILLIN SODIUM, TAZOBACTAM SODIUM 200 GRAM(S): 3; .375 INJECTION, POWDER, LYOPHILIZED, FOR SOLUTION INTRAVENOUS at 18:29

## 2024-08-01 RX ADMIN — Medication 1000 MILLILITER(S): at 16:51

## 2024-08-01 NOTE — ED PROCEDURE NOTE - PROCEDURE ADDITIONAL DETAILS
LVEF grossly normal, no RV dilation or hypokinesis, IVC 1.5-2 cm with < 50% respiratory variation, no pericardial effusion

## 2024-08-01 NOTE — ED PROVIDER NOTE - OBJECTIVE STATEMENT
Prescription sent to pharmacy 49-year-old female DM type II, HTN, Adenocarcinoma of Cervix (Dx 5/2021, not currently active), Right hydronephrosis s/p Right ureteral stent placement (6/2022), nephrolithiasis, Low back pain (followed by pain mgmt) and Chemo-induced neuropathy, coming in for 2 days of fever, weakness, and dysuria.  States that she saw her primary care doctor who obtained a UA that was notable for hematuria.  Denies chest pain, shortness of breath, abdominal pain, back pain, nausea, vomiting, diarrhea, constipation, or any other symptoms at this time. [place] PGY2

## 2024-08-01 NOTE — ED ADULT NURSE NOTE - OBJECTIVE STATEMENT
49 yr old female with past h/o cervical cancer and recent biopsy of cervix came in after having fevers since yesterday feeling confused as well as urinary symptoms. on assessment a and o x 3 lungs clear abd soft non tender no swelling in extremities no n/v/d has high fever. pt placed on high flow and oxygen was dipping when sleeping. states hurts to urinate.

## 2024-08-01 NOTE — ED PROVIDER NOTE - PROGRESS NOTE DETAILS
Patient with hypercarbic respiratory acidosis with mild clinical presentation c/w that (drowsy, dozing off, but easily arousable). States she is "disoriented". Will attempt HFNC (high flow rate and low FiO2) to assist with ventilation. Will repeat VBG around 7:30 pm - 8:00 PM (roughly an hour with HFNC on) and decide on escalation to BiPAP. SAMUEL Baker PGY2- VBG improved, weaned off high flow, comfortable. will admit

## 2024-08-01 NOTE — ED PROVIDER NOTE - CLINICAL SUMMARY MEDICAL DECISION MAKING FREE TEXT BOX
49-year-old female hx of nephrolithiasis with ureteral stent placement (last exchanged 6/2024) presents for  2 days of fever, weakness, and dysuria. On exam patient is tachycardic to 108, hypertensive 90s over 60s with a MAP of 66, hypoxic to 95% and febrile.  Will perform sepsis workup including blood cultures and urine cultures.  CT abdomen pelvis to eval for Pete versus infected stone, chest x-ray to eval pneumonia.  Will start empiric antibiotics, fluid bolus, and reevaluate.  Likely admit for sepsis.  [place] PGY 2

## 2024-08-01 NOTE — ED PROVIDER NOTE - ATTENDING CONTRIBUTION TO CARE
Presenting with disorientation, sepsis, acute on chronic low back/abdominal pain radiating down legs. No signs of meningitis. Low concern for CNS infection. Will perform sepsis w/u, give antimicrobials, likely TBA.

## 2024-08-01 NOTE — ED ADULT TRIAGE NOTE - GLASGOW COMA SCALE: BEST VERBAL RESPONSE, MLM
(V5) oriented
TPN (via UVC): 46 ml/kg/d Non-lipid fluid (12.5% Dextrose & 5% Amino acid) + 15 ml/kg/d SMOF lipids = 61 ml/kg/d, 59 kathie/kg/d, 2.3 gm prot/kg/d. Glucose infusion rate = 4.0 mg/kg/min.  OG: EHM 20ml every 3 hrs = 64 ml/kg/d, 43 kathie/kg/d, 3.2gm prot/kg/d  Total: 125 ml/kg/d, 102 kathie/kg/d, 3.2gm prot/kg/d

## 2024-08-01 NOTE — ED PROVIDER NOTE - PHYSICAL EXAMINATION
Babita Delatorre DO (PGY1)   Physical Exam:    Gen: NAD, AOx3, tired but non-toxic appearing  Head: NCAT  HEENT: EOMI, PEERLA, normal conjunctiva, tongue midline, oral mucosa moist  Lung: CTAB, no respiratory distress, no wheezes/rhonchi/rales B/L  CV: RRR, no murmurs, rubs or gallops  Abd: soft, NT, ND, no guarding, no rigidity, no rebound tenderness, no CVA tenderness

## 2024-08-01 NOTE — ED ADULT NURSE NOTE - NSFALLHARMRISKINTERV_ED_ALL_ED

## 2024-08-02 DIAGNOSIS — N39.0 URINARY TRACT INFECTION, SITE NOT SPECIFIED: ICD-10-CM

## 2024-08-02 DIAGNOSIS — A41.9 SEPSIS, UNSPECIFIED ORGANISM: ICD-10-CM

## 2024-08-02 DIAGNOSIS — E11.9 TYPE 2 DIABETES MELLITUS WITHOUT COMPLICATIONS: ICD-10-CM

## 2024-08-02 DIAGNOSIS — E87.20 ACIDOSIS, UNSPECIFIED: ICD-10-CM

## 2024-08-02 DIAGNOSIS — N13.30 UNSPECIFIED HYDRONEPHROSIS: ICD-10-CM

## 2024-08-02 DIAGNOSIS — N17.9 ACUTE KIDNEY FAILURE, UNSPECIFIED: ICD-10-CM

## 2024-08-02 DIAGNOSIS — R25.3 FASCICULATION: ICD-10-CM

## 2024-08-02 DIAGNOSIS — R10.9 UNSPECIFIED ABDOMINAL PAIN: ICD-10-CM

## 2024-08-02 DIAGNOSIS — G93.40 ENCEPHALOPATHY, UNSPECIFIED: ICD-10-CM

## 2024-08-02 LAB
APPEARANCE UR: CLEAR — SIGNIFICANT CHANGE UP
BACTERIA # UR AUTO: NEGATIVE /HPF — SIGNIFICANT CHANGE UP
BILIRUB UR-MCNC: NEGATIVE — SIGNIFICANT CHANGE UP
CAST: 1 /LPF — SIGNIFICANT CHANGE UP (ref 0–4)
COLOR SPEC: YELLOW — SIGNIFICANT CHANGE UP
CREAT ?TM UR-MCNC: 15 MG/DL — SIGNIFICANT CHANGE UP
DIFF PNL FLD: ABNORMAL
GLUCOSE BLDC GLUCOMTR-MCNC: 121 MG/DL — HIGH (ref 70–99)
GLUCOSE BLDC GLUCOMTR-MCNC: 149 MG/DL — HIGH (ref 70–99)
GLUCOSE BLDC GLUCOMTR-MCNC: 162 MG/DL — HIGH (ref 70–99)
GLUCOSE BLDC GLUCOMTR-MCNC: 90 MG/DL — SIGNIFICANT CHANGE UP (ref 70–99)
GLUCOSE UR QL: NEGATIVE MG/DL — SIGNIFICANT CHANGE UP
KETONES UR-MCNC: NEGATIVE MG/DL — SIGNIFICANT CHANGE UP
LEUKOCYTE ESTERASE UR-ACNC: ABNORMAL
NITRITE UR-MCNC: NEGATIVE — SIGNIFICANT CHANGE UP
OSMOLALITY UR: 156 MOS/KG — LOW (ref 300–900)
PH UR: 6.5 — SIGNIFICANT CHANGE UP (ref 5–8)
POTASSIUM UR-SCNC: 8 MMOL/L — SIGNIFICANT CHANGE UP
PROT ?TM UR-MCNC: 16 MG/DL — HIGH (ref 0–12)
PROT UR-MCNC: SIGNIFICANT CHANGE UP MG/DL
PROT/CREAT UR-RTO: 1.1 RATIO — HIGH (ref 0–0.2)
RBC CASTS # UR COMP ASSIST: 0 /HPF — SIGNIFICANT CHANGE UP (ref 0–4)
SODIUM UR-SCNC: 50 MMOL/L — SIGNIFICANT CHANGE UP
SP GR SPEC: 1 — SIGNIFICANT CHANGE UP (ref 1–1.03)
SQUAMOUS # UR AUTO: 2 /HPF — SIGNIFICANT CHANGE UP (ref 0–5)
UROBILINOGEN FLD QL: 0.2 MG/DL — SIGNIFICANT CHANGE UP (ref 0.2–1)
WBC UR QL: 33 /HPF — HIGH (ref 0–5)

## 2024-08-02 PROCEDURE — 70450 CT HEAD/BRAIN W/O DYE: CPT | Mod: 26

## 2024-08-02 PROCEDURE — 99283 EMERGENCY DEPT VISIT LOW MDM: CPT

## 2024-08-02 PROCEDURE — 99223 1ST HOSP IP/OBS HIGH 75: CPT

## 2024-08-02 RX ORDER — DEXTROSE MONOHYDRATE, SODIUM CHLORIDE, SODIUM LACTATE, CALCIUM CHLORIDE, MAGNESIUM CHLORIDE 1.5; 538; 448; 18.4; 5.08 G/100ML; MG/100ML; MG/100ML; MG/100ML; MG/100ML
1000 SOLUTION INTRAPERITONEAL
Refills: 0 | Status: DISCONTINUED | OUTPATIENT
Start: 2024-08-02 | End: 2024-08-02

## 2024-08-02 RX ORDER — CHLORHEXIDINE GLUCONATE 500 MG/1
1 CLOTH TOPICAL DAILY
Refills: 0 | Status: DISCONTINUED | OUTPATIENT
Start: 2024-08-02 | End: 2024-08-06

## 2024-08-02 RX ORDER — SERTRALINE HYDROCHLORIDE 100 MG/1
50 TABLET, FILM COATED ORAL DAILY
Refills: 0 | Status: DISCONTINUED | OUTPATIENT
Start: 2024-08-02 | End: 2024-08-06

## 2024-08-02 RX ORDER — GLUCAGON INJECTION, SOLUTION 0.5 MG/.1ML
1 INJECTION, SOLUTION SUBCUTANEOUS ONCE
Refills: 0 | Status: DISCONTINUED | OUTPATIENT
Start: 2024-08-02 | End: 2024-08-06

## 2024-08-02 RX ORDER — DEXTROSE MONOHYDRATE, SODIUM CHLORIDE, SODIUM LACTATE, CALCIUM CHLORIDE, MAGNESIUM CHLORIDE 1.5; 538; 448; 18.4; 5.08 G/100ML; MG/100ML; MG/100ML; MG/100ML; MG/100ML
1000 SOLUTION INTRAPERITONEAL
Refills: 0 | Status: DISCONTINUED | OUTPATIENT
Start: 2024-08-02 | End: 2024-08-06

## 2024-08-02 RX ORDER — INSULIN LISPRO 100/ML
VIAL (ML) SUBCUTANEOUS AT BEDTIME
Refills: 0 | Status: DISCONTINUED | OUTPATIENT
Start: 2024-08-02 | End: 2024-08-06

## 2024-08-02 RX ORDER — DEXTROSE 4 G
15 TABLET,CHEWABLE ORAL ONCE
Refills: 0 | Status: DISCONTINUED | OUTPATIENT
Start: 2024-08-02 | End: 2024-08-06

## 2024-08-02 RX ORDER — LORATADINE 10 MG
17 TABLET,DISINTEGRATING ORAL
Refills: 0 | Status: DISCONTINUED | OUTPATIENT
Start: 2024-08-02 | End: 2024-08-06

## 2024-08-02 RX ORDER — BACTERIOSTATIC SODIUM CHLORIDE 0.9 %
1000 VIAL (ML) INJECTION
Refills: 0 | Status: DISCONTINUED | OUTPATIENT
Start: 2024-08-02 | End: 2024-08-03

## 2024-08-02 RX ORDER — OXYCODONE HYDROCHLORIDE 30 MG/1
10 TABLET ORAL EVERY 6 HOURS
Refills: 0 | Status: DISCONTINUED | OUTPATIENT
Start: 2024-08-02 | End: 2024-08-06

## 2024-08-02 RX ORDER — DEXTROSE 4 G
25 TABLET,CHEWABLE ORAL ONCE
Refills: 0 | Status: DISCONTINUED | OUTPATIENT
Start: 2024-08-02 | End: 2024-08-06

## 2024-08-02 RX ORDER — INSULIN LISPRO 100/ML
VIAL (ML) SUBCUTANEOUS
Refills: 0 | Status: DISCONTINUED | OUTPATIENT
Start: 2024-08-02 | End: 2024-08-06

## 2024-08-02 RX ORDER — DEXTROSE 4 G
12.5 TABLET,CHEWABLE ORAL ONCE
Refills: 0 | Status: DISCONTINUED | OUTPATIENT
Start: 2024-08-02 | End: 2024-08-06

## 2024-08-02 RX ORDER — SODIUM BICARBONATE 0.9MEQ/ML
1300 SYRINGE (ML) INTRAVENOUS THREE TIMES A DAY
Refills: 0 | Status: DISCONTINUED | OUTPATIENT
Start: 2024-08-02 | End: 2024-08-03

## 2024-08-02 RX ORDER — SENNOSIDES 8.6 MG/1
2 TABLET ORAL AT BEDTIME
Refills: 0 | Status: DISCONTINUED | OUTPATIENT
Start: 2024-08-02 | End: 2024-08-06

## 2024-08-02 RX ORDER — GABAPENTIN 400 MG/1
200 CAPSULE ORAL
Refills: 0 | Status: DISCONTINUED | OUTPATIENT
Start: 2024-08-02 | End: 2024-08-03

## 2024-08-02 RX ADMIN — Medication 1300 MILLIGRAM(S): at 15:18

## 2024-08-02 RX ADMIN — GABAPENTIN 200 MILLIGRAM(S): 400 CAPSULE ORAL at 17:08

## 2024-08-02 RX ADMIN — SERTRALINE HYDROCHLORIDE 50 MILLIGRAM(S): 100 TABLET, FILM COATED ORAL at 15:18

## 2024-08-02 RX ADMIN — Medication 150 MILLILITER(S): at 21:02

## 2024-08-02 RX ADMIN — DEXTROSE MONOHYDRATE, SODIUM CHLORIDE, SODIUM LACTATE, CALCIUM CHLORIDE, MAGNESIUM CHLORIDE 200 MILLILITER(S): 1.5; 538; 448; 18.4; 5.08 SOLUTION INTRAPERITONEAL at 06:18

## 2024-08-02 RX ADMIN — Medication 15 MILLIGRAM(S): at 05:49

## 2024-08-02 RX ADMIN — SENNOSIDES 2 TABLET(S): 8.6 TABLET ORAL at 21:01

## 2024-08-02 RX ADMIN — Medication 15 MILLIGRAM(S): at 17:08

## 2024-08-02 RX ADMIN — GABAPENTIN 200 MILLIGRAM(S): 400 CAPSULE ORAL at 05:05

## 2024-08-02 RX ADMIN — CHLORHEXIDINE GLUCONATE 1 APPLICATION(S): 500 CLOTH TOPICAL at 16:35

## 2024-08-02 RX ADMIN — Medication 100 MILLIGRAM(S): at 05:49

## 2024-08-02 RX ADMIN — Medication 1300 MILLIGRAM(S): at 05:49

## 2024-08-02 RX ADMIN — Medication 1300 MILLIGRAM(S): at 21:01

## 2024-08-02 NOTE — H&P ADULT - NSHPREVIEWOFSYSTEMS_GEN_ALL_CORE
REVIEW OF SYSTEMS:  CONSTITUTIONAL: +weakness. +fevers. +chills. +hand twitching.  EYES: +blurry and double vision. No eye pain.  ENT: No hearing difficulty. No sore throat. No Sinusitis/rhinorrhea.   NECK: No pain. No stiffness/rigidity.  CARDIAC: No chest pain. No palpitations. No lightheadedness. No syncope.  RESPIRATORY: No cough. No SOB.   GASTROINTESTINAL: +abdominal pain. +nausea. No vomiting. No diarrhea. +constipation.   GENITOURINARY: +dysuria. No frequency. No oliguria.  NEUROLOGICAL: No numbness/tingling. No focal weakness. No headache. +unsteady gait.  BACK: +back pain. No flank pain.  EXTREMITIES: No lower extremity edema. Full ROM. No joint pain.  SKIN: No rashes. No itching. No other lesions.    All other review of systems is negative unless indicated above.  Unless indicated above, unable to assess ROS 2/2

## 2024-08-02 NOTE — H&P ADULT - ASSESSMENT
48F c hx cervical adeno ca (dx '21) s/p chemo (last dose '21)/RT reportedly in remission, R hydro s/p b/l stents, ?CKD (baseline Cr ?1.8), HTN, DM2, chronic lower back pain, pw acute encephalopathy, muscle jerking, hypotension, in setting of VINITA and UTI.

## 2024-08-02 NOTE — H&P ADULT - NSHPPHYSICALEXAM_GEN_ALL_CORE
PHYSICAL EXAM:   GENERAL: Alert. Not confused. No acute distress. Not thin. Not cachectic. Not obese.  HEAD:  Atraumatic. Normocephalic.  EYES: EOMI. PERRLA. Normal conjunctiva/sclera.  ENT: Neck supple. No JVD. Moist oral mucosa. Not edentulous. No thrush.  LYMPH: Normal supraclavicular/cervical lymph nodes.   CARDIAC: Not tachy, Not skyler. Regular rhythm. Not irregularly irregular. S1. S2. No murmur. No rub. No distant heart sounds.  LUNG/CHEST: CTAB. BS equal bilaterally. No wheezes. No rales. No rhonchi.  ABDOMEN: Soft. No tenderness. No distension. No fluid wave. Normal bowel sounds.  BACK: No midline/vertebral tenderness. No flank tenderness.  VASCULAR: +2 b/l radial or ulnar pulses. Palpable DP pulses.  EXTREMITIES:  No clubbing. No cyanosis. No edema. Moving all 4.  NEUROLOGY: A&Ox3. Non-focal exam. Cranial nerves intact. Normal speech. Sensation intact.  PSYCH: Normal behavior. Normal affect.  SKIN: No jaundice. No erythema. No rash/lesion.    Vital Signs Last 24 Hrs  T(C): 36.9 (02 Aug 2024 01:30), Max: 38.7 (01 Aug 2024 15:40)  T(F): 98.4 (02 Aug 2024 01:30), Max: 101.7 (01 Aug 2024 15:40)  HR: 67 (02 Aug 2024 04:24) (52 - 108)  BP: 105/71 (02 Aug 2024 04:24) (87/65 - 163/92)  BP(mean): 81 (02 Aug 2024 04:24) (71 - 108)  ABP: --  ABP(mean): --  RR: 16 (02 Aug 2024 04:24) (14 - 20)  SpO2: 97% (02 Aug 2024 04:24) (94% - 100%)    O2 Parameters below as of 02 Aug 2024 04:24  Patient On (Oxygen Delivery Method): room air          I&O's Summary

## 2024-08-02 NOTE — CONSULT NOTE ADULT - ASSESSMENT
49y Female with PMHx of DM type II, HTN, Adenocarcinoma of Cervix (Dx 5/2021) s/p chemo (last dose 2021)/RT reportedly in remission, nephrolithiasis, Chemo-induced neuropathy, Left kidney modestly atrophic, dx with bilateral ureteral stricture, bilateral tandem ureteral stent exchange on 6/24/24 by Dr. Hoenig, presents to ED for fever x 2 dyas. Urology consulted for hydronephrosis, VINITA (3.53 from 2.89), UTI. In ED, patient Tmax 101.7F, tachy 108 initially, but symptoms is improved now. CT showed Stable moderate to severe left and mild right hydronephrosis. Bilateral ureteral stents are in place. WBC 7.64, Cr 3.53 (2.89 on 6/19), UA with positive nitrite and few bacteria. Recent Urine culture (06.19.24 @ 15:45) indicated >100,000 CFU/ml Streptococcus agalactiae (Group B) isolated Group B streptococci are susceptible to ampicillin, penicillin and cefazolin, but may be resistant to erythromycin and clindamycin. Patient already admitted to medicine.    - Rec PVR, schuster if retention  - Start antibiotic given hx of positive UA  - F/u urine culture  - Case discussed with Dr. Stallings and Dr. Santiago       49y Female with PMHx of DM type II, HTN, Adenocarcinoma of Cervix (Dx 5/2021) s/p chemo (last dose 2021)/RT reportedly in remission, nephrolithiasis, Chemo-induced neuropathy, Left kidney modestly atrophic, dx with bilateral ureteral stricture, bilateral tandem ureteral stent exchange on 6/24/24 by Dr. Hoenig, presents to ED for fever x 2 dyas. Urology consulted for hydronephrosis, VINITA (3.53 from 2.89), UTI. In ED, patient Tmax 101.7F, tachy 108 initially, but symptoms is improved now. CT showed Stable moderate to severe left and mild right hydronephrosis. Bilateral ureteral stents are in place. WBC 7.64, Cr 3.53 (2.89 on 6/19), UA with positive nitrite and few bacteria. Recent Urine culture (06.19.24 @ 15:45) indicated >100,000 CFU/ml Streptococcus agalactiae (Group B) isolated Group B streptococci are susceptible to ampicillin, penicillin and cefazolin, but may be resistant to erythromycin and clindamycin. Patient already admitted to medicine.    - Labs and CT imaging reviewed  - Continue antibiotics  - Follow up urine and blood cultures  - Recommend PVR to ensure emptying bladder well - Rhodes if in retention    Case discussed with Dr. Hoenig

## 2024-08-02 NOTE — H&P ADULT - PROBLEM SELECTOR PLAN 2
- IVF  - urology consulted for poss stent exchange  - needs renal consult in AM. pt states she hasn't established care with nephrologist yet  - urine lytes

## 2024-08-02 NOTE — H&P ADULT - PROBLEM SELECTOR PLAN 5
- non gap. also with mild respiratory acidosis  - denies toxic ingestion  - IVF  - start sodium bicarb po

## 2024-08-02 NOTE — H&P ADULT - NSHPSOCIALHISTORY_GEN_ALL_CORE
Social History:    Marital Status: (  ) , (  ) Single, (  ) , (  ) , (  )   # of Children: 2  Lives with: (  ) alone, (  ) children, (  ) spouse, ( x ) parents, (  ) siblings, (  ) friends, (  ) other:   Occupation:     Substance Use/Illicit Drugs: (  ) never used vs other:   Tobacco Usage: (x  ) never smoked, (  ) former smoker, (  ) current smoker and Total Pack-Years:   Last Alcohol Usage/Frequency/Amount/Withdrawal/Hx of Abuse:  none  Foreign travel:   Animal exposure:

## 2024-08-02 NOTE — PATIENT PROFILE ADULT - FALL HARM RISK - HARM RISK INTERVENTIONS
Communicate Risk of Fall with Harm to all staff/Monitor gait and stability/Reinforce activity limits and safety measures with patient and family/Tailored Fall Risk Interventions/Visual Cue: Yellow wristband and red socks/Bed in lowest position, wheels locked, appropriate side rails in place/Call bell, personal items and telephone in reach/Instruct patient to call for assistance before getting out of bed or chair/Non-slip footwear when patient is out of bed/[place] to call system/Physically safe environment - no spills, clutter or unnecessary equipment/Purposeful Proactive Rounding/Room/bathroom lighting operational, light cord in reach

## 2024-08-02 NOTE — CONSULT NOTE ADULT - SUBJECTIVE AND OBJECTIVE BOX
HPI:  49y Female with PMHx of DM type II, HTN, Adenocarcinoma of Cervix (Dx 2021) s/p chemo (last dose )/RT reportedly in remission, nephrolithiasis, Chemo-induced neuropathy, Left kidney modestly atrophic, dx with bilateral ureteral stricture, bilateral tandem ureteral stent exchange on 24 by Dr. Hoenig, presents to ED for fever x 2 dyas. Urology consulted for hydronephrosis, VINITA (3.53), UTI. Patient states had cloudy dark color urine 1 week ago and was seen by primary doctor no intervention at that time. Patient reports new confusion, lethargy, twitching of b/l hands, "disorientation", blurry vision. Back to her PMD and UTI was diagnosed and antibiotic prescribed (patient did not get it yet). Patient was told to go to ED. Patient also reports dysuria, urgency, intermittent hematuria, fever 101F. Denies frequency, flank pain, or any other urinary symptoms. Denies chest pain, sob.  In ED, patient Tmax 101.7F, tachy 108 initially, but symptoms is improved now.        PAST MEDICAL & SURGICAL HISTORY:  Hypertension      T2DM (type 2 diabetes mellitus)      H/O hemorrhoids      Anxiety disorder      GERD (gastroesophageal reflux disease)      Chemotherapy-induced neuropathy      Pedal edema      Low back pain radiating to both legs      Bilateral lumbar radiculopathy      Calculus of kidney      Cervical adenocarcinoma      Neuropathic pain due to radiation      Hydronephrosis, right      History of foot drop      S/P ureteral stent placement  2022- left      S/P cystoscopy with ureteral stent placement      S/P cystoscopy with ureteral stent placement          FAMILY HISTORY:  FH: breast cancer    No known  malignancy     Denies alcohol and drug abuse, nonsmoker     MEDICATIONS  (STANDING):  cefTRIAXone   IVPB 1000 milliGRAM(s) IV Intermittent every 24 hours  dextrose 5%. 1000 milliLiter(s) (100 mL/Hr) IV Continuous <Continuous>  dextrose 5%. 1000 milliLiter(s) (50 mL/Hr) IV Continuous <Continuous>  dextrose 50% Injectable 12.5 Gram(s) IV Push once  dextrose 50% Injectable 25 Gram(s) IV Push once  dextrose 50% Injectable 25 Gram(s) IV Push once  gabapentin 200 milliGRAM(s) Oral two times a day  glucagon  Injectable 1 milliGRAM(s) IntraMuscular once  insulin lispro (ADMELOG) corrective regimen sliding scale   SubCutaneous at bedtime  insulin lispro (ADMELOG) corrective regimen sliding scale   SubCutaneous three times a day before meals  lactated ringers. 1000 milliLiter(s) (200 mL/Hr) IV Continuous <Continuous>  morphine ER Tablet 15 milliGRAM(s) Oral two times a day  polyethylene glycol 3350 17 Gram(s) Oral two times a day  senna 2 Tablet(s) Oral at bedtime  sertraline 50 milliGRAM(s) Oral daily  sodium bicarbonate 1300 milliGRAM(s) Oral three times a day    MEDICATIONS  (PRN):  dextrose Oral Gel 15 Gram(s) Oral once PRN Blood Glucose LESS THAN 70 milliGRAM(s)/deciliter  oxyCODONE    IR 10 milliGRAM(s) Oral every 6 hours PRN Severe Pain (7 - 10)    Allergies    No Known Allergies    Intolerances      REVIEW OF SYSTEMS: Pertinent positives and negatives as stated in HPI, otherwise negative    Vital signs  T(C): 36.5 (24 @ 05:07), Max: 38.7 (24 @ 15:40)  HR: 59 (24 @ 05:07)  BP: 107/70 (24 @ 05:07)  SpO2: 95% (24 @ 05:07)  Wt(kg): --    Physical Exam  Gen: NAD  HEENT: normocephalic, atraumatic, no scleral icterus  Pulm: CTA b/l, No respiratory distress, no subcostal retractions, no accessory muscle use   CV: S1 S2, RRR,  no JVD  Abd: Soft, NT, ND, no rebound tenderness or guarding  : void freely, non palpable bladder  MSK:  No CVAT bilaterally  NEURO: A&Ox3  SKIN: warm, dry, no rash.    LABS:  CBC                       8.2    7.64  )-----------( 302      ( 01 Aug 2024 17:03 )             26.3     BMP   08-    130<L>  |  98  |  36<H>  ----------------------------<  97  4.2   |  19<L>  |  3.53<H>    Ca    8.8      01 Aug 2024 17:03    TPro  8.1  /  Alb  3.6  /  TBili  0.3  /  DBili  x   /  AST  11  /  ALT  11  /  AlkPhos  111      PT/INR - ( 01 Aug 2024 17:03 )   PT: 13.4 sec;   INR: 1.23 ratio         PTT - ( 01 Aug 2024 17:03 )  PTT:35.9 sec    Urinalysis Basic - ( 01 Aug 2024 20:12 )    Color: Yellow / Appearance: Clear / S.006 / pH: x  Gluc: x / Ketone: Negative mg/dL  / Bili: Negative / Urobili: 0.2 mg/dL   Blood: x / Protein: 100 mg/dL / Nitrite: Positive   Leuk Esterase: Large / RBC: 0 /HPF / WBC 92 /HPF   Sq Epi: x / Non Sq Epi: 0 /HPF / Bacteria: Few /HPF      Urine Cx: Pending    Culture - Urine (24 @ 15:45)   Specimen Source: Clean Catch Clean Catch (Midstream)  Culture Results:   >100,000 CFU/ml Streptococcus agalactiae (Group B) isolated   Group B streptococci are susceptible to ampicillin,   penicillin and cefazolin, but may be resistant to   erythromycin and clindamycin.   <10,000 CFU/ml Normal Urogenital elvin present      Blood Cx: Pending    Radiology:  ACC: [id number] EXAM:  CT ABDOMEN AND PELVIS   ORDERED BY:  SERA HENRY     PROCEDURE DATE:  2024          INTERPRETATION:  CLINICAL INFORMATION: Low back pain. History of cervical   cancer.    COMPARISON: CT pelvis 7/10/2024.    CONTRAST/COMPLICATIONS:  IV Contrast: NONE  Oral Contrast: NONE  Complications: None reported at time of study completion    PROCEDURE:  CT of the Abdomen and Pelvis was performed.  Sagittal and coronal reformats were performed.    FINDINGS:  LOWER CHEST: Subsegmental bilateral lower lobe atelectasis.    LIVER: Within normal limits.  BILE DUCTS: Normal caliber.  GALLBLADDER: Within normal limits.  SPLEEN: Within normal limits.  PANCREAS: Within normal limits.  ADRENALS: Within normal limits.  KIDNEYS/URETERS:Bilateral nephroureteral stents are noted. Right stent   proximal tip in the renal pelvis and proximal most tip of the left stent   in a left interpolar calyx. There is stable moderate to severe left   hydronephrosis and stable mild right hydronephrosis. Bilateral renal   cortical scarring and left cortical atrophy.    BLADDER: Within normal limits.  REPRODUCTIVE ORGANS: Uterus and adnexa within normal limits.    BOWEL: No bowel obstruction. Appendix is normal. Moderate fecal load.  PERITONEUM/RETROPERITONEUM: Stable right precaval node measures 1.1 cm in   short axis.. Stable left para-aortic nodes measuring up to 1 cm in short   axis.  VESSELS: Within normal limits.  LYMPH NODES: No lymphadenopathy.  ABDOMINAL WALL: Within normal limits.  BONES: Degenerative changes.    IMPRESSION:  Stable moderate to severe left and mild right hydronephrosis. Bilateral   ureteral stents are in place.    Evaluation limited by lack of IV contrast.    --- End of Report ---

## 2024-08-02 NOTE — CONSULT NOTE ADULT - REASON FOR ADMISSION
confusion, muscle jerking, fever, abd pain, hypotension
confusion, muscle jerking, fever, abd pain, hypotension

## 2024-08-02 NOTE — H&P ADULT - PROBLEM SELECTOR PLAN 4
- CTH  - suspect muscle twitching related to worsening renal failure  - reduce gabapentin dose. hold methocarbamol

## 2024-08-02 NOTE — H&P ADULT - HISTORY OF PRESENT ILLNESS
48F c hx cervical adeno ca (dx '21) s/p chemo (last dose '21)/RT reportedly in remission, R hydro s/p b/l stents, ?CKD (baseline Cr ?1.8), HTN, DM2, chronic lower back pain, presents from pmd's office with confusion, muscle jerking, fever, abd pain, hypotension.    Pt reports 2 days of fever, chills, suprapubic abd pain, dysuria, weakness. Yesterday, pt reports new confusion, lethargy, twitching of b/l hands, "disorientation", blurry vision. Pt went to her PMD who noticed her blood pressures were low, and told her to go to the hospital.  Denies overdose on medications, new medications, illicit drugs. Pt reports good compliance with her home meds. Denies focal weakness of hands or legs.

## 2024-08-02 NOTE — H&P ADULT - NSHPLABSRESULTS_GEN_ALL_CORE
Personally reviewed old records.  Personally reviewed labs.  Personally reviewed imaging.                            8.2    7.64  )-----------( 302      ( 01 Aug 2024 17:03 )             26.3       08-    130<L>  |  98  |  36<H>  ----------------------------<  97  4.2   |  19<L>  |  3.53<H>    Ca    8.8      01 Aug 2024 17:03    TPro  8.1  /  Alb  3.6  /  TBili  0.3  /  DBili  x   /  AST  11  /  ALT  11  /  AlkPhos  111              LIVER FUNCTIONS - ( 01 Aug 2024 17:03 )  Alb: 3.6 g/dL / Pro: 8.1 g/dL / ALK PHOS: 111 U/L / ALT: 11 U/L / AST: 11 U/L / GGT: x             PT/INR - ( 01 Aug 2024 17:03 )   PT: 13.4 sec;   INR: 1.23 ratio         PTT - ( 01 Aug 2024 17:03 )  PTT:35.9 sec    Urinalysis Basic - ( 01 Aug 2024 20:12 )    Color: Yellow / Appearance: Clear / S.006 / pH: x  Gluc: x / Ketone: Negative mg/dL  / Bili: Negative / Urobili: 0.2 mg/dL   Blood: x / Protein: 100 mg/dL / Nitrite: Positive   Leuk Esterase: Large / RBC: 0 /HPF / WBC 92 /HPF   Sq Epi: x / Non Sq Epi: 0 /HPF / Bacteria: Few /HPF

## 2024-08-02 NOTE — CONSULT NOTE ADULT - SUBJECTIVE AND OBJECTIVE BOX
[facility] [place] NEPHROLOGY SERVICES, [facility]  NEPHROLOGY AND HYPERTENSION  [address]  [address]  [place] [address]  [phone]    MD CLARE CARRERA MD YELENA ROSENBERG, MD BINNY KOSHY, MD CHRISTOPHER CAPUTO, MD EDWARD BOVER, MD      Information from chart:  "Patient is a 49y old  Female who presents with a chief complaint of confusion, muscle jerking, fever, abd pain, hypotension (02 Aug 2024 05:25)    HPI:  48F c hx cervical adeno ca (dx ') s/p chemo (last dose )/RT reportedly in remission, R hydro s/p b/l stents, ?CKD (baseline Cr ?1.8), HTN, DM2, chronic lower back pain, presents from pmd's office with confusion, muscle jerking, fever, abd pain, hypotension.    Pt reports 2 days of fever, chills, suprapubic abd pain, dysuria, weakness. Yesterday, pt reports new confusion, lethargy, twitching of b/l hands, "disorientation", blurry vision. Pt went to her PMD who noticed her blood pressures were low, and told her to go to the hospital.  Denies overdose on medications, new medications, illicit drugs. Pt reports good compliance with her home meds. Denies focal weakness of hands or legs.  (02 Aug 2024 04:43)   "    Feels better no distress      PAST MEDICAL & SURGICAL HISTORY:  Hypertension      T2DM (type 2 diabetes mellitus)      H/O hemorrhoids      Anxiety disorder      GERD (gastroesophageal reflux disease)      Chemotherapy-induced neuropathy      Pedal edema      Low back pain radiating to both legs      Bilateral lumbar radiculopathy      Calculus of kidney      Cervical adenocarcinoma      Neuropathic pain due to radiation      Hydronephrosis, right      History of foot drop      S/P ureteral stent placement  2022- left      S/P cystoscopy with ureteral stent placement      S/P cystoscopy with ureteral stent placement        FAMILY HISTORY:  FH: breast cancer      Allergies    No Known Allergies    Intolerances      Home Medications:  amLODIPine 5 mg oral tablet: 1 tab(s) orally once a day (02 Aug 2024 04:51)  gabapentin 400 mg oral capsule: 1 cap(s) orally 2 times a day (02 Aug 2024 04:51)  metFORMIN 500 mg oral tablet: 1 tab(s) orally 2 times a day (02 Aug 2024 04:51)  methocarbamol 500 mg oral tablet: 1 tab(s) orally 2 times a day (02 Aug 2024 04:49)  MS Contin 30 mg oral tablet, extended release: 0.5 tab(s) orally 2 times a day (02 Aug 2024 04:51)  oxyCODONE 5 mg oral tablet: 2 tab(s) orally every 4 hours, As Needed (02 Aug 2024 04:51)  sertraline 50 mg oral tablet: 1 tab(s) orally once a day (02 Aug 2024 04:51)    MEDICATIONS  (STANDING):  cefTRIAXone   IVPB 1000 milliGRAM(s) IV Intermittent every 24 hours  chlorhexidine 2% Cloths 1 Application(s) Topical daily  dextrose 5%. 1000 milliLiter(s) (100 mL/Hr) IV Continuous <Continuous>  dextrose 5%. 1000 milliLiter(s) (50 mL/Hr) IV Continuous <Continuous>  dextrose 50% Injectable 12.5 Gram(s) IV Push once  dextrose 50% Injectable 25 Gram(s) IV Push once  dextrose 50% Injectable 25 Gram(s) IV Push once  gabapentin 200 milliGRAM(s) Oral two times a day  glucagon  Injectable 1 milliGRAM(s) IntraMuscular once  insulin lispro (ADMELOG) corrective regimen sliding scale   SubCutaneous three times a day before meals  insulin lispro (ADMELOG) corrective regimen sliding scale   SubCutaneous at bedtime  lactated ringers. 1000 milliLiter(s) (200 mL/Hr) IV Continuous <Continuous>  morphine ER Tablet 15 milliGRAM(s) Oral two times a day  polyethylene glycol 3350 17 Gram(s) Oral two times a day  senna 2 Tablet(s) Oral at bedtime  sertraline 50 milliGRAM(s) Oral daily  sodium bicarbonate 1300 milliGRAM(s) Oral three times a day    MEDICATIONS  (PRN):  dextrose Oral Gel 15 Gram(s) Oral once PRN Blood Glucose LESS THAN 70 milliGRAM(s)/deciliter  oxyCODONE    IR 10 milliGRAM(s) Oral every 6 hours PRN Severe Pain (7 - 10)    Vital Signs Last 24 Hrs  T(C): 36.6 (02 Aug 2024 18:49), Max: 37.7 (01 Aug 2024 21:06)  T(F): 97.8 (02 Aug 2024 18:49), Max: 99.8 (01 Aug 2024 21:06)  HR: 67 (02 Aug 2024 18:49) (52 - 75)  BP: 119/73 (02 Aug 2024 18:49) (99/62 - 163/92)  BP(mean): 81 (02 Aug 2024 04:24) (81 - 108)  RR: 18 (02 Aug 2024 18:49) (14 - 20)  SpO2: 95% (02 Aug 2024 18:49) (93% - 100%)    Parameters below as of 02 Aug 2024 18:49  Patient On (Oxygen Delivery Method): room air        Daily     Daily     CAPILLARY BLOOD GLUCOSE      POCT Blood Glucose.: 149 mg/dL (02 Aug 2024 17:09)  POCT Blood Glucose.: 121 mg/dL (02 Aug 2024 11:38)  POCT Blood Glucose.: 90 mg/dL (02 Aug 2024 07:27)    PHYSICAL EXAM:      T(C): 36.6 (24 @ 18:49), Max: 37.7 (24 @ 21:06)  HR: 67 (24 @ 18:49) (52 - 75)  BP: 119/73 (24 @ 18:49) (99/62 - 163/92)  RR: 18 (24 @ 18:49) (14 - 20)  SpO2: 95% (24 @ 18:49) (93% - 100%)  Wt(kg): --  Lungs clear  Heart S1S2  Abd soft NT ND  Extremities:   tr edema                  130<L>  |  98  |  36<H>  ----------------------------<  97  4.2   |  19<L>  |  3.53<H>    Ca    8.8      01 Aug 2024 17:03    TPro  8.1  /  Alb  3.6  /  TBili  0.3  /  DBili  x   /  AST  11  /  ALT  11  /  AlkPhos  111                            8.2    7.64  )-----------( 302      ( 01 Aug 2024 17:03 )             26.3     Creatinine Trend: 3.53<--  Urinalysis Basic - ( 02 Aug 2024 09:19 )    Color: Yellow / Appearance: Clear / S.005 / pH: x  Gluc: x / Ketone: Negative mg/dL  / Bili: Negative / Urobili: 0.2 mg/dL   Blood: x / Protein: Trace mg/dL / Nitrite: Negative   Leuk Esterase: Moderate / RBC: 0 /HPF / WBC 33 /HPF   Sq Epi: x / Non Sq Epi: 2 /HPF / Bacteria: Negative /HPF    Trend Bun/Cr  24 @ 17:03  BUN/CR -  36<H> / 3.53<H>  24 @ 15:45  BUN/CR -  29<H> / 2.29<H>  24 @ 19:27  BUN/CR -  27<H> / 1.83<H>  23 @ 16:40  BUN/CR -  26<H> / 1.82<H>  23 @ 05:35  BUN/CR -  16 / 1.15  23 @ 08:40  BUN/CR -  19 / 1.35<H>  23 @ 10:19  BUN/CR -  28<H> / 1.70<H>  23 @ 16:51  BUN/CR -  15 / 0.94  10-11-22 @ 13:04  BUN/CR -  31<H> / 0.86  22 @ 12:29  BUN/CR -  12 / 0.90  22 @ 20:46  BUN/CR -  20 / 1.48<H>  22 @ 06:54  BUN/CR -  10 / 0.65          Assessment   VINITA CKD 3; pre renal azotemia; risk for infectious AIN;     Plan  Follow Cr trend with IVF and abx support  Urology follow up     Carlyle Sharma MD

## 2024-08-02 NOTE — PATIENT PROFILE ADULT - NSPROHMDIABETBLDGLCTST_GEN_A_NUR
Patient Education     Suspect Carpal Tunnel Syndrome    The carpal tunnel is a narrow space inside the wrist. It is ringed by bone and a band of tough tissue called the transverse carpal ligament. A major nerve called the median nerve runs from the forearm into the hand through the carpal tunnel. Tendons also run through the carpal tunnel.  With carpal tunnel syndrome, the tendons or nearby tissues within the carpal tunnel may swell or thicken. Or the transverse carpal ligament may harden and shorten. This narrows the space in the carpal tunnel and puts pressure on the median nerve. This pressure leads to tingling and numbness of the hand and wrist. In time, the condition can make even simple tasks hard to do.  What causes carpal tunnel syndrome?  Doctors aren t entirely clear why the condition occurs. Certain things may make a person more likely to have it. These include:    Being female    Being pregnant    Being overweight    Having diabetes or rheumatoid arthritis  Symptoms of carpal tunnel syndrome  Symptoms often come and go. At first, symptoms may occur mainly at night. Later, they may be noticed during the day as well. They may get worse with activities such as driving, reading, typing, or holding a phone. Symptoms can include:    Tingling and numbness in the hand or wrist    Sharp pain that shoots up the arm or down to the fingers    Hand stiffness or cramping, especially in the morning    Trouble making a fist    Hand weakness and clumsiness  Treatment for carpal tunnel syndrome  Certain treatments help reduce the pressure on the median nerve and relieve symptoms. Choices for treatment may include one or more of the following:    Wrist splint. This involves wearing a special brace on the wrist and hand. The splint holds the wrist straight, in a neutral position. This helps keep the carpal tunnel as open as possible.    Cortisone shots. Cortisone is a medicine that helps reduce swelling. It is injected  directly into the wrist. It helps shrink tissues inside the carpal tunnel. This relieves symptoms for a time.    Pain medicines. You may take over-the-counter or prescription medicines to help reduce swelling and relieve symptoms.    Surgery. If the condition doesn t respond to other treatments and doesn t go away on its own, you may need surgery. During surgery, the surgeon cuts the transverse carpal ligament to relieve pressure on the median nerve.     When to call your healthcare provider  Call your healthcare provider right away if you have any of these:    Fever of 100.4 F (38 C) or higher, or as directed    Symptoms that don t get better, or get worse    New symptoms   Kev last reviewed this educational content on 3/10/2016    6465-7312 [employer]. All rights reserved. This information is not intended as a substitute for professional medical care. Always follow your healthcare professional's instructions.            3 times/day

## 2024-08-03 DIAGNOSIS — N17.9 ACUTE KIDNEY FAILURE, UNSPECIFIED: ICD-10-CM

## 2024-08-03 DIAGNOSIS — Z29.9 ENCOUNTER FOR PROPHYLACTIC MEASURES, UNSPECIFIED: ICD-10-CM

## 2024-08-03 LAB
ANION GAP SERPL CALC-SCNC: 16 MMOL/L — SIGNIFICANT CHANGE UP (ref 5–17)
BUN SERPL-MCNC: 32 MG/DL — HIGH (ref 7–23)
CALCIUM SERPL-MCNC: 8.8 MG/DL — SIGNIFICANT CHANGE UP (ref 8.4–10.5)
CHLORIDE SERPL-SCNC: 103 MMOL/L — SIGNIFICANT CHANGE UP (ref 96–108)
CO2 SERPL-SCNC: 20 MMOL/L — LOW (ref 22–31)
CREAT ?TM UR-MCNC: 23 MG/DL — SIGNIFICANT CHANGE UP
CREAT SERPL-MCNC: 2.81 MG/DL — HIGH (ref 0.5–1.3)
CULTURE RESULTS: SIGNIFICANT CHANGE UP
EGFR: 20 ML/MIN/1.73M2 — LOW
GLUCOSE BLDC GLUCOMTR-MCNC: 114 MG/DL — HIGH (ref 70–99)
GLUCOSE BLDC GLUCOMTR-MCNC: 139 MG/DL — HIGH (ref 70–99)
GLUCOSE BLDC GLUCOMTR-MCNC: 139 MG/DL — HIGH (ref 70–99)
GLUCOSE BLDC GLUCOMTR-MCNC: 144 MG/DL — HIGH (ref 70–99)
GLUCOSE SERPL-MCNC: 130 MG/DL — HIGH (ref 70–99)
MRSA PCR RESULT.: DETECTED
POTASSIUM SERPL-MCNC: 3.5 MMOL/L — SIGNIFICANT CHANGE UP (ref 3.5–5.3)
POTASSIUM SERPL-SCNC: 3.5 MMOL/L — SIGNIFICANT CHANGE UP (ref 3.5–5.3)
PROT ?TM UR-MCNC: 47 MG/DL — HIGH (ref 0–12)
PROT/CREAT UR-RTO: 2 RATIO — HIGH (ref 0–0.2)
S AUREUS DNA NOSE QL NAA+PROBE: DETECTED
SODIUM SERPL-SCNC: 139 MMOL/L — SIGNIFICANT CHANGE UP (ref 135–145)
SODIUM UR-SCNC: 56 MMOL/L — SIGNIFICANT CHANGE UP
SPECIMEN SOURCE: SIGNIFICANT CHANGE UP
UUN UR-MCNC: 115 MG/DL — SIGNIFICANT CHANGE UP

## 2024-08-03 PROCEDURE — 99232 SBSQ HOSP IP/OBS MODERATE 35: CPT

## 2024-08-03 RX ORDER — BACTERIOSTATIC SODIUM CHLORIDE 0.9 %
1000 VIAL (ML) INJECTION
Refills: 0 | Status: COMPLETED | OUTPATIENT
Start: 2024-08-03 | End: 2024-08-04

## 2024-08-03 RX ORDER — SODIUM BICARBONATE 0.9MEQ/ML
1300 SYRINGE (ML) INTRAVENOUS EVERY 12 HOURS
Refills: 0 | Status: DISCONTINUED | OUTPATIENT
Start: 2024-08-03 | End: 2024-08-06

## 2024-08-03 RX ORDER — GABAPENTIN 400 MG/1
400 CAPSULE ORAL EVERY 12 HOURS
Refills: 0 | Status: DISCONTINUED | OUTPATIENT
Start: 2024-08-03 | End: 2024-08-06

## 2024-08-03 RX ORDER — HEPARIN SODIUM 1000 [USP'U]/ML
5000 INJECTION, SOLUTION INTRAVENOUS; SUBCUTANEOUS EVERY 8 HOURS
Refills: 0 | Status: DISCONTINUED | OUTPATIENT
Start: 2024-08-03 | End: 2024-08-06

## 2024-08-03 RX ADMIN — SENNOSIDES 2 TABLET(S): 8.6 TABLET ORAL at 21:33

## 2024-08-03 RX ADMIN — Medication 15 MILLIGRAM(S): at 18:09

## 2024-08-03 RX ADMIN — OXYCODONE HYDROCHLORIDE 10 MILLIGRAM(S): 30 TABLET ORAL at 14:13

## 2024-08-03 RX ADMIN — Medication 1300 MILLIGRAM(S): at 17:20

## 2024-08-03 RX ADMIN — Medication 15 MILLIGRAM(S): at 05:10

## 2024-08-03 RX ADMIN — GABAPENTIN 200 MILLIGRAM(S): 400 CAPSULE ORAL at 05:07

## 2024-08-03 RX ADMIN — Medication 100 MILLILITER(S): at 21:34

## 2024-08-03 RX ADMIN — Medication 100 MILLILITER(S): at 14:52

## 2024-08-03 RX ADMIN — OXYCODONE HYDROCHLORIDE 10 MILLIGRAM(S): 30 TABLET ORAL at 13:13

## 2024-08-03 RX ADMIN — Medication 17 GRAM(S): at 17:19

## 2024-08-03 RX ADMIN — CHLORHEXIDINE GLUCONATE 1 APPLICATION(S): 500 CLOTH TOPICAL at 11:44

## 2024-08-03 RX ADMIN — SERTRALINE HYDROCHLORIDE 50 MILLIGRAM(S): 100 TABLET, FILM COATED ORAL at 11:42

## 2024-08-03 RX ADMIN — Medication 15 MILLIGRAM(S): at 17:25

## 2024-08-03 RX ADMIN — Medication 1300 MILLIGRAM(S): at 05:07

## 2024-08-03 RX ADMIN — HEPARIN SODIUM 5000 UNIT(S): 1000 INJECTION, SOLUTION INTRAVENOUS; SUBCUTANEOUS at 21:33

## 2024-08-03 RX ADMIN — Medication 100 MILLIGRAM(S): at 05:07

## 2024-08-03 RX ADMIN — GABAPENTIN 400 MILLIGRAM(S): 400 CAPSULE ORAL at 17:25

## 2024-08-03 NOTE — PROGRESS NOTE ADULT - PROBLEM SELECTOR PLAN 2
- IVF  - urology consulted for poss stent exchange  - needs renal consult in AM. pt states she hasn't established care with nephrologist yet  - urine lytes Baseline Cr around 1.8, on admission Cr 3.5  - Suspect pre-renal / intrinsic VINITA on CKD in s/o UTI   - Cr downtrending s/p IVF; encouraging PO intake  - F/u repeat urine lytes. Studies from 8/2 more consistent with intrinsic renal disease Baseline Cr around 1.8, on admission Cr 3.5  - Suspect pre-renal / intrinsic VINITA on CKD in s/o UTI, also to consider post-renal in s/o chronic b/l hydro  - Cr downtrending s/p IVF; encouraging PO intake  - F/u repeat urine lytes. Studies from 8/2 more consistent with intrinsic renal disease

## 2024-08-03 NOTE — PROGRESS NOTE ADULT - PROBLEM SELECTOR PLAN 4
- CTH  - suspect muscle twitching related to worsening renal failure  - reduce gabapentin dose. hold methocarbamol - ISS  - a1c

## 2024-08-03 NOTE — PROGRESS NOTE ADULT - PROBLEM SELECTOR PLAN 5
- non gap. also with mild respiratory acidosis  - denies toxic ingestion  - IVF  - start sodium bicarb po DVT ppx: SQH  Diet: CC  Dispo: pending UCx and clinical course; ancitipate in another 48 hours once Cr stabilizes and uro without plan to exchange stent

## 2024-08-03 NOTE — PROGRESS NOTE ADULT - PROBLEM SELECTOR PLAN 3
- ceftriaxone  - IVF  - VS q4h - CT a/p showed stable moderate to severe left and mild right hydronephrosis. Bilateral ureteral stents are in place. Suspect chronic obstruction at baseline leading to CKD   - F/u with urology re role for stent replacement

## 2024-08-03 NOTE — PROGRESS NOTE ADULT - SUBJECTIVE AND OBJECTIVE BOX
[facility] NEPHROLOGY SERVICES, [facility]  NEPHROLOGY AND HYPERTENSION  [address]  [address]  [place] [address]  [phone]    MD CLARE CARRERA MD YELENA ROSENBERG, MD BINNY KOSHY, MD CHRISTOPHER CAPUTO, MD EDWARD BOVER, MD          Patient events noted    MEDICATIONS  (STANDING):  cefTRIAXone   IVPB 1000 milliGRAM(s) IV Intermittent every 24 hours  chlorhexidine 2% Cloths 1 Application(s) Topical daily  dextrose 5%. 1000 milliLiter(s) (100 mL/Hr) IV Continuous <Continuous>  dextrose 5%. 1000 milliLiter(s) (50 mL/Hr) IV Continuous <Continuous>  dextrose 50% Injectable 12.5 Gram(s) IV Push once  dextrose 50% Injectable 25 Gram(s) IV Push once  dextrose 50% Injectable 25 Gram(s) IV Push once  gabapentin 400 milliGRAM(s) Oral every 12 hours  glucagon  Injectable 1 milliGRAM(s) IntraMuscular once  heparin   Injectable 5000 Unit(s) SubCutaneous every 8 hours  insulin lispro (ADMELOG) corrective regimen sliding scale   SubCutaneous at bedtime  insulin lispro (ADMELOG) corrective regimen sliding scale   SubCutaneous three times a day before meals  morphine ER Tablet 15 milliGRAM(s) Oral two times a day  polyethylene glycol 3350 17 Gram(s) Oral two times a day  senna 2 Tablet(s) Oral at bedtime  sertraline 50 milliGRAM(s) Oral daily  sodium bicarbonate 1300 milliGRAM(s) Oral every 12 hours  sodium chloride 0.9%. 1000 milliLiter(s) (100 mL/Hr) IV Continuous <Continuous>    MEDICATIONS  (PRN):  dextrose Oral Gel 15 Gram(s) Oral once PRN Blood Glucose LESS THAN 70 milliGRAM(s)/deciliter  oxyCODONE    IR 10 milliGRAM(s) Oral every 6 hours PRN Severe Pain (7 - 10)      08-03-24 @ 07:01  -  08-03-24 @ 20:33  --------------------------------------------------------  IN: 240 mL / OUT: 0 mL / NET: 240 mL      PHYSICAL EXAM:      T(C): 36.9 (08-03-24 @ 20:28), Max: 36.9 (08-03-24 @ 20:28)  HR: 79 (08-03-24 @ 20:28) (65 - 79)  BP: 131/82 (08-03-24 @ 20:28) (111/72 - 131/82)  RR: 18 (08-03-24 @ 20:28) (17 - 18)  SpO2: 95% (08-03-24 @ 20:28) (95% - 97%)  Wt(kg): --  Lungs clear  Heart S1S2  Abd soft NT ND  Extremities:   tr edema                08-03    139  |  103  |  32<H>  ----------------------------<  130<H>  3.5   |  20<L>  |  2.81<H>    Ca    8.8      03 Aug 2024 07:17          Creatinine Trend: 2.81<--, 3.53<--              Assessment   VINITA CKD 3; pre renal azotemia; risk for infectious AIN;   Improving trend     Plan  Follow Cr trend with IVF and abx support  Gabapentin 400 mg q 12 at home, resume   Urology follow up     Carlyle Sharma MD

## 2024-08-03 NOTE — PROGRESS NOTE ADULT - SUBJECTIVE AND OBJECTIVE BOX
PROGRESS NOTE:   Authored by Dr. Ella Dalal MD. Available on TEAMS.    Patient is a 49y old  Female who presents with a chief complaint of confusion, muscle jerking, fever, abd pain, hypotension (02 Aug 2024 19:33)      SUBJECTIVE / OVERNIGHT EVENTS:  No acute events overnight. Patient seen and examined at bedside. Patient denies new symptoms or concerns.     ADDITIONAL REVIEW OF SYSTEMS:  Patient denies fevers, chills, chest pain, shortness of breath, nausea, abdominal pain, diarrhea, constipation, dysuria, leg swelling, headache, light headedness.    MEDICATIONS  (STANDING):  cefTRIAXone   IVPB 1000 milliGRAM(s) IV Intermittent every 24 hours  chlorhexidine 2% Cloths 1 Application(s) Topical daily  dextrose 5%. 1000 milliLiter(s) (50 mL/Hr) IV Continuous <Continuous>  dextrose 5%. 1000 milliLiter(s) (100 mL/Hr) IV Continuous <Continuous>  dextrose 50% Injectable 12.5 Gram(s) IV Push once  dextrose 50% Injectable 25 Gram(s) IV Push once  dextrose 50% Injectable 25 Gram(s) IV Push once  gabapentin 200 milliGRAM(s) Oral two times a day  glucagon  Injectable 1 milliGRAM(s) IntraMuscular once  insulin lispro (ADMELOG) corrective regimen sliding scale   SubCutaneous three times a day before meals  insulin lispro (ADMELOG) corrective regimen sliding scale   SubCutaneous at bedtime  morphine ER Tablet 15 milliGRAM(s) Oral two times a day  polyethylene glycol 3350 17 Gram(s) Oral two times a day  senna 2 Tablet(s) Oral at bedtime  sertraline 50 milliGRAM(s) Oral daily  sodium bicarbonate 1300 milliGRAM(s) Oral three times a day    MEDICATIONS  (PRN):  dextrose Oral Gel 15 Gram(s) Oral once PRN Blood Glucose LESS THAN 70 milliGRAM(s)/deciliter  oxyCODONE    IR 10 milliGRAM(s) Oral every 6 hours PRN Severe Pain (7 - 10)      CAPILLARY BLOOD GLUCOSE      POCT Blood Glucose.: 114 mg/dL (03 Aug 2024 08:27)  POCT Blood Glucose.: 162 mg/dL (02 Aug 2024 21:05)  POCT Blood Glucose.: 149 mg/dL (02 Aug 2024 17:09)  POCT Blood Glucose.: 121 mg/dL (02 Aug 2024 11:38)    I&O's Summary      PHYSICAL EXAM:  Vital Signs Last 24 Hrs  T(C): 36.2 (03 Aug 2024 04:25), Max: 36.7 (02 Aug 2024 12:06)  T(F): 97.2 (03 Aug 2024 04:25), Max: 98.1 (02 Aug 2024 12:06)  HR: 79 (03 Aug 2024 04:25) (62 - 79)  BP: 123/78 (03 Aug 2024 04:25) (104/72 - 124/81)  BP(mean): --  RR: 17 (03 Aug 2024 04:25) (17 - 19)  SpO2: 97% (03 Aug 2024 04:25) (95% - 100%)    Parameters below as of 03 Aug 2024 04:25  Patient On (Oxygen Delivery Method): room air        CONSTITUTIONAL: NAD, well-developed  RESPIRATORY: Normal respiratory effort; lungs are clear to auscultation bilaterally  CARDIOVASCULAR: Regular rate and rhythm, normal S1 and S2, no murmur/rub/gallop; No lower extremity edema; Peripheral pulses are 2+ bilaterally  ABDOMEN: Nontender to palpation, normoactive bowel sounds, no rebound/guarding; No hepatosplenomegaly  MUSCLOSKELETAL: no clubbing or cyanosis of digits; no joint swelling or tenderness to palpation  PSYCH: A+O to person, place, and time; affect appropriate    LABS:                        8.2    7.64  )-----------( 302      ( 01 Aug 2024 17:03 )             26.3     08-03    139  |  103  |  32<H>  ----------------------------<  130<H>  3.5   |  20<L>  |  2.81<H>    Ca    8.8      03 Aug 2024 07:17    TPro  8.1  /  Alb  3.6  /  TBili  0.3  /  DBili  x   /  AST  11  /  ALT  11  /  AlkPhos  111  08-01    PT/INR - ( 01 Aug 2024 17:03 )   PT: 13.4 sec;   INR: 1.23 ratio         PTT - ( 01 Aug 2024 17:03 )  PTT:35.9 sec      Urinalysis Basic - ( 03 Aug 2024 07:17 )    Color: x / Appearance: x / SG: x / pH: x  Gluc: 130 mg/dL / Ketone: x  / Bili: x / Urobili: x   Blood: x / Protein: x / Nitrite: x   Leuk Esterase: x / RBC: x / WBC x   Sq Epi: x / Non Sq Epi: x / Bacteria: x        Culture - Blood (collected 01 Aug 2024 16:15)  Source: .Blood Blood-Peripheral  Preliminary Report (02 Aug 2024 23:02):    No growth at 24 hours    Culture - Blood (collected 01 Aug 2024 16:10)  Source: .Blood Blood-Peripheral  Preliminary Report (02 Aug 2024 23:02):    No growth at 24 hours        Tele Reviewed:    RADIOLOGY & ADDITIONAL TESTS:  Results Reviewed:   Imaging Personally Reviewed:  Electrocardiogram Personally Reviewed:     PROGRESS NOTE:   Authored by Dr. Ella Dalal MD. Available on TEAMS.    Patient is a 49y old  Female who presents with a chief complaint of confusion, muscle jerking, fever, abd pain, hypotension (02 Aug 2024 19:33)      SUBJECTIVE / OVERNIGHT EVENTS:  No acute events overnight. Patient seen and examined at bedside along with brother and nephew. Patient denies new symptoms or concerns.     ADDITIONAL REVIEW OF SYSTEMS:  Patient denies fevers, chills, chest pain, shortness of breath, nausea, abdominal pain, diarrhea, constipation, dysuria, leg swelling, headache, light headedness.    MEDICATIONS  (STANDING):  cefTRIAXone   IVPB 1000 milliGRAM(s) IV Intermittent every 24 hours  chlorhexidine 2% Cloths 1 Application(s) Topical daily  dextrose 5%. 1000 milliLiter(s) (50 mL/Hr) IV Continuous <Continuous>  dextrose 5%. 1000 milliLiter(s) (100 mL/Hr) IV Continuous <Continuous>  dextrose 50% Injectable 12.5 Gram(s) IV Push once  dextrose 50% Injectable 25 Gram(s) IV Push once  dextrose 50% Injectable 25 Gram(s) IV Push once  gabapentin 200 milliGRAM(s) Oral two times a day  glucagon  Injectable 1 milliGRAM(s) IntraMuscular once  insulin lispro (ADMELOG) corrective regimen sliding scale   SubCutaneous three times a day before meals  insulin lispro (ADMELOG) corrective regimen sliding scale   SubCutaneous at bedtime  morphine ER Tablet 15 milliGRAM(s) Oral two times a day  polyethylene glycol 3350 17 Gram(s) Oral two times a day  senna 2 Tablet(s) Oral at bedtime  sertraline 50 milliGRAM(s) Oral daily  sodium bicarbonate 1300 milliGRAM(s) Oral three times a day    MEDICATIONS  (PRN):  dextrose Oral Gel 15 Gram(s) Oral once PRN Blood Glucose LESS THAN 70 milliGRAM(s)/deciliter  oxyCODONE    IR 10 milliGRAM(s) Oral every 6 hours PRN Severe Pain (7 - 10)      CAPILLARY BLOOD GLUCOSE      POCT Blood Glucose.: 114 mg/dL (03 Aug 2024 08:27)  POCT Blood Glucose.: 162 mg/dL (02 Aug 2024 21:05)  POCT Blood Glucose.: 149 mg/dL (02 Aug 2024 17:09)  POCT Blood Glucose.: 121 mg/dL (02 Aug 2024 11:38)    I&O's Summary      PHYSICAL EXAM:  Vital Signs Last 24 Hrs  T(C): 36.2 (03 Aug 2024 04:25), Max: 36.7 (02 Aug 2024 12:06)  T(F): 97.2 (03 Aug 2024 04:25), Max: 98.1 (02 Aug 2024 12:06)  HR: 79 (03 Aug 2024 04:25) (62 - 79)  BP: 123/78 (03 Aug 2024 04:25) (104/72 - 124/81)  BP(mean): --  RR: 17 (03 Aug 2024 04:25) (17 - 19)  SpO2: 97% (03 Aug 2024 04:25) (95% - 100%)    Parameters below as of 03 Aug 2024 04:25  Patient On (Oxygen Delivery Method): room air        CONSTITUTIONAL: NAD, well-developed  RESPIRATORY: Normal respiratory effort; lungs are clear to auscultation bilaterally  CARDIOVASCULAR: Regular rate and rhythm, normal S1 and S2, no murmur/rub/gallop; No lower extremity edema  ABDOMEN: Nontender to palpation, normoactive bowel sounds, no rebound/guarding  NO CVA tenderness  PSYCH: A+O to person, place, and time; affect appropriate    LABS:                        8.2    7.64  )-----------( 302      ( 01 Aug 2024 17:03 )             26.3     08-03    139  |  103  |  32<H>  ----------------------------<  130<H>  3.5   |  20<L>  |  2.81<H>    Ca    8.8      03 Aug 2024 07:17    TPro  8.1  /  Alb  3.6  /  TBili  0.3  /  DBili  x   /  AST  11  /  ALT  11  /  AlkPhos  111  08-01    PT/INR - ( 01 Aug 2024 17:03 )   PT: 13.4 sec;   INR: 1.23 ratio         PTT - ( 01 Aug 2024 17:03 )  PTT:35.9 sec      Urinalysis Basic - ( 03 Aug 2024 07:17 )    Color: x / Appearance: x / SG: x / pH: x  Gluc: 130 mg/dL / Ketone: x  / Bili: x / Urobili: x   Blood: x / Protein: x / Nitrite: x   Leuk Esterase: x / RBC: x / WBC x   Sq Epi: x / Non Sq Epi: x / Bacteria: x        Culture - Blood (collected 01 Aug 2024 16:15)  Source: .Blood Blood-Peripheral  Preliminary Report (02 Aug 2024 23:02):    No growth at 24 hours    Culture - Blood (collected 01 Aug 2024 16:10)  Source: .Blood Blood-Peripheral  Preliminary Report (02 Aug 2024 23:02):    No growth at 24 hours

## 2024-08-03 NOTE — PROGRESS NOTE ADULT - ASSESSMENT
48F c hx cervical adeno ca (dx '21) s/p chemo (last dose '21)/RT reportedly in remission, R hydro s/p b/l stents, ?CKD (baseline Cr ?1.8), HTN, DM2, chronic lower back pain, pw acute encephalopathy, muscle jerking, hypotension, in setting of VINITA and UTI. 48F c hx cervical adeno ca (dx '21) s/p chemo (last dose '21)/RT reportedly in remission, R hydro s/p b/l stents, CKD (baseline Cr ~1.8), HTN, DM2, chronic lower back pain, pw acute encephalopathy, muscle jerking, hypotension, in setting of sepsis 2/2 UTI, also found to have VINITA on CKD. She is improving while on empiric ceftriaxone with UCx pending.

## 2024-08-03 NOTE — PROGRESS NOTE ADULT - PROBLEM SELECTOR PLAN 1
- cont ceftriaxone  - IVF  - f/u ur and blood cx On admission met sepsis criteria  - UA grossly positive  - Pending UCx, will f/u  - C/w ceftriaxone for now

## 2024-08-04 LAB
ANION GAP SERPL CALC-SCNC: 12 MMOL/L — SIGNIFICANT CHANGE UP (ref 5–17)
BUN SERPL-MCNC: 26 MG/DL — HIGH (ref 7–23)
CALCIUM SERPL-MCNC: 9.2 MG/DL — SIGNIFICANT CHANGE UP (ref 8.4–10.5)
CHLORIDE SERPL-SCNC: 101 MMOL/L — SIGNIFICANT CHANGE UP (ref 96–108)
CO2 SERPL-SCNC: 24 MMOL/L — SIGNIFICANT CHANGE UP (ref 22–31)
CREAT SERPL-MCNC: 2.6 MG/DL — HIGH (ref 0.5–1.3)
EGFR: 22 ML/MIN/1.73M2 — LOW
GLUCOSE BLDC GLUCOMTR-MCNC: 125 MG/DL — HIGH (ref 70–99)
GLUCOSE BLDC GLUCOMTR-MCNC: 127 MG/DL — HIGH (ref 70–99)
GLUCOSE BLDC GLUCOMTR-MCNC: 129 MG/DL — HIGH (ref 70–99)
GLUCOSE BLDC GLUCOMTR-MCNC: 95 MG/DL — SIGNIFICANT CHANGE UP (ref 70–99)
GLUCOSE SERPL-MCNC: 110 MG/DL — HIGH (ref 70–99)
POTASSIUM SERPL-MCNC: 3.3 MMOL/L — LOW (ref 3.5–5.3)
POTASSIUM SERPL-SCNC: 3.3 MMOL/L — LOW (ref 3.5–5.3)
PROT ?TM UR-MCNC: 50 MG/DL — HIGH (ref 0–12)
SODIUM SERPL-SCNC: 137 MMOL/L — SIGNIFICANT CHANGE UP (ref 135–145)
UUN UR-MCNC: 155 MG/DL — SIGNIFICANT CHANGE UP

## 2024-08-04 PROCEDURE — 99232 SBSQ HOSP IP/OBS MODERATE 35: CPT

## 2024-08-04 RX ORDER — POTASSIUM CHLORIDE 1500 MG/1
20 TABLET, EXTENDED RELEASE ORAL ONCE
Refills: 0 | Status: COMPLETED | OUTPATIENT
Start: 2024-08-04 | End: 2024-08-04

## 2024-08-04 RX ORDER — BACTERIOSTATIC SODIUM CHLORIDE 0.9 %
1000 VIAL (ML) INJECTION
Refills: 0 | Status: DISCONTINUED | OUTPATIENT
Start: 2024-08-04 | End: 2024-08-05

## 2024-08-04 RX ORDER — MUPIROCIN CALCIUM 20 MG/G
1 CREAM TOPICAL
Refills: 0 | Status: DISCONTINUED | OUTPATIENT
Start: 2024-08-04 | End: 2024-08-06

## 2024-08-04 RX ADMIN — Medication 1300 MILLIGRAM(S): at 17:20

## 2024-08-04 RX ADMIN — CHLORHEXIDINE GLUCONATE 1 APPLICATION(S): 500 CLOTH TOPICAL at 11:54

## 2024-08-04 RX ADMIN — HEPARIN SODIUM 5000 UNIT(S): 1000 INJECTION, SOLUTION INTRAVENOUS; SUBCUTANEOUS at 13:50

## 2024-08-04 RX ADMIN — Medication 100 MILLILITER(S): at 18:19

## 2024-08-04 RX ADMIN — HEPARIN SODIUM 5000 UNIT(S): 1000 INJECTION, SOLUTION INTRAVENOUS; SUBCUTANEOUS at 21:09

## 2024-08-04 RX ADMIN — Medication 100 MILLILITER(S): at 05:45

## 2024-08-04 RX ADMIN — SERTRALINE HYDROCHLORIDE 50 MILLIGRAM(S): 100 TABLET, FILM COATED ORAL at 11:51

## 2024-08-04 RX ADMIN — Medication 17 GRAM(S): at 17:20

## 2024-08-04 RX ADMIN — Medication 100 MILLIGRAM(S): at 05:45

## 2024-08-04 RX ADMIN — OXYCODONE HYDROCHLORIDE 10 MILLIGRAM(S): 30 TABLET ORAL at 11:54

## 2024-08-04 RX ADMIN — Medication 1300 MILLIGRAM(S): at 05:48

## 2024-08-04 RX ADMIN — OXYCODONE HYDROCHLORIDE 10 MILLIGRAM(S): 30 TABLET ORAL at 00:31

## 2024-08-04 RX ADMIN — OXYCODONE HYDROCHLORIDE 10 MILLIGRAM(S): 30 TABLET ORAL at 13:00

## 2024-08-04 RX ADMIN — GABAPENTIN 400 MILLIGRAM(S): 400 CAPSULE ORAL at 05:48

## 2024-08-04 RX ADMIN — HEPARIN SODIUM 5000 UNIT(S): 1000 INJECTION, SOLUTION INTRAVENOUS; SUBCUTANEOUS at 05:47

## 2024-08-04 RX ADMIN — OXYCODONE HYDROCHLORIDE 10 MILLIGRAM(S): 30 TABLET ORAL at 01:30

## 2024-08-04 RX ADMIN — SENNOSIDES 2 TABLET(S): 8.6 TABLET ORAL at 21:08

## 2024-08-04 RX ADMIN — POTASSIUM CHLORIDE 20 MILLIEQUIVALENT(S): 1500 TABLET, EXTENDED RELEASE ORAL at 10:53

## 2024-08-04 RX ADMIN — Medication 15 MILLIGRAM(S): at 17:20

## 2024-08-04 RX ADMIN — GABAPENTIN 400 MILLIGRAM(S): 400 CAPSULE ORAL at 17:19

## 2024-08-04 RX ADMIN — Medication 15 MILLIGRAM(S): at 05:48

## 2024-08-04 RX ADMIN — Medication 15 MILLIGRAM(S): at 18:20

## 2024-08-04 RX ADMIN — MUPIROCIN CALCIUM 1 APPLICATION(S): 20 CREAM TOPICAL at 18:38

## 2024-08-04 NOTE — PROGRESS NOTE ADULT - PROBLEM SELECTOR PLAN 1
On admission met sepsis criteria  - UA grossly positive  - Pending UCx, will f/u  - C/w ceftriaxone for now

## 2024-08-04 NOTE — PROGRESS NOTE ADULT - ASSESSMENT
48F c hx cervical adeno ca (dx '21) s/p chemo (last dose '21)/RT reportedly in remission, R hydro s/p b/l stents, CKD (baseline Cr ~1.8), HTN, DM2, chronic lower back pain, pw acute encephalopathy, muscle jerking, hypotension, in setting of sepsis 2/2 UTI, also found to have VINITA on CKD. She is improving while on empiric ceftriaxone with UCx pending.

## 2024-08-04 NOTE — PROGRESS NOTE ADULT - PROBLEM SELECTOR PLAN 3
- CT a/p showed stable moderate to severe left and mild right hydronephrosis. Bilateral ureteral stents are in place. Suspect chronic obstruction at baseline leading to CKD   - F/u with urology re role for stent replacement

## 2024-08-04 NOTE — PROGRESS NOTE ADULT - PROBLEM SELECTOR PLAN 5
DVT ppx: SQH  Diet: CC  Dispo: pending UCx and clinical course; ancitipate in another 48 hours once Cr stabilizes and uro without plan to exchange stent DVT ppx: SQH  Diet: CC  Dispo: pending UCx and clinical course; ancitipate in another 24 hours once Cr stabilizes and uro without plan to exchange stent

## 2024-08-04 NOTE — PROGRESS NOTE ADULT - TIME BILLING
- Ordering, reviewing, and interpreting labs, testing, and imaging  - Independently obtaining a review of systems and performing a physical exam  - Reviewing consultant documentation/recommendations in addition to discussing plan of care with consultants  - Counselling and educating patient and family regarding interpretation of aforementioned items and plan of care
- Ordering, reviewing, and interpreting labs, testing, and imaging    - Independently obtaining a review of systems and performing a physical exam    - Reviewing consultant documentation/recommendations in addition to discussing plan of care with consultants    - Counselling and educating patient and family regarding interpretation of aforementioned items and plan of care

## 2024-08-04 NOTE — PROGRESS NOTE ADULT - PROBLEM SELECTOR PLAN 2
Baseline Cr around 1.8, on admission Cr 3.5  - Suspect pre-renal / intrinsic VINITA on CKD in s/o UTI, also to consider post-renal in s/o chronic b/l hydro  - Cr downtrending s/p IVF; encouraging PO intake  - F/u repeat urine lytes. Studies from 8/2 more consistent with intrinsic renal disease Baseline Cr around 1.8, on admission Cr 3.5  - Suspect pre-renal / intrinsic VINITA on CKD in s/o UTI, also to consider post-renal in s/o chronic b/l hydro  - Cr downtrending s/p IVF; encouraging PO intake  - Repeat urine lytes from 8/3 consistent with intrinsic renal disease

## 2024-08-04 NOTE — PROGRESS NOTE ADULT - SUBJECTIVE AND OBJECTIVE BOX
[facility] NEPHROLOGY SERVICES, [facility]  NEPHROLOGY AND HYPERTENSION  [address]  [address]  [place] [address]  [phone]    MD CLARE CARRERA MD YELENA ROSENBERG, MD BINNY KOSHY, MD CHRISTOPHER CAPUTO, MD EDWARD BOVER, MD          Patient events noted    MEDICATIONS  (STANDING):  cefTRIAXone   IVPB 1000 milliGRAM(s) IV Intermittent every 24 hours  chlorhexidine 2% Cloths 1 Application(s) Topical daily  dextrose 5%. 1000 milliLiter(s) (100 mL/Hr) IV Continuous <Continuous>  dextrose 5%. 1000 milliLiter(s) (50 mL/Hr) IV Continuous <Continuous>  dextrose 50% Injectable 12.5 Gram(s) IV Push once  dextrose 50% Injectable 25 Gram(s) IV Push once  dextrose 50% Injectable 25 Gram(s) IV Push once  gabapentin 400 milliGRAM(s) Oral every 12 hours  glucagon  Injectable 1 milliGRAM(s) IntraMuscular once  heparin   Injectable 5000 Unit(s) SubCutaneous every 8 hours  insulin lispro (ADMELOG) corrective regimen sliding scale   SubCutaneous three times a day before meals  insulin lispro (ADMELOG) corrective regimen sliding scale   SubCutaneous at bedtime  morphine ER Tablet 15 milliGRAM(s) Oral two times a day  mupirocin 2% Nasal 1 Application(s) Both Nostrils two times a day  polyethylene glycol 3350 17 Gram(s) Oral two times a day  senna 2 Tablet(s) Oral at bedtime  sertraline 50 milliGRAM(s) Oral daily  sodium bicarbonate 1300 milliGRAM(s) Oral every 12 hours  sodium chloride 0.9%. 1000 milliLiter(s) (100 mL/Hr) IV Continuous <Continuous>    MEDICATIONS  (PRN):  dextrose Oral Gel 15 Gram(s) Oral once PRN Blood Glucose LESS THAN 70 milliGRAM(s)/deciliter  oxyCODONE    IR 10 milliGRAM(s) Oral every 6 hours PRN Severe Pain (7 - 10)      08-03-24 @ 07:01  -  08-04-24 @ 07:00  --------------------------------------------------------  IN: 240 mL / OUT: 0 mL / NET: 240 mL      PHYSICAL EXAM:      T(C): 37.1 (08-04-24 @ 21:13), Max: 37.1 (08-04-24 @ 21:13)  HR: 78 (08-04-24 @ 21:13) (71 - 78)  BP: 143/87 (08-04-24 @ 21:13) (110/76 - 143/87)  RR: 17 (08-04-24 @ 21:13) (17 - 18)  SpO2: 96% (08-04-24 @ 21:13) (96% - 96%)  Wt(kg): --  Lungs clear  Heart S1S2  Abd soft NT ND  Extremities:   tr edema                08-04    137  |  101  |  26<H>  ----------------------------<  110<H>  3.3<L>   |  24  |  2.60<H>    Ca    9.2      04 Aug 2024 07:50          Creatinine Trend: 2.60<--, 2.81<--, 3.53<--      Assessment   VINITA CKD 3; pre renal azotemia; risk for infectious AIN;   Improving trend     Plan  Follow Cr trend with IVF and abx support  Urology follow up         Carlyle Sharma MD

## 2024-08-04 NOTE — PROGRESS NOTE ADULT - SUBJECTIVE AND OBJECTIVE BOX
PROGRESS NOTE:   Authored by Dr. Ella Dalal MD. Available on TEAMS.    Patient is a 49y old  Female who presents with a chief complaint of confusion, muscle jerking, fever, abd pain, hypotension (03 Aug 2024 20:32)      SUBJECTIVE / OVERNIGHT EVENTS:  No acute events overnight. Patient seen and examined at bedside. Patient denies new symptoms or concerns.     ADDITIONAL REVIEW OF SYSTEMS:  Patient denies fevers, chills, chest pain, shortness of breath, nausea, abdominal pain, diarrhea, constipation, dysuria, leg swelling, headache, light headedness.    MEDICATIONS  (STANDING):  cefTRIAXone   IVPB 1000 milliGRAM(s) IV Intermittent every 24 hours  chlorhexidine 2% Cloths 1 Application(s) Topical daily  dextrose 5%. 1000 milliLiter(s) (100 mL/Hr) IV Continuous <Continuous>  dextrose 5%. 1000 milliLiter(s) (50 mL/Hr) IV Continuous <Continuous>  dextrose 50% Injectable 12.5 Gram(s) IV Push once  dextrose 50% Injectable 25 Gram(s) IV Push once  dextrose 50% Injectable 25 Gram(s) IV Push once  gabapentin 400 milliGRAM(s) Oral every 12 hours  glucagon  Injectable 1 milliGRAM(s) IntraMuscular once  heparin   Injectable 5000 Unit(s) SubCutaneous every 8 hours  insulin lispro (ADMELOG) corrective regimen sliding scale   SubCutaneous three times a day before meals  insulin lispro (ADMELOG) corrective regimen sliding scale   SubCutaneous at bedtime  morphine ER Tablet 15 milliGRAM(s) Oral two times a day  polyethylene glycol 3350 17 Gram(s) Oral two times a day  senna 2 Tablet(s) Oral at bedtime  sertraline 50 milliGRAM(s) Oral daily  sodium bicarbonate 1300 milliGRAM(s) Oral every 12 hours    MEDICATIONS  (PRN):  dextrose Oral Gel 15 Gram(s) Oral once PRN Blood Glucose LESS THAN 70 milliGRAM(s)/deciliter  oxyCODONE    IR 10 milliGRAM(s) Oral every 6 hours PRN Severe Pain (7 - 10)      CAPILLARY BLOOD GLUCOSE      POCT Blood Glucose.: 144 mg/dL (03 Aug 2024 21:52)  POCT Blood Glucose.: 139 mg/dL (03 Aug 2024 17:29)  POCT Blood Glucose.: 139 mg/dL (03 Aug 2024 12:40)  POCT Blood Glucose.: 114 mg/dL (03 Aug 2024 08:27)    I&O's Summary    03 Aug 2024 07:01  -  04 Aug 2024 07:00  --------------------------------------------------------  IN: 240 mL / OUT: 0 mL / NET: 240 mL        PHYSICAL EXAM:  Vital Signs Last 24 Hrs  T(C): 36.7 (04 Aug 2024 04:55), Max: 36.9 (03 Aug 2024 20:28)  T(F): 98.1 (04 Aug 2024 04:55), Max: 98.4 (03 Aug 2024 20:28)  HR: 71 (04 Aug 2024 04:55) (65 - 79)  BP: 128/87 (04 Aug 2024 04:55) (111/72 - 131/82)  BP(mean): --  RR: 18 (04 Aug 2024 04:55) (18 - 18)  SpO2: 96% (04 Aug 2024 04:55) (95% - 96%)    Parameters below as of 04 Aug 2024 04:55  Patient On (Oxygen Delivery Method): room air        CONSTITUTIONAL: NAD, well-developed  RESPIRATORY: Normal respiratory effort; lungs are clear to auscultation bilaterally  CARDIOVASCULAR: Regular rate and rhythm, normal S1 and S2, no murmur/rub/gallop; No lower extremity edema; Peripheral pulses are 2+ bilaterally  ABDOMEN: Nontender to palpation, normoactive bowel sounds, no rebound/guarding; No hepatosplenomegaly  MUSCLOSKELETAL: no clubbing or cyanosis of digits; no joint swelling or tenderness to palpation  PSYCH: A+O to person, place, and time; affect appropriate    LABS:    08-03    139  |  103  |  32<H>  ----------------------------<  130<H>  3.5   |  20<L>  |  2.81<H>    Ca    8.8      03 Aug 2024 07:17            Urinalysis Basic - ( 03 Aug 2024 07:17 )    Color: x / Appearance: x / SG: x / pH: x  Gluc: 130 mg/dL / Ketone: x  / Bili: x / Urobili: x   Blood: x / Protein: x / Nitrite: x   Leuk Esterase: x / RBC: x / WBC x   Sq Epi: x / Non Sq Epi: x / Bacteria: x        Culture - Urine (collected 01 Aug 2024 20:12)  Source: Clean Catch Clean Catch (Midstream)  Final Report (03 Aug 2024 12:28):    >=3 organisms. Probable collection contamination.    Culture - Blood (collected 01 Aug 2024 16:15)  Source: .Blood Blood-Peripheral  Preliminary Report (03 Aug 2024 23:09):    No growth at 48 Hours    Culture - Blood (collected 01 Aug 2024 16:10)  Source: .Blood Blood-Peripheral  Preliminary Report (03 Aug 2024 23:09):    No growth at 48 Hours        Tele Reviewed:    RADIOLOGY & ADDITIONAL TESTS:  Results Reviewed:   Imaging Personally Reviewed:  Electrocardiogram Personally Reviewed:     PROGRESS NOTE:   Authored by Dr. Ella Dalal MD. Available on TEAMS.    Patient is a 49y old  Female who presents with a chief complaint of confusion, muscle jerking, fever, abd pain, hypotension (03 Aug 2024 20:32)      SUBJECTIVE / OVERNIGHT EVENTS:  No acute events overnight. Patient seen and examined at bedside. Patient denies new symptoms or concerns.     ADDITIONAL REVIEW OF SYSTEMS:  Patient denies fevers, chills, chest pain, shortness of breath, nausea, abdominal pain, diarrhea, constipation, dysuria, leg swelling, headache, light headedness.    MEDICATIONS  (STANDING):  cefTRIAXone   IVPB 1000 milliGRAM(s) IV Intermittent every 24 hours  chlorhexidine 2% Cloths 1 Application(s) Topical daily  dextrose 5%. 1000 milliLiter(s) (100 mL/Hr) IV Continuous <Continuous>  dextrose 5%. 1000 milliLiter(s) (50 mL/Hr) IV Continuous <Continuous>  dextrose 50% Injectable 12.5 Gram(s) IV Push once  dextrose 50% Injectable 25 Gram(s) IV Push once  dextrose 50% Injectable 25 Gram(s) IV Push once  gabapentin 400 milliGRAM(s) Oral every 12 hours  glucagon  Injectable 1 milliGRAM(s) IntraMuscular once  heparin   Injectable 5000 Unit(s) SubCutaneous every 8 hours  insulin lispro (ADMELOG) corrective regimen sliding scale   SubCutaneous three times a day before meals  insulin lispro (ADMELOG) corrective regimen sliding scale   SubCutaneous at bedtime  morphine ER Tablet 15 milliGRAM(s) Oral two times a day  polyethylene glycol 3350 17 Gram(s) Oral two times a day  senna 2 Tablet(s) Oral at bedtime  sertraline 50 milliGRAM(s) Oral daily  sodium bicarbonate 1300 milliGRAM(s) Oral every 12 hours    MEDICATIONS  (PRN):  dextrose Oral Gel 15 Gram(s) Oral once PRN Blood Glucose LESS THAN 70 milliGRAM(s)/deciliter  oxyCODONE    IR 10 milliGRAM(s) Oral every 6 hours PRN Severe Pain (7 - 10)      CAPILLARY BLOOD GLUCOSE      POCT Blood Glucose.: 144 mg/dL (03 Aug 2024 21:52)  POCT Blood Glucose.: 139 mg/dL (03 Aug 2024 17:29)  POCT Blood Glucose.: 139 mg/dL (03 Aug 2024 12:40)  POCT Blood Glucose.: 114 mg/dL (03 Aug 2024 08:27)    I&O's Summary    03 Aug 2024 07:01  -  04 Aug 2024 07:00  --------------------------------------------------------  IN: 240 mL / OUT: 0 mL / NET: 240 mL        PHYSICAL EXAM:  Vital Signs Last 24 Hrs  T(C): 36.7 (04 Aug 2024 04:55), Max: 36.9 (03 Aug 2024 20:28)  T(F): 98.1 (04 Aug 2024 04:55), Max: 98.4 (03 Aug 2024 20:28)  HR: 71 (04 Aug 2024 04:55) (65 - 79)  BP: 128/87 (04 Aug 2024 04:55) (111/72 - 131/82)  BP(mean): --  RR: 18 (04 Aug 2024 04:55) (18 - 18)  SpO2: 96% (04 Aug 2024 04:55) (95% - 96%)    Parameters below as of 04 Aug 2024 04:55  Patient On (Oxygen Delivery Method): room air        CONSTITUTIONAL: NAD, well-developed  RESPIRATORY: Normal respiratory effort; lungs are clear to auscultation bilaterally  CARDIOVASCULAR: Regular rate and rhythm, normal S1 and S2, no murmur/rub/gallop; No lower extremity edema  ABDOMEN: Nontender to palpation, normoactive bowel sounds, no rebound/guarding  MUSCLOSKELETAL: no clubbing or cyanosis of digits; no joint swelling or tenderness to palpation  PSYCH: A+O to person, place, and time; affect appropriate    LABS:    08-03    139  |  103  |  32<H>  ----------------------------<  130<H>  3.5   |  20<L>  |  2.81<H>    Ca    8.8      03 Aug 2024 07:17            Urinalysis Basic - ( 03 Aug 2024 07:17 )    Color: x / Appearance: x / SG: x / pH: x  Gluc: 130 mg/dL / Ketone: x  / Bili: x / Urobili: x   Blood: x / Protein: x / Nitrite: x   Leuk Esterase: x / RBC: x / WBC x   Sq Epi: x / Non Sq Epi: x / Bacteria: x        Culture - Urine (collected 01 Aug 2024 20:12)  Source: Clean Catch Clean Catch (Midstream)  Final Report (03 Aug 2024 12:28):    >=3 organisms. Probable collection contamination.    Culture - Blood (collected 01 Aug 2024 16:15)  Source: .Blood Blood-Peripheral  Preliminary Report (03 Aug 2024 23:09):    No growth at 48 Hours    Culture - Blood (collected 01 Aug 2024 16:10)  Source: .Blood Blood-Peripheral  Preliminary Report (03 Aug 2024 23:09):    No growth at 48 Hours

## 2024-08-05 LAB
ANION GAP SERPL CALC-SCNC: 14 MMOL/L — SIGNIFICANT CHANGE UP (ref 5–17)
BUN SERPL-MCNC: 24 MG/DL — HIGH (ref 7–23)
CALCIUM SERPL-MCNC: 9.4 MG/DL — SIGNIFICANT CHANGE UP (ref 8.4–10.5)
CHLORIDE SERPL-SCNC: 106 MMOL/L — SIGNIFICANT CHANGE UP (ref 96–108)
CO2 SERPL-SCNC: 24 MMOL/L — SIGNIFICANT CHANGE UP (ref 22–31)
CREAT SERPL-MCNC: 2.5 MG/DL — HIGH (ref 0.5–1.3)
EGFR: 23 ML/MIN/1.73M2 — LOW
GLUCOSE BLDC GLUCOMTR-MCNC: 102 MG/DL — HIGH (ref 70–99)
GLUCOSE BLDC GLUCOMTR-MCNC: 135 MG/DL — HIGH (ref 70–99)
GLUCOSE BLDC GLUCOMTR-MCNC: 140 MG/DL — HIGH (ref 70–99)
GLUCOSE BLDC GLUCOMTR-MCNC: 149 MG/DL — HIGH (ref 70–99)
GLUCOSE SERPL-MCNC: 96 MG/DL — SIGNIFICANT CHANGE UP (ref 70–99)
HCT VFR BLD CALC: 28.3 % — LOW (ref 34.5–45)
HGB BLD-MCNC: 9 G/DL — LOW (ref 11.5–15.5)
MCHC RBC-ENTMCNC: 27.7 PG — SIGNIFICANT CHANGE UP (ref 27–34)
MCHC RBC-ENTMCNC: 31.8 GM/DL — LOW (ref 32–36)
MCV RBC AUTO: 87.1 FL — SIGNIFICANT CHANGE UP (ref 80–100)
NRBC # BLD: 0 /100 WBCS — SIGNIFICANT CHANGE UP (ref 0–0)
PLATELET # BLD AUTO: 361 K/UL — SIGNIFICANT CHANGE UP (ref 150–400)
POTASSIUM SERPL-MCNC: 4 MMOL/L — SIGNIFICANT CHANGE UP (ref 3.5–5.3)
POTASSIUM SERPL-SCNC: 4 MMOL/L — SIGNIFICANT CHANGE UP (ref 3.5–5.3)
RBC # BLD: 3.25 M/UL — LOW (ref 3.8–5.2)
RBC # FLD: 14.6 % — HIGH (ref 10.3–14.5)
SODIUM SERPL-SCNC: 144 MMOL/L — SIGNIFICANT CHANGE UP (ref 135–145)
WBC # BLD: 4.41 K/UL — SIGNIFICANT CHANGE UP (ref 3.8–10.5)
WBC # FLD AUTO: 4.41 K/UL — SIGNIFICANT CHANGE UP (ref 3.8–10.5)

## 2024-08-05 PROCEDURE — 99232 SBSQ HOSP IP/OBS MODERATE 35: CPT

## 2024-08-05 RX ORDER — METHOCARBAMOL 500 MG
500 TABLET ORAL
Refills: 0 | Status: DISCONTINUED | OUTPATIENT
Start: 2024-08-05 | End: 2024-08-06

## 2024-08-05 RX ORDER — BACTERIOSTATIC SODIUM CHLORIDE 0.9 %
1000 VIAL (ML) INJECTION
Refills: 0 | Status: DISCONTINUED | OUTPATIENT
Start: 2024-08-05 | End: 2024-08-06

## 2024-08-05 RX ORDER — BACTERIOSTATIC SODIUM CHLORIDE 0.9 %
1000 VIAL (ML) INJECTION
Refills: 0 | Status: DISCONTINUED | OUTPATIENT
Start: 2024-08-05 | End: 2024-08-05

## 2024-08-05 RX ORDER — AMLODIPINE BESYLATE 2.5 MG/1
5 TABLET ORAL DAILY
Refills: 0 | Status: DISCONTINUED | OUTPATIENT
Start: 2024-08-05 | End: 2024-08-06

## 2024-08-05 RX ORDER — DOCUSATE SODIUM 50 MG/5ML
1 LIQUID ORAL
Refills: 0 | DISCHARGE

## 2024-08-05 RX ADMIN — SERTRALINE HYDROCHLORIDE 50 MILLIGRAM(S): 100 TABLET, FILM COATED ORAL at 13:11

## 2024-08-05 RX ADMIN — GABAPENTIN 400 MILLIGRAM(S): 400 CAPSULE ORAL at 05:19

## 2024-08-05 RX ADMIN — Medication 15 MILLIGRAM(S): at 18:19

## 2024-08-05 RX ADMIN — Medication 15 MILLIGRAM(S): at 17:19

## 2024-08-05 RX ADMIN — MUPIROCIN CALCIUM 1 APPLICATION(S): 20 CREAM TOPICAL at 17:19

## 2024-08-05 RX ADMIN — SENNOSIDES 2 TABLET(S): 8.6 TABLET ORAL at 21:31

## 2024-08-05 RX ADMIN — GABAPENTIN 400 MILLIGRAM(S): 400 CAPSULE ORAL at 17:18

## 2024-08-05 RX ADMIN — Medication 100 MILLILITER(S): at 21:31

## 2024-08-05 RX ADMIN — CHLORHEXIDINE GLUCONATE 1 APPLICATION(S): 500 CLOTH TOPICAL at 13:11

## 2024-08-05 RX ADMIN — HEPARIN SODIUM 5000 UNIT(S): 1000 INJECTION, SOLUTION INTRAVENOUS; SUBCUTANEOUS at 13:11

## 2024-08-05 RX ADMIN — Medication 15 MILLIGRAM(S): at 05:19

## 2024-08-05 RX ADMIN — Medication 1300 MILLIGRAM(S): at 17:18

## 2024-08-05 RX ADMIN — AMLODIPINE BESYLATE 5 MILLIGRAM(S): 2.5 TABLET ORAL at 13:11

## 2024-08-05 RX ADMIN — Medication 17 GRAM(S): at 13:11

## 2024-08-05 RX ADMIN — Medication 17 GRAM(S): at 17:19

## 2024-08-05 RX ADMIN — HEPARIN SODIUM 5000 UNIT(S): 1000 INJECTION, SOLUTION INTRAVENOUS; SUBCUTANEOUS at 21:31

## 2024-08-05 RX ADMIN — Medication 500 MILLIGRAM(S): at 17:19

## 2024-08-05 RX ADMIN — MUPIROCIN CALCIUM 1 APPLICATION(S): 20 CREAM TOPICAL at 05:21

## 2024-08-05 RX ADMIN — Medication 100 MILLILITER(S): at 13:12

## 2024-08-05 RX ADMIN — HEPARIN SODIUM 5000 UNIT(S): 1000 INJECTION, SOLUTION INTRAVENOUS; SUBCUTANEOUS at 05:20

## 2024-08-05 RX ADMIN — Medication 1300 MILLIGRAM(S): at 05:19

## 2024-08-05 RX ADMIN — Medication 100 MILLIGRAM(S): at 05:20

## 2024-08-05 NOTE — PROGRESS NOTE ADULT - PROBLEM SELECTOR PLAN 1
On admission met sepsis criteria  - UA grossly positive    - Urine culture 8/1: probably contaminant   - Blood culture: NGTD  - Got Vanc, Zosyn on 8/1   - C/w ceftriaxone for now

## 2024-08-05 NOTE — PROGRESS NOTE ADULT - SUBJECTIVE AND OBJECTIVE BOX
[facility] Division of Hospital Medicine  Houston Morel MD M-F, 8A-5P: MS Teams  Other Times: HIC Extension / HIC Pager      Patient is a 49y old  Female who presents with a chief complaint of confusion, muscle jerking, fever, abd pain, hypotension (04 Aug 2024 22:49)      SUBJECTIVE / OVERNIGHT EVENTS:  Patient was examined today. She is still having suprapubic pain, states she is urinating. She denies headache, dizziness, chest pain, palpitations, nausea, fever, chills, dysuria, hematuria.       ADDITIONAL REVIEW OF SYSTEMS:    MEDICATIONS  (STANDING):  cefTRIAXone   IVPB 1000 milliGRAM(s) IV Intermittent every 24 hours  chlorhexidine 2% Cloths 1 Application(s) Topical daily  dextrose 5%. 1000 milliLiter(s) (100 mL/Hr) IV Continuous <Continuous>  dextrose 5%. 1000 milliLiter(s) (50 mL/Hr) IV Continuous <Continuous>  dextrose 50% Injectable 12.5 Gram(s) IV Push once  dextrose 50% Injectable 25 Gram(s) IV Push once  dextrose 50% Injectable 25 Gram(s) IV Push once  gabapentin 400 milliGRAM(s) Oral every 12 hours  glucagon  Injectable 1 milliGRAM(s) IntraMuscular once  heparin   Injectable 5000 Unit(s) SubCutaneous every 8 hours  insulin lispro (ADMELOG) corrective regimen sliding scale   SubCutaneous three times a day before meals  insulin lispro (ADMELOG) corrective regimen sliding scale   SubCutaneous at bedtime  morphine ER Tablet 15 milliGRAM(s) Oral two times a day  mupirocin 2% Nasal 1 Application(s) Both Nostrils two times a day  polyethylene glycol 3350 17 Gram(s) Oral two times a day  senna 2 Tablet(s) Oral at bedtime  sertraline 50 milliGRAM(s) Oral daily  sodium bicarbonate 1300 milliGRAM(s) Oral every 12 hours  sodium chloride 0.9%. 1000 milliLiter(s) (100 mL/Hr) IV Continuous <Continuous>    MEDICATIONS  (PRN):  dextrose Oral Gel 15 Gram(s) Oral once PRN Blood Glucose LESS THAN 70 milliGRAM(s)/deciliter  oxyCODONE    IR 10 milliGRAM(s) Oral every 6 hours PRN Severe Pain (7 - 10)      CAPILLARY BLOOD GLUCOSE      POCT Blood Glucose.: 149 mg/dL (05 Aug 2024 11:58)  POCT Blood Glucose.: 135 mg/dL (05 Aug 2024 08:22)  POCT Blood Glucose.: 127 mg/dL (04 Aug 2024 22:05)  POCT Blood Glucose.: 129 mg/dL (04 Aug 2024 17:14)  POCT Blood Glucose.: 125 mg/dL (04 Aug 2024 12:26)      I&O's Summary      Daily     Daily     PHYSICAL EXAM:  Vital Signs Last 24 Hrs  T(C): 36.8 (05 Aug 2024 04:24), Max: 37.1 (04 Aug 2024 21:13)  T(F): 98.3 (05 Aug 2024 04:24), Max: 98.7 (04 Aug 2024 21:13)  HR: 73 (05 Aug 2024 04:24) (73 - 78)  BP: 129/81 (05 Aug 2024 04:24) (110/76 - 143/87)  BP(mean): --  RR: 17 (05 Aug 2024 04:24) (17 - 18)  SpO2: 94% (05 Aug 2024 04:24) (94% - 96%)    Parameters below as of 05 Aug 2024 04:24  Patient On (Oxygen Delivery Method): room air      CONSTITUTIONAL: NAD, well-developed,   EYES: PERRLA; conjunctiva and sclera clear  ENMT: Moist oral mucosa  NECK: Supple, no palpable masses; no thyromegaly  RESPIRATORY: Normal respiratory effort; lungs are clear to auscultation bilaterally  CARDIOVASCULAR: Regular rate and rhythm, normal S1 and S2, no murmur/rub/gallop; No lower extremity edema; Peripheral pulses are 2+ bilaterally  ABDOMEN: mild tenderness in suprapubic area,, normoactive bowel sounds,  MUSCULOSKELETAL:  no clubbing or cyanosis of digits; no joint swelling or tenderness to palpation, moving all extremities   PSYCH: A+O to person, place, and time; affect appropriate  NEUROLOGY: CN 2-12 are intact and symmetric; no gross sensory/motor deficits   SKIN: No rashes; no palpable lesions    LABS:                        9.0    4.41  )-----------( 361      ( 05 Aug 2024 07:18 )             28.3     08-05    144  |  106  |  24<H>  ----------------------------<  96  4.0   |  24  |  2.50<H>    Ca    9.4      05 Aug 2024 07:24              Urinalysis Basic - ( 05 Aug 2024 07:24 )    Color: x / Appearance: x / SG: x / pH: x  Gluc: 96 mg/dL / Ketone: x  / Bili: x / Urobili: x   Blood: x / Protein: x / Nitrite: x   Leuk Esterase: x / RBC: x / WBC x   Sq Epi: x / Non Sq Epi: x / Bacteria: x            RADIOLOGY & ADDITIONAL TESTS:  Results Reviewed:   Imaging Personally Reviewed:  Electrocardiogram Personally Reviewed:    COORDINATION OF CARE:  Care Discussed with Consultants/Other Providers [Y/N]:  Prior or Outpatient Records Reviewed [Y/N]:

## 2024-08-05 NOTE — PROGRESS NOTE ADULT - PROBLEM SELECTOR PLAN 2
Baseline Cr around 1.8, on admission Cr 3.5  # ; pre renal azotemia; risk for infectious AIN;     - Suspect pre-renal / intrinsic VINITA on CKD in s/o UTI, also to consider post-renal in s/o chronic b/l hydro  - Cr downtrending ; encouraging PO intake  - sCr 2.5, continue IVF  - Repeat urine lytes from 8/3 consistent with intrinsic renal disease

## 2024-08-05 NOTE — PROGRESS NOTE ADULT - SUBJECTIVE AND OBJECTIVE BOX
[facility] NEPHROLOGY SERVICES, [facility]  NEPHROLOGY AND HYPERTENSION  [address]  [address]  [place] [address]  [phone]    MD CLARE CARRERA MD YELENA ROSENBERG, MD BINNY KOSHY, MD CHRISTOPHER CAPUTO, MD EDWARD BOVER, MD          Patient events noted    MEDICATIONS  (STANDING):  amLODIPine   Tablet 5 milliGRAM(s) Oral daily  cefTRIAXone   IVPB 1000 milliGRAM(s) IV Intermittent every 24 hours  chlorhexidine 2% Cloths 1 Application(s) Topical daily  dextrose 5%. 1000 milliLiter(s) (100 mL/Hr) IV Continuous <Continuous>  dextrose 5%. 1000 milliLiter(s) (50 mL/Hr) IV Continuous <Continuous>  dextrose 50% Injectable 12.5 Gram(s) IV Push once  dextrose 50% Injectable 25 Gram(s) IV Push once  dextrose 50% Injectable 25 Gram(s) IV Push once  gabapentin 400 milliGRAM(s) Oral every 12 hours  glucagon  Injectable 1 milliGRAM(s) IntraMuscular once  heparin   Injectable 5000 Unit(s) SubCutaneous every 8 hours  insulin lispro (ADMELOG) corrective regimen sliding scale   SubCutaneous at bedtime  insulin lispro (ADMELOG) corrective regimen sliding scale   SubCutaneous three times a day before meals  methocarbamol 500 milliGRAM(s) Oral two times a day  morphine ER Tablet 15 milliGRAM(s) Oral two times a day  mupirocin 2% Nasal 1 Application(s) Both Nostrils two times a day  polyethylene glycol 3350 17 Gram(s) Oral two times a day  senna 2 Tablet(s) Oral at bedtime  sertraline 50 milliGRAM(s) Oral daily  sodium bicarbonate 1300 milliGRAM(s) Oral every 12 hours  sodium chloride 0.9%. 1000 milliLiter(s) (100 mL/Hr) IV Continuous <Continuous>    MEDICATIONS  (PRN):  dextrose Oral Gel 15 Gram(s) Oral once PRN Blood Glucose LESS THAN 70 milliGRAM(s)/deciliter  oxyCODONE    IR 10 milliGRAM(s) Oral every 6 hours PRN Severe Pain (7 - 10)      PHYSICAL EXAM:      T(C): 36.7 (08-05-24 @ 12:44), Max: 37.1 (08-04-24 @ 21:13)  HR: 64 (08-05-24 @ 12:44) (64 - 78)  BP: 123/78 (08-05-24 @ 12:44) (123/78 - 143/87)  RR: 18 (08-05-24 @ 12:44) (17 - 18)  SpO2: 95% (08-05-24 @ 12:44) (94% - 96%)  Wt(kg): --  Lungs clear  Heart S1S2  Abd soft NT ND  Extremities:   tr edema                                    9.0    4.41  )-----------( 361      ( 05 Aug 2024 07:18 )             28.3     08-05    144  |  106  |  24<H>  ----------------------------<  96  4.0   |  24  |  2.50<H>    Ca    9.4      05 Aug 2024 07:24          Creatinine Trend: 2.50<--, 2.60<--, 2.81<--, 3.53<--      Assessment   VINITA CKD 3; pre renal azotemia; risk for infectious AIN;   Improving trend     Plan  Stable for discharge from renal view  Follow up with Urology and my office as outpatient     Carlyle Sharma MD

## 2024-08-05 NOTE — PHARMACOTHERAPY INTERVENTION NOTE - COMMENTS
Medication Reconciliation completed for patient.  These were confirmed with the patient and the pharmacy.  Updated medications are reflected in Outpatient Medication Review.     Home Medications:  amLODIPine 5 mg oral tablet: 1 tab(s) orally once a day   docusate sodium 100 mg oral capsule: 1 cap(s) orally once a day   gabapentin 400 mg oral capsule: 1 cap(s) orally 2 times a day   metFORMIN 500 mg oral tablet, extended release: 1 tab(s) orally 2 times a day   methocarbamol 500 mg oral tablet: 1 tab(s) orally 2 times a day   morphine 15 mg/12 hr oral tablet, extended release: 1 orally 3 times a day   oxyCODONE 10 mg oral tablet: 1 tab(s) orally every 4 hours as needed for  pain   sertraline 50 mg oral tablet: 1 tab(s) orally once a day       Albin Peterson  [facility] Pharmacy Student Candidate 2025   Medication Reconciliation completed for patient.  These were confirmed with the patient, [facility], and her pharmacy.  Updated medications are reflected in Outpatient Medication Review.     The Drug Utilization Report below displays all of the controlled substance prescriptions, if any, that your patient has filled in the last twelve months. The information displayed on this report is compiled from pharmacy submissions to the Department, and accurately reflects the information as submitted by the pharmacies.    This report was requested by: Katie Connolly | Reference #: [id number]    Practitioner Count: 1  Pharmacy Count: 1  Current Opioid Prescriptions: 0  Current Benzodiazepine Prescriptions: 0  Current Stimulant Prescriptions: 0      Patient Demographic Information (PDI)       PDI	First Name	Last Name	Birth Date	Gender	Street Address	[facility]  A	Freda Bearda	1975	Female	[id number]	[facility]	[address]  B	Freda Wilson	1975	Female	[id number]	[facility]	[id number]    Prescription Information      PDI Filter:    PDI	Current Rx	Drug Type	Rx Written	Rx Dispensed	Drug	Quantity	Days Supply	Prescriber Name	Prescriber ADWOA #	Payment Method  A	N	O	06/20/2024	06/20/2024	morphine sulf er 15 mg tablet	90	30	Olimpia Gonsalves)	[id number]	Insurance  Dispenser [facility]  A	N	O	06/20/2024	06/20/2024	oxycodone hcl (ir) 10 mg tab	120	20	Olimpia Gonsalves)	[id number]	Insurance  Dispenser [facility]  A	N	O	05/20/2024	05/23/2024	morphine sulf er 15 mg tablet	90	30	Olimpia Gonsalves (MD)	[id number]	Insurance  Dispenser [facility]  A	N	O	05/20/2024	05/20/2024	oxycodone hcl (ir) 10 mg tab	120	20	Olimpia Gonsalves (MD)	[id number]	Insurance  Dispenser [facility]  B	N	O	04/08/2024	04/08/2024	morphine sulf er 15 mg tablet	90	30	Olimpia Gonsalves (MD)	[id number]	Insurance  Dispenser [facility]  B	N	O	04/08/2024	04/08/2024	oxycodone hcl (ir) 10 mg tab	120	20	Olimpia Gonsalves)	[id number]	Insurance  Dispenser [employer] [facility]	N	O	02/26/2024	02/27/2024	morphine sulf er 15 mg tablet	90	30	Olimpia Gonsalves)	[id number]	Insurance  Dispenser [facility]	N	O	02/26/2024	02/27/2024	oxycodone hcl (ir) 10 mg tab	120	20	Olimpia Gonsalves)	[id number]	[facility]	N	O	01/02/2024	01/05/2024	morphine sulf er 15 mg tablet	90	30	Mcaleese, Corina	[id number]	Medicaid  Dispens[facility]	N	O	01/02/2024	01/05/2024	oxycodone hcl (ir) 10 mg tab	120	20	Mcaleese, Corina	[id number]	Medicaid  Dispens[facility]	N	O	11/29/2023	12/01/2023	morphine sulf er 15 mg tablet	90	30	Mcaleese, Corina	[id number]	Medicaid  Dispens[facility]	N	O	11/29/2023	12/01/2023	oxycodone hcl (ir) 10 mg tab	120	20	Mcaleese, Corina	[id number]	Medicaid  [facility]pens[facility]	N	O	11/02/2023	11/04/2023	morphine sulf er 15 mg tablet	90	30	Mcaleese, Corina	[id number]	Medicaid  Dispens[facility]	N	O	10/04/2023	10/06/2023	oxycodone hcl (ir) 10 mg tab	120	20	Olimpia Gonsalves)	[id number]	Medicaid  [facility]	N	O	09/20/2023	09/20/2023	morphine sulf er 15 mg tablet	90	30	Mcaleese, Corina	[id number]	Medicaid  Dispens[facility]  B	N	O	08/07/2023	08/07/2023	morphine sulf er 15 mg tablet	90	30	Mcaleese, Cornia	[id number]	Medicaid  Dispens[facility]	N	O	08/07/2023	08/07/2023	oxycodone hcl (ir) 10 mg tab	120	20	Mcaleese, Corina	[id number]	Medicaid  Dispenser [facility]      Home Medications:  amLODIPine 5 mg oral tablet: 1 tab(s) orally once a day   docusate sodium 100 mg oral capsule: 1 cap(s) orally once a day   gabapentin 400 mg oral capsule: 1 cap(s) orally 2 times a day   metFORMIN 500 mg oral tablet, extended release: 1 tab(s) orally 2 times a day   methocarbamol 500 mg oral tablet: 1 tab(s) orally 2 times a day   morphine 15 mg/12 hr oral tablet, extended release: 1 orally 3 times a day   oxyCODONE 10 mg oral tablet: 1 tab(s) orally every 4 hours as needed for  pain   sertraline 50 mg oral tablet: 1 tab(s) orally once a day       Albin Petreson  [facility] Pharmacy Student Candidate 2025

## 2024-08-05 NOTE — PROGRESS NOTE ADULT - PROBLEM SELECTOR PLAN 3
- CT a/p showed stable moderate to severe left and mild right hydronephrosis. Bilateral ureteral stents are in place. Suspect chronic obstruction at baseline leading to CKD       >     > F/U with urology re role for stent replacement

## 2024-08-05 NOTE — PROGRESS NOTE ADULT - PROBLEM SELECTOR PLAN 5
DVT ppx: SQH  Diet: CC      # Discharge planning , pending final urology recs, pending renal function improvement. DVT ppx: SQH  Diet: CC    - Last BM reported 8/5.     # Discharge planning , pending final urology recs, pending renal function improvement.

## 2024-08-06 ENCOUNTER — TRANSCRIPTION ENCOUNTER (OUTPATIENT)
Age: 49
End: 2024-08-06

## 2024-08-06 VITALS
RESPIRATION RATE: 18 BRPM | HEART RATE: 81 BPM | DIASTOLIC BLOOD PRESSURE: 85 MMHG | OXYGEN SATURATION: 94 % | TEMPERATURE: 98 F | SYSTOLIC BLOOD PRESSURE: 124 MMHG

## 2024-08-06 LAB
ANION GAP SERPL CALC-SCNC: 13 MMOL/L — SIGNIFICANT CHANGE UP (ref 5–17)
BUN SERPL-MCNC: 24 MG/DL — HIGH (ref 7–23)
CALCIUM SERPL-MCNC: 9.6 MG/DL — SIGNIFICANT CHANGE UP (ref 8.4–10.5)
CHLORIDE SERPL-SCNC: 106 MMOL/L — SIGNIFICANT CHANGE UP (ref 96–108)
CO2 SERPL-SCNC: 22 MMOL/L — SIGNIFICANT CHANGE UP (ref 22–31)
CREAT SERPL-MCNC: 2.27 MG/DL — HIGH (ref 0.5–1.3)
CULTURE RESULTS: SIGNIFICANT CHANGE UP
CULTURE RESULTS: SIGNIFICANT CHANGE UP
EGFR: 26 ML/MIN/1.73M2 — LOW
GLUCOSE BLDC GLUCOMTR-MCNC: 146 MG/DL — HIGH (ref 70–99)
GLUCOSE BLDC GLUCOMTR-MCNC: 96 MG/DL — SIGNIFICANT CHANGE UP (ref 70–99)
GLUCOSE SERPL-MCNC: 96 MG/DL — SIGNIFICANT CHANGE UP (ref 70–99)
POTASSIUM SERPL-MCNC: 3.7 MMOL/L — SIGNIFICANT CHANGE UP (ref 3.5–5.3)
POTASSIUM SERPL-SCNC: 3.7 MMOL/L — SIGNIFICANT CHANGE UP (ref 3.5–5.3)
SODIUM SERPL-SCNC: 141 MMOL/L — SIGNIFICANT CHANGE UP (ref 135–145)
SPECIMEN SOURCE: SIGNIFICANT CHANGE UP
SPECIMEN SOURCE: SIGNIFICANT CHANGE UP

## 2024-08-06 PROCEDURE — 87637 SARSCOV2&INF A&B&RSV AMP PRB: CPT

## 2024-08-06 PROCEDURE — 81001 URINALYSIS AUTO W/SCOPE: CPT

## 2024-08-06 PROCEDURE — 82962 GLUCOSE BLOOD TEST: CPT

## 2024-08-06 PROCEDURE — 85027 COMPLETE CBC AUTOMATED: CPT

## 2024-08-06 PROCEDURE — 85730 THROMBOPLASTIN TIME PARTIAL: CPT

## 2024-08-06 PROCEDURE — 85018 HEMOGLOBIN: CPT

## 2024-08-06 PROCEDURE — 80048 BASIC METABOLIC PNL TOTAL CA: CPT

## 2024-08-06 PROCEDURE — 84156 ASSAY OF PROTEIN URINE: CPT

## 2024-08-06 PROCEDURE — 99239 HOSP IP/OBS DSCHRG MGMT >30: CPT

## 2024-08-06 PROCEDURE — 70450 CT HEAD/BRAIN W/O DYE: CPT | Mod: MC

## 2024-08-06 PROCEDURE — 36415 COLL VENOUS BLD VENIPUNCTURE: CPT

## 2024-08-06 PROCEDURE — 84132 ASSAY OF SERUM POTASSIUM: CPT

## 2024-08-06 PROCEDURE — 87641 MR-STAPH DNA AMP PROBE: CPT

## 2024-08-06 PROCEDURE — 84540 ASSAY OF URINE/UREA-N: CPT

## 2024-08-06 PROCEDURE — 83605 ASSAY OF LACTIC ACID: CPT

## 2024-08-06 PROCEDURE — 71046 X-RAY EXAM CHEST 2 VIEWS: CPT

## 2024-08-06 PROCEDURE — 84702 CHORIONIC GONADOTROPIN TEST: CPT

## 2024-08-06 PROCEDURE — 82330 ASSAY OF CALCIUM: CPT

## 2024-08-06 PROCEDURE — 82435 ASSAY OF BLOOD CHLORIDE: CPT

## 2024-08-06 PROCEDURE — 80053 COMPREHEN METABOLIC PANEL: CPT

## 2024-08-06 PROCEDURE — 85610 PROTHROMBIN TIME: CPT

## 2024-08-06 PROCEDURE — 74176 CT ABD & PELVIS W/O CONTRAST: CPT | Mod: MC

## 2024-08-06 PROCEDURE — 87086 URINE CULTURE/COLONY COUNT: CPT

## 2024-08-06 PROCEDURE — 87040 BLOOD CULTURE FOR BACTERIA: CPT

## 2024-08-06 PROCEDURE — 84133 ASSAY OF URINE POTASSIUM: CPT

## 2024-08-06 PROCEDURE — 99285 EMERGENCY DEPT VISIT HI MDM: CPT

## 2024-08-06 PROCEDURE — 82570 ASSAY OF URINE CREATININE: CPT

## 2024-08-06 PROCEDURE — 85025 COMPLETE CBC W/AUTO DIFF WBC: CPT

## 2024-08-06 PROCEDURE — 83935 ASSAY OF URINE OSMOLALITY: CPT

## 2024-08-06 PROCEDURE — 93308 TTE F-UP OR LMTD: CPT

## 2024-08-06 PROCEDURE — 84295 ASSAY OF SERUM SODIUM: CPT

## 2024-08-06 PROCEDURE — 87640 STAPH A DNA AMP PROBE: CPT

## 2024-08-06 PROCEDURE — 82947 ASSAY GLUCOSE BLOOD QUANT: CPT

## 2024-08-06 PROCEDURE — 82803 BLOOD GASES ANY COMBINATION: CPT

## 2024-08-06 PROCEDURE — 84300 ASSAY OF URINE SODIUM: CPT

## 2024-08-06 PROCEDURE — 85014 HEMATOCRIT: CPT

## 2024-08-06 RX ORDER — SODIUM BICARBONATE 0.9MEQ/ML
2 SYRINGE (ML) INTRAVENOUS
Qty: 56 | Refills: 0
Start: 2024-08-06 | End: 2024-08-19

## 2024-08-06 RX ORDER — OXYCODONE HYDROCHLORIDE 30 MG/1
1 TABLET ORAL
Qty: 0 | Refills: 0 | DISCHARGE

## 2024-08-06 RX ORDER — MUPIROCIN CALCIUM 20 MG/G
1 CREAM TOPICAL
Qty: 1 | Refills: 0
Start: 2024-08-06 | End: 2024-08-08

## 2024-08-06 RX ADMIN — HEPARIN SODIUM 5000 UNIT(S): 1000 INJECTION, SOLUTION INTRAVENOUS; SUBCUTANEOUS at 06:20

## 2024-08-06 RX ADMIN — MUPIROCIN CALCIUM 1 APPLICATION(S): 20 CREAM TOPICAL at 06:20

## 2024-08-06 RX ADMIN — Medication 17 GRAM(S): at 06:21

## 2024-08-06 RX ADMIN — Medication 100 MILLIGRAM(S): at 06:19

## 2024-08-06 RX ADMIN — Medication 15 MILLIGRAM(S): at 06:20

## 2024-08-06 RX ADMIN — Medication 500 MILLIGRAM(S): at 06:20

## 2024-08-06 RX ADMIN — GABAPENTIN 400 MILLIGRAM(S): 400 CAPSULE ORAL at 06:19

## 2024-08-06 RX ADMIN — AMLODIPINE BESYLATE 5 MILLIGRAM(S): 2.5 TABLET ORAL at 06:24

## 2024-08-06 RX ADMIN — Medication 1300 MILLIGRAM(S): at 06:21

## 2024-08-06 RX ADMIN — Medication 15 MILLIGRAM(S): at 07:19

## 2024-08-06 NOTE — DISCHARGE NOTE PROVIDER - NSDCFUADDAPPT_GEN_ALL_CORE_FT
You must follow up with your primary medical doctor within 2-3 days of discharge - please call to make an appointment.    You must follow up with your nephrologist, Dr. Sharma, within one week of discharge - please call to make an appointment.  At this appointment, you will need to discuss how long to continue taking your sodium bicarbonate tabs.    You must follow up with your urologist, Dr. Hoenig, within one month - please call to make an appointment.  At this time, you will need your stents replaced.        APPTS ARE READY TO BE MADE: [ X] YES    Best Family or Patient Contact (if needed):    Additional Information about above appointments (if needed):    1: Primary medical doctor  2: Nephrologist  3: Urologist    Other comments or requests:    You must follow up with your primary medical doctor within 2-3 days of discharge - please call to make an appointment.    You must follow up with your nephrologist, Dr. Sharma, within one week of discharge - please call to make an appointment.  At this appointment, you will need to discuss how long to continue taking your sodium bicarbonate tabs.    You must follow up with your urologist, Dr. Hoenig, within one month - please call to make an appointment.  At this time, you will need your stents replaced.        APPTS ARE READY TO BE MADE: [ X] YES    Best Family or Patient Contact (if needed):    Additional Information about above appointments (if needed):    1: Primary medical doctor  2: Nephrologist  3: Urologist    Other comments or requests:           8/15-Patient informed us they already have secured a follow up appointment which is not visible in [facility] on 8/26/24 with Nephrology    8/15-Patient informed us they already have secured a follow up appointment which is visible on [facility] and confirmed by patient on 8/27/24 at 11:30 a.m. [address] [place] [address]-  this appointment is Tele-visit     8/15-Patient informed us they already have secured a follow up appointment which is not visible on [facility] with Dr. Comer.

## 2024-08-06 NOTE — DISCHARGE NOTE PROVIDER - CARE PROVIDER_API CALL
Hoenig, David Mayer  Urology  [address] [facility] [address]  Phone: [phone]  Fax: [phone]  Follow Up Time: 1 month    Carlyle Sharma  Nephrology  [address], [address] [place] [address]  Phone: [phone]  Fax: [phone]  Follow Up Time: 1 week    Antoine, Jacques CLAUDEL  [address] [place] [address]  Phone: ()-  Fax: ()-  Follow Up Time: 1-3 days

## 2024-08-06 NOTE — DISCHARGE NOTE NURSING/CASE MANAGEMENT/SOCIAL WORK - PATIENT PORTAL LINK FT
You can access the FollowMyHealth Patient Portal offered by [facility] by registering at the following website: [email or web address]. By joining [facility]’s FollowMyHealth portal, you will also be able to view your health information using other applications (apps) compatible with our system.

## 2024-08-06 NOTE — DISCHARGE NOTE PROVIDER - NSDCMRMEDTOKEN_GEN_ALL_CORE_FT
amLODIPine 5 mg oral tablet: 1 tab(s) orally once a day  docusate sodium 100 mg oral capsule: 1 cap(s) orally once a day  gabapentin 400 mg oral capsule: 1 cap(s) orally 2 times a day  metFORMIN 500 mg oral tablet, extended release: 1 tab(s) orally 2 times a day  methocarbamol 500 mg oral tablet: 1 tab(s) orally 2 times a day  morphine 15 mg/12 hr oral tablet, extended release: 1 orally 3 times a day  oxyCODONE 10 mg oral tablet: 1 tab(s) orally every 4 hours as needed for  pain  senna oral tablet: 2 tab(s) orally once a day (at bedtime)  sertraline 50 mg oral tablet: 1 tab(s) orally once a day   amLODIPine 5 mg oral tablet: 1 tab(s) orally once a day  cefpodoxime 200 mg oral tablet: 1 tab(s) orally every 12 hours Start on August 7, 2024 and take for one day, and then discontinue  docusate sodium 100 mg oral capsule: 1 cap(s) orally once a day  gabapentin 400 mg oral capsule: 1 cap(s) orally 2 times a day  metFORMIN 500 mg oral tablet, extended release: 1 tab(s) orally 2 times a day  methocarbamol 500 mg oral tablet: 1 tab(s) orally 2 times a day  morphine 15 mg/12 hr oral tablet, extended release: 1 tablet, extended release orally 2 times a day  oxyCODONE 10 mg oral tablet: 1 tab(s) orally every 6 hours as needed for  pain  senna oral tablet: 2 tab(s) orally once a day (at bedtime)  sertraline 50 mg oral tablet: 1 tab(s) orally once a day  sodium bicarbonate 650 mg oral tablet: 2 tab(s) orally every 12 hours   amLODIPine 5 mg oral tablet: 1 tab(s) orally once a day  cefpodoxime 200 mg oral tablet: 1 tab(s) orally every 12 hours Start on August 7, 2024 and take for one day, and then discontinue  docusate sodium 100 mg oral capsule: 1 cap(s) orally once a day  gabapentin 400 mg oral capsule: 1 cap(s) orally 2 times a day  metFORMIN 500 mg oral tablet, extended release: 1 tab(s) orally 2 times a day  methocarbamol 500 mg oral tablet: 1 tab(s) orally 2 times a day  morphine 15 mg/12 hr oral tablet, extended release: 1 tablet, extended release orally 2 times a day  mupirocin 2% topical ointment: Apply topically to affected area 2 times a day (apply to bilateral nostrils) - take through the am dose on August 9, 2024 and then discontinue  oxyCODONE 10 mg oral tablet: 1 tab(s) orally every 6 hours as needed for  pain  senna oral tablet: 2 tab(s) orally once a day (at bedtime)  sertraline 50 mg oral tablet: 1 tab(s) orally once a day  sodium bicarbonate 650 mg oral tablet: 2 tab(s) orally every 12 hours   amLODIPine 5 mg oral tablet: 1 tab(s) orally once a day  BMP: Check lab: BMP on 8/12/24. Fax results to : JOS ÉMIGUEL DEMARCO, [phone].  cefpodoxime 200 mg oral tablet: 1 tab(s) orally every 12 hours Start on August 7, 2024 and take for one day, and then discontinue  docusate sodium 100 mg oral capsule: 1 cap(s) orally once a day  gabapentin 400 mg oral capsule: 1 cap(s) orally 2 times a day  metFORMIN 500 mg oral tablet, extended release: 1 tab(s) orally 2 times a day  methocarbamol 500 mg oral tablet: 1 tab(s) orally 2 times a day  morphine 15 mg/12 hr oral tablet, extended release: 1 tablet, extended release orally 2 times a day  mupirocin 2% topical ointment: Apply topically to affected area 2 times a day (apply to bilateral nostrils) - take through the am dose on August 9, 2024 and then discontinue  oxyCODONE 10 mg oral tablet: 1 tab(s) orally every 6 hours as needed for  pain  senna oral tablet: 2 tab(s) orally once a day (at bedtime)  sertraline 50 mg oral tablet: 1 tab(s) orally once a day  sodium bicarbonate 650 mg oral tablet: 2 tab(s) orally every 12 hours

## 2024-08-06 NOTE — CHART NOTE - NSCHARTNOTEFT_GEN_A_CORE
Request from Dr. Gant to facilitate patient discharge.  Medication reconciliation reviewed, revised, and resolved with Dr. Gant, who has medically cleared patient for discharge with follow up as advised.  Please refer to discharge note for detailed hospital course.
48F c hx cervical adeno ca (dx '21) s/p chemo (last dose '21)/RT reportedly in remission, R hydro s/p b/l stents, ?CKD (baseline Cr ?1.8), HTN, DM2, chronic lower back pain, presents with  acute encephalopathy, muscle jerking, hypotension, in setting of VINITA and UTI.    Patient reports improvement of fever  CT showed Stable moderate to severe left and mild right hydronephrosis.  PVRs recommended by Urology    Plan:   Followup Urine culture  Continue IV Antibiotics  Restart antihypertensive as needed  Followup Urology recs
Patient presented with concern for urosepsis in setting of bilateral ureteral stents for bilateral ureteral strictures. Patient has been on abx since 8/4. Would recommend abx for total duration of 5 days. Patients urine culture grew contaminated urine but since patient has been stable on CTX can discharge on cefpodoxime 200mg BID for total duration of abx 7 days. Patient will follow with Dr. Hoenig for scheduled stent exchange next month.    [facility]  [address] [place] [address], [address] [address] [phone]
Reference #: [id number]  PDI	Current Rx	Drug Type	Rx Written	Rx Dispensed	Drug	Quantity	Days Supply	Prescriber Name	Prescriber ADWOA #	Payment Method	Dispenser  A	N	O	06/20/2024	06/20/2024	morphine sulf er 15 mg tablet	90	30	Olimpia Gonsalves)	[id number]	Insurance	[employer] [facility]  A	N	O	06/20/2024	06/20/2024	oxycodone hcl (ir) 10 mg tab	120	20	Olimpia Gonsalves)	[id number]	[facility]

## 2024-08-06 NOTE — DISCHARGE NOTE PROVIDER - ATTENDING DISCHARGE PHYSICAL EXAMINATION:
CONSTITUTIONAL: NAD, well-developed,   EYES: PERRLA; conjunctiva and sclera clear  ENMT: Moist oral mucosa  NECK: Supple, no palpable masses; no thyromegaly  RESPIRATORY: Normal respiratory effort; lungs are clear to auscultation bilaterally  CARDIOVASCULAR: Regular rate and rhythm, normal S1 and S2, no murmur/rub/gallop; No lower extremity edema; Peripheral pulses are 2+ bilaterally  ABDOMEN: mild tenderness in suprapubic area,, normoactive bowel sounds,  MUSCULOSKELETAL:  no clubbing or cyanosis of digits; no joint swelling or tenderness to palpation, moving all extremities   PSYCH: A+O to person, place, and time; affect appropriate  NEUROLOGY: CN 2-12 are intact and symmetric; no gross sensory/motor deficits   SKIN: No rashes; no palpable lesions

## 2024-08-06 NOTE — PROGRESS NOTE ADULT - SUBJECTIVE AND OBJECTIVE BOX
[facility] NEPHROLOGY SERVICES, [facility]  NEPHROLOGY AND HYPERTENSION  [address]  [address]  [place] [address]  [phone]    MD CLARE CARRERA MD YELENA ROSENBERG, MD BINNY KOSHY, MD CHRISTOPHER CAPUTO, MD EDWARD BOVER, MD          Patient events noted  No distress    MEDICATIONS  (STANDING):  amLODIPine   Tablet 5 milliGRAM(s) Oral daily  cefTRIAXone   IVPB 1000 milliGRAM(s) IV Intermittent every 24 hours  chlorhexidine 2% Cloths 1 Application(s) Topical daily  dextrose 5%. 1000 milliLiter(s) (50 mL/Hr) IV Continuous <Continuous>  dextrose 5%. 1000 milliLiter(s) (100 mL/Hr) IV Continuous <Continuous>  dextrose 50% Injectable 25 Gram(s) IV Push once  dextrose 50% Injectable 12.5 Gram(s) IV Push once  dextrose 50% Injectable 25 Gram(s) IV Push once  gabapentin 400 milliGRAM(s) Oral every 12 hours  glucagon  Injectable 1 milliGRAM(s) IntraMuscular once  heparin   Injectable 5000 Unit(s) SubCutaneous every 8 hours  insulin lispro (ADMELOG) corrective regimen sliding scale   SubCutaneous at bedtime  insulin lispro (ADMELOG) corrective regimen sliding scale   SubCutaneous three times a day before meals  methocarbamol 500 milliGRAM(s) Oral two times a day  morphine ER Tablet 15 milliGRAM(s) Oral two times a day  mupirocin 2% Nasal 1 Application(s) Both Nostrils two times a day  polyethylene glycol 3350 17 Gram(s) Oral two times a day  senna 2 Tablet(s) Oral at bedtime  sertraline 50 milliGRAM(s) Oral daily  sodium bicarbonate 1300 milliGRAM(s) Oral every 12 hours  sodium chloride 0.9%. 1000 milliLiter(s) (100 mL/Hr) IV Continuous <Continuous>    MEDICATIONS  (PRN):  dextrose Oral Gel 15 Gram(s) Oral once PRN Blood Glucose LESS THAN 70 milliGRAM(s)/deciliter  oxyCODONE    IR 10 milliGRAM(s) Oral every 6 hours PRN Severe Pain (7 - 10)      08-05-24 @ 07:01  -  08-06-24 @ 07:00  --------------------------------------------------------  IN: 1350 mL / OUT: 0 mL / NET: 1350 mL      PHYSICAL EXAM:      T(C): 36.9 (08-06-24 @ 12:26), Max: 37 (08-06-24 @ 06:00)  HR: 81 (08-06-24 @ 12:26) (61 - 89)  BP: 124/85 (08-06-24 @ 12:26) (113/77 - 127/82)  RR: 18 (08-06-24 @ 12:26) (18 - 19)  SpO2: 94% (08-06-24 @ 12:26) (94% - 97%)  Wt(kg): --  Lungs clear  Heart S1S2  Abd soft NT ND  Extremities:   tr edema                                    9.0    4.41  )-----------( 361      ( 05 Aug 2024 07:18 )             28.3     08-06    141  |  106  |  24<H>  ----------------------------<  96  3.7   |  22  |  2.27<H>    Ca    9.6      06 Aug 2024 07:20          Creatinine Trend: 2.27<--, 2.50<--, 2.60<--, 2.81<--, 3.53<--    Assessment   VINITA CKD 3; pre renal azotemia; risk for infectious AIN;   Improving trend     Plan  Stable for discharge from renal view  Follow up with Urology and my office as outpatient       Carlyle Sharma MD

## 2024-08-06 NOTE — PROGRESS NOTE ADULT - REASON FOR ADMISSION
confusion, muscle jerking, fever, abd pain, hypotension

## 2024-08-06 NOTE — DISCHARGE NOTE PROVIDER - HOSPITAL COURSE
48F c hx cervical adeno ca (dx '21) s/p chemo (last dose '21)/RT reportedly in remission, R hydro s/p b/l stents, CKD (baseline Cr ~1.8), HTN, DM2, chronic lower back pain, pw acute encephalopathy, muscle jerking, hypotension, in setting of sepsis 2/2 UTI, also found to have VINITA on CKD. She is improving while on empiric ceftriaxone with UCx pending.        Problem/Plan - 1:  ·  Problem: Acute UTI.   ·  Plan: On admission met sepsis criteria  - UA grossly positive  - Urine culture 8/1: probably contaminant   - Blood culture: NGTD  - Got Vanc, Zosyn on 8/1   - Treated with Ceftriaxone - will discharge on cefpodoxime for one more day to complete 7 day course     Problem/Plan - 2:  ·  Problem: Acute kidney injury superimposed on CKD.   ·  Plan: Baseline Cr around 1.8, on admission Cr 3.5  # ; pre renal azotemia; risk for infectious AIN;     - Suspect pre-renal / intrinsic VINITA on CKD in s/o UTI, also to consider post-renal in s/o chronic b/l hydro  - Cr downtrending ; encouraging PO intake  - sCr 2.5, continue IVF  - Repeat urine lytes from 8/3 consistent with intrinsic renal disease.     Problem/Plan - 3:  ·  Problem: Hydronephrosis.   ·  Plan: - CT a/p showed stable moderate to severe left and mild right hydronephrosis. Bilateral ureteral stents are in place. Suspect chronic obstruction at baseline leading to CKD   Follow up with Dr. Hoenig within one month for bilateral stent exchange     Problem/Plan - 4:  ·  Problem: DM2 (diabetes mellitus, type 2).   ·  Plan: - ISS  - a1c 7.6.  - restart metformin on discharge    Patient medically cleared for discharge, by Dr. Gant, with PCP, Urology, and Nephrology follow up.     48F c hx cervical adeno ca (dx '21) s/p chemo (last dose '21)/RT reportedly in remission, R hydro s/p b/l stents, CKD (baseline Cr ~1.8), HTN, DM2, chronic lower back pain, pw acute encephalopathy, muscle jerking, hypotension, in setting of sepsis 2/2 UTI, also found to have VINITA on CKD. She is improving while on empiric ceftriaxone with UCx pending.        Problem/Plan - 1:  ·  Problem: Acute UTI.   ·  Plan: On admission met sepsis criteria  - UA grossly positive  - Urine culture 8/1: probably contaminant   - Blood culture: NGTD  - Got Vanc, Zosyn on 8/1   - Treated with Ceftriaxone - will discharge on cefpodoxime for one more day to complete 7 day course     Problem/Plan - 2:  ·  Problem: Acute kidney injury superimposed on CKD.   ·  Plan: Baseline Cr around 1.8, on admission Cr 3.5  # ; pre renal azotemia; risk for infectious AIN;     - Suspect pre-renal / intrinsic VINITA on CKD in s/o UTI, also to consider post-renal in s/o chronic b/l hydro  - Cr downtrending ; encouraging PO intake  - sCr 2.5, continue IVF  - Repeat urine lytes from 8/3 consistent with intrinsic renal disease.     Problem/Plan - 3:  ·  Problem: Hydronephrosis.   ·  Plan: - CT a/p showed stable moderate to severe left and mild right hydronephrosis. Bilateral ureteral stents are in place. Suspect chronic obstruction at baseline leading to CKD   Follow up with Dr. Hoenig within one month for bilateral stent exchange     Problem/Plan - 4:  ·  Problem: DM2 (diabetes mellitus, type 2).   ·  Plan: - ISS  - a1c 7.6.  - restart metformin on discharge    Patient medically cleared for discharge, by Dr. Gant, with PCP, Urology, and Nephrology follow up.      - 8/6: sCr improving. patient cleared for discharge by nephrology team, case discussed with nephro attending. No further intervention planned by urology team, they rec finishing total 7 day course of antibiotic, Day 6 of antibiotic today, patient to cefpodoxime 200mg BID tomorrow 8/7. Close outpatient follow up with nephrology and urology teams. BMP check in 1 week. Patient in agreement with plan.  Case and plan discussed with ACP: IGOR Torres, CM.          48F c hx cervical adeno ca (dx '21) s/p chemo (last dose '21)/RT reportedly in remission, R hydro s/p b/l stents, CKD (baseline Cr ~1.8), HTN, DM2, chronic lower back pain, pw acute encephalopathy, muscle jerking, hypotension, in setting of sepsis 2/2 UTI, also found to have VINITA on CKD. She is improving while on empiric ceftriaxone with UCx pending.        Problem/Plan - 1:  ·  Problem: Acute UTI.   ·  Plan: On admission met sepsis criteria  - UA grossly positive  - Urine culture 8/1: probably contaminant   - Blood culture: NGTD  - Got Vanc, Zosyn on 8/1   - Treated with Ceftriaxone - will discharge on cefpodoxime for one more day to complete 7 day course     Problem/Plan - 2:  ·  Problem: Acute kidney injury superimposed on CKD.   ·  Plan: Baseline Cr around 1.8, on admission Cr 3.5  # ; pre renal azotemia; risk for infectious AIN;     - Suspect pre-renal / intrinsic VINITA on CKD in s/o UTI, also to consider post-renal in s/o chronic b/l hydro  - Cr downtrending ; encouraging PO intake  - sCr 2.5, continue IVF  - Repeat urine lytes from 8/3 consistent with intrinsic renal disease.     Problem/Plan - 3:  ·  Problem: Hydronephrosis.   ·  Plan: - CT a/p showed stable moderate to severe left and mild right hydronephrosis. Bilateral ureteral stents are in place. Suspect chronic obstruction at baseline leading to CKD   Follow up with Dr. Hoenig within one month for bilateral stent exchange     Problem/Plan - 4:  ·  Problem: DM2 (diabetes mellitus, type 2).   ·  Plan: - ISS  - a1c 7.6.  - restart metformin on discharge    Patient medically cleared for discharge, by Dr. Gant, with PCP, Urology, and Nephrology follow up.      - 8/6: sCr improving. patient cleared for discharge by nephrology team, case discussed with nephro attending. No further intervention planned by urology team, they rec finishing total 7 day course of antibiotic, Day 6 of antibiotic today, patient to cefpodoxime 200mg BID tomorrow 8/7. Close outpatient follow up with nephrology and urology teams. BMP check in 1 week. Patient in agreement with plan.  Case and plan discussed with ACP: IGOR Torres, CM.       Important Medication Changes and Reason:  Cefpodoxime 200mg BID    Active or Pending Issues Requiring Follow-up:  Urology, Nephro F/U. BMP check outpatient.     Advanced Directives:   [x ] Full code  [ ] DNR  [ ] Hospice    Discharge Diagnoses:  #Sepsis , complicated UTI, hydronephrosis.   #VINITA CKD 3; pre renal azotemia; risk for infectious AIN;

## 2024-08-06 NOTE — DISCHARGE NOTE NURSING/CASE MANAGEMENT/SOCIAL WORK - NSDCFUADDAPPT_GEN_ALL_CORE_FT
You must follow up with your primary medical doctor within 2-3 days of discharge - please call to make an appointment.    You must follow up with your nephrologist, Dr. Sharma, within one week of discharge - please call to make an appointment.  At this appointment, you will need to discuss how long to continue taking your sodium bicarbonate tabs.    You must follow up with your urologist, Dr. Hoenig, within one month - please call to make an appointment.  At this time, you will need your stents replaced.        APPTS ARE READY TO BE MADE: [ X] YES    Best Family or Patient Contact (if needed):    Additional Information about above appointments (if needed):    1: Primary medical doctor  2: Nephrologist  3: Urologist    Other comments or requests:

## 2024-08-06 NOTE — DISCHARGE NOTE PROVIDER - PROVIDER TOKENS
PROVIDER:[TOKEN:[[id number]],FOLLOWUP:[1 month]],PROVIDER:[TOKEN:[[id number]],FOLLOWUP:[1 week]],PROVIDER:[TOKEN:[[id number]],FOLLOWUP:[1-3 days]]

## 2024-08-06 NOTE — DISCHARGE NOTE PROVIDER - NSDCFUSCHEDAPPT_GEN_ALL_CORE_FT
[facility]  [facility]  Scheduled Appointment: 08/07/2024    [facility]  [facility]  Scheduled Appointment: 08/07/2024     [facility] Physician [facility]  ULTRASND  [facility]  Scheduled Appointment: 08/20/2024    Hoenig, David  [facility]  UROLOGY [address] [address]  Scheduled Appointment: 08/27/2024

## 2024-08-06 NOTE — DISCHARGE NOTE PROVIDER - NSDCCPCAREPLAN_GEN_ALL_CORE_FT
PRINCIPAL DISCHARGE DIAGNOSIS  Diagnosis: Abdominal pain  Assessment and Plan of Treatment: Improved  You must follow up with your primary medical doctor within 2-3 days of discharge - please call to make an appointment.      SECONDARY DISCHARGE DIAGNOSES  Diagnosis: Acute UTI  Assessment and Plan of Treatment: You are being discharged on Cefpodoxime, which you will take for one more day (August 7, 2024) and then discontinue.  HOME CARE INSTRUCTIONS  Drink enough water and fluids to keep your urine clear or pale yellow.  Avoid caffeine, tea, and carbonated beverages. They tend to irritate your bladder.  Empty your bladder often. Avoid holding urine for long periods of time.  Empty your bladder before and after sexual intercourse.  After a bowel movement, women should cleanse from front to back. Use each tissue only once.  SEEK MEDICAL CARE IF:  You have back pain.  You develop a fever.  Your symptoms do not begin to resolve within 3 days.  SEEK IMMEDIATE MEDICAL CARE IF:  You have severe back pain or lower abdominal pain.  You develop chills.  You have nausea or vomiting.  You have continued burning or discomfort with urination.      Diagnosis: Hydronephrosis  Assessment and Plan of Treatment: You must follow up with your urologist, Dr. Hoenig, within one month for stent exchange - please call to make an appointment.    Diagnosis: Acute kidney injury superimposed on CKD  Assessment and Plan of Treatment: You are being discharged on sodium bicarbonate tabs.    You must follow up with your nephrologist, Dr. Sharma, within one week of discharge - at this appointment, you will discuss how long to take your sodium bicarbonate.  Avoid taking (NSAIDs) - (ex: Ibuprofen, Advil, Celebrex, Naprosyn)  Avoid taking any nephrotoxic agents (can harm kidneys) - Intravenous contrast for diagnostic testing, combination cold medications.  Have all medications adjusted for your renal function by your Health Care Provider.  Blood pressure control is important.  Take all medication as prescribed.      Diagnosis: DM2 (diabetes mellitus, type 2)  Assessment and Plan of Treatment: Make sure you get your HgA1c checked every three months.  If you take oral diabetes medications, check your blood glucose two times a day.  If you take insulin, check your blood glucose before meals and at bedtime.  It's important not to skip any meals.  Keep a log of your blood glucose results and always take it with you to your doctor appointments.  Keep a list of your current medications including injectables and over the counter medications and bring this medication list with you to all your doctor appointments.  If you have not seen your ophthalmologist this year call for appointment.  Check your feet daily for redness, sores, or openings. Do not self treat. If no improvement in two days call your primary care physician for an appointment.  Low blood sugar (hypoglycemia) is a blood sugar below 70mg/dl. Check your blood sugar if you feel signs/symptoms of hypoglycemia. If your blood sugar is below 70 take 15 grams of carbohydrates (ex 4 oz of apple juice, 3-4 glucose tablets, or 4-6 oz of regular soda) wait 15 minutes and repeat blood sugar to make sure it comes up above 70.  If your blood sugar is above 70 and you are due for a meal, have a meal.  If you are not due for a meal have a snack.  This snack helps keeps your blood sugar at a safe range.       PRINCIPAL DISCHARGE DIAGNOSIS  Diagnosis: Abdominal pain  Assessment and Plan of Treatment: Improved  You must follow up with your primary medical doctor within 2-3 days of discharge - please call to make an appointment.      SECONDARY DISCHARGE DIAGNOSES  Diagnosis: Acute UTI  Assessment and Plan of Treatment: You are being discharged on Cefpodoxime, which you will take for one more day (August 7, 2024) and then discontinue.  HOME CARE INSTRUCTIONS  Drink enough water and fluids to keep your urine clear or pale yellow.  Avoid caffeine, tea, and carbonated beverages. They tend to irritate your bladder.  Empty your bladder often. Avoid holding urine for long periods of time.  Empty your bladder before and after sexual intercourse.  After a bowel movement, women should cleanse from front to back. Use each tissue only once.  SEEK MEDICAL CARE IF:  You have back pain.  You develop a fever.  Your symptoms do not begin to resolve within 3 days.  SEEK IMMEDIATE MEDICAL CARE IF:  You have severe back pain or lower abdominal pain.  You develop chills.  You have nausea or vomiting.  You have continued burning or discomfort with urination.      Diagnosis: Hydronephrosis  Assessment and Plan of Treatment: You must follow up with your urologist, Dr. Hoenig, within one month for stent exchange - please call to make an appointment.    Diagnosis: Acute kidney injury superimposed on CKD  Assessment and Plan of Treatment: You are being discharged on sodium bicarbonate tabs.    You must follow up with your nephrologist, Dr. Sharma, within one week of discharge - at this appointment, you will discuss how long to take your sodium bicarbonate.  Avoid taking (NSAIDs) - (ex: Ibuprofen, Advil, Celebrex, Naprosyn)  Avoid taking any nephrotoxic agents (can harm kidneys) - Intravenous contrast for diagnostic testing, combination cold medications.  Have all medications adjusted for your renal function by your Health Care Provider.  Blood pressure control is important.  Take all medication as prescribed.      Diagnosis: DM2 (diabetes mellitus, type 2)  Assessment and Plan of Treatment: Make sure you get your HgA1c checked every three months.  If you take oral diabetes medications, check your blood glucose two times a day.  If you take insulin, check your blood glucose before meals and at bedtime.  It's important not to skip any meals.  Keep a log of your blood glucose results and always take it with you to your doctor appointments.  Keep a list of your current medications including injectables and over the counter medications and bring this medication list with you to all your doctor appointments.  If you have not seen your ophthalmologist this year call for appointment.  Check your feet daily for redness, sores, or openings. Do not self treat. If no improvement in two days call your primary care physician for an appointment.  Low blood sugar (hypoglycemia) is a blood sugar below 70mg/dl. Check your blood sugar if you feel signs/symptoms of hypoglycemia. If your blood sugar is below 70 take 15 grams of carbohydrates (ex 4 oz of apple juice, 3-4 glucose tablets, or 4-6 oz of regular soda) wait 15 minutes and repeat blood sugar to make sure it comes up above 70.  If your blood sugar is above 70 and you are due for a meal, have a meal.  If you are not due for a meal have a snack.  This snack helps keeps your blood sugar at a safe range.      Diagnosis: MRSA nasal colonization  Assessment and Plan of Treatment: You will apply Mupirocin ointment to bilateral nostrils through the am dose on August 9, 2024 and then discontinue

## 2024-08-06 NOTE — DISCHARGE NOTE PROVIDER - CARE PROVIDERS DIRECT ADDRESSES
,[email or web address]

## 2024-08-07 RX ORDER — CEFPODOXIME PROXETIL 100 MG
1 TABLET ORAL
Qty: 2 | Refills: 0
Start: 2024-08-07 | End: 2024-08-07

## 2024-08-09 ENCOUNTER — APPOINTMENT (OUTPATIENT)
Dept: NUCLEAR MEDICINE | Facility: IMAGING CENTER | Age: 49
End: 2024-08-09

## 2024-08-09 ENCOUNTER — OUTPATIENT (OUTPATIENT)
Dept: OUTPATIENT SERVICES | Facility: HOSPITAL | Age: 49
LOS: 1 days | End: 2024-08-09
Payer: COMMERCIAL

## 2024-08-09 DIAGNOSIS — Z96.0 PRESENCE OF UROGENITAL IMPLANTS: Chronic | ICD-10-CM

## 2024-08-09 DIAGNOSIS — Z00.8 ENCOUNTER FOR OTHER GENERAL EXAMINATION: ICD-10-CM

## 2024-08-09 PROCEDURE — 78815 PET IMAGE W/CT SKULL-THIGH: CPT | Mod: 26,PS

## 2024-08-09 PROCEDURE — A9552: CPT

## 2024-08-09 PROCEDURE — 78815 PET IMAGE W/CT SKULL-THIGH: CPT

## 2024-08-13 LAB
ANION GAP SERPL CALC-SCNC: 15 MMOL/L
BUN SERPL-MCNC: 27 MG/DL
CALCIUM SERPL-MCNC: 9.8 MG/DL
CHLORIDE SERPL-SCNC: 99 MMOL/L
CO2 SERPL-SCNC: 26 MMOL/L
CREAT SERPL-MCNC: 2.05 MG/DL
EGFR: 29 ML/MIN/1.73M2
GLUCOSE SERPL-MCNC: 117 MG/DL
POTASSIUM SERPL-SCNC: 4.6 MMOL/L
SODIUM SERPL-SCNC: 140 MMOL/L

## 2024-08-19 ENCOUNTER — RESULT REVIEW (OUTPATIENT)
Age: 49
End: 2024-08-19

## 2024-08-20 ENCOUNTER — OUTPATIENT (OUTPATIENT)
Dept: OUTPATIENT SERVICES | Facility: HOSPITAL | Age: 49
LOS: 1 days | End: 2024-08-20
Payer: COMMERCIAL

## 2024-08-20 ENCOUNTER — RESULT REVIEW (OUTPATIENT)
Age: 49
End: 2024-08-20

## 2024-08-20 ENCOUNTER — APPOINTMENT (OUTPATIENT)
Dept: ULTRASOUND IMAGING | Facility: IMAGING CENTER | Age: 49
End: 2024-08-20

## 2024-08-20 DIAGNOSIS — Z96.0 PRESENCE OF UROGENITAL IMPLANTS: Chronic | ICD-10-CM

## 2024-08-20 DIAGNOSIS — C53.9 MALIGNANT NEOPLASM OF CERVIX UTERI, UNSPECIFIED: ICD-10-CM

## 2024-08-20 PROCEDURE — 88173 CYTOPATH EVAL FNA REPORT: CPT

## 2024-08-20 PROCEDURE — 87205 SMEAR GRAM STAIN: CPT

## 2024-08-20 PROCEDURE — 88305 TISSUE EXAM BY PATHOLOGIST: CPT

## 2024-08-20 PROCEDURE — 10006 FNA BX W/US GDN EA ADDL: CPT | Mod: 59

## 2024-08-20 PROCEDURE — 10005 FNA BX W/US GDN 1ST LES: CPT

## 2024-08-20 PROCEDURE — 88172 CYTP DX EVAL FNA 1ST EA SITE: CPT

## 2024-08-20 PROCEDURE — 88185 FLOWCYTOMETRY/TC ADD-ON: CPT

## 2024-08-20 PROCEDURE — 88184 FLOWCYTOMETRY/ TC 1 MARKER: CPT

## 2024-08-20 PROCEDURE — 10006 FNA BX W/US GDN EA ADDL: CPT

## 2024-08-20 PROCEDURE — 88189 FLOWCYTOMETRY/READ 16 & >: CPT | Mod: 59

## 2024-08-21 LAB
TM INTERPRETATION: SIGNIFICANT CHANGE UP
TM INTERPRETATION: SIGNIFICANT CHANGE UP

## 2024-08-22 LAB — NON-GYNECOLOGICAL CYTOLOGY STUDY: SIGNIFICANT CHANGE UP

## 2024-08-27 ENCOUNTER — APPOINTMENT (OUTPATIENT)
Dept: UROLOGY | Facility: CLINIC | Age: 49
End: 2024-08-27
Payer: MEDICARE

## 2024-08-27 DIAGNOSIS — N13.30 UNSPECIFIED HYDRONEPHROSIS: ICD-10-CM

## 2024-08-27 DIAGNOSIS — N13.5 CROSSING VESSEL AND STRICTURE OF URETER W/OUT HYDRONEPHROSIS: ICD-10-CM

## 2024-08-27 PROCEDURE — 99442: CPT | Mod: 93

## 2024-08-27 NOTE — HISTORY OF PRESENT ILLNESS
[Other Location: e.g. School (Enter Location, City,State)___] : at [unfilled], at the time of the visit. [Other Location: e.g. Home (Enter Location, City,State)___] : at [unfilled] [Verbal consent obtained from patient] : the patient, [unfilled] [FreeTextEntry1] : The patient-doctor relationship has been established in audio HIPAA compliant communication. The patient's identity has been confirmed. The patient was previously emailed a copy of the telemedicine consent. They have had a chance to review and has now given verbal consent and has requested care to be assessed and treated via telemedicine. They understand there may be limitations in this process, and that they may need further followup care in the office and/or hospital settings.  Verbal consent given on 1900/01/01  TEN HERNANDEZ is a 49 year F who presents for f/u- now dx with bilateral ureteral stricture; ureteral dilation (incomplete dilation due to density of stricture reported) and left single stent 9/2023. Has bilateral tandem stents already.  Post radiation for cervical ca  She is s/p Cystoscopy, bilateral retrograde pyelogram, bilateral tandem ureteral stent exchange on 6/24/24 (op note reviewed) doing well- now on vaginal estrogens since 2023, but has had several UTI  On 3 months tandem stent exchange, last exchange 6/24/24.  Recent hospitalization; creat up to 3.53 8/2024. Question of possible cancer recurrences on PET scan (recent lymph node bx'es appear negative).  Had creat 1.79 , 2.29 creat in past in June... up to 3.53 on admission, and declining through admission to 2.81, 2.5, and down to 2.27 at DC.  1 week post DC creat was 2.05.  Feeling o/w well at this time.  The patient denies fevers, chills, nausea and/or vomiting, and no unexplained weight loss.  All pertinent parts of the patient PFSH (past medical, family, and social histories), laboratory, radiological studies and available physician notes were reviewed prior to starting the face-to-face portion of the telemedicine visit. Questionnaire results, where appropriate, were discussed with the patient.

## 2024-08-27 NOTE — ASSESSMENT
[FreeTextEntry1] : dw pt kidney function issues, not likely obstructive with spont decline  will con with next bilateral tandem stent exchange as planned

## 2024-09-04 ENCOUNTER — NON-APPOINTMENT (OUTPATIENT)
Age: 49
End: 2024-09-04

## 2024-09-05 NOTE — ASU PREOP CHECKLIST - ANTIBIOTIC
PN,  Please see below.  Pended recommended, but allergy warning for milk products.  Lupe MATHEW RN    yes

## 2024-09-24 ENCOUNTER — OUTPATIENT (OUTPATIENT)
Dept: OUTPATIENT SERVICES | Facility: HOSPITAL | Age: 49
LOS: 1 days | Discharge: HOME | End: 2024-09-24

## 2024-09-24 DIAGNOSIS — Z96.0 PRESENCE OF UROGENITAL IMPLANTS: Chronic | ICD-10-CM

## 2024-09-24 DIAGNOSIS — C53.9 MALIGNANT NEOPLASM OF CERVIX UTERI, UNSPECIFIED: ICD-10-CM

## 2024-09-25 ENCOUNTER — OUTPATIENT (OUTPATIENT)
Dept: OUTPATIENT SERVICES | Facility: HOSPITAL | Age: 49
LOS: 1 days | End: 2024-09-25
Payer: COMMERCIAL

## 2024-09-25 VITALS
TEMPERATURE: 98 F | WEIGHT: 185.63 LBS | SYSTOLIC BLOOD PRESSURE: 114 MMHG | OXYGEN SATURATION: 97 % | DIASTOLIC BLOOD PRESSURE: 78 MMHG | HEART RATE: 86 BPM | RESPIRATION RATE: 18 BRPM | HEIGHT: 64 IN

## 2024-09-25 DIAGNOSIS — Z96.0 PRESENCE OF UROGENITAL IMPLANTS: Chronic | ICD-10-CM

## 2024-09-25 DIAGNOSIS — G47.33 OBSTRUCTIVE SLEEP APNEA (ADULT) (PEDIATRIC): ICD-10-CM

## 2024-09-25 DIAGNOSIS — N13.5 CROSSING VESSEL AND STRICTURE OF URETER WITHOUT HYDRONEPHROSIS: ICD-10-CM

## 2024-09-25 DIAGNOSIS — Z01.818 ENCOUNTER FOR OTHER PREPROCEDURAL EXAMINATION: ICD-10-CM

## 2024-09-25 DIAGNOSIS — E11.9 TYPE 2 DIABETES MELLITUS WITHOUT COMPLICATIONS: ICD-10-CM

## 2024-09-25 LAB
A1C WITH ESTIMATED AVERAGE GLUCOSE RESULT: 6.4 % — HIGH (ref 4–5.6)
ANION GAP SERPL CALC-SCNC: 12 MMOL/L — SIGNIFICANT CHANGE UP (ref 5–17)
BUN SERPL-MCNC: 36 MG/DL — HIGH (ref 7–23)
CALCIUM SERPL-MCNC: 8.7 MG/DL — SIGNIFICANT CHANGE UP (ref 8.4–10.5)
CHLORIDE SERPL-SCNC: 100 MMOL/L — SIGNIFICANT CHANGE UP (ref 96–108)
CO2 SERPL-SCNC: 25 MMOL/L — SIGNIFICANT CHANGE UP (ref 22–31)
CREAT SERPL-MCNC: 2.88 MG/DL — HIGH (ref 0.5–1.3)
EGFR: 19 ML/MIN/1.73M2 — LOW
ESTIMATED AVERAGE GLUCOSE: 137 MG/DL — HIGH (ref 68–114)
GLUCOSE SERPL-MCNC: 125 MG/DL — HIGH (ref 70–99)
HCT VFR BLD CALC: 27.2 % — LOW (ref 34.5–45)
HGB BLD-MCNC: 8.2 G/DL — LOW (ref 11.5–15.5)
MCHC RBC-ENTMCNC: 26.2 PG — LOW (ref 27–34)
MCHC RBC-ENTMCNC: 30.1 GM/DL — LOW (ref 32–36)
MCV RBC AUTO: 86.9 FL — SIGNIFICANT CHANGE UP (ref 80–100)
NRBC # BLD: 0 /100 WBCS — SIGNIFICANT CHANGE UP (ref 0–0)
PLATELET # BLD AUTO: 290 K/UL — SIGNIFICANT CHANGE UP (ref 150–400)
POTASSIUM SERPL-MCNC: 4.4 MMOL/L — SIGNIFICANT CHANGE UP (ref 3.5–5.3)
POTASSIUM SERPL-SCNC: 4.4 MMOL/L — SIGNIFICANT CHANGE UP (ref 3.5–5.3)
RBC # BLD: 3.13 M/UL — LOW (ref 3.8–5.2)
RBC # FLD: 14.6 % — HIGH (ref 10.3–14.5)
SODIUM SERPL-SCNC: 137 MMOL/L — SIGNIFICANT CHANGE UP (ref 135–145)
WBC # BLD: 6.1 K/UL — SIGNIFICANT CHANGE UP (ref 3.8–10.5)
WBC # FLD AUTO: 6.1 K/UL — SIGNIFICANT CHANGE UP (ref 3.8–10.5)

## 2024-09-25 NOTE — H&P PST ADULT - RESPIRATORY
clear to auscultation bilaterally/no wheezes decreased ability to use arms for pushing/pulling/decreased ability to use legs for bridging/pushing

## 2024-09-25 NOTE — H&P PST ADULT - ASSESSMENT
DASI score: 4.2 METS  DASI activity: walks with cane, light housework/activity   Loose teeth or denture: Denies   MP: Class 2

## 2024-09-25 NOTE — H&P PST ADULT - NSICDXPASTMEDICALHX_GEN_ALL_CORE_FT
PAST MEDICAL HISTORY:  Anxiety disorder     Bilateral lumbar radiculopathy     Calculus of kidney     Cervical adenocarcinoma     Chemotherapy-induced neuropathy     GERD (gastroesophageal reflux disease)     H/O hemorrhoids     History of foot drop     Hydronephrosis     Hypertension     Neuropathic pain due to radiation     T2DM (type 2 diabetes mellitus)      PAST MEDICAL HISTORY:  Anxiety disorder     Bilateral lumbar radiculopathy     Calculus of kidney     Cervical adenocarcinoma     Chemotherapy-induced neuropathy     CKD (chronic kidney disease)     GERD (gastroesophageal reflux disease)     H/O hemorrhoids     History of foot drop     Hydronephrosis     Hypertension     Neuropathic pain due to radiation     LEESA (obstructive sleep apnea)     T2DM (type 2 diabetes mellitus)

## 2024-09-25 NOTE — H&P PST ADULT - PROBLEM SELECTOR PLAN 1
Scheduled for Cystoscopy Bilateral Stent Exchange   Pre-procedure labs collected. PST instructions provided. Patient verbalized understanding of instructions.

## 2024-09-25 NOTE — H&P PST ADULT - HISTORY OF PRESENT ILLNESS
This is a 48 y/o F with PMHx significant for Adenocarcinoma of Cervix (dx 2021) s/p chemotherapy/radiation therapy , Chemo Induced Neuropathy, Nephrolithiasis, h/o Hydronephrosis s/p Ureteral stent placement, CKD, and T2DM. Patient currently with bilateral ureteral stricture (Post Radiation Therapy for Cervical CA). She is s/p Cystoscopy, B/L Retrograde Pyelogram, B/L Tandem Ureteral Stent Exchange on 06/24/24. Currently received stent exchanged every 3 months. Patient is scheduled for Cystoscopy, Bilateral Stent Exchange with Dr. Hoenig on 09/30/2024.  Note* Patient was admitted to [facility] on ( 08/02/2024) for Hydronephrosis, VINITA (CKD), UTI. she was treated for sepsis empirically and discharged. has f/u with both her pcp and Dr. Hoenig since hospital admission.

## 2024-09-26 PROBLEM — M54.50 LOW BACK PAIN, UNSPECIFIED: Chronic | Status: INACTIVE | Noted: 2022-10-11 | Resolved: 2024-09-25

## 2024-09-26 PROBLEM — N13.30 UNSPECIFIED HYDRONEPHROSIS: Chronic | Status: INACTIVE | Noted: 2023-08-08 | Resolved: 2024-09-25

## 2024-09-27 ENCOUNTER — APPOINTMENT (OUTPATIENT)
Dept: HEMATOLOGY ONCOLOGY | Facility: CLINIC | Age: 49
End: 2024-09-27
Payer: MEDICARE

## 2024-09-27 ENCOUNTER — APPOINTMENT (OUTPATIENT)
Dept: GERIATRICS | Facility: CLINIC | Age: 49
End: 2024-09-27
Payer: MEDICARE

## 2024-09-27 VITALS
DIASTOLIC BLOOD PRESSURE: 64 MMHG | OXYGEN SATURATION: 96 % | RESPIRATION RATE: 17 BRPM | TEMPERATURE: 98.5 F | BODY MASS INDEX: 30.9 KG/M2 | HEART RATE: 82 BPM | WEIGHT: 179.99 LBS | SYSTOLIC BLOOD PRESSURE: 113 MMHG

## 2024-09-27 VITALS
HEART RATE: 80 BPM | SYSTOLIC BLOOD PRESSURE: 117 MMHG | TEMPERATURE: 97.2 F | OXYGEN SATURATION: 97 % | BODY MASS INDEX: 31.83 KG/M2 | DIASTOLIC BLOOD PRESSURE: 79 MMHG | WEIGHT: 185.41 LBS | RESPIRATION RATE: 16 BRPM

## 2024-09-27 DIAGNOSIS — M79.606 PAIN IN LEG, UNSPECIFIED: ICD-10-CM

## 2024-09-27 DIAGNOSIS — F41.9 ANXIETY DISORDER, UNSPECIFIED: ICD-10-CM

## 2024-09-27 DIAGNOSIS — C53.9 MALIGNANT NEOPLASM OF CERVIX UTERI, UNSPECIFIED: ICD-10-CM

## 2024-09-27 LAB
CULTURE RESULTS: ABNORMAL
SPECIMEN SOURCE: SIGNIFICANT CHANGE UP

## 2024-09-27 PROCEDURE — 99214 OFFICE O/P EST MOD 30 MIN: CPT

## 2024-09-27 PROCEDURE — G2211 COMPLEX E/M VISIT ADD ON: CPT

## 2024-09-27 PROCEDURE — 99213 OFFICE O/P EST LOW 20 MIN: CPT

## 2024-09-29 ENCOUNTER — TRANSCRIPTION ENCOUNTER (OUTPATIENT)
Age: 49
End: 2024-09-29

## 2024-09-29 NOTE — PHYSICAL EXAM
[Restricted in physically strenuous activity but ambulatory and able to carry out work of a light or sedentary nature] : Status 1- Restricted in physically strenuous activity but ambulatory and able to carry out work of a light or sedentary nature, e.g., light house work, office work [Normal] : affect appropriate [de-identified] : walks with cane

## 2024-09-29 NOTE — HISTORY OF PRESENT ILLNESS
[Disease: _____________________] : Disease: [unfilled] [AJCC Stage: ____] : AJCC Stage: [unfilled] [de-identified] : 46 y.o female with adenocarcinoma most likely  of cervical origin.\par  \par  46 year old premenopausal female with cervical adenocarcinoma, with disease involved in the endometrium presenting for consideration of chemotherapy. Initially seen by Dr. Bill on 7/16/21 given concern for primary vs secondary gynecologic malignancy. \par  \par  Presented to [facility] on 5/28/21 given pelvic pain and retained tampon with a 5 day history of malodorous discharge with fever and tachycardia. Initially assumed to have PID and started on antibiotics, however cervix appeared irregular, dark and distorted. Subsequently taken to the OR for EUA, and cervical biospy done, showing adenocarcinoma, unclear at time if primary or secondary. \par  Pathology is as below: \par   PATHOLOGY:\par  1) EMBx, 5/26/21, Dr. James\par   a) atypical endometrial glands exhibiting complex, cribiform architexture are present as a few scattered glands, further workup is necessary to R/O CAH or adenocarcinoma of the endometrium\par  2) EMBx, 5/28/21\par   a) minute polypoid fragment of endometrial tissue with partial infarction and focal glandular crowding without atypia \par  3) EUA, D&C, ECC, cervical biopsy, 5/30/21, [facility]\par   a) cervical/endocervix - adenocarcinoma in a background of necrosis and acute inflammation (primary vs. secondary)\par  \par  PET/CT done on 7/5/21 is as below: \par  trophic left left kidney. Lobulated bilateral renal cortex with cortical scarring bilaterally. Multiple non obstruction stones on the left, largest 7mm in the lower pole, stable since 5/29/21. Difficult to delineate increased FDG activity in the uterine cervix corresponding to known malignancy SUV 7.3. There is a separate focus of increased FDG activity in the endometrium which may be physiologic SUV 8.8. No enlarged FDG avid nodes. Pan colonic rectal hypermetabolism which may be related to Metformin. \par  \par  Other imaging: \par  \par  TVUS on 5/28/21: uterus 9.0 x 4.9 x 5.2cm. Retained tampon in vagina. No collection or fluid in the endometrial canal. EML 6.3mm. \par  RTO- a simple cyst 3.7 x 3.4 x 3.4cm\par  LTO - Small follicles are seen. \par  No FF. \par  \par  \par  CT A/P on 5/29/21: lobulated bilateral renal cortex with cortical scarring bilaterally. Multiple nonobstructing stones on the left. no hydro.\par  The endometrial cavity is slightly distended with fluid. An air containing structure is identified within the upper vaginal cavity (likely retained tampon). Mild fatty stranding in the pelvis. Likely a physiological cyst in the right ovary and a small corpus luteal or hemorrhagic cyst in the left ovary. Small free fluid. Appendix normal. No ascites. No LAD. \par  \par  Initially it was thought to be a GI primary and patient underwent GI work up with EGD and colonoscopy, all WNL.\par  Patient still has some vaginal discharge, malodorous.\par  \par   [de-identified] : Patient is here with her sister to discuss results of biopsy and Tiana testing. All testing has been negative. She continues to have chronic pain but urinary symptoms are better than last month, having stent exchange next week. She saw nephrology for consult yesterday. She is planning to travel to [place] soon and will come back when next appointments are needed. She denies fever, chills, nausea, vomiting, bleeding.

## 2024-09-29 NOTE — REVIEW OF SYSTEMS
[Diarrhea: Grade 0] : Diarrhea: Grade 0 [Negative] : Allergic/Immunologic [Abdominal Pain] : abdominal pain [Joint Pain] : joint pain

## 2024-09-29 NOTE — HISTORY OF PRESENT ILLNESS
[Disease: _____________________] : Disease: [unfilled] [AJCC Stage: ____] : AJCC Stage: [unfilled] [de-identified] : 46 y.o female with adenocarcinoma most likely  of cervical origin.\par  \par  46 year old premenopausal female with cervical adenocarcinoma, with disease involved in the endometrium presenting for consideration of chemotherapy. Initially seen by Dr. Bill on 7/16/21 given concern for primary vs secondary gynecologic malignancy. \par  \par  Presented to [facility] on 5/28/21 given pelvic pain and retained tampon with a 5 day history of malodorous discharge with fever and tachycardia. Initially assumed to have PID and started on antibiotics, however cervix appeared irregular, dark and distorted. Subsequently taken to the OR for EUA, and cervical biospy done, showing adenocarcinoma, unclear at time if primary or secondary. \par  Pathology is as below: \par   PATHOLOGY:\par  1) EMBx, 5/26/21, Dr. James\par   a) atypical endometrial glands exhibiting complex, cribiform architexture are present as a few scattered glands, further workup is necessary to R/O CAH or adenocarcinoma of the endometrium\par  2) EMBx, 5/28/21\par   a) minute polypoid fragment of endometrial tissue with partial infarction and focal glandular crowding without atypia \par  3) EUA, D&C, ECC, cervical biopsy, 5/30/21, [facility]\par   a) cervical/endocervix - adenocarcinoma in a background of necrosis and acute inflammation (primary vs. secondary)\par  \par  PET/CT done on 7/5/21 is as below: \par  trophic left left kidney. Lobulated bilateral renal cortex with cortical scarring bilaterally. Multiple non obstruction stones on the left, largest 7mm in the lower pole, stable since 5/29/21. Difficult to delineate increased FDG activity in the uterine cervix corresponding to known malignancy SUV 7.3. There is a separate focus of increased FDG activity in the endometrium which may be physiologic SUV 8.8. No enlarged FDG avid nodes. Pan colonic rectal hypermetabolism which may be related to Metformin. \par  \par  Other imaging: \par  \par  TVUS on 5/28/21: uterus 9.0 x 4.9 x 5.2cm. Retained tampon in vagina. No collection or fluid in the endometrial canal. EML 6.3mm. \par  RTO- a simple cyst 3.7 x 3.4 x 3.4cm\par  LTO - Small follicles are seen. \par  No FF. \par  \par  \par  CT A/P on 5/29/21: lobulated bilateral renal cortex with cortical scarring bilaterally. Multiple nonobstructing stones on the left. no hydro.\par  The endometrial cavity is slightly distended with fluid. An air containing structure is identified within the upper vaginal cavity (likely retained tampon). Mild fatty stranding in the pelvis. Likely a physiological cyst in the right ovary and a small corpus luteal or hemorrhagic cyst in the left ovary. Small free fluid. Appendix normal. No ascites. No LAD. \par  \par  Initially it was thought to be a GI primary and patient underwent GI work up with EGD and colonoscopy, all WNL.\par  Patient still has some vaginal discharge, malodorous.\par  \par   [de-identified] : Patient is here with her sister to discuss results of biopsy and Tiana testing. All testing has been negative. She continues to have chronic pain but urinary symptoms are better than last month, having stent exchange next week. She saw nephrology for consult yesterday. She is planning to travel to [place] soon and will come back when next appointments are needed. She denies fever, chills, nausea, vomiting, bleeding.

## 2024-09-29 NOTE — PHYSICAL EXAM
[Restricted in physically strenuous activity but ambulatory and able to carry out work of a light or sedentary nature] : Status 1- Restricted in physically strenuous activity but ambulatory and able to carry out work of a light or sedentary nature, e.g., light house work, office work [Normal] : affect appropriate [de-identified] : walks with cane

## 2024-09-30 ENCOUNTER — APPOINTMENT (OUTPATIENT)
Dept: UROLOGY | Facility: HOSPITAL | Age: 49
End: 2024-09-30

## 2024-09-30 ENCOUNTER — OUTPATIENT (OUTPATIENT)
Dept: INPATIENT UNIT | Facility: HOSPITAL | Age: 49
LOS: 1 days | End: 2024-09-30
Payer: COMMERCIAL

## 2024-09-30 ENCOUNTER — TRANSCRIPTION ENCOUNTER (OUTPATIENT)
Age: 49
End: 2024-09-30

## 2024-09-30 VITALS
RESPIRATION RATE: 16 BRPM | DIASTOLIC BLOOD PRESSURE: 94 MMHG | SYSTOLIC BLOOD PRESSURE: 145 MMHG | HEIGHT: 64 IN | HEART RATE: 91 BPM | TEMPERATURE: 99 F

## 2024-09-30 VITALS
RESPIRATION RATE: 16 BRPM | OXYGEN SATURATION: 100 % | HEART RATE: 78 BPM | TEMPERATURE: 97 F | SYSTOLIC BLOOD PRESSURE: 127 MMHG

## 2024-09-30 DIAGNOSIS — N13.5 CROSSING VESSEL AND STRICTURE OF URETER WITHOUT HYDRONEPHROSIS: ICD-10-CM

## 2024-09-30 DIAGNOSIS — Z96.0 PRESENCE OF UROGENITAL IMPLANTS: Chronic | ICD-10-CM

## 2024-09-30 LAB
GLUCOSE BLDC GLUCOMTR-MCNC: 140 MG/DL — HIGH (ref 70–99)
GLUCOSE BLDC GLUCOMTR-MCNC: 148 MG/DL — HIGH (ref 70–99)

## 2024-09-30 PROCEDURE — 52332 CYSTOSCOPY AND TREATMENT: CPT

## 2024-09-30 PROCEDURE — C1769: CPT

## 2024-09-30 PROCEDURE — C2625: CPT

## 2024-09-30 PROCEDURE — C1758: CPT

## 2024-09-30 PROCEDURE — 85027 COMPLETE CBC AUTOMATED: CPT

## 2024-09-30 PROCEDURE — 52332 CYSTOSCOPY AND TREATMENT: CPT | Mod: 50

## 2024-09-30 PROCEDURE — 87086 URINE CULTURE/COLONY COUNT: CPT

## 2024-09-30 PROCEDURE — 82962 GLUCOSE BLOOD TEST: CPT

## 2024-09-30 PROCEDURE — 76000 FLUOROSCOPY <1 HR PHYS/QHP: CPT

## 2024-09-30 PROCEDURE — 80048 BASIC METABOLIC PNL TOTAL CA: CPT

## 2024-09-30 PROCEDURE — 83036 HEMOGLOBIN GLYCOSYLATED A1C: CPT

## 2024-09-30 PROCEDURE — G0463: CPT

## 2024-09-30 PROCEDURE — 87077 CULTURE AEROBIC IDENTIFY: CPT

## 2024-09-30 DEVICE — GUIDEWIRE SENSOR DUAL-FLEX NITINOL STRAIGHT .035" X 150CM: Type: IMPLANTABLE DEVICE | Site: BILATERAL | Status: FUNCTIONAL

## 2024-09-30 DEVICE — STENT URET 7FR 26CM: Type: IMPLANTABLE DEVICE | Site: BILATERAL | Status: FUNCTIONAL

## 2024-09-30 DEVICE — URETERAL CATH AXXCESS OPEN END 6FR 70CM: Type: IMPLANTABLE DEVICE | Site: BILATERAL | Status: FUNCTIONAL

## 2024-09-30 RX ORDER — SODIUM CHLORIDE 0.9 % (FLUSH) 0.9 %
3 SYRINGE (ML) INJECTION EVERY 8 HOURS
Refills: 0 | Status: DISCONTINUED | OUTPATIENT
Start: 2024-09-30 | End: 2024-09-30

## 2024-09-30 RX ORDER — INFLUENZA VIRUS VACCINE 15; 15; 15; 15 UG/.5ML; UG/.5ML; UG/.5ML; UG/.5ML
0.5 SUSPENSION INTRAMUSCULAR ONCE
Refills: 0 | Status: DISCONTINUED | OUTPATIENT
Start: 2024-09-30 | End: 2024-10-14

## 2024-09-30 RX ORDER — HYDROMORPHONE HYDROCHLORIDE 1 MG/ML
0.5 INJECTION, SOLUTION INTRAMUSCULAR; INTRAVENOUS; SUBCUTANEOUS
Refills: 0 | Status: DISCONTINUED | OUTPATIENT
Start: 2024-09-30 | End: 2024-09-30

## 2024-09-30 RX ORDER — OXYCODONE HYDROCHLORIDE 30 MG/1
5 TABLET, FILM COATED, EXTENDED RELEASE ORAL ONCE
Refills: 0 | Status: DISCONTINUED | OUTPATIENT
Start: 2024-09-30 | End: 2024-09-30

## 2024-09-30 RX ORDER — CEFAZOLIN SODIUM 1 G
2000 VIAL (EA) INJECTION ONCE
Refills: 0 | Status: COMPLETED | OUTPATIENT
Start: 2024-09-30 | End: 2024-09-30

## 2024-09-30 RX ORDER — LIDOCAINE HCL 20 MG/ML
0.2 AMPUL (ML) INJECTION ONCE
Refills: 0 | Status: DISCONTINUED | OUTPATIENT
Start: 2024-09-30 | End: 2024-09-30

## 2024-09-30 RX ORDER — ALPRAZOLAM 0.5 MG/1
1 TABLET ORAL
Refills: 0 | DISCHARGE

## 2024-09-30 RX ORDER — ONDANSETRON HCL/PF 4 MG/2 ML
4 VIAL (ML) INJECTION ONCE
Refills: 0 | Status: DISCONTINUED | OUTPATIENT
Start: 2024-09-30 | End: 2024-09-30

## 2024-09-30 NOTE — PHYSICAL EXAM
[General Appearance - Alert] : alert [General Appearance - In No Acute Distress] : in no acute distress [Oriented To Time, Place, And Person] : oriented to person, place, and time [FreeTextEntry1] : intermittently tearful [Sclera] : the sclera and conjunctiva were normal [Normal Oral Mucosa] : normal oral mucosa [Neck Appearance] : the appearance of the neck was normal [] : no respiratory distress [Auscultation Breath Sounds / Voice Sounds] : lungs were clear to auscultation bilaterally [Heart Rate And Rhythm] : heart rate was normal and rhythm regular [Heart Sounds] : normal S1 and S2 [Bowel Sounds] : normal bowel sounds [Abdomen Soft] : soft [Abdomen Tenderness] : non-tender [Skin Color & Pigmentation] : normal skin color and pigmentation [No Focal Deficits] : no focal deficits

## 2024-09-30 NOTE — DATA REVIEWED
[FreeTextEntry1] : PETCT [facility] ONC FDG SUBS   (08/09/2024):   COMPARISON: FDG PET/CT 6/21/2024  OTHER STUDIES USED FOR CORRELATION:  FINDINGS:  HEAD/NECK: Physiologic FDG activity in visualized brain, head, and neck.  CHEST: No abnormal FDG activity. No lymphadenopathy. Bilateral gynecomastia without abnormal suspicious findings.  LUNGS: No abnormal FDG activity. No lung nodule(s).  PLEURA/PERICARDIUM: No abnormal FDG activity. No effusions.  ESOPHAGUS/STOMACH: No abnormal FDG activity.  HEPATOBILIARY/PANCREAS: Physiologic hepatobiliary FDG activity. For reference, liver SUV mean is 3.0, previously 3.1.  SPLEEN: Physiologic FDG activity. Normal size.  ADRENAL GLANDS: No abnormal FDG activity. No nodule(s).  KIDNEYS/URINARY BLADDER: Physiologic excreted FDG activity. Bilateral renal stents.  REPRODUCTIVE ORGANS: Revisualized hypermetabolic soft tissue at the region of the RIGHT cervix adjacent to the RIGHT ureter (SUV 10.9, image 240, previously 7.9 [remeasured]).  ABDOMINOPELVIC NODES: Hypermetabolic nodes suspected in the LEFT para-aortic region medial to the LEFT kidney (i.e, 1.5 x 0.8 cm, SUV 6.1, image 165). X-rays as well as on the RIGHT (1.2 x 1.0 cm, SUV 6.1, image 185) Hypermetabolic BILATERAL inguinal nodes. For example, a RIGHT inguinal node measuring 0.9 x 0.9 cm with SUV 6.1 (image 260).  BOWEL/PERITONEUM/MESENTERY: Previously seen multiple foci of bowel uptake now appear more diffuse, likely due to use of oral hypoglycemic medications (i.e. metformin). Stomach and mesentery without abnormal uptake.  ABDOMINAL WALL: No abnormal FDG activity.  BONES/SOFT TISSUES: There is an FDG-avid posterior LEFT neck soft tissue nodule, new since prior exam (0.7 cm, SUV 9.7, image 36). Heterogeneous marrow uptake without focally suspicious findings.  VESSELS: Normal.  IMPRESSION: Since prior FDG-PET/CT scan 6/21/2024: 1.  Interval development of UPPER metabolic BILATERAL inguinal lymph nodes, as well as more distinct para-aortic lymphadenopathy BILATERALLY, suspicious for metastatic malignancy. 2.  FDG-avid soft tissue in the region of the RIGHT cervix, which is difficult to delineate but which remains suspicious for residual/recurrent malignancy. Consider pelvic MRI. 3.  There is a seen multifocal bowel uptake is now more diffuse; patient is noted to be a metformin. There is suspicion for malignant involvement. Further evaluation may be helpful. 4.  Hypermetabolic posterolateral LEFT neck soft tissue nodule, suspicious for malignant involvement. Consider further evaluation to exclude neoplasm.

## 2024-09-30 NOTE — ASSESSMENT
[FreeTextEntry1] : 49yoF with:  # Cervical Cancer - s/p weekly Cisplatin + EBRT, completed 11/2021. s/p R ureteral stent placement 6/2022. Follow up with Med Onc, Rad Onc.  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  Appreciate the input of additional consultants - Physiatry, Neurosurgery and Neurology. - Taper MS ER to 15mg QHS, c/w PRN Oxycodone IR 10mg with goal of minimizing use, c/w Methocarbamol 500mg TID. Lower gabapentin to 200mg BID. - c/w PM&R follow up.  # Depression/Anxiety - C/w Sertraline 50mg QD. C/w PRN alprazolam.25mg for anxiety attacks. Provided extensive emotional support and validation of her worries. c/w behavioral health follow up.  #  Encounter for Palliative Care - Emotional support provided.  Follow up in 2 months, call sooner with questions or issues.

## 2024-09-30 NOTE — PATIENT PROFILE ADULT - NSPROPOAURINARYCATHETER_GEN_A_NUR
MAY 12 2017         RE: Lit Ward                                  To: [facility]  MR#: [id number]                                    [address]   : [phone]   ENC: [id number]                             Al, [address]   (Exam #: [id number])                           Fax: [phone]      The LMP of this 32year old,  G6, P1-3-2-4 patient was DEC 15 2016, giving   her an HARJIT of SEP 24 2017 and a current gestational age of 22 weeks 1 day   by dates  A sonographic examination was performed on MAY 12 2017 using   real time equipment  The ultrasound examination was performed using   abdominal & vaginal techniques  The patient has a BMI of 34 6  Her blood   pressure today was 113/69  Earliest ultrasound found in her record: 2/15/17   9w1d  17 HARJIT         Problem list   Hx of a 27 week PTD after PPROM in   Will get [place] weekly  Had   delivered at term with a prior pregnancy on [place] in   Baby weighed   7lb 7 oz  Hx of a prior CS   Hx of Twins in  that delivered at 36 weeks by CS  Chelsy Costello is here today for her [place] injections and was complaining about   pelvic pressure  Cardiac motion was observed at 164 bpm       INDICATIONS      previous  delivery   obesity   pelvic pressure      Exam Types      Transvaginal      RESULTS      Fetus # 1 of 1   Breech presentation   Placenta Location = Anterior   No placenta previa   Placenta Grade = I      AMNIOTIC FLUID         Largest Vertical Pocket = 4 4 cm   Amniotic Fluid: Normal      CERVICAL EVALUATION      The cervix appeared normal (Ultrasound Examination)  SUPINE      Cervical Length: 3 36 cm      OTHER TEST RESULTS           Funneling?: No             Dynamic Changes?: No        Resp   To TFP?: No      ANATOMY COMMENTS      The cervix seen by transvaginal scan appears normal  There is no evidence   for spontaneous funneling of the cervix seen  There is no funneling seen   in response to fundal or suprapubic pressure  IMPRESSION      [place] IUP   Breech presentation   Regular fetal heart rate of 164 bpm   Anterior placenta   No placenta previa      GENERAL COMMENT      She was previously set up for a transvaginal scan for cervix length in one   week  Recommend a 32 week growth scan secondary due to her history of   prior  delivery which can be associated with a future pregnancy   with growth restriction  TIO Prado S , R YANELIS C S  ELIZABETH Covarrubias     Maternal-Fetal Medicine   Electronically signed 17 15:09 no

## 2024-09-30 NOTE — ASU DISCHARGE PLAN (ADULT/PEDIATRIC) - ASU DC SPECIAL INSTRUCTIONSFT
STENT: You may have an internal stent (a hollow tube that runs from the kidney to your bladder) after your procedure, which helps urine drain from the kidney to your bladder. Some patients experience urinary frequency, burning, or even back pain (especially with urination). These sensations will gradually get better. Increasing your fluid intake can also improve these symptoms. While the stent is in place, your urine may continue to be bloody.   GENERAL: It is common to have blood in your urine after your procedure. It may be pink or even red; inform your doctor if you have a significant amount of clot in the urine or if you are unable to void at all. The urine may clear and then become bloody again especially as you are more physically active.  BATHING: You may shower or bathe.  DIET: You may resume your regular diet and regular medication regimen.  PAIN: You may take Tylenol (acetaminophen) 650-975mg and/or Motrin (ibuprofen) 400-600mg, both available over the counter, for pain every 6 hours as needed. Do not exceed 4000mg of Tylenol (acetaminophen) daily. You may alternate these medications such that you take one or the other every 3 hours for around the clock pain coverage.   ANTIBIOTICS: You may be given a prescription for an antibiotic, please take this medication as instructed and be sure to complete the entire course.  STOOL SOFTENERS: Do not allow yourself to become constipated as straining may cause bleeding. Take stool softeners or a laxative (ex. Miralax, Colace, Senokot, ExLax, etc), available over the counter, if needed.  ACTIVITY: No heavy lifting or strenuous exercise until you are evaluated at your post-operative appointment. Otherwise, you may return to your usual level of physical activity.  ANTICOAGULATION: If you are taking any blood thinning medications, please discuss with your urologist prior to restarting these medications unless otherwise specified.  FOLLOW-UP: If you did not already schedule your post-operative appointment, please call your urologist to schedule and follow-up appointment.  CALL YOUR UROLOGIST IF: You have any bleeding that does not stop, inability to void >8 hours, fever over 100.4 F, chills, persistent nausea/vomiting, changes in your incision concerning for infection, or if your pain is not controlled on your discharge pain medications.

## 2024-09-30 NOTE — ASU DISCHARGE PLAN (ADULT/PEDIATRIC) - CARE PROVIDER_API CALL
Hoenig, David Mayer  Urology  [address] [place] [address]- [place] [address]  Phone: [phone]  Fax: [phone]  Follow Up Time:

## 2024-09-30 NOTE — DATA REVIEWED
----- Message from Shannan Rosales MD sent at 12/27/2022 11:58 PM CST -----  Sugar is borderline, HgbA1C 5.7 - continue low carb diet and exercises  Vit D level is on a low side of normal, continue Vit D supplements 34085 units once a week for another 3 months, then OTC supplements 2000 units a day  Kidneys, liver are normal   [FreeTextEntry1] : PETCT [facility] ONC FDG SUBS   (08/09/2024):   COMPARISON: FDG PET/CT 6/21/2024  OTHER STUDIES USED FOR CORRELATION:  FINDINGS:  HEAD/NECK: Physiologic FDG activity in visualized brain, head, and neck.  CHEST: No abnormal FDG activity. No lymphadenopathy. Bilateral gynecomastia without abnormal suspicious findings.  LUNGS: No abnormal FDG activity. No lung nodule(s).  PLEURA/PERICARDIUM: No abnormal FDG activity. No effusions.  ESOPHAGUS/STOMACH: No abnormal FDG activity.  HEPATOBILIARY/PANCREAS: Physiologic hepatobiliary FDG activity. For reference, liver SUV mean is 3.0, previously 3.1.  SPLEEN: Physiologic FDG activity. Normal size.  ADRENAL GLANDS: No abnormal FDG activity. No nodule(s).  KIDNEYS/URINARY BLADDER: Physiologic excreted FDG activity. Bilateral renal stents.  REPRODUCTIVE ORGANS: Revisualized hypermetabolic soft tissue at the region of the RIGHT cervix adjacent to the RIGHT ureter (SUV 10.9, image 240, previously 7.9 [remeasured]).  ABDOMINOPELVIC NODES: Hypermetabolic nodes suspected in the LEFT para-aortic region medial to the LEFT kidney (i.e, 1.5 x 0.8 cm, SUV 6.1, image 165). X-rays as well as on the RIGHT (1.2 x 1.0 cm, SUV 6.1, image 185) Hypermetabolic BILATERAL inguinal nodes. For example, a RIGHT inguinal node measuring 0.9 x 0.9 cm with SUV 6.1 (image 260).  BOWEL/PERITONEUM/MESENTERY: Previously seen multiple foci of bowel uptake now appear more diffuse, likely due to use of oral hypoglycemic medications (i.e. metformin). Stomach and mesentery without abnormal uptake.  ABDOMINAL WALL: No abnormal FDG activity.  BONES/SOFT TISSUES: There is an FDG-avid posterior LEFT neck soft tissue nodule, new since prior exam (0.7 cm, SUV 9.7, image 36). Heterogeneous marrow uptake without focally suspicious findings.  VESSELS: Normal.  IMPRESSION: Since prior FDG-PET/CT scan 6/21/2024: 1.  Interval development of UPPER metabolic BILATERAL inguinal lymph nodes, as well as more distinct para-aortic lymphadenopathy BILATERALLY, suspicious for metastatic malignancy. 2.  FDG-avid soft tissue in the region of the RIGHT cervix, which is difficult to delineate but which remains suspicious for residual/recurrent malignancy. Consider pelvic MRI. 3.  There is a seen multifocal bowel uptake is now more diffuse; patient is noted to be a metformin. There is suspicion for malignant involvement. Further evaluation may be helpful. 4.  Hypermetabolic posterolateral LEFT neck soft tissue nodule, suspicious for malignant involvement. Consider further evaluation to exclude neoplasm.

## 2024-09-30 NOTE — HISTORY OF PRESENT ILLNESS
[FreeTextEntry1] : 49yoF with h/o cervical adenocarcinoma presents for follow-up palliative care visit.   Patient initially diagnosed with cervical cancer 5/2021. Underwent weekly cisplatin and pelvic RT 4500cgy 25 fx plus parametrial boost 540cgy 3 fx and 4 brachytherapy procedures (hybrid therapy) 700cgy x 4.  Patient was recently hospitalized at [facility] (1/26 - 2/1/22) after presenting with 3 weeks of worsening b/l hip/lower back pain and feet numbness. Pain is sharp in nature, radiating down the lateral thighs, with mild numbness on dorsal sides of feet. Symptoms initially improved with Aleve, however noted blood tinged vaginal discharge and blood clots with urination not related to menstrual periods so stopped taking Aleve. Hip pain and feet numbness progressively worsened, to the point that she cannot walk or lie supine. Pain is mildly alleviated when lying prone, and better with activity. Pt presented to ER in [place] 2 weeks prior to admission due to these symptoms, CT spine non-con showed multiple non-obstructing nephrolithiasis in L kidney but no fractures, deformities, subluxation were noted. She was discharged with gabapentin 300mg BID, Methocarbamol 500mg TID, and Meloxicam 15mg. She is referred today for continuation of pain management.   Patient states that she developed pain in her L buttock approximately 6 weeks ago. It started as a pinching pain in her L buttock. It worsened to the point that the pain felt as if someone where punching her in the area.  Standing up and walking helped to relieve the pain a bit.  She found that she was pacing a lot in her home to help the pain.   She was advised to use Acetaminophen 1000mg, was using it about twice daily.   This initially helped a bit. The pain progressed, eventually affecting both left and right. Associated with tingling in her toes. Was advised to use Aleve BID. Was advised to increase to 2 pills BID which caused her vaginal bleeding, she stopped.   Was in [place] at the time and was sent to the hospital where she states she received a steroid shot to her back. This helped her very much for a while. She was also prescribed a lidocaine 5% patch there but this was not approved by her insurance. Pain came back and was intense.  She returned to [place] on 1/24 and presented to [facility] on 1/26 at which time she was admitted to [facility].   Pt states that numbness is worse on L side, from dorsal foot to lateral mid-calf. Feels like it is "freezing" and must keep socks on. Also notes cramping intermittently in the toes. Pt also noticed that not all of her urine will come out, and must put pressure to completely relieve herself  When she walks her low back pain gets better but her legs/feet feel tight and crampy.  She feels weakness in her legs, lifting her legs is difficult, moving her toes is difficult.  She describes a sensation of coldness of her toes.    Interval Hx (9/27/24):  Patient presents for follow up visit, seen in office.  Pain flares when she is more active. She continues to suffer with intermittent cramping of her feet and calf muscle.   Is interested in trying to lower analgesic regimen. Her nephrologist would like ashok to be lowered. Mood has been pretty good on sertraline 50mg daily. Anxiety is controlled with PRN alprazolam which she uses sparingly.   Current pain regimen: MS ER 15mg BID Oxycodone IR 5-10mg PRN (typically takes a dose twice daily) Gabapentin 400mg BID Methocarbamol 500mg TID Tylenol 1000mg PRN, no recent need  ROS: +constipation - uses senna daily, PRN Miralax +appetite has been OK.  +mood has improved on sertraline +nocturnal leg cramping- massaging helps +hematuria - chronic issue  At times she ambulates with the help of a cane.   Patient is single, lives with her sister, parents, daughter.  She has a 28yo son and a 26yo daughter. She is unemployed.   PMD: Dr. Valentino Comer ([phone])  [employer] Ref#: [id number]

## 2024-10-02 PROBLEM — G47.33 OBSTRUCTIVE SLEEP APNEA (ADULT) (PEDIATRIC): Chronic | Status: ACTIVE | Noted: 2024-09-25

## 2024-10-02 PROBLEM — N13.30 UNSPECIFIED HYDRONEPHROSIS: Chronic | Status: ACTIVE | Noted: 2024-09-25

## 2024-10-03 ENCOUNTER — NON-APPOINTMENT (OUTPATIENT)
Age: 49
End: 2024-10-03

## 2024-11-05 ENCOUNTER — APPOINTMENT (OUTPATIENT)
Dept: UROLOGY | Facility: CLINIC | Age: 49
End: 2024-11-05
Payer: MEDICARE

## 2024-11-05 DIAGNOSIS — N13.30 UNSPECIFIED HYDRONEPHROSIS: ICD-10-CM

## 2024-11-05 DIAGNOSIS — N13.5 CROSSING VESSEL AND STRICTURE OF URETER W/OUT HYDRONEPHROSIS: ICD-10-CM

## 2024-11-05 PROCEDURE — 99441: CPT | Mod: 93

## 2024-11-16 NOTE — ASU PREOP CHECKLIST - LATEX ALLERGY
Patient identification verified with 2 identifiers.    Location: [facility] [place] at [facility] [id number] [address] [phone] option #1     Referring Physician: Matheus Oro MD   Enrollment/ Re-enrollment date: 24 sent again 10/19/24  INR Goal: 2.0-3.0  INR monitoring is per AMS protocol.  Anticoagulation Medication: warfarin  Indication: Deep Vein Thrombosis (DVT)    Subjective   Bleeding signs/symptoms: No    Bruising: No   Major bleeding event: No  Thrombosis signs/symptoms: No  Thromboembolic event: No  Missed doses: No  Extra doses: No  Medication changes: No  Dietary changes: Yes  on hybiscus tea  Change in health: No  Change in activity: No  Alcohol: No  Other concerns: No    Upcoming Procedures:  Does the Patient Have any upcoming procedures that require interruption in anticoagulation therapy? no  Does the patient require bridging? no      Anticoagulation Summary  As of 2024      INR goal:  2.0-3.0   TTR:  75.6% (1.1 y)   INR used for dosin.30 (2024)   Weekly warfarin total:  47.5 mg               Assessment/Plan   Therapeutic     1. New dose: no change    2. Next INR: 1 month      Education provided to patient during the visit:  Patient instructed to call in interim with questions, concerns and changes.         no
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
## 2024-12-06 ENCOUNTER — NON-APPOINTMENT (OUTPATIENT)
Age: 49
End: 2024-12-06

## 2024-12-06 ENCOUNTER — APPOINTMENT (OUTPATIENT)
Dept: GYNECOLOGIC ONCOLOGY | Facility: CLINIC | Age: 49
End: 2024-12-06

## 2024-12-06 ENCOUNTER — OUTPATIENT (OUTPATIENT)
Dept: OUTPATIENT SERVICES | Facility: HOSPITAL | Age: 49
LOS: 1 days | End: 2024-12-06
Payer: COMMERCIAL

## 2024-12-06 VITALS
HEIGHT: 64 IN | DIASTOLIC BLOOD PRESSURE: 84 MMHG | SYSTOLIC BLOOD PRESSURE: 123 MMHG | OXYGEN SATURATION: 96 % | RESPIRATION RATE: 18 BRPM | WEIGHT: 184.09 LBS | HEART RATE: 73 BPM | TEMPERATURE: 98 F

## 2024-12-06 DIAGNOSIS — Z96.0 PRESENCE OF UROGENITAL IMPLANTS: Chronic | ICD-10-CM

## 2024-12-06 DIAGNOSIS — N13.30 UNSPECIFIED HYDRONEPHROSIS: ICD-10-CM

## 2024-12-06 DIAGNOSIS — Z01.818 ENCOUNTER FOR OTHER PREPROCEDURAL EXAMINATION: ICD-10-CM

## 2024-12-06 DIAGNOSIS — N13.5 CROSSING VESSEL AND STRICTURE OF URETER WITHOUT HYDRONEPHROSIS: ICD-10-CM

## 2024-12-06 LAB
ANION GAP SERPL CALC-SCNC: 14 MMOL/L — SIGNIFICANT CHANGE UP (ref 5–17)
BUN SERPL-MCNC: 33 MG/DL — HIGH (ref 7–23)
CALCIUM SERPL-MCNC: 9.7 MG/DL — SIGNIFICANT CHANGE UP (ref 8.4–10.5)
CHLORIDE SERPL-SCNC: 103 MMOL/L — SIGNIFICANT CHANGE UP (ref 96–108)
CO2 SERPL-SCNC: 22 MMOL/L — SIGNIFICANT CHANGE UP (ref 22–31)
CREAT SERPL-MCNC: 2.16 MG/DL — HIGH (ref 0.5–1.3)
EGFR: 27 ML/MIN/1.73M2 — LOW
GLUCOSE SERPL-MCNC: 137 MG/DL — HIGH (ref 70–99)
HCT VFR BLD CALC: 32 % — LOW (ref 34.5–45)
HGB BLD-MCNC: 10.1 G/DL — LOW (ref 11.5–15.5)
MCHC RBC-ENTMCNC: 27 PG — SIGNIFICANT CHANGE UP (ref 27–34)
MCHC RBC-ENTMCNC: 31.6 G/DL — LOW (ref 32–36)
MCV RBC AUTO: 85.6 FL — SIGNIFICANT CHANGE UP (ref 80–100)
NRBC # BLD: 0 /100 WBCS — SIGNIFICANT CHANGE UP (ref 0–0)
PLATELET # BLD AUTO: 291 K/UL — SIGNIFICANT CHANGE UP (ref 150–400)
POTASSIUM SERPL-MCNC: 4 MMOL/L — SIGNIFICANT CHANGE UP (ref 3.5–5.3)
POTASSIUM SERPL-SCNC: 4 MMOL/L — SIGNIFICANT CHANGE UP (ref 3.5–5.3)
RBC # BLD: 3.74 M/UL — LOW (ref 3.8–5.2)
RBC # FLD: 15.8 % — HIGH (ref 10.3–14.5)
SODIUM SERPL-SCNC: 139 MMOL/L — SIGNIFICANT CHANGE UP (ref 135–145)
WBC # BLD: 6.61 K/UL — SIGNIFICANT CHANGE UP (ref 3.8–10.5)
WBC # FLD AUTO: 6.61 K/UL — SIGNIFICANT CHANGE UP (ref 3.8–10.5)

## 2024-12-06 PROCEDURE — G0463: CPT

## 2024-12-06 PROCEDURE — 85027 COMPLETE CBC AUTOMATED: CPT

## 2024-12-06 PROCEDURE — 80048 BASIC METABOLIC PNL TOTAL CA: CPT

## 2024-12-06 PROCEDURE — 87086 URINE CULTURE/COLONY COUNT: CPT

## 2024-12-06 RX ORDER — LIDOCAINE HCL 20 MG/ML
0.2 VIAL (ML) INJECTION ONCE
Refills: 0 | Status: DISCONTINUED | OUTPATIENT
Start: 2024-12-10 | End: 2024-12-24

## 2024-12-06 RX ORDER — SODIUM CHLORIDE 9 MG/ML
3 INJECTION, SOLUTION INTRAMUSCULAR; INTRAVENOUS; SUBCUTANEOUS EVERY 8 HOURS
Refills: 0 | Status: DISCONTINUED | OUTPATIENT
Start: 2024-12-10 | End: 2024-12-24

## 2024-12-06 RX ORDER — 0.9 % SODIUM CHLORIDE 0.9 %
1000 INTRAVENOUS SOLUTION INTRAVENOUS
Refills: 0 | Status: DISCONTINUED | OUTPATIENT
Start: 2024-12-10 | End: 2024-12-24

## 2024-12-06 NOTE — H&P PST ADULT - ATTENDING COMMENTS
pt seen and examined today  consent reviewed, signed, for bilateral tandem stent exchange  CT 12/2024 reviewed- bilat hydro reflective of chronic changes  creat 2.16 on pst, stable, lower than prior  on augmentin- urine culture pst > 3 org, contam-   will proceed as planned for cysto, bilat tandem stent exchange (prior were 7F 26 cm optima)

## 2024-12-06 NOTE — H&P PST ADULT - NSICDXPASTMEDICALHX_GEN_ALL_CORE_FT
PAST MEDICAL HISTORY:  Anxiety disorder     Bilateral lumbar radiculopathy     Calculus of kidney     Cervical adenocarcinoma     Chemotherapy-induced neuropathy     CKD (chronic kidney disease)     GERD (gastroesophageal reflux disease)     H/O hemorrhoids     History of foot drop     Hydronephrosis     Hypertension     Neuropathic pain due to radiation     LEESA (obstructive sleep apnea)     T2DM (type 2 diabetes mellitus)

## 2024-12-06 NOTE — H&P PST ADULT - PROBLEM SELECTOR PLAN 1
Scheduled for Cystoscopy Bilateral Stent Exchange   Pre-procedure labs collected. PST instructions provided. Patient verbalized understanding of instructions.  labs anmd urine culture sent Scheduled for Cystoscopy Bilateral Stent Exchange   Pre-procedure labs collected. PST instructions provided. Patient verbalized understanding of instructions.  labs and urine culture sent

## 2024-12-06 NOTE — H&P PST ADULT - HISTORY OF PRESENT ILLNESS
50 y/o F with PMHx significant for Adenocarcinoma of Cervix (dx 2021) s/p chemotherapy/radiation therapy , Chemo Induced Neuropathy, Nephrolithiasis, h/o Hydronephrosis s/p Ureteral stent placement, CKD, and T2DM. Patient currently with bilateral ureteral stricture (Post Radiation Therapy for Cervical CA). She is s/p Cystoscopy, B/L Retrograde Pyelogram, B/L Tandem Ureteral Stent Exchange on 06/24/24. Currently received stent exchanged every 3 months. Patient is scheduled for Cystoscopy, Bilateral Stent Exchange with Dr. Hoenig on 09/30/2024. Presents to PST for scheduled  Cystoscopy  Biolateral Tandem stent exchange on 12/10/24.       50 y/o F with PMHx significant for Adenocarcinoma of Cervix (dx 2021) s/p chemotherapy/radiation therapy , Chemo Induced Neuropathy on gabapentin and morphine , Nephrolithiasis, h/o Hydronephrosis s/p Ureteral stent placement, CKD, and T2DM. Patient currently with bilateral ureteral stricture (Post Radiation Therapy for Cervical CA). s/p Cystoscopy, B/L Retrograde Pyelogram, B/L Tandem Ureteral Stent Exchange in 06/24/24. Currently received stent exchanged every 3 months, s/p  Cystoscopy, Bilateral Stent Exchange with Dr. Hoenig in  6/24/24  and  09/30/2024. Presents to PST for scheduled  Cystoscopy  Biolateral Tandem stent exchange on 12/10/24.       50 y/o F with PMHx significant for Adenocarcinoma of Cervix (dx 2021) s/p chemotherapy/radiation therapy , Chemo Induced Neuropathy on gabapentin and morphine , Nephrolithiasis, h/o Hydronephrosis s/p Ureteral stent placement, CKD, and T2DM. Patient currently with bilateral ureteral stricture (Post Radiation Therapy for Cervical CA). s/p Cystoscopy, B/L Retrograde Pyelogram, B/L Tandem Ureteral Stent Exchange in 06/24/24. Currently received stent exchanged every ~ 3 months, s/p  Cystoscopy, Bilateral Stent Exchange with Dr. Hoenig in  6/24/24  and  09/30/2024. Presents to PST for scheduled  Cystoscopy  Biolateral Tandem stent exchange on 12/10/24.

## 2024-12-06 NOTE — H&P PST ADULT - ASSESSMENT
DASI score: 4.2 METS  DASI activity: walks with cane, light housework/activity   Loose teeth or denture: Denies   MP: Class 2  DASI score: 5.25 METS  DASI activity: walks with cane, light housework/activity   Loose teeth or denture: Denies   MP: Class 2

## 2024-12-06 NOTE — H&P PST ADULT - PATIENT'S PREFERRED PRONOUN
Received call from Dahiana Chicas [facility] with [employer]. Brief description of triage: Shortness of breath, headache, congestion x3 days     Triage indicates for patient to go to the ED     Care advice provided, patient verbalizes understanding; denies any other questions or concerns; instructed to call back for any new or worsening symptoms. Attention Provider: Thank you for allowing me to participate in the care of your patient. The patient was connected to triage in response to information provided to the [facility]. Please do not respond through this encounter as the response is not directed to a shared pool. Reason for Disposition   MILD difficulty breathing (e.g., minimal/no SOB at rest, SOB with walking, pulse <100)    Answer Assessment - Initial Assessment Questions  1. COVID-19 DIAGNOSIS: \"Who made your Coronavirus (COVID-19) diagnosis? \" \"Was it confirmed by a positive lab test?\" If not diagnosed by a HCP, ask \"Are there lots of cases (community spread) where you live? \" (See public health department website, if unsure)      Denies   2. COVID-19 EXPOSURE: \"Was there any known exposure to COVID before the symptoms began? \" CDC Definition of close contact: within 6 feet (2 meters) for a total of 15 minutes or more over a 24-hour period. A friend     3. ONSET: \"When did the COVID-19 symptoms start? \"       3 days ago     4. WORST SYMPTOM: \"What is your worst symptom? \" (e.g., cough, fever, shortness of breath, muscle aches)      Shortness of breath     5. COUGH: \"Do you have a cough? \" If so, ask: \"How bad is the cough? \"        Yes, hacking     6. FEVER: \"Do you have a fever? \" If so, ask: \"What is your temperature, how was it measured, and when did it start? \"      Denies     7. RESPIRATORY STATUS: \"Describe your breathing? \" (e.g., shortness of breath, wheezing, unable to speak)       Wheezing, sob     8.  BETTER-SAME-WORSE: Nayeli  you getting better, staying the same or Her/She getting worse compared to yesterday? \"  If getting worse, ask, \"In what way? \"      Same     9. HIGH RISK DISEASE: \"Do you have any chronic medical problems? \" (e.g., asthma, heart or lung disease, weak immune system, etc.)      Asthma, seems like inhaler is not helping     10. PREGNANCY: \"Is there any chance you are pregnant? \" \"When was your last menstrual period? \"        Denies     11. OTHER SYMPTOMS: \"Do you have any other symptoms? \"  (e.g., chills, fatigue, headache, loss of smell or taste, muscle pain, sore throat)        Chills, headache, diminished tastes, cough, sore throat, chest and sinus congestion.     Protocols used: CORONAVIRUS ([id number]) DIAGNOSED OR SUSPECTED-ADULT-[place]

## 2024-12-07 ENCOUNTER — APPOINTMENT (OUTPATIENT)
Dept: NUCLEAR MEDICINE | Facility: IMAGING CENTER | Age: 49
End: 2024-12-07

## 2024-12-07 ENCOUNTER — OUTPATIENT (OUTPATIENT)
Dept: OUTPATIENT SERVICES | Facility: HOSPITAL | Age: 49
LOS: 1 days | End: 2024-12-07
Payer: COMMERCIAL

## 2024-12-07 DIAGNOSIS — Z96.0 PRESENCE OF UROGENITAL IMPLANTS: Chronic | ICD-10-CM

## 2024-12-07 DIAGNOSIS — C53.9 MALIGNANT NEOPLASM OF CERVIX UTERI, UNSPECIFIED: ICD-10-CM

## 2024-12-07 DIAGNOSIS — Z00.8 ENCOUNTER FOR OTHER GENERAL EXAMINATION: ICD-10-CM

## 2024-12-07 PROCEDURE — 78815 PET IMAGE W/CT SKULL-THIGH: CPT | Mod: 26,PS

## 2024-12-07 PROCEDURE — 78815 PET IMAGE W/CT SKULL-THIGH: CPT

## 2024-12-07 PROCEDURE — A9552: CPT

## 2024-12-09 ENCOUNTER — APPOINTMENT (OUTPATIENT)
Dept: GERIATRICS | Facility: CLINIC | Age: 49
End: 2024-12-09
Payer: MEDICARE

## 2024-12-09 DIAGNOSIS — M79.606 PAIN IN LEG, UNSPECIFIED: ICD-10-CM

## 2024-12-09 DIAGNOSIS — C53.9 MALIGNANT NEOPLASM OF CERVIX UTERI, UNSPECIFIED: ICD-10-CM

## 2024-12-09 LAB
CULTURE RESULTS: SIGNIFICANT CHANGE UP
SPECIMEN SOURCE: SIGNIFICANT CHANGE UP

## 2024-12-09 PROCEDURE — 99214 OFFICE O/P EST MOD 30 MIN: CPT | Mod: 95

## 2024-12-10 ENCOUNTER — OUTPATIENT (OUTPATIENT)
Dept: OUTPATIENT SERVICES | Facility: HOSPITAL | Age: 49
LOS: 1 days | End: 2024-12-10
Payer: COMMERCIAL

## 2024-12-10 ENCOUNTER — APPOINTMENT (OUTPATIENT)
Dept: UROLOGY | Facility: HOSPITAL | Age: 49
End: 2024-12-10

## 2024-12-10 ENCOUNTER — TRANSCRIPTION ENCOUNTER (OUTPATIENT)
Age: 49
End: 2024-12-10

## 2024-12-10 VITALS
HEIGHT: 64 IN | DIASTOLIC BLOOD PRESSURE: 78 MMHG | HEART RATE: 84 BPM | TEMPERATURE: 98 F | OXYGEN SATURATION: 96 % | SYSTOLIC BLOOD PRESSURE: 122 MMHG | WEIGHT: 184.09 LBS | RESPIRATION RATE: 16 BRPM

## 2024-12-10 VITALS
RESPIRATION RATE: 15 BRPM | SYSTOLIC BLOOD PRESSURE: 145 MMHG | DIASTOLIC BLOOD PRESSURE: 95 MMHG | OXYGEN SATURATION: 99 % | HEART RATE: 95 BPM

## 2024-12-10 DIAGNOSIS — N13.30 UNSPECIFIED HYDRONEPHROSIS: ICD-10-CM

## 2024-12-10 DIAGNOSIS — N13.5 CROSSING VESSEL AND STRICTURE OF URETER WITHOUT HYDRONEPHROSIS: ICD-10-CM

## 2024-12-10 DIAGNOSIS — Z96.0 PRESENCE OF UROGENITAL IMPLANTS: Chronic | ICD-10-CM

## 2024-12-10 LAB — GLUCOSE BLDC GLUCOMTR-MCNC: 141 MG/DL — HIGH (ref 70–99)

## 2024-12-10 PROCEDURE — 52332 CYSTOSCOPY AND TREATMENT: CPT | Mod: 50

## 2024-12-10 PROCEDURE — C1769: CPT

## 2024-12-10 PROCEDURE — C2617: CPT

## 2024-12-10 PROCEDURE — 82962 GLUCOSE BLOOD TEST: CPT

## 2024-12-10 PROCEDURE — 74420 UROGRAPHY RTRGR +-KUB: CPT | Mod: 26

## 2024-12-10 PROCEDURE — C2625: CPT

## 2024-12-10 PROCEDURE — C1758: CPT

## 2024-12-10 PROCEDURE — 76000 FLUOROSCOPY <1 HR PHYS/QHP: CPT

## 2024-12-10 DEVICE — STENT URET 7FR 26CM: Type: IMPLANTABLE DEVICE | Site: BILATERAL | Status: FUNCTIONAL

## 2024-12-10 DEVICE — GUIDEWIRE SENSOR DUAL-FLEX NITINOL STRAIGHT .035" X 150CM: Type: IMPLANTABLE DEVICE | Site: BILATERAL | Status: FUNCTIONAL

## 2024-12-10 DEVICE — STENT URET INLAY OPTIMA 6FRX26CM: Type: IMPLANTABLE DEVICE | Site: BILATERAL | Status: FUNCTIONAL

## 2024-12-10 DEVICE — URETERAL CATH OPEN END 5FR 70CM: Type: IMPLANTABLE DEVICE | Site: BILATERAL | Status: FUNCTIONAL

## 2024-12-10 RX ORDER — ONDANSETRON HYDROCHLORIDE 4 MG/1
4 TABLET, FILM COATED ORAL ONCE
Refills: 0 | Status: DISCONTINUED | OUTPATIENT
Start: 2024-12-10 | End: 2024-12-24

## 2024-12-10 RX ORDER — CEFAZOLIN SODIUM 10 G
2000 VIAL (EA) INJECTION ONCE
Refills: 0 | Status: COMPLETED | OUTPATIENT
Start: 2024-12-10 | End: 2024-12-10

## 2024-12-10 RX ORDER — FENTANYL 12 UG/H
25 PATCH, EXTENDED RELEASE TRANSDERMAL
Refills: 0 | Status: DISCONTINUED | OUTPATIENT
Start: 2024-12-10 | End: 2024-12-10

## 2024-12-10 RX ORDER — AMOXICILLIN 250 MG
0 CAPSULE ORAL
Refills: 0 | DISCHARGE

## 2024-12-10 RX ADMIN — Medication 100 MILLILITER(S): at 10:57

## 2024-12-10 NOTE — ASU PATIENT PROFILE, ADULT - FALL HARM RISK - RISK INTERVENTIONS

## 2024-12-10 NOTE — ASU DISCHARGE PLAN (ADULT/PEDIATRIC) - FINANCIAL ASSISTANCE
[facility] provides services at a reduced cost to those who are determined to be eligible through [facility]’s financial assistance program. Information regarding [facility]’s financial assistance program can be found by going to [email or web address] or by calling [phone].

## 2024-12-10 NOTE — ASU DISCHARGE PLAN (ADULT/PEDIATRIC) - NS MD DC FALL RISK RISK
For information on Fall & Injury Prevention, visit: [email or web address] OR  [email or web address] OR  https://www.cdc.gov/steadi/patient.html

## 2024-12-10 NOTE — ASU PATIENT PROFILE, ADULT - FALL HARM RISK - UNIVERSAL INTERVENTIONS
Bed in lowest position, wheels locked, appropriate side rails in place/Call bell, personal items and telephone in reach/Instruct patient to call for assistance before getting out of bed or chair/Non-slip footwear when patient is out of bed/Pep to call system/Physically safe environment - no spills, clutter or unnecessary equipment/Purposeful Proactive Rounding/Room/bathroom lighting operational, light cord in reach

## 2024-12-10 NOTE — PRE-ANESTHESIA EVALUATION ADULT - NSDENTALSD_ENT_ALL_CORE
Otc Regimen: Vaseline to affected areas on days not using Clobetasol Modify Regimen: Clobetasol 0.05 % topical ointment \\nSig: Apply to affected areas on lower legs twice a day 2 days a week as maintenance Plan: Wear compression stockings daily Detail Level: Zone Otc Regimen: Amlactin daily s/p lower front extraction. None loose/missing teeth

## 2024-12-10 NOTE — ASU DISCHARGE PLAN (ADULT/PEDIATRIC) - CARE PROVIDER_API CALL
Hoenig, David Mayer  Urology  [address] [place] [address]- [place] [address]  Phone: [phone]  Fax: [phone]  Follow Up Time: Routine

## 2024-12-10 NOTE — ASU DISCHARGE PLAN (ADULT/PEDIATRIC) - NURSING INSTRUCTIONS
Next dose of Tylenol will be on or after __5:40pm_________ ,today/tonight and every 6 hours afterwards for pain management, do not take any Tylenol containing products until this time. Your first dose of Tylenol was given at __11:40am_________. Do not exceed more than 4000mg of Tylenol in one 24 hour setting.

## 2024-12-10 NOTE — ASU DISCHARGE PLAN (ADULT/PEDIATRIC) - ASU DC SPECIAL INSTRUCTIONSFT
PAIN CONTROL: You may take 650 mg of Tylenol every 4-6 hours. Do not exceed more than 4000mg or 4 grams of Tylenol daily. You may take Tylenol and Ibuprofen as needed for pain. If you were prescribed narcotic pain medication, take as directed. You should also take senna to prevent constipation.    STENT: You may have intermittent pink tinged urine and slight flank pain when you urinate.  This is normal and due to the stent in your ureter. It is not uncommon to have some burning when you urinate.  Some patients do not feel any discomfort from the stent, while others may feel the sensation of needing to urinate frequently, burning on urination, or even back pain that worsens with urination. These symptoms usually improve gradually, however it may be necessary to take pain medication until the discomfort subsides.  If you are unable to urinate or your urine becomes bright red or with clots, please call the office. Please make sure you drink plenty of fluids.    ANTIBIOTICS: Please complete the course of antibiotics you have at home.    BATHING: Please do not submerge wound underwater. You may shower and/or sponge bathe.    ACTIVITY: No heavy lifting or straining. Otherwise, you may return to your usual level of physical activity. If you are taking narcotic pain medications (such as oxycodone), do NOT drive a car, operate machinery or make important decisions.    DIET: Return to your usual diet    NOTIFY YOUR SURGEON IF: You have any bleeding that does not stop, any fevers (over 100.4F) or chills, persistent nausea/vomiting, persistent diarrhea, your pain is not controlled on your discharge pain medications, heavy bleeding in the urine, inability to urinate, or other worrisome symptoms arise.    FOLLOW UP:  1. Please call your surgeon to make a follow up appointment within two weeks of discharge  2: Please follow up with your primary care physician within 1-2 weeks regarding your hospitalization.

## 2024-12-20 ENCOUNTER — APPOINTMENT (OUTPATIENT)
Dept: GYNECOLOGIC ONCOLOGY | Facility: CLINIC | Age: 49
End: 2024-12-20
Payer: MEDICARE

## 2024-12-20 VITALS
DIASTOLIC BLOOD PRESSURE: 101 MMHG | SYSTOLIC BLOOD PRESSURE: 151 MMHG | HEIGHT: 64 IN | HEART RATE: 106 BPM | OXYGEN SATURATION: 98 % | WEIGHT: 192 LBS | TEMPERATURE: 97.8 F | BODY MASS INDEX: 32.78 KG/M2

## 2024-12-20 DIAGNOSIS — N13.5 CROSSING VESSEL AND STRICTURE OF URETER W/OUT HYDRONEPHROSIS: ICD-10-CM

## 2024-12-20 DIAGNOSIS — N89.5 STRICTURE AND ATRESIA OF VAGINA: ICD-10-CM

## 2024-12-20 DIAGNOSIS — R94.8 ABNORMAL RESULTS OF FUNCTION STUDIES OF OTHER ORGANS AND SYSTEMS: ICD-10-CM

## 2024-12-20 DIAGNOSIS — G62.82 RADIATION-INDUCED POLYNEUROPATHY: ICD-10-CM

## 2024-12-20 DIAGNOSIS — G62.0 DRUG-INDUCED POLYNEUROPATHY: ICD-10-CM

## 2024-12-20 DIAGNOSIS — Z92.21 PERSONAL HISTORY OF IRRADIATION: ICD-10-CM

## 2024-12-20 DIAGNOSIS — T45.1X5A DRUG-INDUCED POLYNEUROPATHY: ICD-10-CM

## 2024-12-20 DIAGNOSIS — Z92.3 PERSONAL HISTORY OF IRRADIATION: ICD-10-CM

## 2024-12-20 DIAGNOSIS — C53.9 MALIGNANT NEOPLASM OF CERVIX UTERI, UNSPECIFIED: ICD-10-CM

## 2024-12-20 PROCEDURE — 99214 OFFICE O/P EST MOD 30 MIN: CPT

## 2024-12-20 PROCEDURE — 99459 PELVIC EXAMINATION: CPT

## 2024-12-29 LAB — CYTOLOGY CVX/VAG DOC THIN PREP: ABNORMAL

## 2025-01-09 ENCOUNTER — NON-APPOINTMENT (OUTPATIENT)
Age: 50
End: 2025-01-09

## 2025-01-15 ENCOUNTER — NON-APPOINTMENT (OUTPATIENT)
Age: 50
End: 2025-01-15

## 2025-01-21 ENCOUNTER — APPOINTMENT (OUTPATIENT)
Dept: UROLOGY | Facility: CLINIC | Age: 50
End: 2025-01-21
Payer: MEDICARE

## 2025-01-21 DIAGNOSIS — N13.5 CROSSING VESSEL AND STRICTURE OF URETER W/OUT HYDRONEPHROSIS: ICD-10-CM

## 2025-01-21 DIAGNOSIS — Z93.6 OTHER ARTIFICIAL OPENINGS OF URINARY TRACT STATUS: ICD-10-CM

## 2025-01-21 PROCEDURE — 99214 OFFICE O/P EST MOD 30 MIN: CPT

## 2025-01-23 ENCOUNTER — APPOINTMENT (OUTPATIENT)
Dept: GERIATRICS | Facility: CLINIC | Age: 50
End: 2025-01-23
Payer: MEDICARE

## 2025-01-23 DIAGNOSIS — M79.606 PAIN IN LEG, UNSPECIFIED: ICD-10-CM

## 2025-01-23 DIAGNOSIS — G62.0 DRUG-INDUCED POLYNEUROPATHY: ICD-10-CM

## 2025-01-23 DIAGNOSIS — R45.89 OTHER SYMPTOMS AND SIGNS INVOLVING EMOTIONAL STATE: ICD-10-CM

## 2025-01-23 DIAGNOSIS — Z51.5 ENCOUNTER FOR PALLIATIVE CARE: ICD-10-CM

## 2025-01-23 DIAGNOSIS — T45.1X5A DRUG-INDUCED POLYNEUROPATHY: ICD-10-CM

## 2025-01-23 DIAGNOSIS — C53.9 MALIGNANT NEOPLASM OF CERVIX UTERI, UNSPECIFIED: ICD-10-CM

## 2025-01-23 DIAGNOSIS — N13.5 CROSSING VESSEL AND STRICTURE OF URETER W/OUT HYDRONEPHROSIS: ICD-10-CM

## 2025-01-23 PROCEDURE — 99214 OFFICE O/P EST MOD 30 MIN: CPT | Mod: 2W

## (undated) DEVICE — BAG URINE W METER 2L

## (undated) DEVICE — MEDICATION LABELS W MARKER

## (undated) DEVICE — POSITIONER STRAP ARMBOARD VELCRO TS-30

## (undated) DEVICE — TUBING SUCTION 20FT

## (undated) DEVICE — DRSG TELFA 3 X 8

## (undated) DEVICE — PACK CYSTO

## (undated) DEVICE — VENODYNE/SCD SLEEVE CALF LARGE

## (undated) DEVICE — POSITIONER FOAM HEAD DONUT 9" (PINK)

## (undated) DEVICE — NSA-C-ARM: Type: DURABLE MEDICAL EQUIPMENT

## (undated) DEVICE — SOL IRR POUR H2O 500ML

## (undated) DEVICE — GLV 7.5 PROTEXIS (WHITE)

## (undated) DEVICE — POSITIONER FOAM EGG CRATE ULNAR 2PCS (PINK)

## (undated) DEVICE — SYR ASEPTO

## (undated) DEVICE — SOL IRR POUR NS 0.9% 1000ML

## (undated) DEVICE — SYR LUER LOK 10CC

## (undated) DEVICE — GLV 7 PROTEXIS (WHITE)

## (undated) DEVICE — SOL IRR POUR NS 0.9% 500ML

## (undated) DEVICE — FOLEY HOLDER STATLOCK 2 WAY ADULT

## (undated) DEVICE — WARMING BLANKET UPPER ADULT

## (undated) DEVICE — TUBING IRR SET FOR CYSTOSCOPY 77"

## (undated) DEVICE — Device

## (undated) DEVICE — VENODYNE/SCD SLEEVE CALF MEDIUM

## (undated) DEVICE — ADAPTER CHECK FLO 9FR STERILE

## (undated) DEVICE — SOL IRR POUR H2O 1500ML

## (undated) DEVICE — IRR BULB PATHFINDER + 10"

## (undated) DEVICE — SYR LUER LOK 20CC

## (undated) DEVICE — SOL IRR BAG NS 0.9% 3000ML

## (undated) DEVICE — GOWN TRIMAX LG

## (undated) DEVICE — FOLEY TRAY 16FR 5CC LTX UMETER CLOSED

## (undated) DEVICE — DRAPE LINGEMAN TUR

## (undated) DEVICE — DRAPE EQUIPMENT BANDED BAG 30 X 30" (SHOWER CAP)

## (undated) DEVICE — PREP BETADINE KIT

## (undated) DEVICE — ACMI SELF-SEALING SEAL UP TO 7FR

## (undated) DEVICE — TUBING RANGER FLUID IRRIGATION SET DISP

## (undated) DEVICE — POSITIONER FOAM HEADREST (PINK)

## (undated) DEVICE — GLV 6.5 PROTEXIS (WHITE)

## (undated) DEVICE — SOL IRR BAG H2O 3000ML

## (undated) DEVICE — TUBING THERMADX UROLOGY

## (undated) DEVICE — DRAPE COVER SNAP 36X30"

## (undated) DEVICE — DRAPE DRAINAGE BAG RELAX & GEMINI

## (undated) DEVICE — TUBING SUCTION NONCONDUCTIVE 6MM X 12FT

## (undated) DEVICE — BASIN SET DOUBLE

## (undated) DEVICE — BASIN SET SINGLE

## (undated) DEVICE — DRAPE TOWEL BLUE 17" X 24"

## (undated) DEVICE — GLV 8 PROTEXIS (CREAM) NEU-THERA

## (undated) DEVICE — DRAPE EQUIPMENT COVER 27"

## (undated) DEVICE — GLV 8 PROTEXIS (WHITE)

## (undated) DEVICE — GLV 7 PROTEXIS (CREAM) MICRO

## (undated) DEVICE — SPECIMEN CONTAINER 100ML

## (undated) DEVICE — DRSG PAD SANITARY OB

## (undated) DEVICE — GLV 7.5 PROTEXIS (BLUE)

## (undated) DEVICE — DRAPE IRRIGATION POUCH 19X23"

## (undated) DEVICE — DRAINAGE BAG URINARY 4L

## (undated) DEVICE — DRAPE LIGHT HANDLE COVER (GREEN)

## (undated) DEVICE — LABELS BLANK W PEN

## (undated) DEVICE — CANISTER DISPOSABLE THIN WALL 3000CC

## (undated) DEVICE — TUBING LEVEL ONE NORMOFLO SET

## (undated) DEVICE — GOWN LG

## (undated) DEVICE — VISITEC 4X4

## (undated) DEVICE — DRAPE C ARM 41X140"

## (undated) DEVICE — PREP BETADINE SPONGE STICKS

## (undated) DEVICE — PRESSURE INFUSOR BAG 1000ML

## (undated) DEVICE — BOSTON SCIENTIFC ENCORE 26 INFLATOR